# Patient Record
Sex: FEMALE | Race: WHITE | NOT HISPANIC OR LATINO | URBAN - METROPOLITAN AREA
[De-identification: names, ages, dates, MRNs, and addresses within clinical notes are randomized per-mention and may not be internally consistent; named-entity substitution may affect disease eponyms.]

---

## 2017-02-13 ENCOUNTER — ALLSCRIPTS OFFICE VISIT (OUTPATIENT)
Dept: OTHER | Facility: OTHER | Age: 67
End: 2017-02-13

## 2017-04-04 ENCOUNTER — ALLSCRIPTS OFFICE VISIT (OUTPATIENT)
Dept: OTHER | Facility: OTHER | Age: 67
End: 2017-04-04

## 2017-04-04 DIAGNOSIS — Z12.31 ENCOUNTER FOR SCREENING MAMMOGRAM FOR MALIGNANT NEOPLASM OF BREAST: ICD-10-CM

## 2017-04-04 PROCEDURE — G0145 SCR C/V CYTO,THINLAYER,RESCR: HCPCS | Performed by: NURSE PRACTITIONER

## 2017-04-05 ENCOUNTER — LAB REQUISITION (OUTPATIENT)
Dept: LAB | Facility: HOSPITAL | Age: 67
End: 2017-04-05
Payer: COMMERCIAL

## 2017-04-05 DIAGNOSIS — Z01.419 ENCOUNTER FOR GYNECOLOGICAL EXAMINATION WITHOUT ABNORMAL FINDING: ICD-10-CM

## 2017-04-11 LAB
LAB AP GYN PRIMARY INTERPRETATION: NORMAL
Lab: NORMAL

## 2017-04-12 ENCOUNTER — GENERIC CONVERSION - ENCOUNTER (OUTPATIENT)
Dept: OTHER | Facility: OTHER | Age: 67
End: 2017-04-12

## 2017-10-17 ENCOUNTER — HOSPITAL ENCOUNTER (OUTPATIENT)
Dept: MAMMOGRAPHY | Facility: CLINIC | Age: 67
Discharge: HOME | End: 2017-10-17
Payer: COMMERCIAL

## 2017-10-17 DIAGNOSIS — Z12.31 ENCOUNTER FOR SCREENING MAMMOGRAM FOR MALIGNANT NEOPLASM OF BREAST: ICD-10-CM

## 2017-10-17 PROCEDURE — G0202 SCR MAMMO BI INCL CAD: HCPCS

## 2017-10-17 PROCEDURE — 77063 BREAST TOMOSYNTHESIS BI: CPT

## 2018-01-12 VITALS
BODY MASS INDEX: 21.97 KG/M2 | DIASTOLIC BLOOD PRESSURE: 60 MMHG | WEIGHT: 124 LBS | HEIGHT: 63 IN | SYSTOLIC BLOOD PRESSURE: 103 MMHG

## 2018-01-13 VITALS
SYSTOLIC BLOOD PRESSURE: 102 MMHG | BODY MASS INDEX: 21.44 KG/M2 | WEIGHT: 121 LBS | DIASTOLIC BLOOD PRESSURE: 64 MMHG | HEIGHT: 63 IN

## 2018-01-14 NOTE — RESULT NOTES
Message  Previous [facility] pt: Last Annual 3/30/2016, discussed ASCCP guidelines and she will "think about them"   Colonoscopy due SUMMER 2016/2017, DXA 9/22/16 showed + osteopenia, FRAX 16% and 0 9%, Activella stopped 3/2015, Mammo review from 2008 till 2016 all nml with last mammo 9/21/2016 BIRADS 1, FIBROGLANDULAR      Signatures   Electronically signed by : JAY Angeles; Sep 23 2016  3:13PM EST                       (Author)

## 2018-01-16 NOTE — RESULT NOTES
Message  PAPS NIL 3/30/2016 THRU 2009  HPV NEG 2015      Signatures   Electronically signed by : Sean Piedra; Sep 26 2016  3:01PM EST                       (Author)

## 2018-01-17 NOTE — RESULT NOTES
Verified Results  (1) THIN PREP PAP WITH IMAGING [id number] 12:00AM Garo Wallis     Test Name Result Flag Reference   LAB AP CASE REPORT (Report)     Gynecologic Cytology Report            Case: [id number]                  Authorizing Provider: JAY Marinelli    Collected:      04/04/2017           First Screen:     FAHAD Qiu    Received:      04/06/2017 1226        Specimen:  LIQUID-BASED PAP, SCREENING, Cervix   LAB AP GYN PRIMARY INTERPRETATION      Negative for intraepithelial lesion or malignancy  Electronically signed by FAHAD Qiu on 4/11/2017 at 10:15 AM   LAB AP GYN SPECIMEN ADEQUACY      Satisfactory for evaluation  (See note)   LAB AP GYN NOTE      No endocervical cells identified  It is difficult to differentiate between   squamous metaplastic cells and parabasal cells in atrophic specimens due   to numerous causes including menopause, postpartum changes and   progestational agents  LAB AP GYN ADDITIONAL INFORMATION (Report)     [employer]'s FDA approved ,  and ThinPrep Imaging System are   utilized with strict adherence to the 's instruction manual to   prepare gynecologic and non-gynecologic cytology specimens for the   production of ThinPrep slides as well as for gynecologic ThinPrep imaging  These processes have been validated by our laboratory and/or by the     The Pap test is not a diagnostic procedure and should not be used as the   sole means to detect cervical cancer  It is only a screening procedure to   aid in the detection of cervical cancer and its precursors  Both   false-negative and false-positive results have been experienced  Your   patient's test result should be interpreted in this context together with   the history and clinical findings     LAB AP LMP

## 2018-04-09 ENCOUNTER — ANNUAL EXAM (OUTPATIENT)
Dept: OBGYN CLINIC | Age: 68
End: 2018-04-09
Payer: COMMERCIAL

## 2018-04-09 VITALS
DIASTOLIC BLOOD PRESSURE: 80 MMHG | SYSTOLIC BLOOD PRESSURE: 118 MMHG | BODY MASS INDEX: 22.15 KG/M2 | HEIGHT: 63 IN | WEIGHT: 125 LBS

## 2018-04-09 DIAGNOSIS — N81.4 UTERINE PROLAPSE: ICD-10-CM

## 2018-04-09 DIAGNOSIS — Z01.419 ENCOUNTER FOR GYNECOLOGICAL EXAMINATION WITHOUT ABNORMAL FINDING: Primary | ICD-10-CM

## 2018-04-09 DIAGNOSIS — N81.6 RECTOCELE: ICD-10-CM

## 2018-04-09 DIAGNOSIS — Z78.0 POSTMENOPAUSAL: ICD-10-CM

## 2018-04-09 DIAGNOSIS — Z12.31 ENCOUNTER FOR SCREENING MAMMOGRAM FOR MALIGNANT NEOPLASM OF BREAST: ICD-10-CM

## 2018-04-09 DIAGNOSIS — N81.11 MIDLINE CYSTOCELE: ICD-10-CM

## 2018-04-09 PROCEDURE — S0612 ANNUAL GYNECOLOGICAL EXAMINA: HCPCS | Performed by: NURSE PRACTITIONER

## 2018-04-09 NOTE — PATIENT INSTRUCTIONS
Calcium/Vitamin D Supplement (By mouth)   Calcium (IRINEO-see-um), Vitamin D (VYE-ta-min D)  Supplies your body with calcium if you need more than you get in your diet  Calcium helps prevent osteoporosis (weak or brittle bones)  Vitamin D helps your body use the calcium  Calcium and vitamin D are minerals that your body needs to work properly  Brand Name(s): Siddharth-Citrate, Siddharth-Citrate Plus Vitamin D, Calcet Petites, Calcium 600MG+D, Calcium Citrate +D3 Maximum, Caltrate 600 + D, Citracal + D, Citracal Calcium Citrate Petites with Vitamin D, Citracal Petites, Citracal Ultradense Calcium Citrate, Citracal Ultradense Calcium Citrate Petite w/Vit D, Citrus Calcium with Vitamin D, D-1000, D-2000, D3-400IU   There may be other brand names for this medicine  When This Medicine Should Not Be Used: You should not use this medicine if you have had an allergic reaction to calcium or vitamin D (ergocalciferol)  How to Use This Medicine:   Tablet, Long Acting Tablet, Fizzy Tablet, Liquid Filled Capsule, Chewable Tablet  · Your doctor will tell you how much medicine to use  Do not use more than directed  · Follow the instructions on the medicine label if you are using this medicine without a prescription  Ask your pharmacist or health caregiver if you are not sure how much calcium you should take in one day  · Most calcium supplements should be taken with food, but some kinds of calcium (such as calcium citrate) can be taken with or without food  Ask your health care provider or read the label on the bottle to see if you need to take your specific kind of calcium with food  Drink a full glass of water (8 ounces) with each dose  · If you are using the effervescent (fizzy) tablet, dissolve the tablet in about 6 to 8 ounces of water (3/4 cup to 1 cup)  After the tablet is completely dissolved, drink this mixture right away  Do not save any mixture to take later    · Carefully follow your doctor's instructions about any special diet   · If you need to take more than one dose each a day, take each dose at evenly spaced times, unless your doctor has told you otherwise  If a dose is missed:   · Take a dose as soon as you remember  If it is almost time for your next dose, wait until then and take a regular dose  Do not take extra medicine to make up for a missed dose  How to Store and Dispose of This Medicine:   · Store the medicine in a closed container at room temperature, away from heat, moisture, and direct light  If the effervescent (fizzy) tablet comes packaged in foil, do not open the foil until you are ready to use each tablet  · Ask your pharmacist, doctor, or health caregiver about the best way to dispose of any outdated medicine or medicine no longer needed  · Keep all medicine out of the reach of children  Never share your medicine with anyone  Drugs and Foods to Avoid:   Ask your doctor or pharmacist before using any other medicine, including over-the-counter medicines, vitamins, and herbal products  · Make sure your doctor knows if you are also using other supplements or medicines that contain calcium  Tell your doctor if you are also using gallium nitrate (Ganite®), cellulose sodium phosphate (Calcibind®), or etidronate (Didronel®)  · Calcium can change the way other medicines work if you take them at the same time  If you need to use other medicines, take them at least 2 hours before or 2 hours after you take your calcium supplement  This is particularly important if you are also using phenytoin (Dilantin®) or a tetracycline antibiotic to treat an infection (such as doxycycline, minocycline, Vibramycin®)  · Do not take your calcium supplement with a high-fiber meal (such as bran, whole-grain cereal or bread, fresh fruits)  Do not smoke cigarettes or cigars  Do not drink large amounts of alcohol or caffeine (for example, more than about 8 cups of coffee)    Warnings While Using This Medicine:   · Make sure your doctor knows if you are pregnant or breast feeding, or if you have kidney disease or have ever had kidney stones  Tell your doctor if you have had problems with too much calcium (hypercalcemia) or too little calcium in your blood (hypocalcemia)  Some health problems that can cause hypercalcemia are sarcoidosis, or problems with your parathyroid gland  · You should not use certain brands of this medicine if you have kidney disease or are on dialysis, because they may harm your kidneys  Ask your caregiver what brands are best for you  · Some health problems can affect how much calcium you should take  Tell your doctor if you have stomach or digestion problems, such as on-going diarrhea, not absorbing nutrients properly, or not having enough acid in your stomach  · This medicine might contain phenylalanine (aspartame)  This is only a concern if you have a disorder called phenylketonuria (a problem with amino acids)  If you have this condition, talk to your doctor before using this medicine  · If you are using a large amount of calcium or using it for a long time, your doctor might need to check your blood on a regular basis  Be sure to keep all appointments  Possible Side Effects While Using This Medicine:   Call your doctor right away if you notice any of these side effects:  · Headache that will not go away, dry mouth, loss of appetite, severe constipation  If you notice other side effects that you think are caused by this medicine, tell your doctor  Call your doctor for medical advice about side effects  You may report side effects to FDA at 7-804-FDA-5704  © 2017 [employer] Information is for End User's use only and may not be sold, redistributed or otherwise used for commercial purposes  The above information is an  only  It is not intended as medical advice for individual conditions or treatments   Talk to your doctor, nurse or pharmacist before following any medical regimen to see if it is safe and effective for you

## 2018-04-09 NOTE — PROGRESS NOTES
Assessment/Plan:    No problem-specific Assessment & Plan notes found for this encounter  Diagnoses and all orders for this visit:    Encounter for gynecological examination without abnormal finding    Encounter for screening mammogram for malignant neoplasm of breast  -     Mammo screening bilateral w 3d & cad; Future    Postmenopausal  -     DXA bone density spine hip and pelvis; Future    Rectocele    Uterine prolapse    Midline cystocele    Other orders  -     Calcium Carbonate-Vitamin D3 (CALCIUM 600/VITAMIN D) 600-400 MG-UNIT TABS; Take 1 tablet by mouth daily          Subjective:      Patient ID: Andressa Odell is a 78 y o  female  Pleasant 78 y o  postmenopausal female here for annual exam  She denies postmenopausal bleeding  Denies history of abnormal pap smears, agrees to no pap today  Denies vaginal issues other than prolapse but does not request intervention at this point    Denies pelvic pain  Denies menopausal/postmenopausal issues  The following portions of the patient's history were reviewed and updated as appropriate:   She  has a past medical history of Benign neoplasm of skin  She   Patient Active Problem List    Diagnosis Date Noted    Postmenopausal 04/09/2018    Encounter for gynecological examination without abnormal finding 04/09/2018    Midline cystocele 12/30/2016    Rectocele 12/30/2016    Uterine prolapse 12/30/2016    Osteopenia 12/16/2016    Blood in urine 01/17/2014     She  has a past surgical history that includes Breast lumpectomy and Tubal ligation  Her family history includes Depression in her daughter and son; Migraines in her daughter and mother; Osteoporosis in her mother; Ovarian cancer in her paternal grandfather; Rheum arthritis in her daughter; Throat cancer in her father; Urinary tract infection in her daughter  She  reports that she has never smoked  She has never used smokeless tobacco  She reports that she drinks alcohol   She reports that she does not use drugs  Current Outpatient Prescriptions   Medication Sig Dispense Refill    Calcium Carbonate-Vitamin D3 (CALCIUM 600/VITAMIN D) 600-400 MG-UNIT TABS Take 1 tablet by mouth daily       No current facility-administered medications for this visit  No current outpatient prescriptions on file prior to visit  No current facility-administered medications on file prior to visit  She has No Known Allergies  OB History    Para Term  AB Living   4 4 4     4   SAB TAB Ectopic Multiple Live Births           4      # Outcome Date GA Lbr Landry/2nd Weight Sex Delivery Anes PTL Lv   4 Term            3 Term            2 Term            1 Term                 2 daughter, 2 sons  Review of Systems   Constitutional: Negative for chills, fatigue, fever and unexpected weight change  Respiratory: Negative for shortness of breath  Gastrointestinal: Negative for anal bleeding, blood in stool, constipation and diarrhea  Genitourinary: Negative for difficulty urinating, dysuria and hematuria  Objective:      /80 (BP Location: Right arm, Patient Position: Sitting)   Ht 5' 3" (1 6 m)   Wt 56 7 kg (125 lb)   Breastfeeding? No   BMI 22 14 kg/m²          Physical Exam   Constitutional: She appears well-developed and well-nourished  No distress  HENT:   Head: Normocephalic  Neck: Normal range of motion  Neck supple  Pulmonary/Chest: Effort normal  Right breast exhibits no inverted nipple, no mass, no nipple discharge, no skin change and no tenderness  Left breast exhibits no inverted nipple, no mass, no nipple discharge, no skin change and no tenderness  Breasts are symmetrical    Abdominal: Soft  Genitourinary: No breast swelling, tenderness, discharge or bleeding  Pelvic exam was performed with patient supine  No labial fusion  There is no rash, tenderness, lesion or injury on the right labia  There is no rash, tenderness, lesion or injury on the left labia   Uterus is not deviated, not enlarged, not fixed and not tender  Cervix exhibits no motion tenderness, no discharge and no friability  Right adnexum displays no mass, no tenderness and no fullness  Left adnexum displays no mass, no tenderness and no fullness  No erythema or tenderness in the vagina  No foreign body in the vagina  No signs of injury around the vagina  No vaginal discharge found  Genitourinary Comments: +uterine/bladder and rectal prolapse stopped using pessary   Lymphadenopathy:        Right: No inguinal adenopathy present  Left: No inguinal adenopathy present

## 2018-11-21 ENCOUNTER — HOSPITAL ENCOUNTER (OUTPATIENT)
Dept: MAMMOGRAPHY | Facility: CLINIC | Age: 68
Discharge: HOME | End: 2018-11-21
Payer: COMMERCIAL

## 2018-11-21 VITALS — WEIGHT: 125 LBS | BODY MASS INDEX: 22.15 KG/M2 | HEIGHT: 63 IN

## 2018-11-21 DIAGNOSIS — Z78.0 POSTMENOPAUSAL: ICD-10-CM

## 2018-11-21 DIAGNOSIS — Z12.31 ENCOUNTER FOR SCREENING MAMMOGRAM FOR MALIGNANT NEOPLASM OF BREAST: ICD-10-CM

## 2018-11-21 PROCEDURE — 77067 SCR MAMMO BI INCL CAD: CPT

## 2018-11-21 PROCEDURE — 77063 BREAST TOMOSYNTHESIS BI: CPT

## 2018-11-21 PROCEDURE — 77080 DXA BONE DENSITY AXIAL: CPT

## 2018-11-26 ENCOUNTER — TELEPHONE (OUTPATIENT)
Dept: OBGYN CLINIC | Facility: CLINIC | Age: 68
End: 2018-11-26

## 2018-11-26 NOTE — TELEPHONE ENCOUNTER
Spoke with pt - she is aware of her DEXA results  Explained we can start her on medication like Fosamax or Boniva - stated she does not want to take medication  She will continue taking her calcium and Vit D with her weight bearing exercises

## 2018-11-26 NOTE — TELEPHONE ENCOUNTER
----- Message from Gabriela Orr, [address]  sent at 11/26/2018  8:18 AM EST -----  Please notify patient her Dexa scan results showed osteoporosis  We can start medication for this if she agrees  She should also continue to work on calcium/vitamin D in her diet and weight bearing exercises  Thanks

## 2019-02-21 ENCOUNTER — OFFICE VISIT (OUTPATIENT)
Dept: INTERNAL MEDICINE CLINIC | Facility: CLINIC | Age: 69
End: 2019-02-21
Payer: COMMERCIAL

## 2019-02-21 VITALS
SYSTOLIC BLOOD PRESSURE: 122 MMHG | BODY MASS INDEX: 22.43 KG/M2 | HEIGHT: 63 IN | OXYGEN SATURATION: 96 % | WEIGHT: 126.6 LBS | DIASTOLIC BLOOD PRESSURE: 70 MMHG | HEART RATE: 70 BPM

## 2019-02-21 DIAGNOSIS — M54.50 ACUTE MIDLINE LOW BACK PAIN WITHOUT SCIATICA: ICD-10-CM

## 2019-02-21 DIAGNOSIS — K59.09 OTHER CONSTIPATION: Primary | ICD-10-CM

## 2019-02-21 PROCEDURE — 1160F RVW MEDS BY RX/DR IN RCRD: CPT | Performed by: INTERNAL MEDICINE

## 2019-02-21 PROCEDURE — 3008F BODY MASS INDEX DOCD: CPT | Performed by: INTERNAL MEDICINE

## 2019-02-21 PROCEDURE — 99214 OFFICE O/P EST MOD 30 MIN: CPT | Performed by: INTERNAL MEDICINE

## 2019-02-21 PROCEDURE — 1036F TOBACCO NON-USER: CPT | Performed by: INTERNAL MEDICINE

## 2019-02-21 NOTE — PROGRESS NOTES
Assessment/Plan:  Regarding the constipation she did have a good bowel movement today  It may be secondary to her back trauma  She will have an x-ray of her low back  She can use Tylenol and/or ibuprofen for that and if it does not get better she will let me know  Regarding the constipation this is a new problem and she has not had a colonoscopy in 12 years  She will use MiraLax for the next day or 2 and then a stool softener  She will make an appointment as soon as possible with Gastroenterology for a colonoscopy  She will get labs including thyroid functions today and if she is not totally resolved of her symptoms she will come back in for re-evaluation  No results found for this or any previous visit (from the past 1008 hour(s))  1  Other constipation  CBC and differential    Comprehensive metabolic panel    TSH, 3rd generation    T4, free    Ambulatory referral to Gastroenterology   2  Acute midline low back pain without sciatica  XR spine lumbar 2 or 3 views injury       Orders Placed This Encounter   Procedures    XR spine lumbar 2 or 3 views injury    CBC and differential    Comprehensive metabolic panel    TSH, 3rd generation    T4, free    Ambulatory referral to Gastroenterology         Subjective:  Constipation and back pain     Patient ID: Lois Mane is a 80 y o  female  HPI she fell about a week ago  She has not had a bowel movement in 4 days until this morning  She was bloated yesterday  No severe pain  No fever  She still has some back pain but it is better  Otherwise has been feeling fairly well but has not had a colonoscopy in 12 years  No melena or hematochezia weight loss or appetite changes  She does have a history of bladder prolapse    The following portions of the patient's history were reviewed and updated as appropriate:   She has a past medical history of Benign neoplasm of skin  ,  does not have any pertinent problems on file  ,   has a past surgical history that includes Tubal ligation and Breast lumpectomy (Left)  ,  family history includes Depression in her daughter and son; Migraines in her daughter and mother; Osteoporosis in her mother; Rheum arthritis in her daughter; Throat cancer in her father; Urinary tract infection in her daughter  ,   reports that she has never smoked  She has never used smokeless tobacco  She reports that she drinks alcohol  She reports that she does not use drugs  ,  has No Known Allergies       Current Outpatient Medications:     Calcium Carbonate-Vitamin D3 (CALCIUM 600/VITAMIN D) 600-400 MG-UNIT TABS, Take 1 tablet by mouth daily, Disp: , Rfl:     Review of Systems   Constitutional: Negative for activity change, appetite change, chills, diaphoresis, fatigue, fever and unexpected weight change  HENT: Negative for congestion, ear pain, hearing loss, mouth sores, nosebleeds, postnasal drip, sinus pressure, sinus pain, sore throat and trouble swallowing  Eyes: Negative for pain, discharge and visual disturbance  Respiratory: Negative for apnea, cough, chest tightness, shortness of breath and wheezing  Cardiovascular: Negative for chest pain, palpitations and leg swelling  Gastrointestinal: Positive for constipation  Negative for abdominal pain, anal bleeding, blood in stool, diarrhea, nausea and vomiting  Endocrine: Negative for polydipsia and polyphagia  Genitourinary: Negative for decreased urine volume, dysuria, flank pain, frequency, hematuria and urgency  She has some bladder dysfunction  Musculoskeletal: Positive for back pain  Negative for arthralgias, gait problem, joint swelling and myalgias  Skin: Negative for rash and wound  Allergic/Immunologic: Negative for environmental allergies and food allergies  Neurological: Negative for dizziness, tremors, seizures, syncope, speech difficulty, light-headedness, numbness and headaches  Hematological: Negative for adenopathy   Does not bruise/bleed easily  Psychiatric/Behavioral: Negative for agitation, confusion, hallucinations, sleep disturbance and suicidal ideas  The patient is not nervous/anxious  Objective:  /70   Pulse 70   Ht 5' 3" (1 6 m)   Wt 57 4 kg (126 lb 9 6 oz)   SpO2 96%   BMI 22 43 kg/m²      Physical Exam   Constitutional: She appears well-developed and well-nourished  No distress  HENT:   Head: Normocephalic  Right Ear: External ear normal    Left Ear: External ear normal    Nose: Nose normal    Mouth/Throat: Oropharynx is clear and moist  No oropharyngeal exudate  Eyes: Pupils are equal, round, and reactive to light  Conjunctivae and EOM are normal  Right eye exhibits no discharge  Left eye exhibits no discharge  Neck: Normal range of motion  Neck supple  No thyromegaly present  Cardiovascular: Normal rate, regular rhythm, normal heart sounds and intact distal pulses  Exam reveals no gallop and no friction rub  No murmur heard  Pulmonary/Chest: Effort normal and breath sounds normal  No respiratory distress  She has no wheezes  She has no rales  Abdominal: Soft  Bowel sounds are normal  She exhibits no distension and no mass  There is no tenderness  There is no rebound and no guarding  Bowel sounds are normoactive  No masses  No tenderness  Musculoskeletal: Normal range of motion  She exhibits no edema, tenderness or deformity  Lymphadenopathy:     She has no cervical adenopathy  Neurological: She is alert  She has normal reflexes  She displays normal reflexes  No cranial nerve deficit  Coordination normal    Skin: Skin is warm and dry  No rash noted  No erythema  Psychiatric: She has a normal mood and affect  Her behavior is normal  Judgment and thought content normal    Nursing note and vitals reviewed

## 2019-02-22 ENCOUNTER — APPOINTMENT (OUTPATIENT)
Dept: RADIOLOGY | Facility: CLINIC | Age: 69
End: 2019-02-22
Payer: COMMERCIAL

## 2019-02-22 ENCOUNTER — APPOINTMENT (OUTPATIENT)
Dept: LAB | Facility: CLINIC | Age: 69
End: 2019-02-22
Payer: COMMERCIAL

## 2019-02-22 DIAGNOSIS — K59.09 OTHER CONSTIPATION: ICD-10-CM

## 2019-02-22 DIAGNOSIS — M54.50 ACUTE MIDLINE LOW BACK PAIN WITHOUT SCIATICA: ICD-10-CM

## 2019-02-22 LAB
ALBUMIN SERPL BCP-MCNC: 3.7 G/DL (ref 3.5–5)
ALP SERPL-CCNC: 81 U/L (ref 46–116)
ALT SERPL W P-5'-P-CCNC: 21 U/L (ref 12–78)
ANION GAP SERPL CALCULATED.3IONS-SCNC: 7 MMOL/L (ref 4–13)
AST SERPL W P-5'-P-CCNC: 23 U/L (ref 5–45)
BASOPHILS # BLD AUTO: 0.07 THOUSANDS/ΜL (ref 0–0.1)
BASOPHILS NFR BLD AUTO: 1 % (ref 0–1)
BILIRUB SERPL-MCNC: 0.67 MG/DL (ref 0.2–1)
BUN SERPL-MCNC: 17 MG/DL (ref 5–25)
CALCIUM SERPL-MCNC: 8.8 MG/DL (ref 8.3–10.1)
CHLORIDE SERPL-SCNC: 106 MMOL/L (ref 100–108)
CO2 SERPL-SCNC: 26 MMOL/L (ref 21–32)
CREAT SERPL-MCNC: 0.91 MG/DL (ref 0.6–1.3)
EOSINOPHIL # BLD AUTO: 0.31 THOUSAND/ΜL (ref 0–0.61)
EOSINOPHIL NFR BLD AUTO: 5 % (ref 0–6)
ERYTHROCYTE [DISTWIDTH] IN BLOOD BY AUTOMATED COUNT: 11.9 % (ref 11.6–15.1)
GFR SERPL CREATININE-BSD FRML MDRD: 65 ML/MIN/1.73SQ M
GLUCOSE P FAST SERPL-MCNC: 77 MG/DL (ref 65–99)
HCT VFR BLD AUTO: 41.4 % (ref 34.8–46.1)
HGB BLD-MCNC: 13.6 G/DL (ref 11.5–15.4)
IMM GRANULOCYTES # BLD AUTO: 0.01 THOUSAND/UL (ref 0–0.2)
IMM GRANULOCYTES NFR BLD AUTO: 0 % (ref 0–2)
LYMPHOCYTES # BLD AUTO: 1.67 THOUSANDS/ΜL (ref 0.6–4.47)
LYMPHOCYTES NFR BLD AUTO: 26 % (ref 14–44)
MCH RBC QN AUTO: 30.4 PG (ref 26.8–34.3)
MCHC RBC AUTO-ENTMCNC: 32.9 G/DL (ref 31.4–37.4)
MCV RBC AUTO: 93 FL (ref 82–98)
MONOCYTES # BLD AUTO: 0.5 THOUSAND/ΜL (ref 0.17–1.22)
MONOCYTES NFR BLD AUTO: 8 % (ref 4–12)
NEUTROPHILS # BLD AUTO: 3.88 THOUSANDS/ΜL (ref 1.85–7.62)
NEUTS SEG NFR BLD AUTO: 60 % (ref 43–75)
NRBC BLD AUTO-RTO: 0 /100 WBCS
PLATELET # BLD AUTO: 289 THOUSANDS/UL (ref 149–390)
PMV BLD AUTO: 10.2 FL (ref 8.9–12.7)
POTASSIUM SERPL-SCNC: 4.1 MMOL/L (ref 3.5–5.3)
PROT SERPL-MCNC: 7.5 G/DL (ref 6.4–8.2)
RBC # BLD AUTO: 4.47 MILLION/UL (ref 3.81–5.12)
SODIUM SERPL-SCNC: 139 MMOL/L (ref 136–145)
T4 FREE SERPL-MCNC: 0.92 NG/DL (ref 0.76–1.46)
TSH SERPL DL<=0.05 MIU/L-ACNC: 1.83 UIU/ML (ref 0.36–3.74)
WBC # BLD AUTO: 6.44 THOUSAND/UL (ref 4.31–10.16)

## 2019-02-22 PROCEDURE — 84443 ASSAY THYROID STIM HORMONE: CPT

## 2019-02-22 PROCEDURE — 85025 COMPLETE CBC W/AUTO DIFF WBC: CPT

## 2019-02-22 PROCEDURE — 36415 COLL VENOUS BLD VENIPUNCTURE: CPT

## 2019-02-22 PROCEDURE — 80053 COMPREHEN METABOLIC PANEL: CPT

## 2019-02-22 PROCEDURE — 84439 ASSAY OF FREE THYROXINE: CPT

## 2019-02-22 PROCEDURE — 72100 X-RAY EXAM L-S SPINE 2/3 VWS: CPT

## 2019-03-05 ENCOUNTER — TELEPHONE (OUTPATIENT)
Dept: INTERNAL MEDICINE CLINIC | Facility: CLINIC | Age: 69
End: 2019-03-05

## 2019-03-05 NOTE — TELEPHONE ENCOUNTER
Patient returning dr gama's phone call  The best time to reach her is before 3 pm daily  Please have him call her at [phone] before 3 pm  Duran López

## 2019-04-12 ENCOUNTER — ANNUAL EXAM (OUTPATIENT)
Dept: OBGYN CLINIC | Age: 69
End: 2019-04-12
Payer: COMMERCIAL

## 2019-04-12 VITALS
WEIGHT: 123 LBS | BODY MASS INDEX: 21.79 KG/M2 | DIASTOLIC BLOOD PRESSURE: 70 MMHG | HEIGHT: 63 IN | SYSTOLIC BLOOD PRESSURE: 112 MMHG

## 2019-04-12 DIAGNOSIS — N81.11 MIDLINE CYSTOCELE: ICD-10-CM

## 2019-04-12 DIAGNOSIS — Z12.31 ENCOUNTER FOR SCREENING MAMMOGRAM FOR MALIGNANT NEOPLASM OF BREAST: Primary | ICD-10-CM

## 2019-04-12 DIAGNOSIS — N81.4 UTERINE PROLAPSE: ICD-10-CM

## 2019-04-12 DIAGNOSIS — M81.8 OTHER OSTEOPOROSIS WITHOUT CURRENT PATHOLOGICAL FRACTURE: ICD-10-CM

## 2019-04-12 DIAGNOSIS — Z78.0 ASYMPTOMATIC POSTMENOPAUSAL STATUS: ICD-10-CM

## 2019-04-12 DIAGNOSIS — Z01.411 ENCOUNTER FOR GYNECOLOGICAL EXAMINATION WITH ABNORMAL FINDING: ICD-10-CM

## 2019-04-12 DIAGNOSIS — N81.6 RECTOCELE: ICD-10-CM

## 2019-04-12 PROCEDURE — S0612 ANNUAL GYNECOLOGICAL EXAMINA: HCPCS | Performed by: NURSE PRACTITIONER

## 2020-01-28 ENCOUNTER — HOSPITAL ENCOUNTER (OUTPATIENT)
Dept: MAMMOGRAPHY | Facility: CLINIC | Age: 70
Discharge: HOME | End: 2020-01-28
Payer: COMMERCIAL

## 2020-01-28 VITALS — WEIGHT: 123 LBS | BODY MASS INDEX: 21.79 KG/M2 | HEIGHT: 63 IN

## 2020-01-28 DIAGNOSIS — Z12.31 ENCOUNTER FOR SCREENING MAMMOGRAM FOR MALIGNANT NEOPLASM OF BREAST: ICD-10-CM

## 2020-01-28 PROCEDURE — 77063 BREAST TOMOSYNTHESIS BI: CPT

## 2020-01-28 PROCEDURE — 77067 SCR MAMMO BI INCL CAD: CPT

## 2020-07-17 LAB
EXTERNAL HIV CONFIRMATION: NORMAL
EXTERNAL HIV SCREEN: NORMAL
HCV AB SER-ACNC: NEGATIVE

## 2020-08-18 ENCOUNTER — TELEPHONE (OUTPATIENT)
Dept: INTERNAL MEDICINE CLINIC | Facility: CLINIC | Age: 70
End: 2020-08-18

## 2020-09-08 ENCOUNTER — OFFICE VISIT (OUTPATIENT)
Dept: INTERNAL MEDICINE CLINIC | Facility: CLINIC | Age: 70
End: 2020-09-08
Payer: MEDICARE

## 2020-09-08 VITALS
SYSTOLIC BLOOD PRESSURE: 120 MMHG | HEART RATE: 68 BPM | RESPIRATION RATE: 12 BRPM | BODY MASS INDEX: 21.79 KG/M2 | TEMPERATURE: 97.1 F | HEIGHT: 63 IN | DIASTOLIC BLOOD PRESSURE: 72 MMHG | WEIGHT: 123 LBS

## 2020-09-08 DIAGNOSIS — Z00.00 WELL ADULT EXAM: ICD-10-CM

## 2020-09-08 DIAGNOSIS — M81.8 OTHER OSTEOPOROSIS WITHOUT CURRENT PATHOLOGICAL FRACTURE: ICD-10-CM

## 2020-09-08 DIAGNOSIS — Z12.11 COLON CANCER SCREENING: Primary | ICD-10-CM

## 2020-09-08 DIAGNOSIS — Z23 NEED FOR PNEUMOCOCCAL VACCINATION: ICD-10-CM

## 2020-09-08 DIAGNOSIS — M41.25 OTHER IDIOPATHIC SCOLIOSIS, THORACOLUMBAR REGION: ICD-10-CM

## 2020-09-08 PROCEDURE — 99214 OFFICE O/P EST MOD 30 MIN: CPT | Performed by: INTERNAL MEDICINE

## 2020-09-08 RX ORDER — CYCLOSPORINE 0.5 MG/ML
1 EMULSION OPHTHALMIC EVERY 12 HOURS
COMMUNITY
Start: 2020-09-04

## 2020-09-08 NOTE — PROGRESS NOTES
Assessment/Plan:    Diagnoses and all orders for this visit:    Colon cancer screening  -     Cologuard; Future    Other osteoporosis without current pathological fracture    Other idiopathic scoliosis, thoracolumbar region    Need for pneumococcal vaccination    Other orders  -     Restasis 0 05 % ophthalmic emulsion         Patient Instructions   New patient visit to establish primary care and annual Medicare well visit  Health maintenance-due for pneumococcal vaccine, she just got Shingrix 9 days ago therefore hold off until next month for pneumococcal vaccine as well as flu vaccine  Cologuard ordered, health maintenance otherwise up-to-date    Osteoporosis-repeat DEXA in November    Left flank pain is likely related to thoracolumbar scoliosis  Consider physical therapy if symptoms warrant, otherwise no specific treatment is indicated  Check CBC, insurance physical lab work reviewed and otherwise unremarkable  Follow-up yearly and as needed      Subjective:      Patient ID: Armida Atwood is a 77 y o  female    New patien to [facility] primary care  Sciatica  Left flank pain        Current Outpatient Medications:     Calcium Carbonate-Vitamin D3 (CALCIUM 600/VITAMIN D) 600-400 MG-UNIT TABS, Take 1 tablet by mouth daily, Disp: , Rfl:     Restasis 0 05 % ophthalmic emulsion, , Disp: , Rfl:     No results found for this or any previous visit (from the past 1008 hour(s))  The following portions of the patient's history were reviewed and updated as appropriate: allergies, current medications, past family history, past medical history, past social history, past surgical history and problem list      Review of Systems   Musculoskeletal: Positive for arthralgias and myalgias  Objective:      Vitals:    09/08/20 1005   BP: 120/72   Pulse: 68   Resp: 12   Temp: (!) 97 1 °F (36 2 °C)          Physical Exam  Constitutional:       Appearance: She is well-developed     HENT:      Head: Normocephalic and atraumatic  Nose: Nose normal    Eyes:      General: No scleral icterus  Conjunctiva/sclera: Conjunctivae normal       Pupils: Pupils are equal, round, and reactive to light  Neck:      Musculoskeletal: Normal range of motion and neck supple  Thyroid: No thyromegaly  Vascular: No JVD  Trachea: No tracheal deviation  Cardiovascular:      Rate and Rhythm: Normal rate and regular rhythm  Heart sounds: No murmur  No friction rub  No gallop  Pulmonary:      Effort: Pulmonary effort is normal  No respiratory distress  Breath sounds: Normal breath sounds  No wheezing or rales  Abdominal:      General: Bowel sounds are normal  There is no distension  Palpations: Abdomen is soft  There is no mass  Tenderness: There is no abdominal tenderness  There is no guarding or rebound  Musculoskeletal:         General: No tenderness  Comments: Dextro thoracic and levo lumbar scoliosis   Lymphadenopathy:      Cervical: No cervical adenopathy  Skin:     General: Skin is warm and dry  Findings: No erythema or rash  Neurological:      Mental Status: She is alert and oriented to person, place, and time  Cranial Nerves: No cranial nerve deficit  Psychiatric:         Behavior: Behavior normal          Thought Content:  Thought content normal          Judgment: Judgment normal

## 2020-09-08 NOTE — PATIENT INSTRUCTIONS
New patient visit to establish primary care and annual Medicare well visit  Health maintenance-due for pneumococcal vaccine, she just got Shingrix 9 days ago therefore hold off until next month for pneumococcal vaccine as well as flu vaccine  Cologuard ordered, health maintenance otherwise up-to-date    Osteoporosis-repeat DEXA in November    Left flank pain is likely related to thoracolumbar scoliosis  Consider physical therapy if symptoms warrant, otherwise no specific treatment is indicated  Check CBC, insurance physical lab work reviewed and otherwise unremarkable      Follow-up yearly and as needed

## 2020-09-08 NOTE — PROGRESS NOTES
Assessment and Plan:     Problem List Items Addressed This Visit     None      Visit Diagnoses     Colon cancer screening    -  Primary    Relevant Orders    Cologuard          Falls Plan of Care: balance, strength, and gait training instructions were provided  Preventive health issues were discussed with patient, and age appropriate screening tests were ordered as noted in patient's After Visit Summary  Personalized health advice and appropriate referrals for health education or preventive services given if needed, as noted in patient's After Visit Summary       History of Present Illness:     Patient presents for Medicare Annual Wellness visit    Patient Care Team:  Aman Yost MD as PCP - General (Internal Medicine)     Problem List:     Patient Active Problem List   Diagnosis    Blood in urine    Midline cystocele    Osteopenia    Rectocele    Uterine prolapse    Postmenopausal    Encounter for gynecological examination without abnormal finding    Other constipation    Other osteoporosis without current pathological fracture    Asymptomatic postmenopausal status      Past Medical and Surgical History:     Past Medical History:   Diagnosis Date    Benign neoplasm of skin      Past Surgical History:   Procedure Laterality Date    BREAST LUMPECTOMY Left     LAST ASSESSED: [id number]    TUBAL LIGATION      LAST ASSESSED: [id number]      Family History:     Family History   Problem Relation Age of Onset    Migraines Mother     Osteoporosis Mother     Throat cancer Father     Depression Daughter         WITH ANXIETY     Migraines Daughter     Rheum arthritis Daughter     Urinary tract infection Daughter     Depression Son         WITH ANXIETY     No Known Problems Daughter     Breast cancer Neg Hx     Colon cancer Neg Hx     Uterine cancer Neg Hx     Cervical cancer Neg Hx     Ovarian cancer Neg Hx       Social History:     E-Cigarette/Vaping    E-Cigarette Use Never User E-Cigarette/Vaping Substances    Nicotine No     THC No     CBD No     Flavoring No     Other No     Unknown No      Social History     Socioeconomic History    Marital status: /Civil Union     Spouse name: None    Number of children: None    Years of education: None    Highest education level: None   Occupational History    None   Social Needs    Financial resource strain: None    Food insecurity     Worry: None     Inability: None    Transportation needs     Medical: None     Non-medical: None   Tobacco Use    Smoking status: Never Smoker    Smokeless tobacco: Never Used   Substance and Sexual Activity    Alcohol use: Yes     Frequency: Monthly or less     Drinks per session: 1 or 2    Drug use: No    Sexual activity: Yes     Birth control/protection: Surgical   Lifestyle    Physical activity     Days per week: 7 days     Minutes per session: 30 min    Stress: To some extent   Relationships    Social connections     Talks on phone: None     Gets together: None     Attends Restoration service: None     Active member of club or organization: None     Attends meetings of clubs or organizations: None     Relationship status: None    Intimate partner violence     Fear of current or ex partner: None     Emotionally abused: None     Physically abused: None     Forced sexual activity: None   Other Topics Concern    None   Social History Narrative    None      Medications and Allergies:     Current Outpatient Medications   Medication Sig Dispense Refill    Calcium Carbonate-Vitamin D3 (CALCIUM 600/VITAMIN D) 600-400 MG-UNIT TABS Take 1 tablet by mouth daily      Restasis 0 05 % ophthalmic emulsion        No current facility-administered medications for this visit  No Known Allergies   Immunizations: There is no immunization history on file for this patient     Health Maintenance:         Topic Date Due    Hepatitis C Screening  1950    MAMMOGRAM  01/28/2021 Topic Date Due    DTaP,Tdap,and Td Vaccines (1 - Tdap) 10/11/1972    Pneumococcal Vaccine: 65+ Years (1 of 1 - PPSV23) 10/11/2016    Influenza Vaccine  07/01/2020      Medicare Health Risk Assessment:     /72 (BP Location: Left arm, Patient Position: Sitting)   Pulse 68   Temp (!) 97 1 °F (36 2 °C) (Temporal)   Resp 12   Ht 5' 3" (1 6 m)   Wt 55 8 kg (123 lb)   BMI 21 79 kg/m²      Alma Delia Plaza is here for her Subsequent Wellness visit  Last Medicare Wellness visit information reviewed, patient interviewed and updates made to the record today  Health Risk Assessment:   Patient rates overall health as excellent  Patient feels that their physical health rating is same  Eyesight was rated as same  Hearing was rated as same  Patient feels that their emotional and mental health rating is same  Pain experienced in the last 7 days has been some  Patient's pain rating has been 3/10  Patient states that she has experienced no weight loss or gain in last 6 months  Depression Screening:   PHQ-2 Score: 0      Fall Risk Screening: In the past year, patient has experienced: history of falling in past year    Number of falls: 2 or more  Injured during fall?: No    Feels unsteady when standing or walking?: No    Worried about falling?: No      Urinary Incontinence Screening:   Patient has not leaked urine accidently in the last six months  Home Safety:  Patient does not have trouble with stairs inside or outside of their home  Patient has working smoke alarms and has no working carbon monoxide detector  Home safety hazards include: none  Nutrition:   Current diet is Regular, Low Carb and Limited junk food  Medications:   Patient is not currently taking any over-the-counter supplements  Patient is able to manage medications       Activities of Daily Living (ADLs)/Instrumental Activities of Daily Living (IADLs):   Walk and transfer into and out of bed and chair?: Yes  Dress and groom yourself?: Yes Bathe or shower yourself?: Yes    Feed yourself?  Yes  Do your laundry/housekeeping?: Yes  Manage your money, pay your bills and track your expenses?: Yes  Make your own meals?: Yes    Do your own shopping?: Yes    Previous Hospitalizations:   Any hospitalizations or ED visits within the last 12 months?: No      Advance Care Planning:   Living will: Yes    Advanced directive: Yes      Comments: Sheila Cameron [address] [address]  [phone]    Cognitive Screening:   Provider or family/friend/caregiver concerned regarding cognition?: No    PREVENTIVE SCREENINGS        Breast Cancer Screening:     General: Screening Current      Cervical Cancer Screening:    General: Screening Not Indicated      Osteoporosis Screening:    General: Screening Not Indicated and History Osteoporosis      Abdominal Aortic Aneurysm (AAA) Screening:        General: Screening Not Indicated      Lung Cancer Screening:     General: Screening Not Indicated      Hepatitis C Screening:    General: Risks and Benefits Discussed      Berry Negron MD

## 2020-10-07 ENCOUNTER — TELEPHONE (OUTPATIENT)
Dept: INTERNAL MEDICINE CLINIC | Facility: CLINIC | Age: 70
End: 2020-10-07

## 2020-10-30 ENCOUNTER — TELEPHONE (OUTPATIENT)
Dept: INTERNAL MEDICINE CLINIC | Facility: CLINIC | Age: 70
End: 2020-10-30

## 2020-10-30 DIAGNOSIS — Z23 NEED FOR INFLUENZA VACCINATION: Primary | ICD-10-CM

## 2020-10-30 DIAGNOSIS — Z23 ENCOUNTER FOR IMMUNIZATION: Primary | ICD-10-CM

## 2020-11-02 ENCOUNTER — TELEPHONE (OUTPATIENT)
Dept: INTERNAL MEDICINE CLINIC | Facility: CLINIC | Age: 70
End: 2020-11-02

## 2020-11-02 ENCOUNTER — APPOINTMENT (OUTPATIENT)
Dept: LAB | Facility: CLINIC | Age: 70
End: 2020-11-02
Payer: MEDICARE

## 2020-11-02 ENCOUNTER — CLINICAL SUPPORT (OUTPATIENT)
Dept: INTERNAL MEDICINE CLINIC | Facility: CLINIC | Age: 70
End: 2020-11-02
Payer: MEDICARE

## 2020-11-02 DIAGNOSIS — Z23 ENCOUNTER FOR IMMUNIZATION: Primary | ICD-10-CM

## 2020-11-02 DIAGNOSIS — Z00.00 WELL ADULT EXAM: ICD-10-CM

## 2020-11-02 LAB
BASOPHILS # BLD AUTO: 0.07 THOUSANDS/ΜL (ref 0–0.1)
BASOPHILS NFR BLD AUTO: 1 % (ref 0–1)
EOSINOPHIL # BLD AUTO: 0.24 THOUSAND/ΜL (ref 0–0.61)
EOSINOPHIL NFR BLD AUTO: 3 % (ref 0–6)
ERYTHROCYTE [DISTWIDTH] IN BLOOD BY AUTOMATED COUNT: 12.3 % (ref 11.6–15.1)
HCT VFR BLD AUTO: 43.2 % (ref 34.8–46.1)
HGB BLD-MCNC: 13.7 G/DL (ref 11.5–15.4)
IMM GRANULOCYTES # BLD AUTO: 0.03 THOUSAND/UL (ref 0–0.2)
IMM GRANULOCYTES NFR BLD AUTO: 0 % (ref 0–2)
LYMPHOCYTES # BLD AUTO: 2.23 THOUSANDS/ΜL (ref 0.6–4.47)
LYMPHOCYTES NFR BLD AUTO: 29 % (ref 14–44)
MCH RBC QN AUTO: 29.8 PG (ref 26.8–34.3)
MCHC RBC AUTO-ENTMCNC: 31.7 G/DL (ref 31.4–37.4)
MCV RBC AUTO: 94 FL (ref 82–98)
MONOCYTES # BLD AUTO: 0.67 THOUSAND/ΜL (ref 0.17–1.22)
MONOCYTES NFR BLD AUTO: 9 % (ref 4–12)
NEUTROPHILS # BLD AUTO: 4.54 THOUSANDS/ΜL (ref 1.85–7.62)
NEUTS SEG NFR BLD AUTO: 58 % (ref 43–75)
NRBC BLD AUTO-RTO: 0 /100 WBCS
PLATELET # BLD AUTO: 303 THOUSANDS/UL (ref 149–390)
PMV BLD AUTO: 10.4 FL (ref 8.9–12.7)
RBC # BLD AUTO: 4.59 MILLION/UL (ref 3.81–5.12)
WBC # BLD AUTO: 7.78 THOUSAND/UL (ref 4.31–10.16)

## 2020-11-02 PROCEDURE — 90670 PCV13 VACCINE IM: CPT

## 2020-11-02 PROCEDURE — 36415 COLL VENOUS BLD VENIPUNCTURE: CPT

## 2020-11-02 PROCEDURE — G0008 ADMIN INFLUENZA VIRUS VAC: HCPCS

## 2020-11-02 PROCEDURE — G0009 ADMIN PNEUMOCOCCAL VACCINE: HCPCS

## 2020-11-02 PROCEDURE — 85025 COMPLETE CBC W/AUTO DIFF WBC: CPT

## 2020-11-02 PROCEDURE — 90662 IIV NO PRSV INCREASED AG IM: CPT

## 2020-11-19 ENCOUNTER — TELEMEDICINE (OUTPATIENT)
Dept: INTERNAL MEDICINE CLINIC | Facility: CLINIC | Age: 70
End: 2020-11-19
Payer: MEDICARE

## 2020-11-19 DIAGNOSIS — Z11.9 ENCOUNTER FOR SCREENING FOR INFECTIOUS AND PARASITIC DISEASES, UNSPECIFIED: Primary | ICD-10-CM

## 2020-11-19 PROCEDURE — 99213 OFFICE O/P EST LOW 20 MIN: CPT | Performed by: INTERNAL MEDICINE

## 2020-12-04 ENCOUNTER — HOSPITAL ENCOUNTER (OUTPATIENT)
Dept: MAMMOGRAPHY | Facility: CLINIC | Age: 70
Discharge: HOME | End: 2020-12-04

## 2020-12-04 DIAGNOSIS — M81.8 OTHER OSTEOPOROSIS WITHOUT CURRENT PATHOLOGICAL FRACTURE: ICD-10-CM

## 2020-12-08 ENCOUNTER — TELEPHONE (OUTPATIENT)
Dept: OBGYN CLINIC | Facility: CLINIC | Age: 70
End: 2020-12-08

## 2020-12-08 DIAGNOSIS — Z12.31 VISIT FOR SCREENING MAMMOGRAM: Primary | ICD-10-CM

## 2021-01-13 ENCOUNTER — TELEPHONE (OUTPATIENT)
Dept: INTERNAL MEDICINE CLINIC | Facility: CLINIC | Age: 71
End: 2021-01-13

## 2021-01-13 ENCOUNTER — HOSPITAL ENCOUNTER (OUTPATIENT)
Dept: MAMMOGRAPHY | Facility: CLINIC | Age: 71
Discharge: HOME | End: 2021-01-13
Payer: MEDICARE

## 2021-01-13 PROCEDURE — 77080 DXA BONE DENSITY AXIAL: CPT

## 2021-01-13 NOTE — TELEPHONE ENCOUNTER
----- Message from Tina Delong MD sent at 1/13/2021  6:21 PM EST -----  Notify -bone density is stable compared to 2 years ago

## 2021-01-14 ENCOUNTER — TELEPHONE (OUTPATIENT)
Dept: INTERNAL MEDICINE CLINIC | Facility: CLINIC | Age: 71
End: 2021-01-14

## 2021-01-15 ENCOUNTER — APPOINTMENT (OUTPATIENT)
Dept: LAB | Facility: CLINIC | Age: 71
End: 2021-01-15
Payer: MEDICARE

## 2021-01-15 ENCOUNTER — OFFICE VISIT (OUTPATIENT)
Dept: INTERNAL MEDICINE CLINIC | Facility: CLINIC | Age: 71
End: 2021-01-15
Payer: MEDICARE

## 2021-01-15 ENCOUNTER — TELEPHONE (OUTPATIENT)
Dept: INTERNAL MEDICINE CLINIC | Facility: CLINIC | Age: 71
End: 2021-01-15

## 2021-01-15 VITALS
SYSTOLIC BLOOD PRESSURE: 122 MMHG | RESPIRATION RATE: 16 BRPM | DIASTOLIC BLOOD PRESSURE: 78 MMHG | BODY MASS INDEX: 21.58 KG/M2 | HEIGHT: 63 IN | HEART RATE: 72 BPM | WEIGHT: 121.8 LBS | TEMPERATURE: 98.8 F

## 2021-01-15 DIAGNOSIS — R30.0 DYSURIA: Primary | ICD-10-CM

## 2021-01-15 LAB
BACTERIA UR QL AUTO: ABNORMAL /HPF
BILIRUB UR QL STRIP: NEGATIVE
CLARITY UR: ABNORMAL
COLOR UR: ABNORMAL
GLUCOSE UR STRIP-MCNC: NEGATIVE MG/DL
HGB UR QL STRIP.AUTO: ABNORMAL
HYALINE CASTS #/AREA URNS LPF: ABNORMAL /LPF
KETONES UR STRIP-MCNC: NEGATIVE MG/DL
LEUKOCYTE ESTERASE UR QL STRIP: ABNORMAL
NITRITE UR QL STRIP: POSITIVE
NON-SQ EPI CELLS URNS QL MICRO: ABNORMAL /HPF
PH UR STRIP.AUTO: 6.5 [PH]
PROT UR STRIP-MCNC: NEGATIVE MG/DL
RBC #/AREA URNS AUTO: ABNORMAL /HPF
SP GR UR STRIP.AUTO: 1.01 (ref 1–1.03)
UROBILINOGEN UR QL STRIP.AUTO: 1 E.U./DL
WBC #/AREA URNS AUTO: ABNORMAL /HPF

## 2021-01-15 PROCEDURE — 87077 CULTURE AEROBIC IDENTIFY: CPT | Performed by: NURSE PRACTITIONER

## 2021-01-15 PROCEDURE — 87086 URINE CULTURE/COLONY COUNT: CPT | Performed by: NURSE PRACTITIONER

## 2021-01-15 PROCEDURE — 87186 SC STD MICRODIL/AGAR DIL: CPT | Performed by: NURSE PRACTITIONER

## 2021-01-15 PROCEDURE — 99213 OFFICE O/P EST LOW 20 MIN: CPT | Performed by: NURSE PRACTITIONER

## 2021-01-15 PROCEDURE — 81001 URINALYSIS AUTO W/SCOPE: CPT | Performed by: NURSE PRACTITIONER

## 2021-01-15 NOTE — PROGRESS NOTES
Assessment/Plan:    There are no Patient Instructions on file for this visit  Diagnoses and all orders for this visit:    Dysuria  -     UA w Reflex to Microscopic w Reflex to Culture         Subjective:      Patient ID: Mayda Victor is a 72 y o  female    Pt has had several days of urinary freq, pelvic pressure and generally not feeling well  Hx of UTI's  No blood, fevers, flank pain n/v  Taking azo        Current Outpatient Medications:     Calcium Carbonate-Vitamin D3 (CALCIUM 600/VITAMIN D) 600-400 MG-UNIT TABS, Take 1 tablet by mouth daily, Disp: , Rfl:     Restasis 0 05 % ophthalmic emulsion, , Disp: , Rfl:     No results found for this or any previous visit (from the past 1008 hour(s))  The following portions of the patient's history were reviewed and updated as appropriate: allergies, current medications, past family history, past medical history, past social history, past surgical history and problem list      Review of Systems   Constitutional: Negative for activity change, appetite change, chills and fatigue  HENT: Negative  Eyes: Negative  Respiratory: Negative  Cardiovascular: Negative  Gastrointestinal: Negative for abdominal distention, abdominal pain and nausea  Genitourinary: Positive for frequency and urgency  Negative for dysuria and flank pain  Musculoskeletal: Negative  Objective:      /78 (BP Location: Left arm, Patient Position: Sitting, Cuff Size: Standard)   Pulse 72   Temp 98 8 °F (37 1 °C) (Temporal) Comment: with nsaids  Resp 16   Ht 5' 3" (1 6 m)   Wt 55 2 kg (121 lb 12 8 oz)   BMI 21 58 kg/m²        Physical Exam  Constitutional:       General: She is not in acute distress  Appearance: She is well-developed  She is not diaphoretic  HENT:      Head: Normocephalic and atraumatic  Nose: Nose normal    Eyes:      Conjunctiva/sclera: Conjunctivae normal       Pupils: Pupils are equal, round, and reactive to light     Neck: Musculoskeletal: Normal range of motion and neck supple  Thyroid: No thyromegaly  Vascular: No JVD  Trachea: No tracheal deviation  Cardiovascular:      Rate and Rhythm: Normal rate and regular rhythm  Heart sounds: Normal heart sounds  No murmur  No friction rub  No gallop  Pulmonary:      Effort: Pulmonary effort is normal  No respiratory distress  Breath sounds: Normal breath sounds  No wheezing or rales  Abdominal:      General: Bowel sounds are normal  There is no distension  Palpations: Abdomen is soft  Tenderness: There is no abdominal tenderness  Musculoskeletal: Normal range of motion  Lymphadenopathy:      Cervical: No cervical adenopathy  Skin:     General: Skin is warm and dry  Findings: No rash  Neurological:      Mental Status: She is alert and oriented to person, place, and time  Cranial Nerves: No cranial nerve deficit  Psychiatric:         Behavior: Behavior normal          Thought Content:  Thought content normal          Judgment: Judgment normal

## 2021-01-18 ENCOUNTER — DOCUMENTATION (OUTPATIENT)
Dept: INTERNAL MEDICINE CLINIC | Facility: CLINIC | Age: 71
End: 2021-01-18

## 2021-01-18 ENCOUNTER — TELEPHONE (OUTPATIENT)
Dept: INTERNAL MEDICINE CLINIC | Facility: CLINIC | Age: 71
End: 2021-01-18

## 2021-01-18 DIAGNOSIS — N30.00 ACUTE CYSTITIS WITHOUT HEMATURIA: Primary | ICD-10-CM

## 2021-01-18 LAB — BACTERIA UR CULT: ABNORMAL

## 2021-01-18 RX ORDER — CEPHALEXIN 500 MG/1
500 CAPSULE ORAL EVERY 12 HOURS SCHEDULED
Qty: 10 CAPSULE | Refills: 0 | Status: SHIPPED | OUTPATIENT
Start: 2021-01-18 | End: 2021-01-23

## 2021-01-18 NOTE — TELEPHONE ENCOUNTER
----- Message from Heaven Khoury, [address] sent at 1/18/2021  9:27 AM EST -----  Notify she does have a uti- not sure why it took so long to get back- sorry for the wait   I sent abx in

## 2021-01-26 ENCOUNTER — TELEMEDICINE (OUTPATIENT)
Dept: INTERNAL MEDICINE CLINIC | Facility: CLINIC | Age: 71
End: 2021-01-26
Payer: MEDICARE

## 2021-01-26 DIAGNOSIS — N39.0 URINARY TRACT INFECTION WITHOUT HEMATURIA, SITE UNSPECIFIED: Primary | ICD-10-CM

## 2021-01-26 DIAGNOSIS — N81.11 MIDLINE CYSTOCELE: ICD-10-CM

## 2021-01-26 DIAGNOSIS — N81.4 UTERINE PROLAPSE: ICD-10-CM

## 2021-01-26 PROCEDURE — 99213 OFFICE O/P EST LOW 20 MIN: CPT | Performed by: NURSE PRACTITIONER

## 2021-01-26 RX ORDER — CIPROFLOXACIN 500 MG/1
500 TABLET, FILM COATED ORAL EVERY 12 HOURS SCHEDULED
Qty: 6 TABLET | Refills: 0 | Status: SHIPPED | OUTPATIENT
Start: 2021-01-26 | End: 2021-01-29
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
## 2021-02-09 ENCOUNTER — HOSPITAL ENCOUNTER (OUTPATIENT)
Dept: MAMMOGRAPHY | Facility: CLINIC | Age: 71
Discharge: HOME | End: 2021-02-09
Payer: MEDICARE

## 2021-02-09 VITALS — BODY MASS INDEX: 21.44 KG/M2 | WEIGHT: 121 LBS | HEIGHT: 63 IN

## 2021-02-09 DIAGNOSIS — Z12.31 VISIT FOR SCREENING MAMMOGRAM: ICD-10-CM

## 2021-02-09 PROCEDURE — 77063 BREAST TOMOSYNTHESIS BI: CPT

## 2021-02-09 PROCEDURE — 77067 SCR MAMMO BI INCL CAD: CPT

## 2021-03-10 DIAGNOSIS — Z23 ENCOUNTER FOR IMMUNIZATION: ICD-10-CM

## 2021-03-18 ENCOUNTER — IMMUNIZATIONS (OUTPATIENT)
Dept: FAMILY MEDICINE CLINIC | Facility: HOSPITAL | Age: 71
End: 2021-03-18

## 2021-03-18 DIAGNOSIS — Z23 ENCOUNTER FOR IMMUNIZATION: Primary | ICD-10-CM

## 2021-03-18 PROCEDURE — 91300 SARS-COV-2 / COVID-19 MRNA VACCINE (PFIZER-BIONTECH) 30 MCG: CPT

## 2021-03-18 PROCEDURE — 0001A SARS-COV-2 / COVID-19 MRNA VACCINE (PFIZER-BIONTECH) 30 MCG: CPT

## 2021-04-05 ENCOUNTER — OFFICE VISIT (OUTPATIENT)
Dept: INTERNAL MEDICINE CLINIC | Facility: CLINIC | Age: 71
End: 2021-04-05
Payer: MEDICARE

## 2021-04-05 ENCOUNTER — APPOINTMENT (OUTPATIENT)
Dept: LAB | Facility: CLINIC | Age: 71
End: 2021-04-05
Payer: MEDICARE

## 2021-04-05 VITALS
OXYGEN SATURATION: 97 % | BODY MASS INDEX: 21.4 KG/M2 | SYSTOLIC BLOOD PRESSURE: 120 MMHG | TEMPERATURE: 98.2 F | WEIGHT: 120.8 LBS | DIASTOLIC BLOOD PRESSURE: 78 MMHG | HEART RATE: 68 BPM

## 2021-04-05 DIAGNOSIS — Z13.6 SCREENING FOR CARDIOVASCULAR CONDITION: ICD-10-CM

## 2021-04-05 DIAGNOSIS — R41.3 MEMORY CHANGE: Primary | ICD-10-CM

## 2021-04-05 DIAGNOSIS — E53.8 VITAMIN B 12 DEFICIENCY: ICD-10-CM

## 2021-04-05 DIAGNOSIS — R73.01 IFG (IMPAIRED FASTING GLUCOSE): ICD-10-CM

## 2021-04-05 DIAGNOSIS — R53.83 OTHER FATIGUE: ICD-10-CM

## 2021-04-05 DIAGNOSIS — R41.3 MEMORY CHANGE: ICD-10-CM

## 2021-04-05 LAB
ALBUMIN SERPL BCP-MCNC: 3.6 G/DL (ref 3.5–5)
ALP SERPL-CCNC: 72 U/L (ref 46–116)
ALT SERPL W P-5'-P-CCNC: 23 U/L (ref 12–78)
ANION GAP SERPL CALCULATED.3IONS-SCNC: 2 MMOL/L (ref 4–13)
AST SERPL W P-5'-P-CCNC: 20 U/L (ref 5–45)
BASOPHILS # BLD AUTO: 0.08 THOUSANDS/ΜL (ref 0–0.1)
BASOPHILS NFR BLD AUTO: 1 % (ref 0–1)
BILIRUB SERPL-MCNC: 0.48 MG/DL (ref 0.2–1)
BUN SERPL-MCNC: 15 MG/DL (ref 5–25)
CALCIUM SERPL-MCNC: 9.3 MG/DL (ref 8.3–10.1)
CHLORIDE SERPL-SCNC: 110 MMOL/L (ref 100–108)
CHOLEST SERPL-MCNC: 191 MG/DL (ref 50–200)
CO2 SERPL-SCNC: 28 MMOL/L (ref 21–32)
CREAT SERPL-MCNC: 0.92 MG/DL (ref 0.6–1.3)
EOSINOPHIL # BLD AUTO: 0.23 THOUSAND/ΜL (ref 0–0.61)
EOSINOPHIL NFR BLD AUTO: 4 % (ref 0–6)
ERYTHROCYTE [DISTWIDTH] IN BLOOD BY AUTOMATED COUNT: 12.1 % (ref 11.6–15.1)
ERYTHROCYTE [SEDIMENTATION RATE] IN BLOOD: 18 MM/HOUR (ref 0–29)
EST. AVERAGE GLUCOSE BLD GHB EST-MCNC: 108 MG/DL
FOLATE SERPL-MCNC: 18.9 NG/ML (ref 3.1–17.5)
GFR SERPL CREATININE-BSD FRML MDRD: 64 ML/MIN/1.73SQ M
GLUCOSE SERPL-MCNC: 88 MG/DL (ref 65–140)
HBA1C MFR BLD: 5.4 %
HCT VFR BLD AUTO: 41.1 % (ref 34.8–46.1)
HDLC SERPL-MCNC: 79 MG/DL
HGB BLD-MCNC: 13.4 G/DL (ref 11.5–15.4)
IMM GRANULOCYTES # BLD AUTO: 0.01 THOUSAND/UL (ref 0–0.2)
IMM GRANULOCYTES NFR BLD AUTO: 0 % (ref 0–2)
LDLC SERPL CALC-MCNC: 97 MG/DL (ref 0–100)
LYMPHOCYTES # BLD AUTO: 1.68 THOUSANDS/ΜL (ref 0.6–4.47)
LYMPHOCYTES NFR BLD AUTO: 29 % (ref 14–44)
MCH RBC QN AUTO: 30.1 PG (ref 26.8–34.3)
MCHC RBC AUTO-ENTMCNC: 32.6 G/DL (ref 31.4–37.4)
MCV RBC AUTO: 92 FL (ref 82–98)
MONOCYTES # BLD AUTO: 0.53 THOUSAND/ΜL (ref 0.17–1.22)
MONOCYTES NFR BLD AUTO: 9 % (ref 4–12)
NEUTROPHILS # BLD AUTO: 3.27 THOUSANDS/ΜL (ref 1.85–7.62)
NEUTS SEG NFR BLD AUTO: 57 % (ref 43–75)
NONHDLC SERPL-MCNC: 112 MG/DL
NRBC BLD AUTO-RTO: 0 /100 WBCS
PLATELET # BLD AUTO: 258 THOUSANDS/UL (ref 149–390)
PMV BLD AUTO: 10.2 FL (ref 8.9–12.7)
POTASSIUM SERPL-SCNC: 4.1 MMOL/L (ref 3.5–5.3)
PROT SERPL-MCNC: 7.3 G/DL (ref 6.4–8.2)
RBC # BLD AUTO: 4.45 MILLION/UL (ref 3.81–5.12)
SODIUM SERPL-SCNC: 140 MMOL/L (ref 136–145)
TRIGL SERPL-MCNC: 76 MG/DL
TSH SERPL DL<=0.05 MIU/L-ACNC: 1.62 UIU/ML (ref 0.36–3.74)
VIT B12 SERPL-MCNC: 258 PG/ML (ref 100–900)
WBC # BLD AUTO: 5.8 THOUSAND/UL (ref 4.31–10.16)

## 2021-04-05 PROCEDURE — 83918 ORGANIC ACIDS TOTAL QUANT: CPT

## 2021-04-05 PROCEDURE — 82746 ASSAY OF FOLIC ACID SERUM: CPT

## 2021-04-05 PROCEDURE — 80061 LIPID PANEL: CPT

## 2021-04-05 PROCEDURE — 36415 COLL VENOUS BLD VENIPUNCTURE: CPT

## 2021-04-05 PROCEDURE — 99214 OFFICE O/P EST MOD 30 MIN: CPT | Performed by: NURSE PRACTITIONER

## 2021-04-05 PROCEDURE — 84443 ASSAY THYROID STIM HORMONE: CPT

## 2021-04-05 PROCEDURE — 83036 HEMOGLOBIN GLYCOSYLATED A1C: CPT

## 2021-04-05 PROCEDURE — 85652 RBC SED RATE AUTOMATED: CPT

## 2021-04-05 PROCEDURE — 85025 COMPLETE CBC W/AUTO DIFF WBC: CPT

## 2021-04-05 PROCEDURE — 80053 COMPREHEN METABOLIC PANEL: CPT

## 2021-04-05 PROCEDURE — 82607 VITAMIN B-12: CPT

## 2021-04-05 NOTE — PATIENT INSTRUCTIONS
Global amnesia attack versus TIA versus other, did stress to patient that would like to check labs an MRI, she is currently declining MRI secondary to claustrophobia, I did recommend we start a baby aspirin, after discussing further with DD, we can try CT a of the head and neck in addition to a full workup including echocardiogram Holter monitor and EEG    Consider statin depending on outcome of testing and labs

## 2021-04-05 NOTE — PROGRESS NOTES
Assessment/Plan:    Patient Instructions    Global amnesia attack versus TIA versus other, did stress to patient that would like to check labs an MRI, she is currently declining MRI secondary to claustrophobia, I did recommend we start a baby aspirin, after discussing further with DD, we can try CT a of the head and neck in addition to a full workup including echocardiogram Holter monitor and EEG  Consider statin depending on outcome of testing and labs         Diagnoses and all orders for this visit:    Memory change  -     TSH, 3rd generation with Free T4 reflex; Future    Vitamin B 12 deficiency  -     Folate; Future  -     Methylmalonic acid, serum; Future  -     Sedimentation rate, automated; Future  -     Vitamin B12; Future    Screening for cardiovascular condition  -     Lipid panel; Future    IFG (impaired fasting glucose)  -     Comprehensive metabolic panel; Future  -     Hemoglobin A1C; Future    Other fatigue  -     CBC and differential; Future         Subjective:      Patient ID: Briseida Stephen is a 72 y o  female    Last Thursday patient was driving home from [place] while she was talking to her mother-in-law on the cellphone she noticed that she was hard time had a hard time recalling specific word, the word was  but she just could not remember she states it took several seconds before the word came to mind, she hung up on that phone and was just trying to focus her his it was an odd issue, then she recalls wanting to call her  and she could not remember how to use series to make phone call, it happened again several minutes later when she wanted to call her brother and again she could not remember how to make a call on the cellphone  At that point she was becoming more anxious she was just talking to herself and she felt like when she was saying words out loud they were not mimicking the words she was thinking internally  She had no headache no visual change    It takes about an hour and a half to get from [place] to home and by the time she got home she felt fine just somewhat fatigued  Next day same thing just felt somewhat disconnected but no other issues  Of note she was in [place] she was visiting her son there is been significant issues with him  He was living in an assisted living that was meant for brain injury patients  But she received a phone call several days prior indicating that he had fallen they had to transfer him to the hospital and they were hoping for him to go into rehab afterward  What ended up happening was they sent him to the hospital and then discharged him from their facility which then she needed to find new placement after rehab  She admits that this was all quite stressful  When she was on the phone with her mother-in-law she was discussing all of these issues  Current Outpatient Medications:     Calcium Carbonate-Vitamin D3 (CALCIUM 600/VITAMIN D) 600-400 MG-UNIT TABS, Take 1 tablet by mouth daily, Disp: , Rfl:     Restasis 0 05 % ophthalmic emulsion, Administer 1 drop to both eyes every 12 (twelve) hours , Disp: , Rfl:     No results found for this or any previous visit (from the past 1008 hour(s))  The following portions of the patient's history were reviewed and updated as appropriate: allergies, current medications, past family history, past medical history, past social history, past surgical history and problem list      Review of Systems   Constitutional: Negative for appetite change, chills, diaphoresis, fatigue, fever and unexpected weight change  HENT: Negative for postnasal drip and sneezing  Eyes: Negative for visual disturbance  Respiratory: Negative for chest tightness and shortness of breath  Cardiovascular: Negative for chest pain, palpitations and leg swelling  Gastrointestinal: Negative for abdominal pain and blood in stool     Endocrine: Negative for cold intolerance, heat intolerance, polydipsia, polyphagia and polyuria  Genitourinary: Negative for difficulty urinating, dysuria, frequency and urgency  Musculoskeletal: Negative for arthralgias and myalgias  Skin: Negative for rash and wound  Neurological: Negative for dizziness, weakness, light-headedness and headaches  Hematological: Negative for adenopathy  Psychiatric/Behavioral: Positive for decreased concentration  Negative for confusion, dysphoric mood and sleep disturbance  The patient is not nervous/anxious  Objective:      /78 (BP Location: Left arm, Patient Position: Sitting, Cuff Size: Standard)   Pulse 68   Temp 98 2 °F (36 8 °C) (Temporal) Comment: NO NSAIDS  Wt 54 8 kg (120 lb 12 8 oz) Comment: w/ shoes denied off  SpO2 97%   BMI 21 40 kg/m²        Physical Exam  Constitutional:       General: She is not in acute distress  Appearance: She is well-developed  She is not diaphoretic  HENT:      Head: Normocephalic and atraumatic  Nose: Nose normal    Eyes:      Conjunctiva/sclera: Conjunctivae normal       Pupils: Pupils are equal, round, and reactive to light  Neck:      Musculoskeletal: Normal range of motion and neck supple  Thyroid: No thyromegaly  Vascular: No JVD  Trachea: No tracheal deviation  Cardiovascular:      Rate and Rhythm: Normal rate and regular rhythm  Heart sounds: Normal heart sounds  No murmur  No friction rub  No gallop  Pulmonary:      Effort: Pulmonary effort is normal  No respiratory distress  Breath sounds: Normal breath sounds  No wheezing or rales  Abdominal:      General: Bowel sounds are normal  There is no distension  Palpations: Abdomen is soft  Tenderness: There is no abdominal tenderness  Musculoskeletal: Normal range of motion  Lymphadenopathy:      Cervical: No cervical adenopathy  Skin:     General: Skin is warm and dry  Findings: No rash     Neurological:      Mental Status: She is alert and oriented to person, place, and time  Cranial Nerves: No cranial nerve deficit  Psychiatric:         Behavior: Behavior normal          Thought Content:  Thought content normal          Judgment: Judgment normal

## 2021-04-08 ENCOUNTER — TELEPHONE (OUTPATIENT)
Dept: INTERNAL MEDICINE CLINIC | Facility: CLINIC | Age: 71
End: 2021-04-08

## 2021-04-08 ENCOUNTER — IMMUNIZATIONS (OUTPATIENT)
Dept: FAMILY MEDICINE CLINIC | Facility: HOSPITAL | Age: 71
End: 2021-04-08

## 2021-04-08 DIAGNOSIS — E53.8 B12 DEFICIENCY: Primary | ICD-10-CM

## 2021-04-08 DIAGNOSIS — Z23 ENCOUNTER FOR IMMUNIZATION: Primary | ICD-10-CM

## 2021-04-08 DIAGNOSIS — R41.3 MEMORY LOSS: ICD-10-CM

## 2021-04-08 PROCEDURE — 91300 SARS-COV-2 / COVID-19 MRNA VACCINE (PFIZER-BIONTECH) 30 MCG: CPT

## 2021-04-08 PROCEDURE — 0002A SARS-COV-2 / COVID-19 MRNA VACCINE (PFIZER-BIONTECH) 30 MCG: CPT

## 2021-04-08 NOTE — TELEPHONE ENCOUNTER
----- Message from Theodora Grijalva sent at 4/8/2021 12:23 PM EDT -----  Please mail lab slip for 4 months

## 2021-04-14 ENCOUNTER — ANNUAL EXAM (OUTPATIENT)
Dept: OBGYN CLINIC | Facility: CLINIC | Age: 71
End: 2021-04-14
Payer: MEDICARE

## 2021-04-14 VITALS
DIASTOLIC BLOOD PRESSURE: 74 MMHG | SYSTOLIC BLOOD PRESSURE: 112 MMHG | BODY MASS INDEX: 21.26 KG/M2 | WEIGHT: 120 LBS | HEIGHT: 63 IN

## 2021-04-14 DIAGNOSIS — Z78.0 ASYMPTOMATIC POSTMENOPAUSAL STATUS: ICD-10-CM

## 2021-04-14 DIAGNOSIS — N81.11 MIDLINE CYSTOCELE: ICD-10-CM

## 2021-04-14 DIAGNOSIS — N81.6 RECTOCELE: ICD-10-CM

## 2021-04-14 DIAGNOSIS — Z01.419 ENCOUNTER FOR GYNECOLOGICAL EXAMINATION WITHOUT ABNORMAL FINDING: Primary | ICD-10-CM

## 2021-04-14 LAB
METHYLMALONATE SERPL-SCNC: 186 NMOL/L (ref 0–378)
SL AMB DISCLAIMER: NORMAL

## 2021-04-14 PROCEDURE — G0101 CA SCREEN;PELVIC/BREAST EXAM: HCPCS | Performed by: NURSE PRACTITIONER

## 2021-04-14 PROCEDURE — G0145 SCR C/V CYTO,THINLAYER,RESCR: HCPCS | Performed by: NURSE PRACTITIONER

## 2021-04-14 NOTE — PATIENT INSTRUCTIONS
Calcium and Osteoporosis   WHAT YOU NEED TO KNOW:   Why is calcium important for osteoporosis? Calcium is important for osteoporosis because calcium helps build bone mass  Osteoporosis is a long-term medical condition that causes your body to break down more bone than it makes  Your bones become weak, brittle, and more likely to fracture  How much calcium do I need? · Women:      ? 19 to 50 years: 1,000 mg    ? Over 50: 1,200 mg    ? Pregnant or breastfeeding, 19 to 50 years: 1,000 mg    · Men:      ? 19 to 70: 1,000 mg    ? Over 70: 1,200 mg    Which foods are high in calcium? The following list shows the number of calcium milligrams (mg) per serving  Your healthcare provider or dietitian can help you create a balanced meal plan for your calcium needs  · Dairy:      ? 1 cup of low-fat plain yogurt (415 mg) or low-fat fruit yogurt (245 to 384 mg)    ? 1½ ounces of shredded cheddar cheese (306 mg) or part skim mozzarella cheese (275 mg)    ? 1 cup of skim, 2%, or whole milk (300 mg)    ? 1 cup of cottage cheese made with 2% milk fat (138 mg)    ? ½ cup of frozen yogurt (103 mg)    · Other foods:      ? 1 cup of calcium-fortified orange juice (300 mg)    ? ½ cup of cooked inge greens (220 mg)    ? 4 canned sardines, with bones (242 mg)    ? ½ cup of tofu (with added calcium) (204 mg)       How can I get extra calcium? · Add powdered milk to puddings, cocoa, custard, or hot cereal     · Sift powdered milk into flour when you make cakes, cookies, or breads  · Use low-fat or fat-free milk instead of water in pancake mix, mashed potatoes, pudding, or hot breakfast cereal     · Add low-fat or fat-free cheese to salad, soup, or pasta  · Add tofu (with added calcium) to vegetable stir-rivera  · Take calcium supplements if you cannot get enough calcium from the foods you eat  Your body can absorb the most calcium from supplements when you take 500 mg or less at one time   Do not take more than 2,500 mg of calcium supplements each day  CARE AGREEMENT:   You have the right to help plan your care  Discuss treatment options with your healthcare provider to decide what care you want to receive  You always have the right to refuse treatment  The above information is an  only  It is not intended as medical advice for individual conditions or treatments  Talk to your doctor, nurse or pharmacist before following any medical regimen to see if it is safe and effective for you  © Copyright [address] Information is for End User's use only and may not be sold, redistributed or otherwise used for commercial purposes   All illustrations and images included in CareNotes® are the copyrighted property of [employer]  or [address] [place] [address]

## 2021-04-14 NOTE — PROGRESS NOTES
Diagnoses and all orders for this visit:    Encounter for gynecological examination without abnormal finding  -     Liquid-based pap, screening    Asymptomatic postmenopausal status    Midline cystocele    Rectocele          -     Encouraged kegels    Call as needed, encouraged calcium/vit D in her diet, call with any PMB, all questions answered      Pleasant 72 y o  postmenopausal female here for annual exam  She denies postmenopausal bleeding  Denies history of abnormal pap smears, last Pap NIL 2017, last pap today if neg  Denies vaginal issues  Denies pelvic pain  Denies postmenopausal issues  Sexually active without issue  Colonoguard done and due again 2024  DXA followed by PCP, due 2023 +history of osteoporosis but declines meds, improved on last check      Past Medical History:   Diagnosis Date    Benign neoplasm of skin      Past Surgical History:   Procedure Laterality Date    BREAST LUMPECTOMY Left     LAST ASSESSED: [id number]    TUBAL LIGATION      LAST ASSESSED: [id number]     Family History   Problem Relation Age of Onset    Migraines Mother     Osteoporosis Mother     Throat cancer Father     Depression Daughter         WITH ANXIETY     Migraines Daughter     Rheum arthritis Daughter     Urinary tract infection Daughter     Depression Son         WITH ANXIETY     No Known Problems Daughter     Breast cancer Neg Hx     Colon cancer Neg Hx     Uterine cancer Neg Hx     Cervical cancer Neg Hx     Ovarian cancer Neg Hx      Social History     Tobacco Use    Smoking status: Never Smoker    Smokeless tobacco: Never Used   Substance Use Topics    Alcohol use: Yes     Frequency: Monthly or less     Drinks per session: 1 or 2     Binge frequency: Never    Drug use: No       Current Outpatient Medications:     Calcium Carbonate-Vitamin D3 (CALCIUM 600/VITAMIN D) 600-400 MG-UNIT TABS, Take 1 tablet by mouth daily, Disp: , Rfl:     Restasis 0 05 % ophthalmic emulsion, Administer 1 drop to both eyes every 12 (twelve) hours , Disp: , Rfl:   Patient Active Problem List    Diagnosis Date Noted    Other idiopathic scoliosis, thoracolumbar region 2020    Other osteoporosis without current pathological fracture 2019    Asymptomatic postmenopausal status 2019    Other constipation 2019    Postmenopausal 2018    Encounter for gynecological examination without abnormal finding 2018    Midline cystocele 2016    Rectocele 2016    Uterine prolapse 2016    Osteopenia 2016    Blood in urine 2014       Allergies   Allergen Reactions    Other Other (See Comments) and Sneezing     CATS-itchy eyes, heaviness in chest       OB History    Para Term  AB Living   4 4 4     4   SAB TAB Ectopic Multiple Live Births           4      # Outcome Date GA Lbr Landry/2nd Weight Sex Delivery Anes PTL Lv   4 Term            3 Term            2 Term            1 Term              Nithya moving to [place] with her boyfriend, 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  Domitila Dias is in a group home, hopefully being transferred to a closer one  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 and is doing great  Younger son works for a [employer]    Vitals:    21 1046   BP: 112/74   BP Location: Right arm   Patient Position: Sitting   Cuff Size: Standard   Weight: 54 4 kg (120 lb)   Height: 5' 3" (1 6 m)     Body mass index is 21 26 kg/m²  Review of Systems   Constitutional: Negative for chills, fatigue, fever and unexpected weight change  Respiratory: Negative for shortness of breath  Gastrointestinal: Negative for anal bleeding, blood in stool, constipation and diarrhea  Genitourinary: Negative for difficulty urinating, dysuria and hematuria  Physical Exam   Constitutional: She appears well-developed and well-nourished  No distress  HENT: atraumatic, EOMI  Head: Normocephalic  Neck: Normal range of motion  Neck supple     Pulmonary: Effort normal   Breasts: bilateral without masses, skin changes or nipple discharge  Bilaterally soft and warm to touch  No areas of erythema or pain  Abdominal: Soft  Pelvic exam was performed with patient supine  No labial fusion  There is no rash, tenderness, lesion or injury on the right labia  There is no rash, tenderness, lesion or injury on the left labia  Urethral meatus does not show any tenderness, inflammation or discharge  Palpation of midline bladder without pain or discomfort  Uterus is not deviated, not enlarged, not fixed and not tender  Cervix exhibits no motion tenderness, no discharge and no friability  Right adnexum displays no mass, no tenderness and no fullness  Left adnexum displays no mass, no tenderness and no fullness  No erythema or tenderness in the vagina  No foreign body in the vagina  No signs of injury around the vagina or anus  Perineum without lesions, signs of injury, erythema or swelling  No vaginal discharge found  +uterine/bladder and rectal prolapse STABLE, she stopped using pessary (didn't like it)  Lymphadenopathy:        Right: No inguinal adenopathy present  Left: No inguinal adenopathy present

## 2021-04-21 LAB
LAB AP GYN PRIMARY INTERPRETATION: NORMAL
Lab: NORMAL

## 2021-05-05 ENCOUNTER — HOSPITAL ENCOUNTER (OUTPATIENT)
Dept: NON INVASIVE DIAGNOSTICS | Facility: CLINIC | Age: 71
Discharge: HOME | End: 2021-05-05
Payer: MEDICARE

## 2021-05-05 DIAGNOSIS — G45.9 TIA (TRANSIENT ISCHEMIC ATTACK): ICD-10-CM

## 2021-05-05 PROCEDURE — 93306 TTE W/DOPPLER COMPLETE: CPT

## 2021-05-05 PROCEDURE — 93226 XTRNL ECG REC<48 HR SCAN A/R: CPT

## 2021-05-05 PROCEDURE — 93306 TTE W/DOPPLER COMPLETE: CPT | Performed by: INTERNAL MEDICINE

## 2021-05-05 PROCEDURE — 93225 XTRNL ECG REC<48 HRS REC: CPT

## 2021-05-06 ENCOUNTER — TELEPHONE (OUTPATIENT)
Dept: INTERNAL MEDICINE CLINIC | Facility: CLINIC | Age: 71
End: 2021-05-06

## 2021-05-06 ENCOUNTER — HOSPITAL ENCOUNTER (OUTPATIENT)
Dept: NEUROLOGY | Facility: HOSPITAL | Age: 71
Discharge: HOME | End: 2021-05-06
Payer: MEDICARE

## 2021-05-06 DIAGNOSIS — R41.3 MEMORY LOSS: ICD-10-CM

## 2021-05-06 DIAGNOSIS — R47.01 EXPRESSIVE APHASIA SYNDROME: ICD-10-CM

## 2021-05-06 PROCEDURE — 95816 EEG AWAKE AND DROWSY: CPT | Performed by: PSYCHIATRY & NEUROLOGY

## 2021-05-06 PROCEDURE — 95816 EEG AWAKE AND DROWSY: CPT

## 2021-05-06 PROCEDURE — 93227 XTRNL ECG REC<48 HR R&I: CPT | Performed by: INTERNAL MEDICINE

## 2021-05-06 NOTE — TELEPHONE ENCOUNTER
Notify-echo looks fine, mild leakage across mitral and aortic valves, will repeat in a yr to assure stability   Per dr Laura Marquez

## 2021-05-06 NOTE — TELEPHONE ENCOUNTER
----- Message from Vinny Mueller MD sent at 5/6/2021 12:56 PM EDT -----  Notify-eeg is normal  ----- Message -----  From: Chris Weaver MD  Sent: 5/6/2021  10:55 AM EDT  To: JAY Manzo

## 2021-05-07 ENCOUNTER — TELEPHONE (OUTPATIENT)
Dept: INTERNAL MEDICINE CLINIC | Facility: CLINIC | Age: 71
End: 2021-05-07

## 2021-05-07 NOTE — TELEPHONE ENCOUNTER
----- Message from Cate Sparrow MD sent at 5/6/2021  8:21 PM EDT -----  Notify-holter fine  ----- Message -----  From: Gena Cortes MD  Sent: 5/6/2021   5:57 PM EDT  To: JAY Grant

## 2021-05-26 ENCOUNTER — HOSPITAL ENCOUNTER (OUTPATIENT)
Dept: CT IMAGING | Facility: CLINIC | Age: 71
Discharge: HOME | End: 2021-05-26
Payer: MEDICARE

## 2021-05-26 DIAGNOSIS — R47.01 EXPRESSIVE APHASIA SYNDROME: ICD-10-CM

## 2021-05-26 DIAGNOSIS — R41.3 MEMORY LOSS: ICD-10-CM

## 2021-05-26 PROCEDURE — 70496 CT ANGIOGRAPHY HEAD: CPT

## 2021-05-26 PROCEDURE — G1004 CDSM NDSC: HCPCS

## 2021-05-26 PROCEDURE — 70498 CT ANGIOGRAPHY NECK: CPT

## 2021-05-26 RX ADMIN — IOHEXOL 85 ML: 350 INJECTION, SOLUTION INTRAVENOUS at 11:13

## 2021-06-02 ENCOUNTER — TELEPHONE (OUTPATIENT)
Dept: INTERNAL MEDICINE CLINIC | Facility: CLINIC | Age: 71
End: 2021-06-02

## 2021-06-02 NOTE — TELEPHONE ENCOUNTER
----- Message from Michael Zamora, [address] Marilou Gipson sent at 6/2/2021  3:46 PM EDT -----  Her CT was normal

## 2021-09-01 ENCOUNTER — TELEPHONE (OUTPATIENT)
Dept: INTERNAL MEDICINE CLINIC | Facility: CLINIC | Age: 71
End: 2021-09-01

## 2021-09-01 DIAGNOSIS — Z20.822 EXPOSURE TO COVID-19 VIRUS: Primary | ICD-10-CM

## 2021-09-01 PROCEDURE — U0003 INFECTIOUS AGENT DETECTION BY NUCLEIC ACID (DNA OR RNA); SEVERE ACUTE RESPIRATORY SYNDROME CORONAVIRUS 2 (SARS-COV-2) (CORONAVIRUS DISEASE [COVID-19]), AMPLIFIED PROBE TECHNIQUE, MAKING USE OF HIGH THROUGHPUT TECHNOLOGIES AS DESCRIBED BY CMS-2020-01-R: HCPCS | Performed by: INTERNAL MEDICINE

## 2021-09-01 PROCEDURE — U0005 INFEC AGEN DETEC AMPLI PROBE: HCPCS | Performed by: INTERNAL MEDICINE

## 2021-09-01 NOTE — TELEPHONE ENCOUNTER
Hawa Perez (Self)  /  [phone]     Pt said she was exposed to someone Sunday that tested positive with covid    she is thinking she should get tested    she has a covid test order in her chart dated 11/20/21 and is asking can she just use that order & come here to get the test done    or does she still need a virtual appt ? ?

## 2021-10-13 ENCOUNTER — TELEPHONE (OUTPATIENT)
Dept: INTERNAL MEDICINE CLINIC | Facility: CLINIC | Age: 71
End: 2021-10-13

## 2021-10-13 ENCOUNTER — OFFICE VISIT (OUTPATIENT)
Dept: INTERNAL MEDICINE CLINIC | Facility: CLINIC | Age: 71
End: 2021-10-13
Payer: MEDICARE

## 2021-10-13 ENCOUNTER — APPOINTMENT (OUTPATIENT)
Dept: LAB | Facility: CLINIC | Age: 71
End: 2021-10-13
Payer: MEDICARE

## 2021-10-13 VITALS
BODY MASS INDEX: 21.79 KG/M2 | TEMPERATURE: 98 F | DIASTOLIC BLOOD PRESSURE: 60 MMHG | HEIGHT: 63 IN | RESPIRATION RATE: 12 BRPM | HEART RATE: 63 BPM | WEIGHT: 123 LBS | SYSTOLIC BLOOD PRESSURE: 112 MMHG | OXYGEN SATURATION: 98 %

## 2021-10-13 DIAGNOSIS — R01.1 MURMUR: Primary | ICD-10-CM

## 2021-10-13 DIAGNOSIS — R41.3 MEMORY LOSS: ICD-10-CM

## 2021-10-13 DIAGNOSIS — E53.8 B12 DEFICIENCY: ICD-10-CM

## 2021-10-13 LAB
ALBUMIN SERPL BCP-MCNC: 3.5 G/DL (ref 3.5–5)
ALP SERPL-CCNC: 75 U/L (ref 46–116)
ALT SERPL W P-5'-P-CCNC: 24 U/L (ref 12–78)
ANION GAP SERPL CALCULATED.3IONS-SCNC: 3 MMOL/L (ref 4–13)
AST SERPL W P-5'-P-CCNC: 22 U/L (ref 5–45)
BASOPHILS # BLD AUTO: 0.08 THOUSANDS/ΜL (ref 0–0.1)
BASOPHILS NFR BLD AUTO: 1 % (ref 0–1)
BILIRUB SERPL-MCNC: 0.54 MG/DL (ref 0.2–1)
BUN SERPL-MCNC: 17 MG/DL (ref 5–25)
CALCIUM SERPL-MCNC: 9.2 MG/DL (ref 8.3–10.1)
CHLORIDE SERPL-SCNC: 109 MMOL/L (ref 100–108)
CO2 SERPL-SCNC: 26 MMOL/L (ref 21–32)
CREAT SERPL-MCNC: 0.88 MG/DL (ref 0.6–1.3)
EOSINOPHIL # BLD AUTO: 0.51 THOUSAND/ΜL (ref 0–0.61)
EOSINOPHIL NFR BLD AUTO: 8 % (ref 0–6)
ERYTHROCYTE [DISTWIDTH] IN BLOOD BY AUTOMATED COUNT: 12.2 % (ref 11.6–15.1)
GFR SERPL CREATININE-BSD FRML MDRD: 67 ML/MIN/1.73SQ M
GLUCOSE SERPL-MCNC: 79 MG/DL (ref 65–140)
HCT VFR BLD AUTO: 41 % (ref 34.8–46.1)
HGB BLD-MCNC: 13.6 G/DL (ref 11.5–15.4)
IMM GRANULOCYTES # BLD AUTO: 0.02 THOUSAND/UL (ref 0–0.2)
IMM GRANULOCYTES NFR BLD AUTO: 0 % (ref 0–2)
LYMPHOCYTES # BLD AUTO: 1.57 THOUSANDS/ΜL (ref 0.6–4.47)
LYMPHOCYTES NFR BLD AUTO: 24 % (ref 14–44)
MCH RBC QN AUTO: 31.1 PG (ref 26.8–34.3)
MCHC RBC AUTO-ENTMCNC: 33.2 G/DL (ref 31.4–37.4)
MCV RBC AUTO: 94 FL (ref 82–98)
MONOCYTES # BLD AUTO: 0.57 THOUSAND/ΜL (ref 0.17–1.22)
MONOCYTES NFR BLD AUTO: 9 % (ref 4–12)
NEUTROPHILS # BLD AUTO: 3.87 THOUSANDS/ΜL (ref 1.85–7.62)
NEUTS SEG NFR BLD AUTO: 58 % (ref 43–75)
NRBC BLD AUTO-RTO: 0 /100 WBCS
PLATELET # BLD AUTO: 269 THOUSANDS/UL (ref 149–390)
PMV BLD AUTO: 10.3 FL (ref 8.9–12.7)
POTASSIUM SERPL-SCNC: 4.2 MMOL/L (ref 3.5–5.3)
PROT SERPL-MCNC: 7.4 G/DL (ref 6.4–8.2)
RBC # BLD AUTO: 4.37 MILLION/UL (ref 3.81–5.12)
SODIUM SERPL-SCNC: 138 MMOL/L (ref 136–145)
VIT B12 SERPL-MCNC: 1328 PG/ML (ref 100–900)
WBC # BLD AUTO: 6.62 THOUSAND/UL (ref 4.31–10.16)

## 2021-10-13 PROCEDURE — 85025 COMPLETE CBC W/AUTO DIFF WBC: CPT

## 2021-10-13 PROCEDURE — 1123F ACP DISCUSS/DSCN MKR DOCD: CPT | Performed by: NURSE PRACTITIONER

## 2021-10-13 PROCEDURE — 36415 COLL VENOUS BLD VENIPUNCTURE: CPT

## 2021-10-13 PROCEDURE — G0438 PPPS, INITIAL VISIT: HCPCS | Performed by: NURSE PRACTITIONER

## 2021-10-13 PROCEDURE — 80053 COMPREHEN METABOLIC PANEL: CPT

## 2021-10-13 PROCEDURE — 99214 OFFICE O/P EST MOD 30 MIN: CPT | Performed by: NURSE PRACTITIONER

## 2021-10-13 PROCEDURE — 82607 VITAMIN B-12: CPT

## 2021-11-28 ENCOUNTER — HOSPITAL ENCOUNTER (INPATIENT)
Facility: HOSPITAL | Age: 71
LOS: 1 days | Discharge: HOME | DRG: 379 | End: 2021-11-29
Attending: EMERGENCY MEDICINE | Admitting: INTERNAL MEDICINE
Payer: MEDICARE

## 2021-11-28 ENCOUNTER — APPOINTMENT (EMERGENCY)
Dept: RADIOLOGY | Facility: HOSPITAL | Age: 71
DRG: 379 | End: 2021-11-28
Payer: MEDICARE

## 2021-11-28 DIAGNOSIS — R55 SYNCOPE AND COLLAPSE: ICD-10-CM

## 2021-11-28 DIAGNOSIS — K92.1 GASTROINTESTINAL HEMORRHAGE WITH MELENA: ICD-10-CM

## 2021-11-28 DIAGNOSIS — K92.2 UPPER GI BLEED: Primary | ICD-10-CM

## 2021-11-28 LAB
ALBUMIN SERPL BCP-MCNC: 3.4 G/DL (ref 3.5–5)
ALP SERPL-CCNC: 64 U/L (ref 46–116)
ALT SERPL W P-5'-P-CCNC: 23 U/L (ref 12–78)
ANION GAP SERPL CALCULATED.3IONS-SCNC: 11 MMOL/L (ref 4–13)
AST SERPL W P-5'-P-CCNC: 18 U/L (ref 5–45)
ATRIAL RATE: 73 BPM
BASOPHILS # BLD AUTO: 0.05 THOUSANDS/ΜL (ref 0–0.1)
BASOPHILS NFR BLD AUTO: 0 % (ref 0–1)
BILIRUB SERPL-MCNC: 0.57 MG/DL (ref 0.2–1)
BUN SERPL-MCNC: 36 MG/DL (ref 5–25)
CALCIUM ALBUM COR SERPL-MCNC: 8.7 MG/DL (ref 8.3–10.1)
CALCIUM SERPL-MCNC: 8.2 MG/DL (ref 8.3–10.1)
CARDIAC TROPONIN I PNL SERPL HS: 2 NG/L
CHLORIDE SERPL-SCNC: 107 MMOL/L (ref 100–108)
CO2 SERPL-SCNC: 26 MMOL/L (ref 21–32)
CREAT SERPL-MCNC: 0.89 MG/DL (ref 0.6–1.3)
EOSINOPHIL # BLD AUTO: 0.02 THOUSAND/ΜL (ref 0–0.61)
EOSINOPHIL NFR BLD AUTO: 0 % (ref 0–6)
ERYTHROCYTE [DISTWIDTH] IN BLOOD BY AUTOMATED COUNT: 12.2 % (ref 11.6–15.1)
GFR SERPL CREATININE-BSD FRML MDRD: 66 ML/MIN/1.73SQ M
GLUCOSE SERPL-MCNC: 103 MG/DL (ref 65–140)
HCT VFR BLD AUTO: 37.5 % (ref 34.8–46.1)
HGB BLD-MCNC: 12.1 G/DL (ref 11.5–15.4)
IMM GRANULOCYTES # BLD AUTO: 0.05 THOUSAND/UL (ref 0–0.2)
IMM GRANULOCYTES NFR BLD AUTO: 0 % (ref 0–2)
LYMPHOCYTES # BLD AUTO: 2.01 THOUSANDS/ΜL (ref 0.6–4.47)
LYMPHOCYTES NFR BLD AUTO: 18 % (ref 14–44)
MCH RBC QN AUTO: 31 PG (ref 26.8–34.3)
MCHC RBC AUTO-ENTMCNC: 32.3 G/DL (ref 31.4–37.4)
MCV RBC AUTO: 96 FL (ref 82–98)
MONOCYTES # BLD AUTO: 0.73 THOUSAND/ΜL (ref 0.17–1.22)
MONOCYTES NFR BLD AUTO: 7 % (ref 4–12)
NEUTROPHILS # BLD AUTO: 8.4 THOUSANDS/ΜL (ref 1.85–7.62)
NEUTS SEG NFR BLD AUTO: 75 % (ref 43–75)
NRBC BLD AUTO-RTO: 0 /100 WBCS
P AXIS: 137 DEGREES
PLATELET # BLD AUTO: 263 THOUSANDS/UL (ref 149–390)
PMV BLD AUTO: 9.8 FL (ref 8.9–12.7)
POTASSIUM SERPL-SCNC: 3.8 MMOL/L (ref 3.5–5.3)
PR INTERVAL: 154 MS
PROT SERPL-MCNC: 7 G/DL (ref 6.4–8.2)
QRS AXIS: 122 DEGREES
QRSD INTERVAL: 80 MS
QT INTERVAL: 398 MS
QTC INTERVAL: 438 MS
RBC # BLD AUTO: 3.9 MILLION/UL (ref 3.81–5.12)
SODIUM SERPL-SCNC: 144 MMOL/L (ref 136–145)
T WAVE AXIS: 112 DEGREES
VENTRICULAR RATE: 73 BPM
WBC # BLD AUTO: 11.26 THOUSAND/UL (ref 4.31–10.16)

## 2021-11-28 PROCEDURE — 80053 COMPREHEN METABOLIC PANEL: CPT | Performed by: EMERGENCY MEDICINE

## 2021-11-28 PROCEDURE — 99285 EMERGENCY DEPT VISIT HI MDM: CPT

## 2021-11-28 PROCEDURE — 93010 ELECTROCARDIOGRAM REPORT: CPT | Performed by: INTERNAL MEDICINE

## 2021-11-28 PROCEDURE — 85025 COMPLETE CBC W/AUTO DIFF WBC: CPT | Performed by: EMERGENCY MEDICINE

## 2021-11-28 PROCEDURE — 71045 X-RAY EXAM CHEST 1 VIEW: CPT

## 2021-11-28 PROCEDURE — 84484 ASSAY OF TROPONIN QUANT: CPT | Performed by: EMERGENCY MEDICINE

## 2021-11-28 PROCEDURE — 36415 COLL VENOUS BLD VENIPUNCTURE: CPT

## 2021-11-28 PROCEDURE — 93005 ELECTROCARDIOGRAM TRACING: CPT

## 2021-11-29 ENCOUNTER — ANESTHESIA (INPATIENT)
Dept: GASTROENTEROLOGY | Facility: HOSPITAL | Age: 71
DRG: 379 | End: 2021-11-29
Payer: MEDICARE

## 2021-11-29 ENCOUNTER — APPOINTMENT (INPATIENT)
Dept: GASTROENTEROLOGY | Facility: HOSPITAL | Age: 71
DRG: 379 | End: 2021-11-29
Payer: MEDICARE

## 2021-11-29 ENCOUNTER — APPOINTMENT (EMERGENCY)
Dept: CT IMAGING | Facility: HOSPITAL | Age: 71
DRG: 379 | End: 2021-11-29
Payer: MEDICARE

## 2021-11-29 ENCOUNTER — TELEPHONE (OUTPATIENT)
Dept: GASTROENTEROLOGY | Facility: HOSPITAL | Age: 71
End: 2021-11-29

## 2021-11-29 ENCOUNTER — ANESTHESIA EVENT (INPATIENT)
Dept: GASTROENTEROLOGY | Facility: HOSPITAL | Age: 71
DRG: 379 | End: 2021-11-29
Payer: MEDICARE

## 2021-11-29 VITALS
OXYGEN SATURATION: 98 % | BODY MASS INDEX: 21.71 KG/M2 | RESPIRATION RATE: 17 BRPM | SYSTOLIC BLOOD PRESSURE: 108 MMHG | TEMPERATURE: 98.3 F | HEART RATE: 66 BPM | WEIGHT: 118 LBS | HEIGHT: 62 IN | DIASTOLIC BLOOD PRESSURE: 53 MMHG

## 2021-11-29 PROBLEM — K26.4 GASTROINTESTINAL HEMORRHAGE ASSOCIATED WITH DUODENAL ULCER: Status: ACTIVE | Noted: 2021-11-29

## 2021-11-29 PROBLEM — R55 SYNCOPE: Status: ACTIVE | Noted: 2021-11-29

## 2021-11-29 PROBLEM — K92.2 GI BLEEDING: Status: ACTIVE | Noted: 2021-11-29

## 2021-11-29 PROBLEM — R13.14 PHARYNGOESOPHAGEAL DYSPHAGIA: Status: ACTIVE | Noted: 2021-11-29

## 2021-11-29 LAB
2HR DELTA HS TROPONIN: 2 NG/L
4HR DELTA HS TROPONIN: 3 NG/L
ABO GROUP BLD: NORMAL
ABO GROUP BLD: NORMAL
BLD GP AB SCN SERPL QL: NEGATIVE
CARDIAC TROPONIN I PNL SERPL HS: 4 NG/L
CARDIAC TROPONIN I PNL SERPL HS: 5 NG/L
FLUAV RNA RESP QL NAA+PROBE: NEGATIVE
FLUBV RNA RESP QL NAA+PROBE: NEGATIVE
HGB BLD-MCNC: 10.2 G/DL (ref 11.5–15.4)
HGB BLD-MCNC: 10.4 G/DL (ref 11.5–15.4)
RH BLD: POSITIVE
RH BLD: POSITIVE
RSV RNA RESP QL NAA+PROBE: NEGATIVE
SARS-COV-2 RNA RESP QL NAA+PROBE: NEGATIVE
SPECIMEN EXPIRATION DATE: NORMAL

## 2021-11-29 PROCEDURE — 0DB98ZX EXCISION OF DUODENUM, VIA NATURAL OR ARTIFICIAL OPENING ENDOSCOPIC, DIAGNOSTIC: ICD-10-PCS | Performed by: INTERNAL MEDICINE

## 2021-11-29 PROCEDURE — 88342 IMHCHEM/IMCYTCHM 1ST ANTB: CPT | Performed by: PATHOLOGY

## 2021-11-29 PROCEDURE — 86850 RBC ANTIBODY SCREEN: CPT

## 2021-11-29 PROCEDURE — 85018 HEMOGLOBIN: CPT

## 2021-11-29 PROCEDURE — 86900 BLOOD TYPING SEROLOGIC ABO: CPT

## 2021-11-29 PROCEDURE — 70450 CT HEAD/BRAIN W/O DYE: CPT

## 2021-11-29 PROCEDURE — 99239 HOSP IP/OBS DSCHRG MGMT >30: CPT | Performed by: INTERNAL MEDICINE

## 2021-11-29 PROCEDURE — 0D748ZZ DILATION OF ESOPHAGOGASTRIC JUNCTION, VIA NATURAL OR ARTIFICIAL OPENING ENDOSCOPIC: ICD-10-PCS | Performed by: INTERNAL MEDICINE

## 2021-11-29 PROCEDURE — NC001 PR NO CHARGE: Performed by: PHYSICIAN ASSISTANT

## 2021-11-29 PROCEDURE — 99222 1ST HOSP IP/OBS MODERATE 55: CPT | Performed by: INTERNAL MEDICINE

## 2021-11-29 PROCEDURE — 88305 TISSUE EXAM BY PATHOLOGIST: CPT | Performed by: PATHOLOGY

## 2021-11-29 PROCEDURE — C9113 INJ PANTOPRAZOLE SODIUM, VIA: HCPCS

## 2021-11-29 PROCEDURE — 0DB68ZX EXCISION OF STOMACH, VIA NATURAL OR ARTIFICIAL OPENING ENDOSCOPIC, DIAGNOSTIC: ICD-10-PCS | Performed by: INTERNAL MEDICINE

## 2021-11-29 PROCEDURE — 43239 EGD BIOPSY SINGLE/MULTIPLE: CPT | Performed by: INTERNAL MEDICINE

## 2021-11-29 PROCEDURE — 99285 EMERGENCY DEPT VISIT HI MDM: CPT | Performed by: PHYSICIAN ASSISTANT

## 2021-11-29 PROCEDURE — 84484 ASSAY OF TROPONIN QUANT: CPT | Performed by: EMERGENCY MEDICINE

## 2021-11-29 PROCEDURE — C9113 INJ PANTOPRAZOLE SODIUM, VIA: HCPCS | Performed by: PHYSICIAN ASSISTANT

## 2021-11-29 PROCEDURE — 0241U HB NFCT DS VIR RESP RNA 4 TRGT: CPT | Performed by: INTERNAL MEDICINE

## 2021-11-29 PROCEDURE — 36415 COLL VENOUS BLD VENIPUNCTURE: CPT | Performed by: EMERGENCY MEDICINE

## 2021-11-29 PROCEDURE — 86901 BLOOD TYPING SEROLOGIC RH(D): CPT

## 2021-11-29 PROCEDURE — 43248 EGD GUIDE WIRE INSERTION: CPT | Performed by: INTERNAL MEDICINE

## 2021-11-29 PROCEDURE — 99223 1ST HOSP IP/OBS HIGH 75: CPT

## 2021-11-29 RX ORDER — SODIUM CHLORIDE, SODIUM LACTATE, POTASSIUM CHLORIDE, CALCIUM CHLORIDE 600; 310; 30; 20 MG/100ML; MG/100ML; MG/100ML; MG/100ML
INJECTION, SOLUTION INTRAVENOUS CONTINUOUS PRN
Status: DISCONTINUED | OUTPATIENT
Start: 2021-11-29 | End: 2021-11-29

## 2021-11-29 RX ORDER — PANTOPRAZOLE SODIUM 40 MG/1
40 INJECTION, POWDER, FOR SOLUTION INTRAVENOUS ONCE
Status: COMPLETED | OUTPATIENT
Start: 2021-11-29 | End: 2021-11-29

## 2021-11-29 RX ORDER — PROPOFOL 10 MG/ML
INJECTION, EMULSION INTRAVENOUS AS NEEDED
Status: DISCONTINUED | OUTPATIENT
Start: 2021-11-29 | End: 2021-11-29

## 2021-11-29 RX ORDER — PANTOPRAZOLE SODIUM 40 MG/1
40 TABLET, DELAYED RELEASE ORAL
Qty: 90 TABLET | Refills: 0 | Status: SHIPPED | OUTPATIENT
Start: 2021-11-30 | End: 2022-03-01 | Stop reason: CLARIF

## 2021-11-29 RX ORDER — PANTOPRAZOLE SODIUM 40 MG/1
40 TABLET, DELAYED RELEASE ORAL
Status: DISCONTINUED | OUTPATIENT
Start: 2021-11-30 | End: 2021-11-30 | Stop reason: HOSPADM

## 2021-11-29 RX ORDER — PANTOPRAZOLE SODIUM 40 MG/1
40 INJECTION, POWDER, FOR SOLUTION INTRAVENOUS EVERY 12 HOURS SCHEDULED
Status: DISCONTINUED | OUTPATIENT
Start: 2021-11-29 | End: 2021-11-29

## 2021-11-29 RX ORDER — LIDOCAINE HYDROCHLORIDE 10 MG/ML
INJECTION, SOLUTION EPIDURAL; INFILTRATION; INTRACAUDAL; PERINEURAL AS NEEDED
Status: DISCONTINUED | OUTPATIENT
Start: 2021-11-29 | End: 2021-11-29

## 2021-11-29 RX ADMIN — PROPOFOL 50 MG: 10 INJECTION, EMULSION INTRAVENOUS at 16:50

## 2021-11-29 RX ADMIN — PROPOFOL 30 MG: 10 INJECTION, EMULSION INTRAVENOUS at 16:53

## 2021-11-29 RX ADMIN — PROPOFOL 50 MG: 10 INJECTION, EMULSION INTRAVENOUS at 16:51

## 2021-11-29 RX ADMIN — PANTOPRAZOLE SODIUM 40 MG: 40 INJECTION, POWDER, FOR SOLUTION INTRAVENOUS at 02:24

## 2021-11-29 RX ADMIN — SODIUM CHLORIDE 1000 ML: 0.9 INJECTION, SOLUTION INTRAVENOUS at 02:26

## 2021-11-29 RX ADMIN — PANTOPRAZOLE SODIUM 40 MG: 40 INJECTION, POWDER, FOR SOLUTION INTRAVENOUS at 14:13

## 2021-11-29 RX ADMIN — LIDOCAINE HYDROCHLORIDE 80 MG: 10 INJECTION, SOLUTION EPIDURAL; INFILTRATION; INTRACAUDAL; PERINEURAL at 16:50

## 2021-11-29 RX ADMIN — SODIUM CHLORIDE, SODIUM LACTATE, POTASSIUM CHLORIDE, AND CALCIUM CHLORIDE: .6; .31; .03; .02 INJECTION, SOLUTION INTRAVENOUS at 16:44

## 2021-11-30 ENCOUNTER — TRANSITIONAL CARE MANAGEMENT (OUTPATIENT)
Dept: INTERNAL MEDICINE CLINIC | Facility: CLINIC | Age: 71
End: 2021-11-30

## 2021-12-01 ENCOUNTER — CLINICAL SUPPORT (OUTPATIENT)
Dept: INTERNAL MEDICINE CLINIC | Facility: CLINIC | Age: 71
End: 2021-12-01
Payer: MEDICARE

## 2021-12-01 DIAGNOSIS — Z23 ENCOUNTER FOR IMMUNIZATION: Primary | ICD-10-CM

## 2021-12-01 PROCEDURE — 90662 IIV NO PRSV INCREASED AG IM: CPT

## 2021-12-01 PROCEDURE — G0008 ADMIN INFLUENZA VIRUS VAC: HCPCS

## 2021-12-07 ENCOUNTER — OFFICE VISIT (OUTPATIENT)
Dept: INTERNAL MEDICINE CLINIC | Facility: CLINIC | Age: 71
End: 2021-12-07
Payer: MEDICARE

## 2021-12-07 VITALS
TEMPERATURE: 97.4 F | HEIGHT: 62 IN | OXYGEN SATURATION: 98 % | WEIGHT: 119.6 LBS | SYSTOLIC BLOOD PRESSURE: 120 MMHG | RESPIRATION RATE: 16 BRPM | HEART RATE: 79 BPM | BODY MASS INDEX: 22.01 KG/M2 | DIASTOLIC BLOOD PRESSURE: 60 MMHG

## 2021-12-07 DIAGNOSIS — K22.2 ESOPHAGEAL RING: ICD-10-CM

## 2021-12-07 DIAGNOSIS — K27.9 PUD (PEPTIC ULCER DISEASE): Primary | ICD-10-CM

## 2021-12-07 DIAGNOSIS — R55 VASOVAGAL SYNCOPE: ICD-10-CM

## 2021-12-07 DIAGNOSIS — K26.4 GASTROINTESTINAL HEMORRHAGE ASSOCIATED WITH DUODENAL ULCER: ICD-10-CM

## 2021-12-07 PROCEDURE — 99214 OFFICE O/P EST MOD 30 MIN: CPT | Performed by: INTERNAL MEDICINE

## 2021-12-07 RX ORDER — FERROUS SULFATE TAB EC 324 MG (65 MG FE EQUIVALENT) 324 (65 FE) MG
324 TABLET DELAYED RESPONSE ORAL
Qty: 60 TABLET | Refills: 0 | Status: SHIPPED | OUTPATIENT
Start: 2021-12-07 | End: 2021-12-27

## 2021-12-08 ENCOUNTER — TELEPHONE (OUTPATIENT)
Dept: GASTROENTEROLOGY | Facility: CLINIC | Age: 71
End: 2021-12-08

## 2021-12-26 DIAGNOSIS — K26.4 GASTROINTESTINAL HEMORRHAGE ASSOCIATED WITH DUODENAL ULCER: ICD-10-CM

## 2021-12-27 RX ORDER — FERROUS SULFATE TAB EC 324 MG (65 MG FE EQUIVALENT) 324 (65 FE) MG
324 TABLET DELAYED RESPONSE ORAL
Qty: 90 TABLET | Refills: 1 | Status: SHIPPED | OUTPATIENT
Start: 2021-12-27

## 2022-01-19 ENCOUNTER — APPOINTMENT (OUTPATIENT)
Dept: LAB | Facility: CLINIC | Age: 72
End: 2022-01-19
Payer: MEDICARE

## 2022-01-19 DIAGNOSIS — K27.9 PUD (PEPTIC ULCER DISEASE): ICD-10-CM

## 2022-01-19 DIAGNOSIS — K26.4 GASTROINTESTINAL HEMORRHAGE ASSOCIATED WITH DUODENAL ULCER: ICD-10-CM

## 2022-01-19 DIAGNOSIS — R55 VASOVAGAL SYNCOPE: ICD-10-CM

## 2022-01-19 LAB
ANION GAP SERPL CALCULATED.3IONS-SCNC: 5 MMOL/L (ref 4–13)
BASOPHILS # BLD AUTO: 0.07 THOUSANDS/ΜL (ref 0–0.1)
BASOPHILS NFR BLD AUTO: 2 % (ref 0–1)
BUN SERPL-MCNC: 17 MG/DL (ref 5–25)
CALCIUM SERPL-MCNC: 9 MG/DL (ref 8.3–10.1)
CHLORIDE SERPL-SCNC: 107 MMOL/L (ref 100–108)
CO2 SERPL-SCNC: 27 MMOL/L (ref 21–32)
CREAT SERPL-MCNC: 1.01 MG/DL (ref 0.6–1.3)
EOSINOPHIL # BLD AUTO: 0.21 THOUSAND/ΜL (ref 0–0.61)
EOSINOPHIL NFR BLD AUTO: 4 % (ref 0–6)
ERYTHROCYTE [DISTWIDTH] IN BLOOD BY AUTOMATED COUNT: 11.9 % (ref 11.6–15.1)
GFR SERPL CREATININE-BSD FRML MDRD: 56 ML/MIN/1.73SQ M
GLUCOSE P FAST SERPL-MCNC: 89 MG/DL (ref 65–99)
HCT VFR BLD AUTO: 39.4 % (ref 34.8–46.1)
HGB BLD-MCNC: 12.9 G/DL (ref 11.5–15.4)
IMM GRANULOCYTES # BLD AUTO: 0.01 THOUSAND/UL (ref 0–0.2)
IMM GRANULOCYTES NFR BLD AUTO: 0 % (ref 0–2)
LYMPHOCYTES # BLD AUTO: 1.5 THOUSANDS/ΜL (ref 0.6–4.47)
LYMPHOCYTES NFR BLD AUTO: 31 % (ref 14–44)
MAGNESIUM SERPL-MCNC: 2.2 MG/DL (ref 1.6–2.6)
MCH RBC QN AUTO: 30.6 PG (ref 26.8–34.3)
MCHC RBC AUTO-ENTMCNC: 32.7 G/DL (ref 31.4–37.4)
MCV RBC AUTO: 94 FL (ref 82–98)
MONOCYTES # BLD AUTO: 0.48 THOUSAND/ΜL (ref 0.17–1.22)
MONOCYTES NFR BLD AUTO: 10 % (ref 4–12)
NEUTROPHILS # BLD AUTO: 2.52 THOUSANDS/ΜL (ref 1.85–7.62)
NEUTS SEG NFR BLD AUTO: 53 % (ref 43–75)
NRBC BLD AUTO-RTO: 0 /100 WBCS
PLATELET # BLD AUTO: 254 THOUSANDS/UL (ref 149–390)
PMV BLD AUTO: 10.1 FL (ref 8.9–12.7)
POTASSIUM SERPL-SCNC: 4.2 MMOL/L (ref 3.5–5.3)
RBC # BLD AUTO: 4.21 MILLION/UL (ref 3.81–5.12)
SODIUM SERPL-SCNC: 139 MMOL/L (ref 136–145)
WBC # BLD AUTO: 4.79 THOUSAND/UL (ref 4.31–10.16)

## 2022-01-19 PROCEDURE — 83735 ASSAY OF MAGNESIUM: CPT

## 2022-01-19 PROCEDURE — 80048 BASIC METABOLIC PNL TOTAL CA: CPT

## 2022-01-19 PROCEDURE — 85025 COMPLETE CBC W/AUTO DIFF WBC: CPT

## 2022-01-19 PROCEDURE — 36415 COLL VENOUS BLD VENIPUNCTURE: CPT

## 2022-01-27 ENCOUNTER — TELEPHONE (OUTPATIENT)
Dept: OBGYN CLINIC | Facility: CLINIC | Age: 72
End: 2022-01-27

## 2022-01-27 DIAGNOSIS — Z12.31 VISIT FOR SCREENING MAMMOGRAM: Primary | ICD-10-CM

## 2022-02-03 ENCOUNTER — OFFICE VISIT (OUTPATIENT)
Dept: INTERNAL MEDICINE CLINIC | Facility: CLINIC | Age: 72
End: 2022-02-03
Payer: MEDICARE

## 2022-02-03 VITALS
TEMPERATURE: 98.3 F | OXYGEN SATURATION: 97 % | SYSTOLIC BLOOD PRESSURE: 120 MMHG | DIASTOLIC BLOOD PRESSURE: 62 MMHG | WEIGHT: 121.2 LBS | RESPIRATION RATE: 16 BRPM | HEART RATE: 66 BPM | BODY MASS INDEX: 22.31 KG/M2 | HEIGHT: 62 IN

## 2022-02-03 DIAGNOSIS — K26.4 GASTROINTESTINAL HEMORRHAGE ASSOCIATED WITH DUODENAL ULCER: Primary | ICD-10-CM

## 2022-02-03 DIAGNOSIS — Z12.31 ENCOUNTER FOR SCREENING MAMMOGRAM FOR BREAST CANCER: ICD-10-CM

## 2022-02-03 DIAGNOSIS — Z00.00 WELL ADULT EXAM: ICD-10-CM

## 2022-02-03 DIAGNOSIS — K27.9 PUD (PEPTIC ULCER DISEASE): ICD-10-CM

## 2022-02-03 DIAGNOSIS — Z23 ENCOUNTER FOR IMMUNIZATION: ICD-10-CM

## 2022-02-03 PROCEDURE — 99213 OFFICE O/P EST LOW 20 MIN: CPT | Performed by: INTERNAL MEDICINE

## 2022-02-03 RX ORDER — UBIDECARENONE 75 MG
CAPSULE ORAL DAILY
COMMUNITY

## 2022-02-03 NOTE — PROGRESS NOTES
Assessment/Plan:    Diagnoses and all orders for this visit:    Gastrointestinal hemorrhage associated with duodenal ulcer    Encounter for immunization    Encounter for screening mammogram for breast cancer  -     Mammo screening bilateral w 3d & cad; Future    PUD (peptic ulcer disease)  -     CBC and differential; Future  -     Comprehensive metabolic panel; Future  -     Ferritin; Future  -     Iron Panel (Includes Ferritin, Iron Sat%, Iron, and TIBC); Future    Well adult exam  -     Lipid panel; Future  -     TSH, 3rd generation with Free T4 reflex; Future    Other orders  -     cyanocobalamin (VITAMIN B-12) 100 mcg tablet; Take by mouth daily              Patient Instructions   Lab data reviewed in detail and compared prior    Peptic ulcer disease as well as Schatzki ring status post dilation-hemoglobin has normalized  Okay to discontinue oral iron  Continue pantoprazole but taper off as discussed taking every other day for 2 weeks and then every 3rd day for a few more doses before you run out  Contact me with any recurrent symptoms  Will follow-up after labs in May, sooner as needed    This patient was interviewed and examined by myself  This wrap up was written by myself  HPI and physical exam were documented by Wild Askew RN, NP Student  Subjective:      Patient ID: Afia Proctor is a 80 y o  female    Patient presenting to the office today for a follow-up of acute GI bleed and review of blood work  Patient reports feeling "good" and offers no complaints  She denies abdominal pain, nausea, vomiting, or blood in stools  She reports taking iron every other day as prescribed and is taking the Protonix daily  Her most recent blood work was normal with no signs of anemia at this time  She denies recent lightheaded, dizziness, or syncopal episodes           Current Outpatient Medications:     Calcium Carbonate-Vitamin D3 (CALCIUM 600/VITAMIN D) 600-400 MG-UNIT TABS, Take 1 tablet by mouth daily, Disp: , Rfl:     cyanocobalamin (VITAMIN B-12) 100 mcg tablet, Take by mouth daily, Disp: , Rfl:     ferrous sulfate 324 (65 Fe) mg, TAKE 1 TABLET (324 MG TOTAL) BY MOUTH DAILY BEFORE BREAKFAST, Disp: 90 tablet, Rfl: 1    pantoprazole (PROTONIX) 40 mg tablet, Take 1 tablet (40 mg total) by mouth daily in the early morning, Disp: 90 tablet, Rfl: 0    Restasis 0 05 % ophthalmic emulsion, Administer 1 drop to both eyes every 12 (twelve) hours , Disp: , Rfl:     Recent Results (from the past 1008 hour(s))   CBC and differential    Collection Time: 01/19/22 10:00 AM   Result Value Ref Range    WBC 4 79 4 31 - 10 16 Thousand/uL    RBC 4 21 3 81 - 5 12 Million/uL    Hemoglobin 12 9 11 5 - 15 4 g/dL    Hematocrit 39 4 34 8 - 46 1 %    MCV 94 82 - 98 fL    MCH 30 6 26 8 - 34 3 pg    MCHC 32 7 31 4 - 37 4 g/dL    RDW 11 9 11 6 - 15 1 %    MPV 10 1 8 9 - 12 7 fL    Platelets 951 093 - 913 Thousands/uL    nRBC 0 /100 WBCs    Neutrophils Relative 53 43 - 75 %    Immat GRANS % 0 0 - 2 %    Lymphocytes Relative 31 14 - 44 %    Monocytes Relative 10 4 - 12 %    Eosinophils Relative 4 0 - 6 %    Basophils Relative 2 (H) 0 - 1 %    Neutrophils Absolute 2 52 1 85 - 7 62 Thousands/µL    Immature Grans Absolute 0 01 0 00 - 0 20 Thousand/uL    Lymphocytes Absolute 1 50 0 60 - 4 47 Thousands/µL    Monocytes Absolute 0 48 0 17 - 1 22 Thousand/µL    Eosinophils Absolute 0 21 0 00 - 0 61 Thousand/µL    Basophils Absolute 0 07 0 00 - 0 10 Thousands/µL   Basic metabolic panel    Collection Time: 01/19/22 10:00 AM   Result Value Ref Range    Sodium 139 136 - 145 mmol/L    Potassium 4 2 3 5 - 5 3 mmol/L    Chloride 107 100 - 108 mmol/L    CO2 27 21 - 32 mmol/L    ANION GAP 5 4 - 13 mmol/L    BUN 17 5 - 25 mg/dL    Creatinine 1 01 0 60 - 1 30 mg/dL    Glucose, Fasting 89 65 - 99 mg/dL    Calcium 9 0 8 3 - 10 1 mg/dL    eGFR 56 ml/min/1 73sq m   Magnesium    Collection Time: 01/19/22 10:00 AM   Result Value Ref Range    Magnesium 2 2 1 6 - 2 6 mg/dL       The following portions of the patient's history were reviewed and updated as appropriate: allergies, current medications, past family history, past medical history, past social history, past surgical history and problem list      Review of Systems   Constitutional: Negative for appetite change, chills, diaphoresis, fatigue, fever and unexpected weight change  HENT: Negative for congestion, hearing loss and rhinorrhea  Eyes: Negative for visual disturbance  Respiratory: Negative for cough, chest tightness, shortness of breath and wheezing  Cardiovascular: Negative for chest pain, palpitations and leg swelling  Gastrointestinal: Negative for abdominal distention, abdominal pain, blood in stool, nausea and vomiting  Endocrine: Negative for cold intolerance, heat intolerance, polydipsia and polyuria  Genitourinary: Negative for difficulty urinating, dysuria, frequency and urgency  Musculoskeletal: Negative for arthralgias and myalgias  Skin: Negative for rash  Neurological: Negative for dizziness, syncope, weakness, light-headedness and headaches  Hematological: Does not bruise/bleed easily  Psychiatric/Behavioral: Negative for dysphoric mood and sleep disturbance  Objective:      Vitals:    02/03/22 0944   BP: 120/62   Pulse: 66   Resp: 16   Temp: 98 3 °F (36 8 °C)   SpO2: 97%          Physical Exam  Vitals and nursing note reviewed  Constitutional:       General: She is not in acute distress  Appearance: She is well-developed  She is not ill-appearing  HENT:      Head: Normocephalic and atraumatic  Right Ear: Tympanic membrane normal       Left Ear: Tympanic membrane normal       Nose: Nose normal       Mouth/Throat:      Mouth: Mucous membranes are moist       Pharynx: Oropharynx is clear  Eyes:      General: No scleral icterus  Conjunctiva/sclera: Conjunctivae normal       Pupils: Pupils are equal, round, and reactive to light     Neck: Thyroid: No thyromegaly  Vascular: No JVD  Trachea: No tracheal deviation  Cardiovascular:      Rate and Rhythm: Normal rate and regular rhythm  Heart sounds: No murmur heard  No friction rub  No gallop  Pulmonary:      Effort: Pulmonary effort is normal  No respiratory distress  Breath sounds: Normal breath sounds  No wheezing or rales  Abdominal:      General: Abdomen is flat  Bowel sounds are normal  There is no distension  Palpations: Abdomen is soft  There is no mass  Tenderness: There is no abdominal tenderness  There is no guarding or rebound  Musculoskeletal:         General: No tenderness  Normal range of motion  Cervical back: Normal range of motion and neck supple  Lymphadenopathy:      Cervical: No cervical adenopathy  Skin:     General: Skin is warm and dry  Coloration: Skin is not pale  Findings: No bruising, erythema or rash  Neurological:      Mental Status: She is alert and oriented to person, place, and time  Cranial Nerves: No cranial nerve deficit  Motor: No weakness  Psychiatric:         Behavior: Behavior normal          Thought Content:  Thought content normal          Judgment: Judgment normal

## 2022-02-03 NOTE — PATIENT INSTRUCTIONS
Lab data reviewed in detail and compared prior    Peptic ulcer disease as well as Schatzki ring status post dilation-hemoglobin has normalized  Okay to discontinue oral iron  Continue pantoprazole but taper off as discussed taking every other day for 2 weeks and then every 3rd day for a few more doses before you run out  Contact me with any recurrent symptoms  Will follow-up after labs in May, sooner as needed    This patient was interviewed and examined by myself  This wrap up was written by myself  HPI and physical exam were documented by Osvaldo Luis RN, NP Student

## 2022-02-17 ENCOUNTER — APPOINTMENT (EMERGENCY)
Dept: RADIOLOGY | Facility: HOSPITAL | Age: 72
End: 2022-02-17
Payer: MEDICARE

## 2022-02-17 ENCOUNTER — HOSPITAL ENCOUNTER (OUTPATIENT)
Facility: HOSPITAL | Age: 72
Setting detail: OBSERVATION
Discharge: HOME | End: 2022-02-18
Attending: EMERGENCY MEDICINE | Admitting: INTERNAL MEDICINE
Payer: MEDICARE

## 2022-02-17 ENCOUNTER — APPOINTMENT (EMERGENCY)
Dept: CT IMAGING | Facility: HOSPITAL | Age: 72
End: 2022-02-17
Payer: MEDICARE

## 2022-02-17 DIAGNOSIS — R55 SYNCOPE: Primary | ICD-10-CM

## 2022-02-17 DIAGNOSIS — S09.90XA INJURY OF HEAD, INITIAL ENCOUNTER: ICD-10-CM

## 2022-02-17 LAB
BASOPHILS # BLD AUTO: 0.06 THOUSANDS/ΜL (ref 0–0.1)
BASOPHILS NFR BLD AUTO: 1 % (ref 0–1)
EOSINOPHIL # BLD AUTO: 0.19 THOUSAND/ΜL (ref 0–0.61)
EOSINOPHIL NFR BLD AUTO: 3 % (ref 0–6)
ERYTHROCYTE [DISTWIDTH] IN BLOOD BY AUTOMATED COUNT: 11.9 % (ref 11.6–15.1)
HCT VFR BLD AUTO: 42.7 % (ref 34.8–46.1)
HGB BLD-MCNC: 13.8 G/DL (ref 11.5–15.4)
IMM GRANULOCYTES # BLD AUTO: 0 THOUSAND/UL (ref 0–0.2)
IMM GRANULOCYTES NFR BLD AUTO: 0 % (ref 0–2)
LYMPHOCYTES # BLD AUTO: 1.91 THOUSANDS/ΜL (ref 0.6–4.47)
LYMPHOCYTES NFR BLD AUTO: 34 % (ref 14–44)
MCH RBC QN AUTO: 29.4 PG (ref 26.8–34.3)
MCHC RBC AUTO-ENTMCNC: 32.3 G/DL (ref 31.4–37.4)
MCV RBC AUTO: 91 FL (ref 82–98)
MONOCYTES # BLD AUTO: 0.51 THOUSAND/ΜL (ref 0.17–1.22)
MONOCYTES NFR BLD AUTO: 9 % (ref 4–12)
NEUTROPHILS # BLD AUTO: 3.03 THOUSANDS/ΜL (ref 1.85–7.62)
NEUTS SEG NFR BLD AUTO: 53 % (ref 43–75)
NRBC BLD AUTO-RTO: 0 /100 WBCS
PLATELET # BLD AUTO: 266 THOUSANDS/UL (ref 149–390)
PMV BLD AUTO: 9.5 FL (ref 8.9–12.7)
RBC # BLD AUTO: 4.7 MILLION/UL (ref 3.81–5.12)
WBC # BLD AUTO: 5.7 THOUSAND/UL (ref 4.31–10.16)

## 2022-02-17 PROCEDURE — 99285 EMERGENCY DEPT VISIT HI MDM: CPT

## 2022-02-17 PROCEDURE — 99285 EMERGENCY DEPT VISIT HI MDM: CPT | Performed by: EMERGENCY MEDICINE

## 2022-02-17 PROCEDURE — 70450 CT HEAD/BRAIN W/O DYE: CPT

## 2022-02-17 PROCEDURE — 84484 ASSAY OF TROPONIN QUANT: CPT | Performed by: EMERGENCY MEDICINE

## 2022-02-17 PROCEDURE — 85025 COMPLETE CBC W/AUTO DIFF WBC: CPT | Performed by: EMERGENCY MEDICINE

## 2022-02-17 PROCEDURE — 80053 COMPREHEN METABOLIC PANEL: CPT | Performed by: EMERGENCY MEDICINE

## 2022-02-17 PROCEDURE — 36415 COLL VENOUS BLD VENIPUNCTURE: CPT | Performed by: EMERGENCY MEDICINE

## 2022-02-17 PROCEDURE — 72125 CT NECK SPINE W/O DYE: CPT

## 2022-02-17 PROCEDURE — 71046 X-RAY EXAM CHEST 2 VIEWS: CPT

## 2022-02-18 ENCOUNTER — TELEPHONE (OUTPATIENT)
Dept: CARDIOLOGY CLINIC | Facility: CLINIC | Age: 72
End: 2022-02-18

## 2022-02-18 ENCOUNTER — APPOINTMENT (OUTPATIENT)
Dept: NON INVASIVE DIAGNOSTICS | Facility: HOSPITAL | Age: 72
End: 2022-02-18
Payer: MEDICARE

## 2022-02-18 VITALS
BODY MASS INDEX: 22.26 KG/M2 | TEMPERATURE: 98.2 F | OXYGEN SATURATION: 94 % | HEART RATE: 59 BPM | HEIGHT: 62 IN | SYSTOLIC BLOOD PRESSURE: 112 MMHG | RESPIRATION RATE: 15 BRPM | WEIGHT: 121 LBS | DIASTOLIC BLOOD PRESSURE: 59 MMHG

## 2022-02-18 PROBLEM — K26.9 DUODENAL ULCER: Status: ACTIVE | Noted: 2021-12-07

## 2022-02-18 LAB
2HR DELTA HS TROPONIN: 1 NG/L
4HR DELTA HS TROPONIN: 2 NG/L
ALBUMIN SERPL BCP-MCNC: 3.7 G/DL (ref 3.5–5)
ALP SERPL-CCNC: 75 U/L (ref 46–116)
ALT SERPL W P-5'-P-CCNC: 30 U/L (ref 12–78)
ANION GAP SERPL CALCULATED.3IONS-SCNC: 7 MMOL/L (ref 4–13)
AORTIC ROOT: 2.8 CM
APICAL FOUR CHAMBER EJECTION FRACTION: 64 %
ASCENDING AORTA: 2.8 CM (ref 1.79–2.67)
AST SERPL W P-5'-P-CCNC: 25 U/L (ref 5–45)
ATRIAL RATE: 67 BPM
ATRIAL RATE: 70 BPM
ATRIAL RATE: 70 BPM
BILIRUB SERPL-MCNC: 0.25 MG/DL (ref 0.2–1)
BUN SERPL-MCNC: 11 MG/DL (ref 5–25)
CALCIUM SERPL-MCNC: 9.3 MG/DL (ref 8.3–10.1)
CARDIAC TROPONIN I PNL SERPL HS: 3 NG/L
CARDIAC TROPONIN I PNL SERPL HS: 4 NG/L
CARDIAC TROPONIN I PNL SERPL HS: 5 NG/L
CHLORIDE SERPL-SCNC: 102 MMOL/L (ref 100–108)
CO2 SERPL-SCNC: 30 MMOL/L (ref 21–32)
CREAT SERPL-MCNC: 0.9 MG/DL (ref 0.6–1.3)
E WAVE DECELERATION TIME: 296 MS
FRACTIONAL SHORTENING: 34 % (ref 28–44)
GFR SERPL CREATININE-BSD FRML MDRD: 64 ML/MIN/1.73SQ M
GLUCOSE SERPL-MCNC: 103 MG/DL (ref 65–140)
INTERVENTRICULAR SEPTUM IN DIASTOLE (PARASTERNAL SHORT AXIS VIEW): 0.8 CM (ref 0.48–0.9)
INTERVENTRICULAR SEPTUM: 0.8 CM (ref 0.6–1.1)
LA/AORTA RATIO 2D: 1.04
LAAS-AP4: 11.2 CM2
LEFT ATRIUM SIZE: 2.9 CM
LEFT INTERNAL DIMENSION IN SYSTOLE: 2.3 CM (ref 2.26–3.43)
LEFT VENTRICULAR INTERNAL DIMENSION IN DIASTOLE: 3.5 CM (ref 3.68–5.48)
LEFT VENTRICULAR POSTERIOR WALL IN END DIASTOLE: 0.9 CM (ref 0.47–0.89)
LEFT VENTRICULAR STROKE VOLUME: 35 ML
LVSV (TEICH): 35 ML
MV E'TISSUE VEL-SEP: 8 CM/S
MV PEAK A VEL: 0.65 M/S
MV PEAK E VEL: 69 CM/S
MV STENOSIS PRESSURE HALF TIME: 87 MS
MV VALVE AREA P 1/2 METHOD: 2.53 CM2
P AXIS: 62 DEGREES
P AXIS: 65 DEGREES
P AXIS: 65 DEGREES
POTASSIUM SERPL-SCNC: 3.8 MMOL/L (ref 3.5–5.3)
PR INTERVAL: 166 MS
PR INTERVAL: 170 MS
PR INTERVAL: 172 MS
PROT SERPL-MCNC: 7.6 G/DL (ref 6.4–8.2)
QRS AXIS: -1 DEGREES
QRS AXIS: -2 DEGREES
QRS AXIS: 34 DEGREES
QRSD INTERVAL: 86 MS
QRSD INTERVAL: 86 MS
QRSD INTERVAL: 88 MS
QT INTERVAL: 404 MS
QT INTERVAL: 408 MS
QT INTERVAL: 408 MS
QTC INTERVAL: 431 MS
QTC INTERVAL: 436 MS
QTC INTERVAL: 440 MS
SL CV PED ECHO LEFT VENTRICLE DIASTOLIC VOLUME (MOD BIPLANE) 2D: 52 ML
SL CV PED ECHO LEFT VENTRICLE SYSTOLIC VOLUME (MOD BIPLANE) 2D: 18 ML
SODIUM SERPL-SCNC: 139 MMOL/L (ref 136–145)
T WAVE AXIS: 38 DEGREES
T WAVE AXIS: 48 DEGREES
T WAVE AXIS: 57 DEGREES
TR MAX PG: 15 MMHG
TR PEAK VELOCITY: 1.9 M/S
TRICUSPID ANNULAR PLANE SYSTOLIC EXCURSION: 1.8 CM
TRICUSPID VALVE PEAK REGURGITATION VELOCITY: 1.91 M/S
VENTRICULAR RATE: 67 BPM
VENTRICULAR RATE: 70 BPM
VENTRICULAR RATE: 70 BPM
Z-SCORE OF ASCENDING AORTA: 2.55
Z-SCORE OF INTERVENTRICULAR SEPTUM IN END DIASTOLE: 1.02
Z-SCORE OF LEFT VENTRICULAR DIMENSION IN END DIASTOLE: -2.48
Z-SCORE OF LEFT VENTRICULAR DIMENSION IN END SYSTOLE: -1.51
Z-SCORE OF LEFT VENTRICULAR POSTERIOR WALL IN END DIASTOLE: 2.06

## 2022-02-18 PROCEDURE — 93306 TTE W/DOPPLER COMPLETE: CPT

## 2022-02-18 PROCEDURE — 84484 ASSAY OF TROPONIN QUANT: CPT | Performed by: PHYSICIAN ASSISTANT

## 2022-02-18 PROCEDURE — 93005 ELECTROCARDIOGRAM TRACING: CPT

## 2022-02-18 PROCEDURE — 99204 OFFICE O/P NEW MOD 45 MIN: CPT | Performed by: INTERNAL MEDICINE

## 2022-02-18 PROCEDURE — 36415 COLL VENOUS BLD VENIPUNCTURE: CPT | Performed by: PHYSICIAN ASSISTANT

## 2022-02-18 PROCEDURE — 99236 HOSP IP/OBS SAME DATE HI 85: CPT | Performed by: INTERNAL MEDICINE

## 2022-02-18 PROCEDURE — 93306 TTE W/DOPPLER COMPLETE: CPT | Performed by: INTERNAL MEDICINE

## 2022-02-18 PROCEDURE — 93010 ELECTROCARDIOGRAM REPORT: CPT | Performed by: INTERNAL MEDICINE

## 2022-02-18 RX ORDER — FERROUS SULFATE 325(65) MG
325 TABLET ORAL
Status: DISCONTINUED | OUTPATIENT
Start: 2022-02-18 | End: 2022-02-18

## 2022-02-18 RX ORDER — PANTOPRAZOLE SODIUM 40 MG/1
40 TABLET, DELAYED RELEASE ORAL
Status: DISCONTINUED | OUTPATIENT
Start: 2022-02-18 | End: 2022-02-18

## 2022-02-18 RX ORDER — ACETAMINOPHEN 325 MG/1
650 TABLET ORAL EVERY 6 HOURS PRN
Status: DISCONTINUED | OUTPATIENT
Start: 2022-02-18 | End: 2022-02-18 | Stop reason: HOSPADM

## 2022-02-18 RX ORDER — PANTOPRAZOLE SODIUM 40 MG/1
40 TABLET, DELAYED RELEASE ORAL EVERY OTHER DAY
Status: DISCONTINUED | OUTPATIENT
Start: 2022-02-18 | End: 2022-02-18 | Stop reason: HOSPADM

## 2022-02-18 RX ADMIN — PANTOPRAZOLE SODIUM 40 MG: 40 TABLET, DELAYED RELEASE ORAL at 09:36

## 2022-02-18 RX ADMIN — SODIUM CHLORIDE 1000 ML: 0.9 INJECTION, SOLUTION INTRAVENOUS at 01:12

## 2022-02-18 NOTE — ASSESSMENT & PLAN NOTE
Patient presents to ED with syncope  She felt dizzy while brushing her teeth and woke up on the ground, notably striking her head on the way down  · She has had similar episodes in the past  · CT head/spine negative  · Negative troponin  · No ischemic changes on EKG  · Status post echocardiogram, preserved EF, no major valvular dysfunction  · Orthostatics VS negative  · Appreciate input per Cardiology  Okay to discharge    Plan on 2 weeks event monitor and ischemic workup as outpatient  · Okay per Cardiology for discharge

## 2022-02-18 NOTE — H&P
[address] [place] [address]  H&P- Tonio Chou 1950, 80 y o  female MRN: [id number]  Unit/Bed#: FT 02 Encounter: [id number]  Primary Care Provider: Jaimee Proctor MD   Date and time admitted to hospital: 2/17/2022 11:17 PM    * Vasovagal syncope  Assessment & Plan  Patient presents to ED with syncope  She felt dizzy while brushing her teeth and woke up on the ground, notably striking her head on the way down  · She has similar episodes in the past  · Last echo done in May 2021 was unremarkable with an EF of 60%  · CT head/spine negative  · Initial troponin 3  · EKG NSR HR 70, unchanged from priors   · Check orthostatics   · Consider repeatTTE   · Tele   · May benefit from outpt holter monitor due to recurrent episodes      Duodenal ulcer  Assessment & Plan  · History of duodenal ulcer with gastrointestinal hemorrhage  · Hemoglobin stable  · Continue Protonix 40mg QOD    VTE Pharmacologic Prophylaxis: VTE Score: 2 Low Risk (Score 0-2) - Encourage Ambulation  Code Status: Level 1 - Full Code     Anticipated Length of Stay: Patient will be admitted on an observation basis with an anticipated length of stay of less than 2 midnights secondary to syncope  Total Time for Visit, including Counseling / Coordination of Care: 45 minutes Greater than 50% of this total time spent on direct patient counseling and coordination of care  Chief Complaint:   Chief Complaint   Patient presents with    Fall     pt states she felt dizzy brushing her teeth and then woke up on the tile floor with head pain         History of Present Illness:  Tonio Chou is a 80 y o  female with a PMH of PUD who presents withPatient presents to ED with syncope  She felt dizzy while brushing her teeth and woke up on the ground, notably striking her head on the way down  Patient has had 2 similar episodes in the past when she had GI bleeding    She also reports she had an episode a few days ago where she woke up from sleep and felt dizzy as if she was going to pass out, however does not did not have syncopal event at that time  She denies chest pain, palpitations, visual changes leading up to this event  She did wake up with pain in the back of her head in back after fall in believes she hit her head on the way down  When she became alert after syncopal episode she had floaters and blurry vision  The symptoms are now resolved  She took Tylenol at home with improvement in pain  CT head performed in the ED was unremarkable  She denies dizziness, lightheadedness, palpitations, visual disturbances chest pain outside of this event  No fever or chills  No known exposure to sick contact  She has not had any dark stools, hematochezia, nausea vomiting, hematemesis  Labs reveal stable hemoglobin  No hx of MI or CVA  Patient reports she drinks 2-3 cups of green tea per day with limited water intake  Review of Systems:  Review of Systems   Constitutional: Negative for chills and fever  HENT: Negative for ear pain and sore throat  Eyes: Negative for pain and visual disturbance  Respiratory: Negative for cough and shortness of breath  Cardiovascular: Negative for chest pain and palpitations  Gastrointestinal: Negative for abdominal pain, nausea and vomiting  Genitourinary: Negative for difficulty urinating and dysuria  Skin: Negative for color change and rash  Neurological: Positive for dizziness and syncope  All other systems reviewed and are negative  Past Medical and Surgical History:   Past Medical History:   Diagnosis Date    Benign neoplasm of skin        Past Surgical History:   Procedure Laterality Date    BREAST LUMPECTOMY Left     LAST ASSESSED: [id number]    TUBAL LIGATION      LAST ASSESSED: [id number]       Meds/Allergies:  Prior to Admission medications    Medication Sig Start Date End Date Taking?  Authorizing Provider   Calcium Carbonate-Vitamin D3 (CALCIUM 600/VITAMIN D) 600-400 MG-UNIT TABS Take 1 tablet by mouth daily 6/18/15   Historical Provider, MD   cyanocobalamin (VITAMIN B-12) 100 mcg tablet Take by mouth daily    Historical Provider, MD   ferrous sulfate 324 (65 Fe) mg TAKE 1 TABLET (324 MG TOTAL) BY MOUTH DAILY BEFORE BREAKFAST 12/27/21   Andres Shannon MD   pantoprazole (PROTONIX) 40 mg tablet Take 1 tablet (40 mg total) by mouth daily in the early morning 11/30/21   Andressa Floyd MD   Restasis 0 05 % ophthalmic emulsion Administer 1 drop to both eyes every 12 (twelve) hours  9/4/20   Historical Provider, MD     I have reviewed home medications with patient personally  Allergies:    Allergies   Allergen Reactions    Other Other (See Comments) and Sneezing     CATS-itchy eyes, heaviness in chest       Social History:  Marital Status: /Civil Union   Occupation:   Patient Pre-hospital Living Situation: Home  Patient Pre-hospital Level of Mobility: walks  Patient Pre-hospital Diet Restrictions:   Substance Use History:   Social History     Substance and Sexual Activity   Alcohol Use Yes    Alcohol/week: 2 0 standard drinks    Types: 2 Glasses of wine per week     Social History     Tobacco Use   Smoking Status Never Smoker   Smokeless Tobacco Never Used     Social History     Substance and Sexual Activity   Drug Use No       Family History:  Family History   Problem Relation Age of Onset    Migraines Mother     Osteoporosis Mother     Throat cancer Father     Depression Daughter         WITH ANXIETY     Migraines Daughter     Rheum arthritis Daughter     Urinary tract infection Daughter     Depression Son         WITH ANXIETY     No Known Problems Daughter     Breast cancer Neg Hx     Colon cancer Neg Hx     Uterine cancer Neg Hx     Cervical cancer Neg Hx     Ovarian cancer Neg Hx        Physical Exam:     Vitals:   Blood Pressure: 151/72 (02/17/22 2313)  Pulse: 81 (02/17/22 2313)  Temperature: 98 2 °F (36 8 °C) (02/17/22 2313)  Temp Source: Oral (02/17/22 2313)  Respirations: 20 (02/17/22 2313)  SpO2: 96 % (02/17/22 2313)    Physical Exam  Vitals and nursing note reviewed  Constitutional:       General: She is not in acute distress  Appearance: She is well-developed  HENT:      Head: Normocephalic and atraumatic  Eyes:      Conjunctiva/sclera: Conjunctivae normal    Cardiovascular:      Rate and Rhythm: Normal rate and regular rhythm  Heart sounds: No murmur heard  Pulmonary:      Effort: Pulmonary effort is normal  No respiratory distress  Breath sounds: Normal breath sounds  Abdominal:      Palpations: Abdomen is soft  Tenderness: There is no abdominal tenderness  Musculoskeletal:      Cervical back: Neck supple  Skin:     General: Skin is warm and dry  Neurological:      Mental Status: She is alert  Additional Data:     Lab Results:  Results from last 7 days   Lab Units 02/17/22  2352   WBC Thousand/uL 5 70   HEMOGLOBIN g/dL 13 8   HEMATOCRIT % 42 7   PLATELETS Thousands/uL 266   NEUTROS PCT % 53   LYMPHS PCT % 34   MONOS PCT % 9   EOS PCT % 3     Results from last 7 days   Lab Units 02/17/22  2352   SODIUM mmol/L 139   POTASSIUM mmol/L 3 8   CHLORIDE mmol/L 102   CO2 mmol/L 30   BUN mg/dL 11   CREATININE mg/dL 0 90   ANION GAP mmol/L 7   CALCIUM mg/dL 9 3   ALBUMIN g/dL 3 7   TOTAL BILIRUBIN mg/dL 0 25   ALK PHOS U/L 75   ALT U/L 30   AST U/L 25   GLUCOSE RANDOM mg/dL 103                       Imaging: Reviewed radiology reports from this admission including: CT head  CT head without contrast   Final Result by Nida Holcomb DO (02/18 0003)      No acute intracranial abnormality  Workstation performed: [id number]         CT spine cervical without contrast   Final Result by Nida Holcomb DO (02/18 0007)      No cervical spine fracture or traumatic malalignment                     Workstation performed: [id number]         XR chest pa & lateral    (Results Pending)       EKG and Other Studies Reviewed on Admission:   · EKG: NSR  HR 70     ** Please Note: This note has been constructed using a voice recognition system   **

## 2022-02-18 NOTE — PLAN OF CARE
Problem: PAIN - ADULT  Goal: Verbalizes/displays adequate comfort level or baseline comfort level  Description: Interventions:  - Encourage patient to monitor pain and request assistance  - Assess pain using appropriate pain scale  - Administer analgesics based on type and severity of pain and evaluate response  - Implement non-pharmacological measures as appropriate and evaluate response  - Consider cultural and social influences on pain and pain management  - Notify physician/advanced practitioner if interventions unsuccessful or patient reports new pain  Outcome: Progressing     Problem: SAFETY ADULT  Goal: Patient will remain free of falls  Description: INTERVENTIONS:  - Educate patient/family on patient safety including physical limitations  - Instruct patient to call for assistance with activity   - Consult OT/PT to assist with strengthening/mobility   - Keep Call bell within reach  - Keep bed low and locked with side rails adjusted as appropriate  - Keep care items and personal belongings within reach  - Initiate and maintain comfort rounds  - Make Fall Risk Sign visible to staff  - Offer Toileting every 2 Hours, in advance of need  - Apply yellow socks and bracelet for high fall risk patients  - Consider moving patient to room near nurses station  Outcome: Progressing

## 2022-02-18 NOTE — ASSESSMENT & PLAN NOTE
Patient presents to ED with syncope  She felt dizzy while brushing her teeth and woke up on the ground, notably striking her head on the way down    · She has similar episodes in the past  · Last echo done in May 2021 was unremarkable with an EF of 60%  · CT head/spine negative  · Initial troponin 3  · EKG NSR HR 70, unchanged from priors   · Check orthostatics   · Consider repeatTTE   · Tele   · May benefit from outpt holter monitor due to recurrent episodes

## 2022-02-18 NOTE — ASSESSMENT & PLAN NOTE
· History of duodenal ulcer with gastrointestinal hemorrhage  · Hemoglobin stable  · Continue Protonix 40mg QOD

## 2022-02-18 NOTE — ED NOTES
Patient presents for syncopal episode and hit her head   Denies chest pain at present     Jessica Haynes RN  02/18/22 0120

## 2022-02-18 NOTE — DISCHARGE SUMMARY
[facility]  Discharge- Fuentes Hagan 1950, 80 y o  female MRN: [id number]  Unit/Bed#: ED 17 Encounter: [id number]  Primary Care Provider: Sergo Rushing MD   Date and time admitted to hospital: 2/17/2022 11:17 PM    * Vasovagal syncope  Assessment & Plan  Patient presents to ED with syncope  She felt dizzy while brushing her teeth and woke up on the ground, notably striking her head on the way down  · She has had similar episodes in the past  · CT head/spine negative  · Negative troponin  · No ischemic changes on EKG  · Status post echocardiogram, preserved EF, no major valvular dysfunction  · Orthostatics VS negative  · Appreciate input per Cardiology  Okay to discharge  Plan on 2 weeks event monitor and ischemic workup as outpatient  · Okay per Cardiology for discharge    Duodenal ulcer  Assessment & Plan  · History of duodenal ulcer with gastrointestinal hemorrhage  · Hemoglobin stable  · Continue Protonix 40mg QOD    Discharging Physician / Practitioner: Jenelle Malagon DO  PCP: Sergo Rushing MD  Admission Date:   Admission Orders (From admission, onward)     Ordered        02/18/22 0035  Place in Observation  Once                      Discharge Date: 02/18/22    Medical Problems             Resolved Problems  Date Reviewed: 2/18/2022    None                Consultations During Hospital Stay:  · Cardiology    Procedures Performed:   · Echo  · CT head/cervical spine    Significant Findings / Test Results:   · See above    Incidental Findings:   ·     Test Results Pending at Discharge (will require follow up):   ·      Outpatient Tests Requested:  ·     Complications:  none    Reason for Admission: syncope    Hospital Course:     Fuentes Hagan is a 80 y o  female with a PMH of PUD who presents withPatient presents to ED with syncope   She felt dizzy while brushing her teeth and woke up on the ground, notably striking her head on the way down    Patient has had 2 similar episodes in the past when she had GI bleeding  She also reports she had an episode a few days ago where she woke up from sleep and felt dizzy as if she was going to pass out, however does not did not have syncopal event at that time  She denies chest pain, palpitations, visual changes leading up to this event  She did wake up with pain in the back of her head in back after fall in believes she hit her head on the way down  When she became alert after syncopal episode she had floaters and blurry vision  The symptoms are now resolved  She took Tylenol at home with improvement in pain  CT head performed in the ED was unremarkable  She denies dizziness, lightheadedness, palpitations, visual disturbances chest pain outside of this event  No fever or chills  No known exposure to sick contact  She has not had any dark stools, hematochezia, nausea vomiting, hematemesis  Labs reveal stable hemoglobin  No hx of MI or CVA  Patient reports she drinks 2-3 cups of green tea per day with limited water intake         Please see above list of diagnoses and related plan for additional information  Condition at Discharge: stable     Discharge Day Visit / Exam:     Subjective:  No recurring syncope  No acute events  Pt denies any cardiac symptoms  Vitals: Blood Pressure: 112/59 (02/18/22 1420)  Pulse: 59 (02/18/22 1500)  Temperature: 98 2 °F (36 8 °C) (02/17/22 2313)  Temp Source: Oral (02/17/22 2313)  Respirations: 15 (02/18/22 1500)  Height: 5' 2" (157 5 cm) (02/18/22 1300)  Weight - Scale: 54 9 kg (121 lb) (02/18/22 1300)  SpO2: 94 % (02/18/22 1420)  Exam:   Physical Exam  Cardiovascular:      Rate and Rhythm: Normal rate and regular rhythm  Pulses: Normal pulses  Heart sounds: Normal heart sounds  No murmur heard  Pulmonary:      Effort: Pulmonary effort is normal  No respiratory distress  Breath sounds: Normal breath sounds  No wheezing or rales  Abdominal:      General: Abdomen is flat  Bowel sounds are normal  There is no distension  Palpations: Abdomen is soft  Tenderness: There is no abdominal tenderness  There is no guarding  Musculoskeletal:         General: Normal range of motion  Cervical back: Normal range of motion and neck supple  Right lower leg: No edema  Left lower leg: No edema  Skin:     General: Skin is warm and dry  Neurological:      General: No focal deficit present  Mental Status: She is alert and oriented to person, place, and time  Mental status is at baseline  Cranial Nerves: No cranial nerve deficit  Motor: No weakness  Discussion with Family: Plan of care d/w patient at length  Offered to call her , she declined stating that he has been updated    Discharge instructions/Information to patient and family:   See after visit summary for information provided to patient and family  Provisions for Follow-Up Care:  See after visit summary for information related to follow-up care and any pertinent home health orders  Disposition:     Home    For Discharges to [facility] SNF:   · Not Applicable to this Patient - Not Applicable to this Patient    Planned Readmission: No     Discharge Statement:  I spent 30 minutes discharging the patient  This time was spent on the day of discharge  I had direct contact with the patient on the day of discharge  Greater than 50% of the total time was spent examining patient, answering all patient questions, arranging and discussing plan of care with patient as well as directly providing post-discharge instructions  Additional time then spent on discharge activities  Discharge Medications:  See after visit summary for reconciled discharge medications provided to patient and family        ** Please Note: This note has been constructed using a voice recognition system **

## 2022-02-18 NOTE — PLAN OF CARE
Problem: PAIN - ADULT  Goal: Verbalizes/displays adequate comfort level or baseline comfort level  Description: Interventions:  - Encourage patient to monitor pain and request assistance  - Assess pain using appropriate pain scale  - Administer analgesics based on type and severity of pain and evaluate response  - Implement non-pharmacological measures as appropriate and evaluate response  - Consider cultural and social influences on pain and pain management  - Notify physician/advanced practitioner if interventions unsuccessful or patient reports new pain  2/18/2022 1748 by Shaniqua Beltrán RN  Outcome: Adequate for Discharge  2/18/2022 1300 by Shaniqua Beltrán RN  Outcome: Progressing     Problem: INFECTION - ADULT  Goal: Absence or prevention of progression during hospitalization  Description: INTERVENTIONS:  - Assess and monitor for signs and symptoms of infection  - Monitor lab/diagnostic results  - Monitor all insertion sites, i e  indwelling lines, tubes, and drains  - Monitor endotracheal if appropriate and nasal secretions for changes in amount and color  - [place] appropriate cooling/warming therapies per order  - Administer medications as ordered  - Instruct and encourage patient and family to use good hand hygiene technique  - Identify and instruct in appropriate isolation precautions for identified infection/condition  Outcome: Adequate for Discharge  Goal: Absence of fever/infection during neutropenic period  Description: INTERVENTIONS:  - Monitor WBC    Outcome: Adequate for Discharge     Problem: SAFETY ADULT  Goal: Patient will remain free of falls  Description: INTERVENTIONS:  - Educate patient/family on patient safety including physical limitations  - Instruct patient to call for assistance with activity   - Consult OT/PT to assist with strengthening/mobility   - Keep Call bell within reach  - Keep bed low and locked with side rails adjusted as appropriate  - Keep care items and personal belongings within reach  - Initiate and maintain comfort rounds  - Make Fall Risk Sign visible to staff  - Offer Toileting every 2 Hours, in advance of need  - Apply yellow socks and bracelet for high fall risk patients  - Consider moving patient to room near nurses station  2/18/2022 1748 by Hilario Arrieta RN  Outcome: Adequate for Discharge  2/18/2022 1300 by Hilario Arrieta RN  Outcome: Progressing  Goal: Maintain or return to baseline ADL function  Description: INTERVENTIONS:  -  Assess patient's ability to carry out ADLs; assess patient's baseline for ADL function and identify physical deficits which impact ability to perform ADLs (bathing, care of mouth/teeth, toileting, grooming, dressing, etc )  - Assess/evaluate cause of self-care deficits   - Assess range of motion  - Assess patient's mobility; develop plan if impaired  - Assess patient's need for assistive devices and provide as appropriate  - Encourage maximum independence but intervene and supervise when necessary  - Involve family in performance of ADLs  - Assess for home care needs following discharge   - Consider OT consult to assist with ADL evaluation and planning for discharge  - Provide patient education as appropriate  Outcome: Adequate for Discharge  Goal: Maintains/Returns to pre admission functional level  Description: INTERVENTIONS:  - Perform BMAT or MOVE assessment daily    - Set and communicate daily mobility goal to care team and patient/family/caregiver  - Collaborate with rehabilitation services on mobility goals if consulted  - Perform Range of Motion 4 times a day  - Reposition patient every 2 hours    - Dangle patient 4 times a day  - Stand patient 4 times a day  - Ambulate patient 4 times a day  - Out of bed to chair 3 times a day   - Out of bed for meals 3 times a day  - Out of bed for toileting  - Record patient progress and toleration of activity level   Outcome: Adequate for Discharge     Problem: DISCHARGE PLANNING  Goal: Discharge to home or other facility with appropriate resources  Description: INTERVENTIONS:  - Identify barriers to discharge w/patient and caregiver  - Arrange for needed discharge resources and transportation as appropriate  - Identify discharge learning needs (meds, wound care, etc )  - Arrange for interpretive services to assist at discharge as needed  - Refer to Case Management Department for coordinating discharge planning if the patient needs post-hospital services based on physician/advanced practitioner order or complex needs related to functional status, cognitive ability, or social support system  Outcome: Adequate for Discharge     Problem: Knowledge Deficit  Goal: Patient/family/caregiver demonstrates understanding of disease process, treatment plan, medications, and discharge instructions  Description: Complete learning assessment and assess knowledge base    Interventions:  - Provide teaching at level of understanding  - Provide teaching via preferred learning methods  Outcome: Adequate for Discharge

## 2022-02-18 NOTE — ED PROVIDER NOTES
History  Chief Complaint   Patient presents with   [facility] Esther Mare Fall     pt states she felt dizzy brushing her teeth and then woke up on the tile floor with head pain     HPI  52-year-old female presents with syncope  She states that she felt dizzy when she was brushing teeth about 1 hour prior to arrival and then woke up on the floor  She states that she hit the back of her head  She denies any chest pain or palpitations  She states that she does feel somewhat tired  She states she was admitted for syncope and GI bleed in the past, does not have any vomiting, dark stools  Prior to Admission Medications   Prescriptions Last Dose Informant Patient Reported? Taking?    Calcium Carbonate-Vitamin D3 (CALCIUM 600/VITAMIN D) 600-400 MG-UNIT TABS  Self Yes No   Sig: Take 1 tablet by mouth daily   Restasis 0 05 % ophthalmic emulsion  Self Yes No   Sig: Administer 1 drop to both eyes every 12 (twelve) hours    cyanocobalamin (VITAMIN B-12) 100 mcg tablet   Yes No   Sig: Take by mouth daily   ferrous sulfate 324 (65 Fe) mg   No No   Sig: TAKE 1 TABLET (324 MG TOTAL) BY MOUTH DAILY BEFORE BREAKFAST   pantoprazole (PROTONIX) 40 mg tablet   No No   Sig: Take 1 tablet (40 mg total) by mouth daily in the early morning      Facility-Administered Medications: None       Past Medical History:   Diagnosis Date    Benign neoplasm of skin        Past Surgical History:   Procedure Laterality Date    BREAST LUMPECTOMY Left     LAST ASSESSED: [id number]    TUBAL LIGATION      LAST ASSESSED: [id number]       Family History   Problem Relation Age of Onset    Migraines Mother     Osteoporosis Mother     Throat cancer Father     Depression Daughter         WITH ANXIETY     Migraines Daughter     Rheum arthritis Daughter     Urinary tract infection Daughter     Depression Son         WITH ANXIETY     No Known Problems Daughter     Breast cancer Neg Hx     Colon cancer Neg Hx     Uterine cancer Neg Hx     Cervical cancer Neg Hx     Ovarian cancer Neg Hx      I have reviewed and agree with the history as documented  E-Cigarette/Vaping    E-Cigarette Use Never User      E-Cigarette/Vaping Substances    Nicotine No     THC No     CBD No     Flavoring No     Other No     Unknown No      Social History     Tobacco Use    Smoking status: Never Smoker    Smokeless tobacco: Never Used   Vaping Use    Vaping Use: Never used   Substance Use Topics    Alcohol use: Yes     Alcohol/week: 2 0 standard drinks     Types: 2 Glasses of wine per week    Drug use: No       Review of Systems   Constitutional: Negative for chills and fever  HENT: Negative for dental problem and ear pain  +head injury   Eyes: Negative for pain and redness  Respiratory: Negative for cough and shortness of breath  Cardiovascular: Negative for chest pain and palpitations  Gastrointestinal: Negative for abdominal pain and nausea  Endocrine: Negative for polydipsia and polyphagia  Genitourinary: Negative for dysuria and frequency  Musculoskeletal: Negative for arthralgias and joint swelling  Skin: Negative for color change and rash  Neurological: Positive for syncope  Negative for dizziness and headaches  Psychiatric/Behavioral: Negative for behavioral problems and confusion  All other systems reviewed and are negative  Physical Exam  Physical Exam  Vitals and nursing note reviewed  Constitutional:       General: She is not in acute distress  Appearance: She is well-developed  She is not diaphoretic  HENT:      Head:      Comments: Occipital hematoma     Right Ear: External ear normal       Left Ear: External ear normal       Nose: Nose normal    Eyes:      Conjunctiva/sclera: Conjunctivae normal       Pupils: Pupils are equal, round, and reactive to light  Neck:      Vascular: No JVD  Cardiovascular:      Rate and Rhythm: Normal rate and regular rhythm  Heart sounds: Normal heart sounds  No murmur heard        Pulmonary: Effort: Pulmonary effort is normal  No respiratory distress  Breath sounds: Normal breath sounds  No wheezing  Abdominal:      General: Bowel sounds are normal  There is no distension  Palpations: Abdomen is soft  Tenderness: There is no abdominal tenderness  Musculoskeletal:         General: Normal range of motion  Cervical back: Normal range of motion and neck supple  Skin:     General: Skin is warm and dry  Capillary Refill: Capillary refill takes less than 2 seconds  Neurological:      Mental Status: She is alert and oriented to person, place, and time  Cranial Nerves: No cranial nerve deficit     Psychiatric:         Behavior: Behavior normal          Vital Signs  ED Triage Vitals [02/17/22 2313]   Temperature Pulse Respirations Blood Pressure SpO2   98 2 °F (36 8 °C) 81 20 151/72 96 %      Temp Source Heart Rate Source Patient Position - Orthostatic VS BP Location FiO2 (%)   Oral Monitor Sitting Left arm --      Pain Score       --           Vitals:    02/17/22 2313   BP: 151/72   Pulse: 81   Patient Position - Orthostatic VS: Sitting         Visual Acuity      ED Medications  Medications - No data to display    Diagnostic Studies  Results Reviewed     Procedure Component Value Units Date/Time    HS Troponin 0hr (reflex protocol) [[id number]]  (Normal) Collected: 02/17/22 2352    Lab Status: Final result Specimen: Blood from Arm, Right Updated: 02/18/22 0020     hs TnI 0hr 3 ng/L     HS Troponin I 2hr [[id number]]     Lab Status: No result Specimen: Blood     HS Troponin I 4hr [[id number]]     Lab Status: No result Specimen: Blood     Comprehensive metabolic panel [[id number]] Collected: 02/17/22 2352    Lab Status: Final result Specimen: Blood from Arm, Right Updated: 02/18/22 0012     Sodium 139 mmol/L      Potassium 3 8 mmol/L      Chloride 102 mmol/L      CO2 30 mmol/L      ANION GAP 7 mmol/L      BUN 11 mg/dL      Creatinine 0 90 mg/dL      Glucose 103 mg/dL Calcium 9 3 mg/dL      AST 25 U/L      ALT 30 U/L      Alkaline Phosphatase 75 U/L      Total Protein 7 6 g/dL      Albumin 3 7 g/dL      Total Bilirubin 0 25 mg/dL      eGFR 64 ml/min/1 73sq m     Narrative:      [place] guidelines for Chronic Kidney Disease (CKD):     Stage 1 with normal or high GFR (GFR > 90 mL/min/1 73 square meters)    Stage 2 Mild CKD (GFR = 60-89 mL/min/1 73 square meters)    Stage 3A Moderate CKD (GFR = 45-59 mL/min/1 73 square meters)    Stage 3B Moderate CKD (GFR = 30-44 mL/min/1 73 square meters)    Stage 4 Severe CKD (GFR = 15-29 mL/min/1 73 square meters)    Stage 5 End Stage CKD (GFR <15 mL/min/1 73 square meters)  Note: GFR calculation is accurate only with a steady state creatinine    CBC and differential [[id number]] Collected: 02/17/22 2352    Lab Status: Final result Specimen: Blood from Arm, Right Updated: 02/17/22 2359     WBC 5 70 Thousand/uL      RBC 4 70 Million/uL      Hemoglobin 13 8 g/dL      Hematocrit 42 7 %      MCV 91 fL      MCH 29 4 pg      MCHC 32 3 g/dL      RDW 11 9 %      MPV 9 5 fL      Platelets 366 Thousands/uL      nRBC 0 /100 WBCs      Neutrophils Relative 53 %      Immat GRANS % 0 %      Lymphocytes Relative 34 %      Monocytes Relative 9 %      Eosinophils Relative 3 %      Basophils Relative 1 %      Neutrophils Absolute 3 03 Thousands/µL      Immature Grans Absolute 0 00 Thousand/uL      Lymphocytes Absolute 1 91 Thousands/µL      Monocytes Absolute 0 51 Thousand/µL      Eosinophils Absolute 0 19 Thousand/µL      Basophils Absolute 0 06 Thousands/µL                  CT head without contrast   Final Result by Shanice Harper DO (02/18 0003)      No acute intracranial abnormality  Workstation performed: [id number]         CT spine cervical without contrast   Final Result by Shanice Harper DO (02/18 0007)      No cervical spine fracture or traumatic malalignment                     Workstation performed: [id number] chest pa & lateral    (Results Pending)              Procedures  Procedures         ED Course                                             MDM  Patient presents with syncope and head injury  CT scans negative  Labs are unremarkable for acute abnormality  Given her  syncope with very limited prodrome, advanced age,cannot rule out cardiac etiology and will admit patient for telemetry and further monitoring  Disposition  Final diagnoses:   Syncope   Injury of head, initial encounter     Time reflects when diagnosis was documented in both MDM as applicable and the Disposition within this note     Time User Action Codes Description Comment    2/18/2022 12:34 AM Isma Reynolds [[id number]] Syncope     2/18/2022 12:34 AM Isma Reynolds [[id number]] Injury of head, initial encounter       ED Disposition     ED Disposition Condition Date/Time Comment    Admit Stable Fri Feb 18, 2022 12:34 AM Case was discussed with [facility] and the patient's admission status was agreed to be Admission Status: observation status to the service of Dr Millicent Gaming   Follow-up Information    None         Patient's Medications   Discharge Prescriptions    No medications on file       No discharge procedures on file      PDMP Review     None          ED Provider  Electronically Signed by           Nat Hoff MD  02/18/22 1327

## 2022-02-18 NOTE — DISCHARGE INSTRUCTIONS
Syncope   WHAT YOU NEED TO KNOW:   Syncope is also called fainting or passing out  Syncope is a sudden, temporary loss of consciousness, followed by a fall from a standing or sitting position  Syncope ranges from not serious to a sign of a more serious condition that needs to be treated  You can control some health conditions that cause syncope  Your healthcare providers can help you create a plan to manage syncope and prevent episodes  DISCHARGE INSTRUCTIONS:   Seek care immediately if:   · You are bleeding because you hit your head when you fainted  · You suddenly have double vision, difficulty speaking, numbness, and cannot move your arms or legs  · You have chest pain and trouble breathing  · You vomit blood or material that looks like coffee grounds  · You see blood in your bowel movement  Contact your healthcare provider if:   · You have new or worsening symptoms  · You have another syncope episode  · You have a headache, fast heartbeat, or feel too dizzy to stand up  · You have questions or concerns about your condition or care  Medicines:   · Medicines  may be needed to help your heart pump strongly and regularly  Your healthcare provider may also make changes to any medicines that are causing syncope  · Take your medicine as directed  Contact your healthcare provider if you think your medicine is not helping or if you have side effects  Tell him or her if you are allergic to any medicine  Keep a list of the medicines, vitamins, and herbs you take  Include the amounts, and when and why you take them  Bring the list or the pill bottles to follow-up visits  Carry your medicine list with you in case of an emergency  Follow up with your doctor as directed:  Write down your questions so you remember to ask them during your visits  Manage syncope:   · Keep a record of your syncope episodes  Include your symptoms and your activity before and after the episode   The record can help your healthcare provider find the cause of your syncope and help you manage episodes  · Sit or lie down when needed  This includes when you feel dizzy, your throat is getting tight, and your vision changes  Raise your legs above the level of your heart  · Take slow, deep breaths if you start to breathe faster with anxiety or fear  This can help decrease dizziness and the feeling that you might faint  · Check your blood pressure often  This is important if you take medicine to lower your blood pressure  Check your blood pressure when you are lying down and when you are standing  Ask how often to check during the day  Keep a record of your blood pressure numbers  Your healthcare provider may use the record to help plan your treatment  Prevent a syncope episode:   · Move slowly and let yourself get used to one position before you move to another position  This is very important when you change from a lying or sitting position to a standing position  Take some deep breaths before you stand up from a lying position  Stand up slowly  Sudden movements may cause a fainting spell  Sit on the side of the bed or couch for a few minutes before you stand up  If you are on bedrest, try to be upright for about 2 hours each day, or as directed  Do not lock your legs if you are standing for a long period of time  Move your legs and bend your knees to keep blood flowing  · Follow your healthcare provider's recommendations  Your provider may  recommend that you drink more liquids to prevent dehydration  You may also need to have more salt to keep your blood pressure from dropping too low and causing syncope  Your provider will tell you how much liquid and sodium to have each day  He or she will also tell you how much physical activity is safe for you  This will depend on what is causing your syncope  · Watch for signs of low blood sugar    These include hunger, nervousness, sweating, and fast or fluttery heartbeats  Talk with your healthcare provider about ways to keep your blood sugar level steady  · Do not strain if you are constipated  You may faint if you strain to have a bowel movement  Walking is the best way to get your bowels moving  Eat foods high in fiber to make it easier to have a bowel movement  Good examples are high-fiber cereals, beans, vegetables, and whole-grain breads  Prune juice may help make bowel movements softer  · Be careful in hot weather  Heat can cause a syncope episode  Limit activity done outside on hot days  Physical activity in hot weather can lead to dehydration  This can cause an episode  © Copyright [employer] 2021 Information is for End User's use only and may not be sold, redistributed or otherwise used for commercial purposes  All illustrations and images included in CareNotes® are the copyrighted property of A D A M , Inc  or Nam Yoder   The above information is an  only  It is not intended as medical advice for individual conditions or treatments  Talk to your doctor, nurse or pharmacist before following any medical regimen to see if it is safe and effective for you  Holter Monitoring   WHAT YOU NEED TO KNOW:   A Holter monitor is also called a portable electrocardiography (EKG) monitor  It shows your heart's electrical activity while you do your usual activities  The monitor is a small battery-operated device that you wear  It will show how fast your heart beats and if it beats in a regular pattern  DISCHARGE INSTRUCTIONS:   How to wear a Holter monitor:   · Sticky pads are placed on certain areas of your chest  Three to 8 sticky pads may be used  Healthcare providers may tape the electrodes to your skin to keep them in place  Wear loose-fitting clothes with your monitor so you can move freely  · The electrodes will be plugged into the monitor   The monitor will be turned on and will record electrical signals constantly for 24 to 48 hours  You may need to use your monitor for up to 7 days  The monitor will be put in a pouch for you to carry  Keep a log:  List any symptoms you have while you wear the monitor  Write down the time and what you were doing when the symptoms started  List when you take any medicines or drink any alcohol  Take this log with you when you see your healthcare provider  The log may help him learn what is causing your abnormal heart activity  The following are some examples of symptoms to write down in the log:  · Chest pain or discomfort    · Dizziness or fainting    · Irregular heartbeats, such as a fluttery feeling in your chest    · Shortness of breath or trouble breathing    · Strong, pounding heartbeats  Do not get your chest wet: Your sticky pads may fall off if they get wet  The monitor cannot record your heart rhythm without the sticky pads and electrodes in place  Do not take a shower while you wear the Holter monitor  Take sponge baths instead  Follow up with your healthcare provider or cardiologist in 24 to 48 hours: You will need to return to have the sticky pads and monitor removed  Bring your log with you  Write down your questions so you remember to ask them during your visits  Contact your healthcare provider if:   · The sticky pads or electrodes come off your chest     · Your monitor stops working  · You have a headache, dizziness, or feel like you are going to faint  · You have a rash on your skin under the sticky pads  · You have questions or concerns about the Holter monitor, your condition, or care  Seek care immediately or call 911 if:   · You are pale and have cold, sweaty skin  · You have a heavy or squeezing feeling in your chest that lasts longer than a few minutes  · You have pain in your chest that spreads to your shoulders, neck, or arms  · You have trouble breathing    © 2017 [employer] Information is for End User's use only and may not be sold, redistributed or otherwise used for commercial purposes  All illustrations and images included in CareNotes® are the copyrighted property of A D A M , Inc  or Dickson Urbina  The above information is an  only  It is not intended as medical advice for individual conditions or treatments  Talk to your doctor, nurse or pharmacist before following any medical regimen to see if it is safe and effective for you

## 2022-02-18 NOTE — CONSULTS
Consultation - Cardiology   Afia Proctor 80 y o  female MRN: [id number]  Unit/Bed#: ED 17 Encounter: [id number]  02/18/22  10:52 AM    Assessment/ Plan:  1  Syncope and collapse  · Syncopal episode while brushing teeth  · Previous workup May 2021 including echo, Holter monitor for 24 hours, and CTA head and neck were negative for significant findings  · No significant family history of sudden cardiac death  · She endorses significant shortness of breath preceding syncopal episode  · Repeat echo ordered  · Two week ambulatory monitor ordered to evaluate for arrhythmia  · Patient would benefit from outpatient stress test to evaluate for exercise induced arrhythmia       History of Present Illness   Physician Requesting Consult: Samuel Cooper    Reason for Consult / Principal Problem: Syncope    HPI: Afia Proctor is a 80y o  year old female with PMH of PUD who presents with after syncopal episode while brushing her teeth  She states that she was sleeping in the evening  Her  called her saying that she was on his way home, so she when to unlock the front door and went to brush her teeth  She said she suddenly felt dizzy and significantly short of breath  She passed out and woke up on the floor  She hit her head during event  She went to the bed to lie down and noted that she had double vision and black floating particles  That has since resolved  Patient underwent workup in May 2021 for possible TIA  Echo showed EF 60%, no wall motion abnormalities, Mild MR, trace AR, trace TR  Holter monitor (24hrs) showed normal sinus rhythm with minimal ectopic activity  No symptoms were noted during this study  CTA of head and neck was negative for stenosis, occlusion, or thrombosis of the cervical or intracranial vasculature  EEG was negative for seizure disorder  Patient states that she overall lives an active and healthy lifestyle   She used to do rachel and currently walks frequently without any exertional symptoms  She denies any familial history of heart disease or sudden cardiac death  She currently feels good and denies any chest pain, palpitations, shortness of breath, lightheadedness, dizziness, or visual changes  Inpatient consult to Cardiology  Consult performed by: JAY Osborne  Consult ordered by: Edwige Mejia DO          EKG: Normal sinus rhythm    Echo 5/5/2021  LEFT VENTRICLE: Size was normal  Ejection fraction was estimated to be 60 %  There were no regional wall motion abnormalities  DOPPLER: Left ventricular diastolic function parameters were normal      RIGHT VENTRICLE: The size was normal  Systolic function was normal  Wall thickness was normal      LEFT ATRIUM: Size was normal      RIGHT ATRIUM: Size was normal      MITRAL VALVE: There was annular calcification  DOPPLER: There was mild regurgitation      AORTIC VALVE: The valve was not well visualized  DOPPLER: There was no evidence for stenosis  There was trace regurgitation      TRICUSPID VALVE: The valve structure was normal  There was normal leaflet separation  DOPPLER: The transtricuspid velocity was within the normal range  There was no evidence for stenosis  There was trace regurgitation      PULMONIC VALVE: Leaflets exhibited normal thickness, no calcification, and normal cuspal separation  DOPPLER: The transpulmonic velocity was within the normal range  There was no regurgitation      PERICARDIUM: There was no pericardial effusion  The pericardium was normal in appearance      AORTA: The root exhibited normal size  Review of Systems   Constitutional: Negative for chills, diaphoresis and fever  Eyes: Positive for visual disturbance  Respiratory: Positive for shortness of breath  Negative for cough and chest tightness  Cardiovascular: Negative for chest pain, palpitations and leg swelling  Gastrointestinal: Negative for abdominal distention, blood in stool, nausea and vomiting     Genitourinary: Negative for difficulty urinating  Musculoskeletal: Negative for arthralgias and back pain  Neurological: Positive for dizziness, syncope and light-headedness  Negative for headaches  Psychiatric/Behavioral: Negative for agitation and confusion  The patient is not nervous/anxious  Historical Information   Past Medical History:   Diagnosis Date    Benign neoplasm of skin      Past Surgical History:   Procedure Laterality Date    BREAST LUMPECTOMY Left     LAST ASSESSED: [id number]    TUBAL LIGATION      LAST ASSESSED: [id number]     Social History     Substance and Sexual Activity   Alcohol Use Yes    Alcohol/week: 2 0 standard drinks    Types: 2 Glasses of wine per week     Social History     Substance and Sexual Activity   Drug Use No     Social History     Tobacco Use   Smoking Status Never Smoker   Smokeless Tobacco Never Used       Family History:   Family History   Problem Relation Age of Onset    Migraines Mother     Osteoporosis Mother     Throat cancer Father     Depression Daughter         WITH ANXIETY     Migraines Daughter     Rheum arthritis Daughter     Urinary tract infection Daughter     Depression Son         WITH ANXIETY     No Known Problems Daughter     Breast cancer Neg Hx     Colon cancer Neg Hx     Uterine cancer Neg Hx     Cervical cancer Neg Hx     Ovarian cancer Neg Hx        Meds/Allergies   all current active meds have been reviewed and current meds:   Current Facility-Administered Medications   Medication Dose Route Frequency    acetaminophen (TYLENOL) tablet 650 mg  650 mg Oral Q6H PRN    pantoprazole (PROTONIX) EC tablet 40 mg  40 mg Oral Every Other Day     Allergies   Allergen Reactions    Other Other (See Comments) and Sneezing     CATS-itchy eyes, heaviness in chest       Objective   Vitals: Blood pressure 141/76, pulse 61, temperature 98 2 °F (36 8 °C), temperature source Oral, resp  rate 20, SpO2 97 %, not currently breastfeeding  , There is no height or weight on file to calculate BMI ,   Orthostatic Blood Pressures      Most Recent Value   Blood Pressure 141/76 filed at 02/18/2022 1008   Patient Position - Orthostatic VS Standing for 3 minutes - Orthostatic VS filed at 02/18/2022 2314          Systolic (34RZX), XCZ:315 , Min:114 , SXS:860     Diastolic (50URW), FSL:93, Min:58, Max:76        Intake/Output Summary (Last 24 hours) at 2/18/2022 1052  Last data filed at 2/18/2022 0431  Gross per 24 hour   Intake 1000 ml   Output --   Net 1000 ml       Invasive Devices  Report    Peripheral Intravenous Line            Peripheral IV 02/17/22 Right Antecubital <1 day                    Physical Exam:  Physical Exam  Vitals and nursing note reviewed  Constitutional:       General: She is not in acute distress  Appearance: Normal appearance  She is well-developed  She is not ill-appearing  Comments: Appears younger than stated age   HENT:      Head: Normocephalic and atraumatic  Eyes:      Conjunctiva/sclera: Conjunctivae normal    Neck:      Vascular: No carotid bruit  Cardiovascular:      Rate and Rhythm: Normal rate and regular rhythm  Heart sounds: Normal heart sounds  No murmur heard  Pulmonary:      Effort: Pulmonary effort is normal  No respiratory distress  Breath sounds: Normal breath sounds  Abdominal:      Palpations: Abdomen is soft  Tenderness: There is no abdominal tenderness  Musculoskeletal:      Cervical back: Neck supple  Right lower leg: No edema  Left lower leg: No edema  Skin:     General: Skin is warm and dry  Neurological:      Mental Status: She is alert and oriented to person, place, and time     Psychiatric:         Mood and Affect: Mood normal          Behavior: Behavior normal           Lab Results:     Cardiac Profile:   Results from last 7 days   Lab Units 02/18/22  0413 02/18/22  0158 02/17/22  2352   HS TNI 0HR ng/L  --   --  3   HS TNI 2HR ng/L  --  4  --    HSTNI D2 ng/L  --  1 --    HS TNI 4HR ng/L 5  --   --    HSTNI D4 ng/L 2  --   --        CBC with diff:   Results from last 7 days   Lab Units 02/17/22  2352   WBC Thousand/uL 5 70   HEMOGLOBIN g/dL 13 8   HEMATOCRIT % 42 7   MCV fL 91   PLATELETS Thousands/uL 266   MCH pg 29 4   MCHC g/dL 32 3   RDW % 11 9   MPV fL 9 5   NRBC AUTO /100 WBCs 0         CMP:   Results from last 7 days   Lab Units 02/17/22  2352   POTASSIUM mmol/L 3 8   CHLORIDE mmol/L 102   CO2 mmol/L 30   BUN mg/dL 11   CREATININE mg/dL 0 90   CALCIUM mg/dL 9 3   AST U/L 25   ALT U/L 30   ALK PHOS U/L 75   EGFR ml/min/1 73sq m 64

## 2022-02-18 NOTE — TELEPHONE ENCOUNTER
----- Message from JAY Frazier sent at 2/18/2022  3:03 PM EST -----  Regarding: Hospital Follow-up  Cardiology Follow-up:    Patient clinical visit in 4 week at the Cardio location: [place] office. .    Schedule visit with [facility] Providers: Janet Levy.    Type of Visit: VISIT TYPE: in-person office visit.    Test ordered: Cardiac tests: 2 week biotel, mail to home.    Additional Details:

## 2022-02-21 ENCOUNTER — TELEPHONE (OUTPATIENT)
Dept: INTERNAL MEDICINE CLINIC | Facility: CLINIC | Age: 72
End: 2022-02-21

## 2022-02-21 ENCOUNTER — TRANSITIONAL CARE MANAGEMENT (OUTPATIENT)
Dept: INTERNAL MEDICINE CLINIC | Facility: CLINIC | Age: 72
End: 2022-02-21

## 2022-02-21 NOTE — TELEPHONE ENCOUNTER
Patient is scheduled to see Dr. Levy March 22nd. Explained to patient if she does not receive the biotel before then to give us a call.

## 2022-03-01 ENCOUNTER — OFFICE VISIT (OUTPATIENT)
Dept: INTERNAL MEDICINE CLINIC | Facility: CLINIC | Age: 72
End: 2022-03-01
Payer: MEDICARE

## 2022-03-01 VITALS
WEIGHT: 122.2 LBS | OXYGEN SATURATION: 99 % | DIASTOLIC BLOOD PRESSURE: 60 MMHG | SYSTOLIC BLOOD PRESSURE: 102 MMHG | TEMPERATURE: 97.4 F | BODY MASS INDEX: 22.49 KG/M2 | HEART RATE: 74 BPM | HEIGHT: 62 IN

## 2022-03-01 DIAGNOSIS — R55 SYNCOPE, UNSPECIFIED SYNCOPE TYPE: Primary | ICD-10-CM

## 2022-03-01 PROCEDURE — 99495 TRANSJ CARE MGMT MOD F2F 14D: CPT | Performed by: NURSE PRACTITIONER

## 2022-03-01 NOTE — PATIENT INSTRUCTIONS
Syncope of unclear etiology question orthostatic hypotension  She has zio patch on now  Following w/ cardiology in 2 weeks  orthstatic signs stable in office today

## 2022-03-01 NOTE — PROGRESS NOTES
Assessment/Plan:   Syncope of unclear etiology question orthostatic hypotension  She has zio patch on now  Following w/ cardiology in 2 weeks  orthstatic signs stable in office today  No problem-specific Assessment & Plan notes found for this encounter  There are no diagnoses linked to this encounter  Subjective:     Patient ID: Carmen Chowdary is a 80 y o  female  Pt is here to follow up recent hospitalization  She states on the night of the hospitalization she was asleep on the recliner, her huband called her and woke her up, she rushed downstairs to meet him at the door but he wasn't there yet so she went into the bathroom to brush her teeth, she was leaning on the counter when she started to feel dizzy and the next thing she knows she was on the floor  She heard her  walking up the stairs so she got up off the floor and went to the bed to lay down  She relized she had hit her head  So she went to the ER  States she has happened before and it was a similar situations      Review of Systems   Constitutional: Negative for appetite change, chills, diaphoresis, fatigue, fever and unexpected weight change  HENT: Negative for postnasal drip and sneezing  Eyes: Negative for visual disturbance  Respiratory: Negative for chest tightness and shortness of breath  Cardiovascular: Negative for chest pain, palpitations and leg swelling  Gastrointestinal: Negative for abdominal pain and blood in stool  Endocrine: Negative for cold intolerance, heat intolerance, polydipsia, polyphagia and polyuria  Genitourinary: Negative for difficulty urinating, dysuria, frequency and urgency  Musculoskeletal: Negative for arthralgias and myalgias  Skin: Negative for rash and wound  Neurological: Negative for dizziness, weakness, light-headedness and headaches  Hematological: Negative for adenopathy  Psychiatric/Behavioral: Negative for confusion, dysphoric mood and sleep disturbance   The patient is not nervous/anxious  Objective:     Physical Exam  Constitutional:       General: She is not in acute distress  Appearance: She is well-developed  She is not diaphoretic  HENT:      Head: Normocephalic and atraumatic  Nose: Nose normal    Eyes:      Conjunctiva/sclera: Conjunctivae normal       Pupils: Pupils are equal, round, and reactive to light  Neck:      Thyroid: No thyromegaly  Vascular: No JVD  Trachea: No tracheal deviation  Cardiovascular:      Rate and Rhythm: Normal rate and regular rhythm  Heart sounds: Normal heart sounds  No murmur heard  No friction rub  No gallop  Pulmonary:      Effort: Pulmonary effort is normal  No respiratory distress  Breath sounds: Normal breath sounds  No wheezing or rales  Abdominal:      General: Bowel sounds are normal  There is no distension  Palpations: Abdomen is soft  Tenderness: There is no abdominal tenderness  Musculoskeletal:         General: Normal range of motion  Cervical back: Normal range of motion and neck supple  Lymphadenopathy:      Cervical: No cervical adenopathy  Skin:     General: Skin is warm and dry  Findings: No rash  Neurological:      Mental Status: She is alert and oriented to person, place, and time  Cranial Nerves: No cranial nerve deficit  Psychiatric:         Behavior: Behavior normal          Thought Content: Thought content normal          Judgment: Judgment normal            Vitals:    03/01/22 0817   BP: 102/60   Pulse: 74   Temp: (!) 97 4 °F (36 3 °C)   SpO2: 99%   Weight: 55 4 kg (122 lb 3 2 oz)   Height: 5' 2" (1 575 m)       Transitional Care Management Review:  Kimmie Sparks is a 80 y o  female here for TCM follow up       During the TCM phone call patient stated:    TCM Call (since 1/29/2022)     Date and time call was made  2/22/2022  9:10 AM    Hospital care reviewed  Records reviewed        Patient was hospitialized at  [facility] Date of Admission  02/17/22    Date of discharge  02/18/22    Diagnosis  head injury,syncope    Disposition  Home    Current Symptoms  --  Head injury region slightly sore  No changes in mental status, vision, no headache       TCM Call (since 1/29/2022)     Scheduled for follow up?   Yes    Do you need help managing your prescriptions or medications  No    Is transportation to your appointment needed  No    I have advised the patient to call PCP with any new or worsening symptoms  Shira Galindo LPN    Living Arrangements  Spouse or Significiant other    Are you recieving any outpatient services  Yes    What type of services  Holter Monitor, Stress Test    Are you recieving home care services  No    Interperter language line needed  No    Counseling  Patient    Counseling topics  Importance of RX compliance          JAY Wayne

## 2022-03-03 ENCOUNTER — HOSPITAL ENCOUNTER (OUTPATIENT)
Dept: MAMMOGRAPHY | Facility: CLINIC | Age: 72
Discharge: HOME | End: 2022-03-03
Payer: MEDICARE

## 2022-03-03 VITALS — BODY MASS INDEX: 22.45 KG/M2 | WEIGHT: 122 LBS | HEIGHT: 62 IN

## 2022-03-03 DIAGNOSIS — Z12.31 VISIT FOR SCREENING MAMMOGRAM: ICD-10-CM

## 2022-03-03 PROCEDURE — 77067 SCR MAMMO BI INCL CAD: CPT

## 2022-03-03 PROCEDURE — 77063 BREAST TOMOSYNTHESIS BI: CPT

## 2022-03-10 ENCOUNTER — CLINICAL SUPPORT (OUTPATIENT)
Dept: CARDIOLOGY CLINIC | Facility: CLINIC | Age: 72
End: 2022-03-10
Payer: MEDICARE

## 2022-03-10 DIAGNOSIS — R55 SYNCOPE AND COLLAPSE: ICD-10-CM

## 2022-03-10 PROCEDURE — 93228 REMOTE 30 DAY ECG REV/REPORT: CPT | Performed by: INTERNAL MEDICINE

## 2022-03-22 ENCOUNTER — OFFICE VISIT (OUTPATIENT)
Dept: CARDIOLOGY CLINIC | Facility: CLINIC | Age: 72
End: 2022-03-22
Payer: MEDICARE

## 2022-03-22 VITALS
OXYGEN SATURATION: 98 % | SYSTOLIC BLOOD PRESSURE: 98 MMHG | WEIGHT: 121 LBS | HEART RATE: 62 BPM | RESPIRATION RATE: 16 BRPM | DIASTOLIC BLOOD PRESSURE: 70 MMHG | HEIGHT: 62 IN | BODY MASS INDEX: 22.26 KG/M2

## 2022-03-22 DIAGNOSIS — R55 SYNCOPE AND COLLAPSE: Primary | ICD-10-CM

## 2022-03-22 PROCEDURE — 99214 OFFICE O/P EST MOD 30 MIN: CPT | Performed by: INTERNAL MEDICINE

## 2022-03-22 NOTE — PROGRESS NOTES
[facility] [place]  [id number] [facility] [place] [address]  Cardiology Follow Up    Aldo Ye  1950  [id number]      1  Syncope and collapse  Echo stress test, exercise       Chief Complaint   Patient presents with    Follow-up       Interval History:   73-year-old female with history of peptic ulcer disease presented for cardiology follow-up after recent hospital discharge    Patient was admitted at [facility] in mid February 2022 for syncope  Etiology was unclear  Acute MI was ruled  EKG showed sinus rhythm with normal QTC  There is a possibility it could be a vasovagal syncope  Echo showed preserved LVEF without regional wall motion abnormality, mild MR, mild TR  Patient was discharged  She had 2 weeks of event monitoring as an outpatient which showed sinus rhythm without any significant arrhythmia  Patient is here for follow-up    Since hospital discharge patient is doing well  She denies chest pain, shortness of breath, palpitation, dizziness, orthopnea, leg edema or loss of conscious  She is doing exercise at home without any chest pain or shortness of breath    Patient had syncope last year which was secondary to her hematemesis was noted to have gastric ulcer    Current medications reviewed  Recent labs during hospitalization reviewed  Recent head CT during hospitalization did not show any evidence of intracranial abnormality    Discussed with patient echo and event monitoring finding today    Review of Systems:   All review of system negative except as mentioned above    Patient Active Problem List   Diagnosis    Blood in urine    Midline cystocele    Osteopenia    Rectocele    Uterine prolapse    Postmenopausal    Encounter for gynecological examination without abnormal finding    Other constipation    Other osteoporosis without current pathological fracture    Asymptomatic postmenopausal status    Other idiopathic scoliosis, thoracolumbar region    Memory loss    Gastrointestinal hemorrhage associated with duodenal ulcer    Vasovagal syncope    Pharyngoesophageal dysphagia    Duodenal ulcer    Esophageal ring     Past Medical History:   Diagnosis Date    Benign neoplasm of skin      Social History     Socioeconomic History    Marital status: /Civil Union     Spouse name: Not on file    Number of children: Not on file    Years of education: Not on file    Highest education level: Not on file   Occupational History    Not on file   Tobacco Use    Smoking status: Never Smoker    Smokeless tobacco: Never Used   Vaping Use    Vaping Use: Never used   Substance and Sexual Activity    Alcohol use: Yes     Alcohol/week: 2 0 standard drinks     Types: 2 Glasses of wine per week    Drug use: No    Sexual activity: Yes     Partners: Male     Birth control/protection: Surgical   Other Topics Concern    Not on file   Social History Narrative    Not on file     Social Determinants of Health     Financial Resource Strain: Not on file   Food Insecurity: No Food Insecurity    Worried About Running Out of Food in the Last Year: Never true    Jalen of Food in the Last Year: Never true   Transportation Needs: No Transportation Needs    Lack of Transportation (Medical): No    Lack of Transportation (Non-Medical): No   Physical Activity: Sufficiently Active    Days of Exercise per Week: 7 days    Minutes of Exercise per Session: 30 min   Stress: Stress Concern Present    Feeling of Stress :  To some extent   Social Connections: Not on file   Intimate Partner Violence: Not on file   Housing Stability: Unknown    Unable to Pay for Housing in the Last Year: Not on file    Number of Places Lived in the Last Year: 1    Unstable Housing in the Last Year: No      Family History   Problem Relation Age of Onset   Amadeo Cobalt Migraines Mother     Osteoporosis Mother     Throat cancer Father     Depression Daughter         WITH ANXIETY     Migraines Daughter     Rheum arthritis Daughter     Urinary tract infection Daughter     Depression Son         WITH ANXIETY     No Known Problems Daughter     Breast cancer Neg Hx     Colon cancer Neg Hx     Uterine cancer Neg Hx     Cervical cancer Neg Hx     Ovarian cancer Neg Hx      Past Surgical History:   Procedure Laterality Date    BREAST EXCISIONAL BIOPSY Left     1992    TUBAL LIGATION      LAST ASSESSED: [id number]       Current Outpatient Medications:     Calcium Carbonate-Vitamin D3 (CALCIUM 600/VITAMIN D) 600-400 MG-UNIT TABS, Take 1 tablet by mouth daily, Disp: , Rfl:     cyanocobalamin (VITAMIN B-12) 100 mcg tablet, Take by mouth daily, Disp: , Rfl:     Restasis 0 05 % ophthalmic emulsion, Administer 1 drop to both eyes every 12 (twelve) hours , Disp: , Rfl:     ferrous sulfate 324 (65 Fe) mg, TAKE 1 TABLET (324 MG TOTAL) BY MOUTH DAILY BEFORE BREAKFAST (Patient not taking: Reported on 3/1/2022 ), Disp: 90 tablet, Rfl: 1  Allergies   Allergen Reactions    Other Other (See Comments) and Sneezing     CATS-itchy eyes, heaviness in chest       Labs:  Admission on 02/17/2022, Discharged on 02/18/2022   Component Date Value    WBC 02/17/2022 5 70     RBC 02/17/2022 4 70     Hemoglobin 02/17/2022 13 8     Hematocrit 02/17/2022 42 7     MCV 02/17/2022 91     MCH 02/17/2022 29 4     MCHC 02/17/2022 32 3     RDW 02/17/2022 11 9     MPV 02/17/2022 9 5     Platelets 84/13/3894 266     nRBC 02/17/2022 0     Neutrophils Relative 02/17/2022 53     Immat GRANS % 02/17/2022 0     Lymphocytes Relative 02/17/2022 34     Monocytes Relative 02/17/2022 9     Eosinophils Relative 02/17/2022 3     Basophils Relative 02/17/2022 1     Neutrophils Absolute 02/17/2022 3 03     Immature Grans Absolute 02/17/2022 0 00     Lymphocytes Absolute 02/17/2022 1 91     Monocytes Absolute 02/17/2022 0 51     Eosinophils Absolute 02/17/2022 0 19     Basophils Absolute 02/17/2022 0 06     Sodium 02/17/2022 139  Potassium 02/17/2022 3 8     Chloride 02/17/2022 102     CO2 02/17/2022 30     ANION GAP 02/17/2022 7     BUN 02/17/2022 11     Creatinine 02/17/2022 0 90     Glucose 02/17/2022 103     Calcium 02/17/2022 9 3     AST 02/17/2022 25     ALT 02/17/2022 30     Alkaline Phosphatase 02/17/2022 75     Total Protein 02/17/2022 7 6     Albumin 02/17/2022 3 7     Total Bilirubin 02/17/2022 0 25     eGFR 02/17/2022 64     hs TnI 0hr 02/17/2022 3     hs TnI 2hr 02/18/2022 4     Delta 2hr hsTnI 02/18/2022 1     hs TnI 4hr 02/18/2022 5     Delta 4hr hsTnI 02/18/2022 2     LA/Ao Ratio 2D 02/18/2022 1 04     LA size 02/18/2022 2 9     Triscuspid Valve Regurgi* 02/18/2022 15 0     Tricuspid valve peak reg* 02/18/2022 1 91     LVPWd 02/18/2022 0 90     Left Atrium Area-systoli* 02/18/2022 11 2     MV E' Tissue Velocity Se* 02/18/2022 8     Tricuspid annular plane * 02/18/2022 1 80     TR Peak Bobby 02/18/2022 1 9     IVSd 02/18/2022 0 51     LV DIASTOLIC VOLUME (MOD* 21/80/2576 52     LEFT VENTRICLE SYSTOLIC * 97/18/2041 18     Left ventricular stroke * 02/18/2022 35 00     A4C EF 02/18/2022 64     LVIDd 02/18/2022 3 50     IVS 02/18/2022 0 8     LVIDS 02/18/2022 2 30     FS 02/18/2022 34     Asc Ao 02/18/2022 2 8     Ao root 02/18/2022 2 80     MV valve area p 1/2 meth* 02/18/2022 2 53     E wave deceleration time 02/18/2022 296     MV Peak E Bobby 02/18/2022 69     MV Peak A Bobby 02/18/2022 0 65     MV stenosis pressure 1/2* 02/18/2022 87     LVSV, 2D 02/18/2022 35     Ao asc z-score 02/18/2022 2 55     ZLVPWD 02/18/2022 2 06     ZLVIDS 02/18/2022 -1 51     ZLVIDD 02/18/2022 -2 48     ZIVSD 02/18/2022 1 02     Ventricular Rate 02/18/2022 70     Atrial Rate 02/18/2022 70     DC Interval 02/18/2022 166     QRSD Interval 02/18/2022 86     QT Interval 02/18/2022 408     QTC Interval 02/18/2022 440     P Axis 02/18/2022 65     QRS Axis 02/18/2022 -2     T Wave Axis 02/18/2022 48  Ventricular Rate 02/18/2022 67     Atrial Rate 02/18/2022 67     OH Interval 02/18/2022 170     QRSD Interval 02/18/2022 88     QT Interval 02/18/2022 408     QTC Interval 02/18/2022 431     P Axis 02/18/2022 62     QRS Axis 02/18/2022 -1     T Wave Axis 02/18/2022 38     Ventricular Rate 02/18/2022 70     Atrial Rate 02/18/2022 70     OH Interval 02/18/2022 172     QRSD Interval 02/18/2022 86     QT Interval 02/18/2022 404     QTC Interval 02/18/2022 436     P Axis 02/18/2022 65     QRS Axis 02/18/2022 34     T Wave Gruver 02/18/2022 57    Appointment on 01/19/2022   Component Date Value    WBC 01/19/2022 4 79     RBC 01/19/2022 4 21     Hemoglobin 01/19/2022 12 9     Hematocrit 01/19/2022 39 4     MCV 01/19/2022 94     MCH 01/19/2022 30 6     MCHC 01/19/2022 32 7     RDW 01/19/2022 11 9     MPV 01/19/2022 10 1     Platelets 24/41/8894 254     nRBC 01/19/2022 0     Neutrophils Relative 01/19/2022 53     Immat GRANS % 01/19/2022 0     Lymphocytes Relative 01/19/2022 31     Monocytes Relative 01/19/2022 10     Eosinophils Relative 01/19/2022 4     Basophils Relative 01/19/2022 2*    Neutrophils Absolute 01/19/2022 2 52     Immature Grans Absolute 01/19/2022 0 01     Lymphocytes Absolute 01/19/2022 1 50     Monocytes Absolute 01/19/2022 0 48     Eosinophils Absolute 01/19/2022 0 21     Basophils Absolute 01/19/2022 0 07     Sodium 01/19/2022 139     Potassium 01/19/2022 4 2     Chloride 01/19/2022 107     CO2 01/19/2022 27     ANION GAP 01/19/2022 5     BUN 01/19/2022 17     Creatinine 01/19/2022 1 01     Glucose, Fasting 01/19/2022 89     Calcium 01/19/2022 9 0     eGFR 01/19/2022 56     Magnesium 01/19/2022 2 2    Admission on 11/28/2021, Discharged on 11/29/2021   Component Date Value    WBC 11/28/2021 11 26*    RBC 11/28/2021 3 90     Hemoglobin 11/28/2021 12 1     Hematocrit 11/28/2021 37 5     MCV 11/28/2021 96     [address] 11/28/2021 31 0     MCHC 11/28/2021 32 3     RDW 11/28/2021 12 2     MPV 11/28/2021 9 8     Platelets 88/82/0747 263     nRBC 11/28/2021 0     Neutrophils Relative 11/28/2021 75     Immat GRANS % 11/28/2021 0     Lymphocytes Relative 11/28/2021 18     Monocytes Relative 11/28/2021 7     Eosinophils Relative 11/28/2021 0     Basophils Relative 11/28/2021 0     Neutrophils Absolute 11/28/2021 8 40*    Immature Grans Absolute 11/28/2021 0 05     Lymphocytes Absolute 11/28/2021 2 01     Monocytes Absolute 11/28/2021 0 73     Eosinophils Absolute 11/28/2021 0 02     Basophils Absolute 11/28/2021 0 05     Sodium 11/28/2021 144     Potassium 11/28/2021 3 8     Chloride 11/28/2021 107     CO2 11/28/2021 26     ANION GAP 11/28/2021 11     BUN 11/28/2021 36*    Creatinine 11/28/2021 0 89     Glucose 11/28/2021 103     Calcium 11/28/2021 8 2*    Corrected Calcium 11/28/2021 8 7     AST 11/28/2021 18     ALT 11/28/2021 23     Alkaline Phosphatase 11/28/2021 64     Total Protein 11/28/2021 7 0     Albumin 11/28/2021 3 4*    Total Bilirubin 11/28/2021 0 57     eGFR 11/28/2021 66     hs TnI 0hr 11/28/2021 2     hs TnI 2hr 11/29/2021 4     Delta 2hr hsTnI 11/29/2021 2     Ventricular Rate 11/28/2021 73     Atrial Rate 11/28/2021 73     NV Interval 11/28/2021 154     QRSD Interval 11/28/2021 80     QT Interval 11/28/2021 398     QTC Interval 11/28/2021 438     P Axis 11/28/2021 137     QRS Axis 11/28/2021 122     T Wave Axis 11/28/2021 112     hs TnI 4hr 11/29/2021 5     Delta 4hr hsTnI 11/29/2021 3     Hemoglobin 11/29/2021 10 2*    ABO Grouping 11/29/2021 O     Rh Factor 11/29/2021 Positive     Antibody Screen 11/29/2021 Negative     Specimen Expiration Date 11/29/2021 [id number]     ABO Grouping 11/29/2021 O     Rh Factor 11/29/2021 Positive     Hemoglobin 11/29/2021 10 4*    Case Report 11/29/2021                      Value:Surgical Pathology Report                         Case: [id number] Authorizing Provider:  Roni Denny DO          Collected:           11/29/2021 1657              Ordering Location:     [facility] Received:            11/29/2021 [address]                                      Emergency Department                                                         Pathologist:           Mel Lara MD                                                        Specimens:   A) - Stomach, antritis                                                                              B) - Duodenum, ulcer                                                                       Final Diagnosis 11/29/2021                      Value: This result contains rich text formatting which cannot be displayed here   Additional Information 11/29/2021                      Value: This result contains rich text formatting which cannot be displayed here  Cade Dorsey Gross Description 11/29/2021                      Value: This result contains rich text formatting which cannot be displayed here   Clinical Information 11/29/2021                      Value:Cold bx r/o H   Pylori    SARS-CoV-2 11/29/2021 Negative     INFLUENZA A PCR 11/29/2021 Negative     INFLUENZA B PCR 11/29/2021 Negative     RSV PCR 11/29/2021 Negative    Appointment on 10/13/2021   Component Date Value    WBC 10/13/2021 6 62     RBC 10/13/2021 4 37     Hemoglobin 10/13/2021 13 6     Hematocrit 10/13/2021 41 0     MCV 10/13/2021 94     MCH 10/13/2021 31 1     MCHC 10/13/2021 33 2     RDW 10/13/2021 12 2     MPV 10/13/2021 10 3     Platelets 95/18/8099 269     nRBC 10/13/2021 0     Neutrophils Relative 10/13/2021 58     Immat GRANS % 10/13/2021 0     Lymphocytes Relative 10/13/2021 24     Monocytes Relative 10/13/2021 9     Eosinophils Relative 10/13/2021 8*    Basophils Relative 10/13/2021 1     Neutrophils Absolute 10/13/2021 3 87     Immature Grans Absolute 10/13/2021 0 02     Lymphocytes Absolute 10/13/2021 1 57     Monocytes Absolute 10/13/2021 0 57     Eosinophils Absolute 10/13/2021 0 51     Basophils Absolute 10/13/2021 0 08     Sodium 10/13/2021 138     Potassium 10/13/2021 4 2     Chloride 10/13/2021 109*    CO2 10/13/2021 26     ANION GAP 10/13/2021 3*    BUN 10/13/2021 17     Creatinine 10/13/2021 0 88     Glucose 10/13/2021 79     Calcium 10/13/2021 9 2     AST 10/13/2021 22     ALT 10/13/2021 24     Alkaline Phosphatase 10/13/2021 75     Total Protein 10/13/2021 7 4     Albumin 10/13/2021 3 5     Total Bilirubin 10/13/2021 0 54     eGFR 10/13/2021 67     Vitamin B-12 10/13/2021 1,328*     Imaging: Mammo screening bilateral w 3d & cad    Result Date: 3/7/2022  Narrative: DIAGNOSIS: Visit for screening mammogram TECHNIQUE: Digital screening mammography was performed  Computer Aided Detection (CAD) analyzed all applicable images  COMPARISONS: Prior breast imaging dated: 02/09/2021, 01/28/2020, 11/21/2018, 10/17/2017, 09/21/2016, 09/16/2015, 09/02/2011, 07/19/2010, 07/06/2009, and 06/26/2008 RELEVANT HISTORY: Family Breast Cancer History: History of breast cancer in Neg Hx  Family Medical History: No known relevant family medical history  Personal History: Hormone history includes hormone replacement therapy  Surgical history includes breast biopsy  No known relevant medical history  The patient is scheduled in a reminder system for screening mammography  8-10% of cancers will be missed on mammography  Management of a palpable abnormality must be based on clinical grounds  Patients will be notified of their results via letter from our facility  Accredited by [address] [place] [address] of Radiology and FDA  RISK ASSESSMENT: 5 Year Tyrer-Cuzick: 0 57 % 10 Year Tyrer-Cuzick: 1 21 % Lifetime Tyrer-Cuzick: 1 94 % TISSUE DENSITY: The breasts are almost entirely fatty  INDICATION: Juana Fritz is a 80 y o  female presenting for screening mammography   FINDINGS: Bilateral There are no suspicious masses, grouped microcalcifications or areas of unexplained architectural distortion  The skin and nipple areolar complex are unremarkable  Benign-appearing calcifications are noted in each breast      Impression: No mammographic evidence of malignancy  ASSESSMENT/BI-RADS CATEGORY: Left: 2 - Benign Right: 2 - Benign Overall: 2 - Benign RECOMMENDATION:      - Routine screening mammogram in 1 year for both breasts  Workstation ID: [id number]       Physical Exam:  General:  Average built, awake, alert and oriented x3, not in distress  Neck: supple, no JVD  Eyes: PERRL, conjunctiva normal  Lungs:  Bilateral air entry positive, no wheeze/rhonchi or crackle  Heart:  S1-S2 normal, no murmur  Abdomen:  Soft ,nondistended ,nontender, bowel sounds positive  Extremities:  No leg edema, no deformity, ROM normal  Neuro:  Moving all extremities, speech clear  Skin: warm, no rash  BP 98/70 (BP Location: Left arm, Patient Position: Sitting, Cuff Size: Standard)   Pulse 62   Resp 16   Ht 5' 2" (1 575 m)   Wt 54 9 kg (121 lb)   SpO2 98%   BMI 22 13 kg/m²     Cardiographics :  As mentioned above      Assessment:    1  Syncope  Unclear etiology  Recent acute MI ruled out  Echo showed preserved LVEF without significant structural abnormality  No arrhythmia or any event monitoring  2  History of duodenal ulcer    Recommendations:    I would get exercise stress echo to evaluate for any exercise-induced arrhythmia and coronary artery disease  Patient advised to stay hydrated    If patient continues to have recurrent syncope then she will need a loop recorder    Above all discussed with patient    Patient understands and agrees

## 2022-04-29 ENCOUNTER — HOSPITAL ENCOUNTER (OUTPATIENT)
Dept: NON INVASIVE DIAGNOSTICS | Facility: CLINIC | Age: 72
Discharge: HOME | End: 2022-04-29
Payer: MEDICARE

## 2022-04-29 VITALS
WEIGHT: 121 LBS | BODY MASS INDEX: 23.75 KG/M2 | HEIGHT: 60 IN | DIASTOLIC BLOOD PRESSURE: 66 MMHG | HEART RATE: 60 BPM | SYSTOLIC BLOOD PRESSURE: 126 MMHG | OXYGEN SATURATION: 98 %

## 2022-04-29 DIAGNOSIS — R55 SYNCOPE AND COLLAPSE: ICD-10-CM

## 2022-04-29 LAB
MAX DIASTOLIC BP: 84 MMHG
MAX HEART RATE: 142 BPM
MAX HR PERCENT: 95 %
MAX PREDICTED HEART RATE: 150 BPM
MAX. SYSTOLIC BP: 168 MMHG
PROTOCOL NAME: NORMAL
RATE PRESSURE PRODUCT: NORMAL
REASON FOR TERMINATION: NORMAL
SL CV STRESS RECOVERY BP: NORMAL MMHG
SL CV STRESS RECOVERY HR: 88 BPM
SL CV STRESS STAGE REACHED: 3
STRESS ANGINA INDEX: 0
STRESS BASELINE BP: NORMAL MMHG
STRESS BASELINE HR: 60 BPM
STRESS O2 SAT REST: 98 %
STRESS PEAK HR: 142 BPM
STRESS PERCENT HR: 95 %
STRESS POST ESTIMATED WORKLOAD: 10.1 METS
STRESS POST EXERCISE DUR MIN: 8 MIN
STRESS POST O2 SAT PEAK: 98 %
STRESS POST PEAK BP: 168 MMHG
STRESS TARGET HR: 142 BPM
TARGET HR FORMULA: NORMAL
TEST INDICATION: NORMAL
TIME IN EXERCISE PHASE: NORMAL

## 2022-04-29 PROCEDURE — 93351 STRESS TTE COMPLETE: CPT | Performed by: INTERNAL MEDICINE

## 2022-04-29 PROCEDURE — 93350 STRESS TTE ONLY: CPT

## 2022-05-19 ENCOUNTER — APPOINTMENT (OUTPATIENT)
Dept: LAB | Facility: CLINIC | Age: 72
End: 2022-05-19
Payer: MEDICARE

## 2022-05-19 DIAGNOSIS — Z00.00 WELL ADULT EXAM: ICD-10-CM

## 2022-05-19 DIAGNOSIS — K27.9 PUD (PEPTIC ULCER DISEASE): ICD-10-CM

## 2022-05-19 LAB
ALBUMIN SERPL BCP-MCNC: 3.5 G/DL (ref 3.5–5)
ALP SERPL-CCNC: 77 U/L (ref 46–116)
ALT SERPL W P-5'-P-CCNC: 38 U/L (ref 12–78)
ANION GAP SERPL CALCULATED.3IONS-SCNC: 4 MMOL/L (ref 4–13)
AST SERPL W P-5'-P-CCNC: 31 U/L (ref 5–45)
BASOPHILS # BLD AUTO: 0.07 THOUSANDS/ΜL (ref 0–0.1)
BASOPHILS NFR BLD AUTO: 1 % (ref 0–1)
BILIRUB SERPL-MCNC: 0.81 MG/DL (ref 0.2–1)
BUN SERPL-MCNC: 15 MG/DL (ref 5–25)
CALCIUM SERPL-MCNC: 9.9 MG/DL (ref 8.3–10.1)
CHLORIDE SERPL-SCNC: 108 MMOL/L (ref 100–108)
CHOLEST SERPL-MCNC: 228 MG/DL
CO2 SERPL-SCNC: 29 MMOL/L (ref 21–32)
CREAT SERPL-MCNC: 0.95 MG/DL (ref 0.6–1.3)
EOSINOPHIL # BLD AUTO: 0.14 THOUSAND/ΜL (ref 0–0.61)
EOSINOPHIL NFR BLD AUTO: 2 % (ref 0–6)
ERYTHROCYTE [DISTWIDTH] IN BLOOD BY AUTOMATED COUNT: 13.3 % (ref 11.6–15.1)
FERRITIN SERPL-MCNC: 52 NG/ML (ref 8–388)
GFR SERPL CREATININE-BSD FRML MDRD: 60 ML/MIN/1.73SQ M
GLUCOSE SERPL-MCNC: 84 MG/DL (ref 65–140)
HCT VFR BLD AUTO: 41.2 % (ref 34.8–46.1)
HDLC SERPL-MCNC: 84 MG/DL
HGB BLD-MCNC: 13.7 G/DL (ref 11.5–15.4)
IMM GRANULOCYTES # BLD AUTO: 0.02 THOUSAND/UL (ref 0–0.2)
IMM GRANULOCYTES NFR BLD AUTO: 0 % (ref 0–2)
IRON SATN MFR SERPL: 25 % (ref 15–50)
IRON SERPL-MCNC: 92 UG/DL (ref 50–170)
LDLC SERPL CALC-MCNC: 130 MG/DL (ref 0–100)
LYMPHOCYTES # BLD AUTO: 1.67 THOUSANDS/ΜL (ref 0.6–4.47)
LYMPHOCYTES NFR BLD AUTO: 27 % (ref 14–44)
MCH RBC QN AUTO: 30.7 PG (ref 26.8–34.3)
MCHC RBC AUTO-ENTMCNC: 33.3 G/DL (ref 31.4–37.4)
MCV RBC AUTO: 92 FL (ref 82–98)
MONOCYTES # BLD AUTO: 0.54 THOUSAND/ΜL (ref 0.17–1.22)
MONOCYTES NFR BLD AUTO: 9 % (ref 4–12)
NEUTROPHILS # BLD AUTO: 3.68 THOUSANDS/ΜL (ref 1.85–7.62)
NEUTS SEG NFR BLD AUTO: 61 % (ref 43–75)
NONHDLC SERPL-MCNC: 144 MG/DL
NRBC BLD AUTO-RTO: 0 /100 WBCS
PLATELET # BLD AUTO: 274 THOUSANDS/UL (ref 149–390)
PMV BLD AUTO: 10 FL (ref 8.9–12.7)
POTASSIUM SERPL-SCNC: 4 MMOL/L (ref 3.5–5.3)
PROT SERPL-MCNC: 7.5 G/DL (ref 6.4–8.2)
RBC # BLD AUTO: 4.46 MILLION/UL (ref 3.81–5.12)
SODIUM SERPL-SCNC: 141 MMOL/L (ref 136–145)
TIBC SERPL-MCNC: 370 UG/DL (ref 250–450)
TRIGL SERPL-MCNC: 72 MG/DL
TSH SERPL DL<=0.05 MIU/L-ACNC: 1.61 UIU/ML (ref 0.45–4.5)
WBC # BLD AUTO: 6.12 THOUSAND/UL (ref 4.31–10.16)

## 2022-05-19 PROCEDURE — 82728 ASSAY OF FERRITIN: CPT

## 2022-05-19 PROCEDURE — 84443 ASSAY THYROID STIM HORMONE: CPT

## 2022-05-19 PROCEDURE — 36415 COLL VENOUS BLD VENIPUNCTURE: CPT

## 2022-05-19 PROCEDURE — 85025 COMPLETE CBC W/AUTO DIFF WBC: CPT

## 2022-05-19 PROCEDURE — 83550 IRON BINDING TEST: CPT

## 2022-05-19 PROCEDURE — 83540 ASSAY OF IRON: CPT

## 2022-05-19 PROCEDURE — 80061 LIPID PANEL: CPT

## 2022-05-19 PROCEDURE — 80053 COMPREHEN METABOLIC PANEL: CPT

## 2022-05-26 ENCOUNTER — OFFICE VISIT (OUTPATIENT)
Dept: INTERNAL MEDICINE CLINIC | Facility: CLINIC | Age: 72
End: 2022-05-26
Payer: MEDICARE

## 2022-05-26 VITALS
BODY MASS INDEX: 22.01 KG/M2 | WEIGHT: 119.6 LBS | HEIGHT: 62 IN | DIASTOLIC BLOOD PRESSURE: 70 MMHG | TEMPERATURE: 97.4 F | OXYGEN SATURATION: 98 % | RESPIRATION RATE: 16 BRPM | HEART RATE: 68 BPM | SYSTOLIC BLOOD PRESSURE: 108 MMHG

## 2022-05-26 DIAGNOSIS — R55 VASOVAGAL SYNCOPE: ICD-10-CM

## 2022-05-26 DIAGNOSIS — M85.80 OSTEOPENIA, UNSPECIFIED LOCATION: ICD-10-CM

## 2022-05-26 DIAGNOSIS — K26.4 GASTROINTESTINAL HEMORRHAGE ASSOCIATED WITH DUODENAL ULCER: Primary | ICD-10-CM

## 2022-05-26 DIAGNOSIS — E78.2 MIXED HYPERLIPIDEMIA: ICD-10-CM

## 2022-05-26 PROCEDURE — 99214 OFFICE O/P EST MOD 30 MIN: CPT | Performed by: INTERNAL MEDICINE

## 2022-05-26 NOTE — PATIENT INSTRUCTIONS
Lab data reviewed in detail and compared prior, cardiology workup reviewed    Vasovagal syncope-negative workup, no recurrence of symptoms, no indication for further workup    History of peptic ulcer disease stable after course of PPI, iron stores remain stable  Continue with p r n  Pepcid    Hyperlipidemia -10 year atherosclerotic risk score only 6 6%  Implications discussed  Guidelines discussed  Concentrate on low saturated fat diet  Continue excellent exercise regimen and maintain ideal body weight  Health maintenance is up-to-date, follow-up 6 months, sooner as needed

## 2022-05-26 NOTE — PROGRESS NOTES
Assessment/Plan:    Diagnoses and all orders for this visit:    Gastrointestinal hemorrhage associated with duodenal ulcer    Osteopenia, unspecified location    Vasovagal syncope    Mixed hyperlipidemia  -     CBC and differential; Future  -     Comprehensive metabolic panel; Future  -     Lipid panel; Future              Patient Instructions   Lab data reviewed in detail and compared prior, cardiology workup reviewed    Vasovagal syncope-negative workup, no recurrence of symptoms, no indication for further workup    History of peptic ulcer disease stable after course of PPI, iron stores remain stable  Continue with p r n  Pepcid    Hyperlipidemia -10 year atherosclerotic risk score only 6 6%  Implications discussed  Guidelines discussed  Concentrate on low saturated fat diet  Continue excellent exercise regimen and maintain ideal body weight  Health maintenance is up-to-date, follow-up 6 months, sooner as needed  Subjective:      Patient ID: Mimi Pinon is a 80 y o  female    F/u mmp and review labs  Syncope in Feb after jumping to action from sleep  Neg w/u, no recurrence  PUD-off iron and ppi, using pepcid prn gerd  HPL-had been eating more ice cream and cheese prior to getting labs done  Exercising at [facility] w/ rachel wts, and Amp'd, and yoga, no cp/sob     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        Current Outpatient Medications:     Calcium Carbonate-Vitamin D3 600-400 MG-UNIT TABS, Take 1 tablet by mouth daily, Disp: , Rfl:     cyanocobalamin (VITAMIN B-12) 100 mcg tablet, Take by mouth daily, Disp: , Rfl:     Restasis 0 05 % ophthalmic emulsion, Administer 1 drop to both eyes every 12 (twelve) hours , Disp: , Rfl:     ferrous sulfate 324 (65 Fe) mg, TAKE 1 TABLET (324 MG TOTAL) BY MOUTH DAILY BEFORE BREAKFAST (Patient not taking: Reported on 3/1/2022 ), Disp: 90 tablet, Rfl: 1    Recent Results (from the past 1008 hour(s))   Stress strip    Collection Time: 04/29/22  9:18 AM   Result Value Ref Range Protocol Name DELVIN     Time In Exercise Phase 00:08:00     MAX   SYSTOLIC  mmHg    Max Diastolic Bp 84 mmHg    Max Heart Rate 142 BPM    Max Predicted Heart Rate 150 BPM    Reason for Termination Target Heart Rate Achieved     Test Indication Syncope     Target Hr Formular (220 - Age)*85%     Arrhy During Ex      ECG Interp Before Ex      ECG Interp during Ex      Ex Summary Comment      Overall Hr Response To Exercise      Overall BP Response To Exercise     Echo stress test, exercise    Collection Time: 04/29/22  9:53 AM   Result Value Ref Range    Baseline HR 60 bpm    Baseline /66 mmHg    O2 sat rest 98 %    Stress peak  bpm    Post peak  mmHg    Rate Pressure Product 23,856 0     O2 sat peak 98 %    Recovery HR 88 bpm    Recovery /76 mmHg    Target  bpm    Percent HR 95 %    Exercise duration (min) 8 min    Estimated workload 10 1 METS    Angina Index 0     Stress Stage Reached 3 0     Max HR Percent 95 %   CBC and differential    Collection Time: 05/19/22 11:01 AM   Result Value Ref Range    WBC 6 12 4 31 - 10 16 Thousand/uL    RBC 4 46 3 81 - 5 12 Million/uL    Hemoglobin 13 7 11 5 - 15 4 g/dL    Hematocrit 41 2 34 8 - 46 1 %    MCV 92 82 - 98 fL    MCH 30 7 26 8 - 34 3 pg    MCHC 33 3 31 4 - 37 4 g/dL    RDW 13 3 11 6 - 15 1 %    MPV 10 0 8 9 - 12 7 fL    Platelets 649 782 - 850 Thousands/uL    nRBC 0 /100 WBCs    Neutrophils Relative 61 43 - 75 %    Immat GRANS % 0 0 - 2 %    Lymphocytes Relative 27 14 - 44 %    Monocytes Relative 9 4 - 12 %    Eosinophils Relative 2 0 - 6 %    Basophils Relative 1 0 - 1 %    Neutrophils Absolute 3 68 1 85 - 7 62 Thousands/µL    Immature Grans Absolute 0 02 0 00 - 0 20 Thousand/uL    Lymphocytes Absolute 1 67 0 60 - 4 47 Thousands/µL    Monocytes Absolute 0 54 0 17 - 1 22 Thousand/µL    Eosinophils Absolute 0 14 0 00 - 0 61 Thousand/µL    Basophils Absolute 0 07 0 00 - 0 10 Thousands/µL   Comprehensive metabolic panel    Collection Time: 05/19/22 11:01 AM   Result Value Ref Range    Sodium 141 136 - 145 mmol/L    Potassium 4 0 3 5 - 5 3 mmol/L    Chloride 108 100 - 108 mmol/L    CO2 29 21 - 32 mmol/L    ANION GAP 4 4 - 13 mmol/L    BUN 15 5 - 25 mg/dL    Creatinine 0 95 0 60 - 1 30 mg/dL    Glucose 84 65 - 140 mg/dL    Calcium 9 9 8 3 - 10 1 mg/dL    AST 31 5 - 45 U/L    ALT 38 12 - 78 U/L    Alkaline Phosphatase 77 46 - 116 U/L    Total Protein 7 5 6 4 - 8 2 g/dL    Albumin 3 5 3 5 - 5 0 g/dL    Total Bilirubin 0 81 0 20 - 1 00 mg/dL    eGFR 60 ml/min/1 73sq m   Lipid panel    Collection Time: 05/19/22 11:01 AM   Result Value Ref Range    Cholesterol 228 (H) See Comment mg/dL    Triglycerides 72 See Comment mg/dL    HDL, Direct 84 >=50 mg/dL    LDL Calculated 130 (H) 0 - 100 mg/dL    Non-HDL-Chol (CHOL-HDL) 144 mg/dl   TSH, 3rd generation with Free T4 reflex    Collection Time: 05/19/22 11:01 AM   Result Value Ref Range    TSH 3RD GENERATON 1 610 0 450 - 4 500 uIU/mL   Iron Saturation %    Collection Time: 05/19/22 11:01 AM   Result Value Ref Range    Iron Saturation 25 15 - 50 %    TIBC 370 250 - 450 ug/dL    Iron 92 50 - 170 ug/dL   Ferritin    Collection Time: 05/19/22 11:01 AM   Result Value Ref Range    Ferritin 52 8 - 388 ng/mL       The following portions of the patient's history were reviewed and updated as appropriate: allergies, current medications, past family history, past medical history, past social history, past surgical history and problem list      Review of Systems   Constitutional: Negative for appetite change, chills, diaphoresis, fatigue, fever and unexpected weight change  HENT: Negative for congestion, hearing loss and rhinorrhea  Eyes: Negative for visual disturbance  Respiratory: Negative for cough, chest tightness, shortness of breath and wheezing  Cardiovascular: Negative for chest pain, palpitations and leg swelling  Gastrointestinal: Negative for abdominal pain and blood in stool     Endocrine: Negative for cold intolerance, heat intolerance, polydipsia and polyuria  Genitourinary: Negative for difficulty urinating, dysuria, frequency and urgency  Musculoskeletal: Positive for back pain  Negative for arthralgias and myalgias  Skin: Negative for rash  Neurological: Negative for dizziness, weakness, light-headedness and headaches  Hematological: Does not bruise/bleed easily  Psychiatric/Behavioral: Negative for dysphoric mood and sleep disturbance  Objective:      Vitals:    05/26/22 1015   BP: 108/70   Pulse: 68   Resp: 16   Temp: (!) 97 4 °F (36 3 °C)   SpO2: 98%          Physical Exam  Constitutional:       Appearance: She is well-developed  HENT:      Head: Normocephalic and atraumatic  Nose: Nose normal    Eyes:      General: No scleral icterus  Conjunctiva/sclera: Conjunctivae normal       Pupils: Pupils are equal, round, and reactive to light  Neck:      Thyroid: No thyromegaly  Vascular: No JVD  Trachea: No tracheal deviation  Cardiovascular:      Rate and Rhythm: Normal rate and regular rhythm  Heart sounds: No murmur heard  No friction rub  No gallop  Pulmonary:      Effort: Pulmonary effort is normal  No respiratory distress  Breath sounds: Normal breath sounds  No wheezing or rales  Musculoskeletal:         General: No deformity  Cervical back: Normal range of motion and neck supple  Lymphadenopathy:      Cervical: No cervical adenopathy  Skin:     General: Skin is warm and dry  Coloration: Skin is not pale  Findings: No erythema or rash  Neurological:      Mental Status: She is alert and oriented to person, place, and time  Cranial Nerves: No cranial nerve deficit  Psychiatric:         Behavior: Behavior normal          Thought Content:  Thought content normal          Judgment: Judgment normal

## 2022-11-04 ENCOUNTER — APPOINTMENT (OUTPATIENT)
Dept: LAB | Facility: CLINIC | Age: 72
End: 2022-11-04

## 2022-11-04 DIAGNOSIS — E78.2 MIXED HYPERLIPIDEMIA: ICD-10-CM

## 2022-11-04 LAB
ALBUMIN SERPL BCP-MCNC: 3.8 G/DL (ref 3.5–5)
ALP SERPL-CCNC: 73 U/L (ref 46–116)
ALT SERPL W P-5'-P-CCNC: 23 U/L (ref 12–78)
ANION GAP SERPL CALCULATED.3IONS-SCNC: 3 MMOL/L (ref 4–13)
AST SERPL W P-5'-P-CCNC: 22 U/L (ref 5–45)
BASOPHILS # BLD AUTO: 0.08 THOUSANDS/ÂΜL (ref 0–0.1)
BASOPHILS NFR BLD AUTO: 1 % (ref 0–1)
BILIRUB SERPL-MCNC: 0.75 MG/DL (ref 0.2–1)
BUN SERPL-MCNC: 13 MG/DL (ref 5–25)
CALCIUM SERPL-MCNC: 9.6 MG/DL (ref 8.3–10.1)
CHLORIDE SERPL-SCNC: 105 MMOL/L (ref 96–108)
CHOLEST SERPL-MCNC: 211 MG/DL
CO2 SERPL-SCNC: 28 MMOL/L (ref 21–32)
CREAT SERPL-MCNC: 0.95 MG/DL (ref 0.6–1.3)
EOSINOPHIL # BLD AUTO: 0.18 THOUSAND/ÂΜL (ref 0–0.61)
EOSINOPHIL NFR BLD AUTO: 3 % (ref 0–6)
ERYTHROCYTE [DISTWIDTH] IN BLOOD BY AUTOMATED COUNT: 12 % (ref 11.6–15.1)
GFR SERPL CREATININE-BSD FRML MDRD: 60 ML/MIN/1.73SQ M
GLUCOSE P FAST SERPL-MCNC: 92 MG/DL (ref 65–99)
HCT VFR BLD AUTO: 42.8 % (ref 34.8–46.1)
HDLC SERPL-MCNC: 80 MG/DL
HGB BLD-MCNC: 14.1 G/DL (ref 11.5–15.4)
IMM GRANULOCYTES # BLD AUTO: 0.01 THOUSAND/UL (ref 0–0.2)
IMM GRANULOCYTES NFR BLD AUTO: 0 % (ref 0–2)
LDLC SERPL CALC-MCNC: 116 MG/DL (ref 0–100)
LYMPHOCYTES # BLD AUTO: 1.76 THOUSANDS/ÂΜL (ref 0.6–4.47)
LYMPHOCYTES NFR BLD AUTO: 32 % (ref 14–44)
MCH RBC QN AUTO: 31.1 PG (ref 26.8–34.3)
MCHC RBC AUTO-ENTMCNC: 32.9 G/DL (ref 31.4–37.4)
MCV RBC AUTO: 95 FL (ref 82–98)
MONOCYTES # BLD AUTO: 0.51 THOUSAND/ÂΜL (ref 0.17–1.22)
MONOCYTES NFR BLD AUTO: 9 % (ref 4–12)
NEUTROPHILS # BLD AUTO: 3.03 THOUSANDS/ÂΜL (ref 1.85–7.62)
NEUTS SEG NFR BLD AUTO: 55 % (ref 43–75)
NONHDLC SERPL-MCNC: 131 MG/DL
NRBC BLD AUTO-RTO: 0 /100 WBCS
PLATELET # BLD AUTO: 267 THOUSANDS/UL (ref 149–390)
PMV BLD AUTO: 10 FL (ref 8.9–12.7)
POTASSIUM SERPL-SCNC: 4.3 MMOL/L (ref 3.5–5.3)
PROT SERPL-MCNC: 7.4 G/DL (ref 6.4–8.4)
RBC # BLD AUTO: 4.53 MILLION/UL (ref 3.81–5.12)
SODIUM SERPL-SCNC: 136 MMOL/L (ref 135–147)
TRIGL SERPL-MCNC: 74 MG/DL
WBC # BLD AUTO: 5.57 THOUSAND/UL (ref 4.31–10.16)

## 2022-12-15 ENCOUNTER — OFFICE VISIT (OUTPATIENT)
Dept: INTERNAL MEDICINE CLINIC | Facility: CLINIC | Age: 72
End: 2022-12-15

## 2022-12-15 VITALS
HEART RATE: 68 BPM | RESPIRATION RATE: 16 BRPM | BODY MASS INDEX: 22.26 KG/M2 | WEIGHT: 121 LBS | SYSTOLIC BLOOD PRESSURE: 122 MMHG | HEIGHT: 62 IN | DIASTOLIC BLOOD PRESSURE: 82 MMHG | OXYGEN SATURATION: 99 %

## 2022-12-15 DIAGNOSIS — Z98.890 HISTORY OF BREAST LUMP/MASS EXCISION: ICD-10-CM

## 2022-12-15 DIAGNOSIS — K26.9 DUODENAL ULCER: ICD-10-CM

## 2022-12-15 DIAGNOSIS — K26.4 GASTROINTESTINAL HEMORRHAGE ASSOCIATED WITH DUODENAL ULCER: Primary | ICD-10-CM

## 2022-12-15 DIAGNOSIS — E78.5 DYSLIPIDEMIA: ICD-10-CM

## 2022-12-15 NOTE — PROGRESS NOTES
Assessment/Plan:    Diagnoses and all orders for this visit:    Gastrointestinal hemorrhage associated with duodenal ulcer  -     CBC and differential; Future  -     Comprehensive metabolic panel; Future  -     Lipid Panel with Direct LDL reflex; Future    Duodenal ulcer  -     CBC and differential; Future  -     Comprehensive metabolic panel; Future  -     Lipid Panel with Direct LDL reflex; Future    Dyslipidemia  -     CBC and differential; Future  -     Comprehensive metabolic panel; Future  -     Lipid Panel with Direct LDL reflex; Future    History of breast lump/mass excision              Patient Instructions   Osteopenia: dexa scan reviewed from last year, currently not on any meds, patient trying conservative measures with calcium and weightbearing exercises    Dyslipidemia: not on statin, diet measures, lipid panel showing slight improvement from previous    Duodenal Ulcer: currently denying any blood or melena in stool    History of breast mass: has yearly mammograms, next due in 2023      Subjective:      Patient ID: Elizabeth Henry is a 71 y o  female    71 y f, pmh- duodenal ulcer, osteopenia  Mammogram done this year, on yearly schedule, next one to be done in 2023  DEXA scan reviewed from 2021 consistent with osteopenia  She has no new complaints or concerns  Current Outpatient Medications:   •  Restasis 0 05 % ophthalmic emulsion, Administer 1 drop to both eyes every 12 (twelve) hours , Disp: , Rfl:   •  Calcium Carbonate-Vitamin D3 600-400 MG-UNIT TABS, Take 1 tablet by mouth daily (Patient not taking: Reported on 12/15/2022), Disp: , Rfl:   •  cyanocobalamin (VITAMIN B-12) 100 mcg tablet, Take by mouth daily, Disp: , Rfl:   •  ferrous sulfate 324 (65 Fe) mg, TAKE 1 TABLET (324 MG TOTAL) BY MOUTH DAILY BEFORE BREAKFAST (Patient not taking: Reported on 3/1/2022 ), Disp: 90 tablet, Rfl: 1    No results found for this or any previous visit (from the past 1008 hour(s))      The following portions of the patient's history were reviewed and updated as appropriate: allergies, current medications, past family history, past medical history, past social history, past surgical history and problem list      Review of Systems   Constitutional: Negative for appetite change, fever and unexpected weight change  Respiratory: Negative for cough, chest tightness and shortness of breath  Cardiovascular: Negative for chest pain, palpitations and leg swelling  Gastrointestinal: Negative for abdominal pain, constipation, diarrhea, nausea and vomiting  Genitourinary: Negative for difficulty urinating and dysuria  Musculoskeletal: Negative for back pain, joint swelling and myalgias  Neurological: Negative for dizziness, numbness and headaches  Psychiatric/Behavioral: Negative for agitation, behavioral problems and confusion  Objective:      Vitals:    12/15/22 1226   BP: 122/82   Pulse: 68   Resp: 16   SpO2: 99%          Physical Exam  Constitutional:       Appearance: Normal appearance  HENT:      Head: Normocephalic and atraumatic  Eyes:      General: No scleral icterus  Right eye: No discharge  Left eye: No discharge  Cardiovascular:      Rate and Rhythm: Normal rate and regular rhythm  Pulses: Normal pulses  Heart sounds: Normal heart sounds  Pulmonary:      Effort: Pulmonary effort is normal       Breath sounds: Normal breath sounds  Abdominal:      General: There is no distension  Palpations: Abdomen is soft  Tenderness: There is no abdominal tenderness  Musculoskeletal:         General: Normal range of motion  Cervical back: Normal range of motion  Skin:     General: Skin is warm and dry  Neurological:      General: No focal deficit present  Mental Status: She is alert  Mental status is at baseline  Psychiatric:         Mood and Affect: Mood normal          Thought Content:  Thought content normal

## 2022-12-15 NOTE — PROGRESS NOTES
Assessment and Plan:     Problem List Items Addressed This Visit        Digestive    Gastrointestinal hemorrhage associated with duodenal ulcer - Primary    Duodenal ulcer        Preventive health issues were discussed with patient, and age appropriate screening tests were ordered as noted in patient's After Visit Summary  Personalized health advice and appropriate referrals for health education or preventive services given if needed, as noted in patient's After Visit Summary       History of Present Illness:     Patient presents for a Medicare Wellness Visit    HPI   Patient Care Team:  Slick Arita MD as PCP - General (Internal Medicine)     Review of Systems:     Review of Systems     Problem List:     Patient Active Problem List   Diagnosis   • Midline cystocele   • Osteopenia   • Rectocele   • Uterine prolapse   • Postmenopausal   • Encounter for gynecological examination without abnormal finding   • Other constipation   • Other osteoporosis without current pathological fracture   • Asymptomatic postmenopausal status   • Other idiopathic scoliosis, thoracolumbar region   • Memory loss   • Gastrointestinal hemorrhage associated with duodenal ulcer   • Vasovagal syncope   • Pharyngoesophageal dysphagia   • Duodenal ulcer   • Esophageal ring      Past Medical and Surgical History:     Past Medical History:   Diagnosis Date   • Benign neoplasm of skin      Past Surgical History:   Procedure Laterality Date   • BREAST EXCISIONAL BIOPSY Left     1992   • TUBAL LIGATION      LAST ASSESSED: [id number]      Family History:     Family History   Problem Relation Age of Onset   • Migraines Mother    • Osteoporosis Mother    • Throat cancer Father    • Depression Daughter         WITH ANXIETY    • Migraines Daughter    • Rheum arthritis Daughter    • Urinary tract infection Daughter    • Depression Son         WITH ANXIETY    • No Known Problems Daughter    • Breast cancer Neg Hx    • Colon cancer Neg Hx    • Uterine cancer Neg Hx    • Cervical cancer Neg Hx    • Ovarian cancer Neg Hx       Social History:     Social History     Socioeconomic History   • Marital status: /Civil Union     Spouse name: None   • Number of children: None   • Years of education: None   • Highest education level: None   Occupational History   • None   Tobacco Use   • Smoking status: Never   • Smokeless tobacco: Never   Vaping Use   • Vaping Use: Never used   Substance and Sexual Activity   • Alcohol use: Yes     Alcohol/week: 2 0 standard drinks     Types: 2 Glasses of wine per week   • Drug use: No   • Sexual activity: Yes     Partners: Male     Birth control/protection: Surgical   Other Topics Concern   • None   Social History Narrative   • None     Social Determinants of Health     Financial Resource Strain: Low Risk    • Difficulty of Paying Living Expenses: Not hard at all   Food Insecurity: Not on file   Transportation Needs: No Transportation Needs   • Lack of Transportation (Medical): No   • Lack of Transportation (Non-Medical): No   Physical Activity: Not on file   Stress: Not on file   Social Connections: Not on file   Intimate Partner Violence: Not on file   Housing Stability: Not on file      Medications and Allergies:     Current Outpatient Medications   Medication Sig Dispense Refill   • Restasis 0 05 % ophthalmic emulsion Administer 1 drop to both eyes every 12 (twelve) hours      • Calcium Carbonate-Vitamin D3 600-400 MG-UNIT TABS Take 1 tablet by mouth daily (Patient not taking: Reported on 12/15/2022)     • cyanocobalamin (VITAMIN B-12) 100 mcg tablet Take by mouth daily     • ferrous sulfate 324 (65 Fe) mg TAKE 1 TABLET (324 MG TOTAL) BY MOUTH DAILY BEFORE BREAKFAST (Patient not taking: Reported on 3/1/2022 ) 90 tablet 1     No current facility-administered medications for this visit       Allergies   Allergen Reactions   • Other Other (See Comments) and Sneezing     CATS-itchy eyes, heaviness in chest      Immunizations: Immunization History   Administered Date(s) Administered   • COVID-19 PFIZER VACCINE 0 3 ML IM 03/18/2021, 04/08/2021, 10/13/2021   • Influenza, high dose seasonal 0 7 mL 11/02/2020, 12/01/2021   • Pneumococcal Conjugate 13-Valent 11/02/2020   • Zoster Vaccine Recombinant 06/30/2020, 08/31/2020      Health Maintenance:         Topic Date Due   • Breast Cancer Screening: Mammogram  03/03/2023   • Colorectal Cancer Screening  09/30/2023   • Hepatitis C Screening  Completed         Topic Date Due   • Hepatitis B Vaccine (1 of 3 - 3-dose series) Never done   • Pneumococcal Vaccine: 65+ Years (2 - PPSV23 if available, else PCV20) 11/02/2021   • COVID-19 Vaccine (4 - Booster for Pfizer series) 02/13/2022   • Influenza Vaccine (1) 09/01/2022      Medicare Screening Tests and Risk Assessments:     Dama Collet is here for her Subsequent Wellness visit  Health Risk Assessment:   Patient rates overall health as good  Patient feels that their physical health rating is same  Patient is satisfied with their life  Eyesight was rated as same  Hearing was rated as same  Patient feels that their emotional and mental health rating is same  Patients states they are never, rarely angry  Patient states they are never, rarely unusually tired/fatigued  Pain experienced in the last 7 days has been none  Patient states that she has experienced no weight loss or gain in last 6 months  Fall Risk Screening: In the past year, patient has experienced: no history of falling in past year      Urinary Incontinence Screening:   Patient has not leaked urine accidently in the last six months  Home Safety:  Patient does not have trouble with stairs inside or outside of their home  Patient has working smoke alarms and has working carbon monoxide detector  Home safety hazards include: none  Nutrition:   Current diet is Regular  Medications:   Patient is currently taking over-the-counter supplements   OTC medications include: see medication list  Patient is able to manage medications  Activities of Daily Living (ADLs)/Instrumental Activities of Daily Living (IADLs):   Walk and transfer into and out of bed and chair?: Yes  Dress and groom yourself?: Yes    Bathe or shower yourself?: Yes    Feed yourself? Yes  Do your laundry/housekeeping?: Yes  Manage your money, pay your bills and track your expenses?: Yes  Make your own meals?: Yes    Do your own shopping?: Yes    Previous Hospitalizations:   Any hospitalizations or ED visits within the last 12 months?: No      Advance Care Planning:   Living will: Yes    Advanced directive: Yes      PREVENTIVE SCREENINGS      Cardiovascular Screening:    General: Screening Not Indicated and History Lipid Disorder      Diabetes Screening:     General: Screening Current      Colorectal Cancer Screening:     General: Screening Current      Breast Cancer Screening:     General: Screening Current      Cervical Cancer Screening:    General: Screening Not Indicated      Osteoporosis Screening:    General: Screening Not Indicated and History Osteoporosis      Lung Cancer Screening:     General: Screening Not Indicated      Hepatitis C Screening:    General: Screening Current    Screening, Brief Intervention, and Referral to Treatment (SBIRT)    Screening  Typical number of drinks in a day: 0  Typical number of drinks in a week: 0  Interpretation: Low risk drinking behavior  Single Item Drug Screening:  How often have you used an illegal drug (including marijuana) or a prescription medication for non-medical reasons in the past year? never    Single Item Drug Screen Score: 0  Interpretation: Negative screen for possible drug use disorder    No results found       Physical Exam:     /82 (BP Location: Left arm, Patient Position: Sitting, Cuff Size: Standard)   Pulse 68   Resp 16   Ht 5' 2" (1 575 m)   Wt 54 9 kg (121 lb)   SpO2 99%   BMI 22 13 kg/m²     Physical Exam     Simona Carvalho MD

## 2022-12-15 NOTE — PATIENT INSTRUCTIONS
Osteopenia: dexa scan reviewed from last year, currently not on any meds, patient trying conservative measures with calcium and weightbearing exercises    Dyslipidemia: not on statin, diet measures, lipid panel showing slight improvement from previous    Duodenal Ulcer: currently denying any blood or melena in stool    History of breast mass: has yearly mammograms, next due in 2023

## 2022-12-28 ENCOUNTER — TELEPHONE (OUTPATIENT)
Dept: INTERNAL MEDICINE CLINIC | Facility: CLINIC | Age: 72
End: 2022-12-28

## 2022-12-28 NOTE — TELEPHONE ENCOUNTER
Pt fell [place]; hit head; had bump on head- gone now  Nothing was of concern at the time    But this AM- was she just tired?   Had some memory issues; she couldn't get the right words out  When she looked in the mirror her vision was off

## 2023-01-06 ENCOUNTER — OFFICE VISIT (OUTPATIENT)
Dept: INTERNAL MEDICINE CLINIC | Facility: CLINIC | Age: 73
End: 2023-01-06

## 2023-01-06 ENCOUNTER — HOSPITAL ENCOUNTER (OUTPATIENT)
Dept: CT IMAGING | Facility: HOSPITAL | Age: 73
End: 2023-01-06

## 2023-01-06 ENCOUNTER — TELEPHONE (OUTPATIENT)
Dept: INTERNAL MEDICINE CLINIC | Facility: CLINIC | Age: 73
End: 2023-01-06

## 2023-01-06 VITALS
HEART RATE: 66 BPM | RESPIRATION RATE: 16 BRPM | HEIGHT: 62 IN | BODY MASS INDEX: 22.63 KG/M2 | WEIGHT: 123 LBS | DIASTOLIC BLOOD PRESSURE: 84 MMHG | SYSTOLIC BLOOD PRESSURE: 122 MMHG

## 2023-01-06 DIAGNOSIS — S09.90XS TRAUMATIC INJURY OF HEAD, SEQUELA: ICD-10-CM

## 2023-01-06 DIAGNOSIS — R47.89 WORD FINDING DIFFICULTY: ICD-10-CM

## 2023-01-06 DIAGNOSIS — S09.90XS TRAUMATIC INJURY OF HEAD, SEQUELA: Primary | ICD-10-CM

## 2023-01-06 DIAGNOSIS — S06.0X0S CONCUSSION WITHOUT LOSS OF CONSCIOUSNESS, SEQUELA (HCC): ICD-10-CM

## 2023-01-06 NOTE — PROGRESS NOTES
INTERNAL MEDICINE FOLLOW-UP VISIT  [facility] - [facility] [address] [place] [address]    NAME: Micha Boles  AGE: 71 y o  SEX: female  : 1950     DATE: 2023     Assessment and Plan:   1  Traumatic injury of head, sequela  Its unlikely that she has a subdural bleed but need to rule out  I think her sx are secondary to concussion/sleep deprived/stress    - CT head wo contrast; Future    2  Word finding difficulty  - CT head wo contrast; Future    3  Concussion without loss of consciousness, sequela (HCC)  - CT head wo contrast; Future        No follow-ups on file  Chief Complaint:     Chief Complaint   Patient presents with   • Fall     On  hit head and left side  History of Present Illness: On  she was assisting him  In the wheelchair in the wheelchair [employer]  She slid of the ramp of the [place]  She was able to get up and get in the car and drove  While in the [employer] for about 20 mins was feeling a goose egg the left forehead  Nothing else signficant  About 5 days after the fall she noticed her dog got sick and she didn't sleep  She woke up the next morning and had visual change right eye and then though her speech was different halted and couldn't get words out  It resolved within mins   All head sx have resolved  Still w/ left rib pain, worse w/ laying down taking advil alter w/ tylen  The following portions of the patient's history were reviewed and updated as appropriate: allergies, current medications, past family history, past medical history, past social history, past surgical history and problem list      Review of Systems:     Review of Systems   Constitutional: Negative for appetite change, chills, diaphoresis, fatigue, fever and unexpected weight change  HENT: Negative for postnasal drip and sneezing  Eyes: Negative for visual disturbance  Respiratory: Negative for chest tightness and shortness of breath      Cardiovascular: Negative for chest pain, palpitations and leg swelling  Gastrointestinal: Negative for abdominal pain and blood in stool  Endocrine: Negative for cold intolerance, heat intolerance, polydipsia, polyphagia and polyuria  Genitourinary: Negative for difficulty urinating, dysuria, frequency and urgency  Musculoskeletal: Negative for arthralgias and myalgias  Skin: Negative for rash and wound  Neurological: Negative for dizziness, weakness, light-headedness and headaches  Hematological: Negative for adenopathy  Psychiatric/Behavioral: Negative for confusion, dysphoric mood and sleep disturbance  The patient is not nervous/anxious  Past Medical History:     Past Medical History:   Diagnosis Date   • Benign neoplasm of skin         Current Medications:     Current Outpatient Medications:   •  cyanocobalamin (VITAMIN B-12) 100 mcg tablet, Take by mouth daily, Disp: , Rfl:   •  Restasis 0 05 % ophthalmic emulsion, Administer 1 drop to both eyes every 12 (twelve) hours , Disp: , Rfl:      Allergies: Allergies   Allergen Reactions   • Other Other (See Comments) and Sneezing     CATS-itchy eyes, heaviness in chest        Physical Exam:     /84 (BP Location: Left arm, Patient Position: Sitting, Cuff Size: Standard)   Pulse 66   Resp 16   Ht 5' 2" (1 575 m)   Wt 55 8 kg (123 lb)   BMI 22 50 kg/m²     Physical Exam  Constitutional:       Appearance: She is well-developed  HENT:      Head: Normocephalic and atraumatic  Eyes:      Pupils: Pupils are equal, round, and reactive to light  Neck:      Thyroid: No thyromegaly  Cardiovascular:      Rate and Rhythm: Normal rate and regular rhythm  Heart sounds: No murmur heard  Pulmonary:      Effort: Pulmonary effort is normal       Breath sounds: Normal breath sounds  Abdominal:      General: Bowel sounds are normal       Palpations: Abdomen is soft  Musculoskeletal:         General: Normal range of motion        Cervical back: Normal range of motion and neck supple  Lymphadenopathy:      Cervical: No cervical adenopathy  Skin:     General: Skin is warm and dry  Neurological:      Mental Status: She is alert and oriented to person, place, and time  Data:     Laboratory Results: I have personally reviewed the pertinent laboratory results/reports   Radiology/Other Diagnostic Testing Results: I have personally reviewed pertinent reports        JAY Rojo  [facility] [address] [place] [address]

## 2023-02-10 ENCOUNTER — TELEPHONE (OUTPATIENT)
Dept: INTERNAL MEDICINE CLINIC | Facility: CLINIC | Age: 73
End: 2023-02-10

## 2023-02-10 DIAGNOSIS — Z12.31 ENCOUNTER FOR SCREENING MAMMOGRAM FOR MALIGNANT NEOPLASM OF BREAST: ICD-10-CM

## 2023-02-10 DIAGNOSIS — Z12.39 ENCOUNTER FOR BREAST CANCER SCREENING OTHER THAN MAMMOGRAM: Primary | ICD-10-CM

## 2023-03-09 ENCOUNTER — HOSPITAL ENCOUNTER (OUTPATIENT)
Dept: MAMMOGRAPHY | Facility: CLINIC | Age: 73
Discharge: HOME | End: 2023-03-09

## 2023-03-09 VITALS — BODY MASS INDEX: 22.63 KG/M2 | WEIGHT: 123 LBS | HEIGHT: 62 IN

## 2023-03-09 DIAGNOSIS — Z12.31 ENCOUNTER FOR SCREENING MAMMOGRAM FOR MALIGNANT NEOPLASM OF BREAST: ICD-10-CM

## 2023-03-09 DIAGNOSIS — Z12.39 ENCOUNTER FOR BREAST CANCER SCREENING OTHER THAN MAMMOGRAM: ICD-10-CM

## 2023-06-15 ENCOUNTER — APPOINTMENT (OUTPATIENT)
Age: 73
End: 2023-06-15
Payer: MEDICARE

## 2023-06-15 DIAGNOSIS — K26.4 GASTROINTESTINAL HEMORRHAGE ASSOCIATED WITH DUODENAL ULCER: ICD-10-CM

## 2023-06-15 DIAGNOSIS — K26.9 DUODENAL ULCER: ICD-10-CM

## 2023-06-15 DIAGNOSIS — E78.5 DYSLIPIDEMIA: ICD-10-CM

## 2023-06-15 LAB
ALBUMIN SERPL BCP-MCNC: 3.7 G/DL (ref 3.5–5)
ALP SERPL-CCNC: 60 U/L (ref 46–116)
ALT SERPL W P-5'-P-CCNC: 27 U/L (ref 12–78)
ANION GAP SERPL CALCULATED.3IONS-SCNC: 3 MMOL/L (ref 4–13)
AST SERPL W P-5'-P-CCNC: 26 U/L (ref 5–45)
BASOPHILS # BLD AUTO: 0.08 THOUSANDS/ÂΜL (ref 0–0.1)
BASOPHILS NFR BLD AUTO: 2 % (ref 0–1)
BILIRUB SERPL-MCNC: 0.48 MG/DL (ref 0.2–1)
BUN SERPL-MCNC: 16 MG/DL (ref 5–25)
CALCIUM SERPL-MCNC: 9.2 MG/DL (ref 8.3–10.1)
CHLORIDE SERPL-SCNC: 110 MMOL/L (ref 96–108)
CHOLEST SERPL-MCNC: 216 MG/DL
CO2 SERPL-SCNC: 25 MMOL/L (ref 21–32)
CREAT SERPL-MCNC: 0.97 MG/DL (ref 0.6–1.3)
EOSINOPHIL # BLD AUTO: 0.22 THOUSAND/ÂΜL (ref 0–0.61)
EOSINOPHIL NFR BLD AUTO: 4 % (ref 0–6)
ERYTHROCYTE [DISTWIDTH] IN BLOOD BY AUTOMATED COUNT: 11.9 % (ref 11.6–15.1)
GFR SERPL CREATININE-BSD FRML MDRD: 58 ML/MIN/1.73SQ M
GLUCOSE P FAST SERPL-MCNC: 90 MG/DL (ref 65–99)
HCT VFR BLD AUTO: 42.1 % (ref 34.8–46.1)
HDLC SERPL-MCNC: 92 MG/DL
HGB BLD-MCNC: 13.9 G/DL (ref 11.5–15.4)
IMM GRANULOCYTES # BLD AUTO: 0.01 THOUSAND/UL (ref 0–0.2)
IMM GRANULOCYTES NFR BLD AUTO: 0 % (ref 0–2)
LDLC SERPL CALC-MCNC: 112 MG/DL (ref 0–100)
LYMPHOCYTES # BLD AUTO: 1.57 THOUSANDS/ÂΜL (ref 0.6–4.47)
LYMPHOCYTES NFR BLD AUTO: 31 % (ref 14–44)
MCH RBC QN AUTO: 31 PG (ref 26.8–34.3)
MCHC RBC AUTO-ENTMCNC: 33 G/DL (ref 31.4–37.4)
MCV RBC AUTO: 94 FL (ref 82–98)
MONOCYTES # BLD AUTO: 0.47 THOUSAND/ÂΜL (ref 0.17–1.22)
MONOCYTES NFR BLD AUTO: 9 % (ref 4–12)
NEUTROPHILS # BLD AUTO: 2.78 THOUSANDS/ÂΜL (ref 1.85–7.62)
NEUTS SEG NFR BLD AUTO: 54 % (ref 43–75)
NRBC BLD AUTO-RTO: 0 /100 WBCS
PLATELET # BLD AUTO: 271 THOUSANDS/UL (ref 149–390)
PMV BLD AUTO: 10 FL (ref 8.9–12.7)
POTASSIUM SERPL-SCNC: 4 MMOL/L (ref 3.5–5.3)
PROT SERPL-MCNC: 7.4 G/DL (ref 6.4–8.4)
RBC # BLD AUTO: 4.49 MILLION/UL (ref 3.81–5.12)
SODIUM SERPL-SCNC: 138 MMOL/L (ref 135–147)
TRIGL SERPL-MCNC: 60 MG/DL
WBC # BLD AUTO: 5.13 THOUSAND/UL (ref 4.31–10.16)

## 2023-06-15 PROCEDURE — 80053 COMPREHEN METABOLIC PANEL: CPT

## 2023-06-15 PROCEDURE — 80061 LIPID PANEL: CPT

## 2023-06-15 PROCEDURE — 85025 COMPLETE CBC W/AUTO DIFF WBC: CPT

## 2023-06-15 PROCEDURE — 36415 COLL VENOUS BLD VENIPUNCTURE: CPT

## 2023-06-27 ENCOUNTER — OFFICE VISIT (OUTPATIENT)
Age: 73
End: 2023-06-27
Payer: MEDICARE

## 2023-06-27 VITALS
OXYGEN SATURATION: 97 % | WEIGHT: 118.6 LBS | DIASTOLIC BLOOD PRESSURE: 60 MMHG | SYSTOLIC BLOOD PRESSURE: 120 MMHG | HEIGHT: 62 IN | HEART RATE: 71 BPM | BODY MASS INDEX: 21.83 KG/M2

## 2023-06-27 DIAGNOSIS — S09.90XD CLOSED HEAD INJURY, SUBSEQUENT ENCOUNTER: ICD-10-CM

## 2023-06-27 DIAGNOSIS — R47.89 WORD FINDING DIFFICULTY: ICD-10-CM

## 2023-06-27 DIAGNOSIS — N18.31 STAGE 3A CHRONIC KIDNEY DISEASE (HCC): ICD-10-CM

## 2023-06-27 DIAGNOSIS — K22.2 ESOPHAGEAL RING: Primary | ICD-10-CM

## 2023-06-27 DIAGNOSIS — M81.8 OTHER OSTEOPOROSIS WITHOUT CURRENT PATHOLOGICAL FRACTURE: ICD-10-CM

## 2023-06-27 DIAGNOSIS — E78.5 DYSLIPIDEMIA: ICD-10-CM

## 2023-06-27 PROCEDURE — G0439 PPPS, SUBSEQ VISIT: HCPCS | Performed by: INTERNAL MEDICINE

## 2023-06-27 PROCEDURE — 99214 OFFICE O/P EST MOD 30 MIN: CPT | Performed by: INTERNAL MEDICINE

## 2023-06-27 NOTE — PATIENT INSTRUCTIONS
Lab data, CTA from 2021, CT brain from January all reviewed and compared to prior    Intermittent word finding difficulties are of unclear etiology  Given temporal correlation after close head injury will check MRI and EEG and refer to neurology  We discussed how stress can impact speech and language in certain situations  Patient advised to continue with healthy diet, routine exercise, good sleep and cutting back on alcohol  Hyperlipidemia-LDL continues to improve, atherosclerotic risk score favorable  CTA showing no significant plaques 2 years ago  Hold off on statin unless any evidence for atherosclerotic cerebrovascular disease      Health maintenance up-to-date    Routine follow-up after labs in 6 months, we will follow-up EEG and MRI accordingly

## 2023-06-27 NOTE — PROGRESS NOTES
Assessment and Plan:     Problem List Items Addressed This Visit        Digestive    Esophageal ring - Primary       Musculoskeletal and Integument    Other osteoporosis without current pathological fracture       Genitourinary    Stage 3a chronic kidney disease ([facility] [address] )       Other    Dyslipidemia    Relevant Orders    CBC and differential    Comprehensive metabolic panel    Lipid panel    TSH, 3rd generation with Free T4 reflex   Other Visit Diagnoses     Word finding difficulty        Relevant Orders    EEG Routine and awake    MRI brain wo contrast    Ambulatory Referral to Neurology    Closed head injury, subsequent encounter        Relevant Orders    EEG Routine and awake    MRI brain wo contrast    Ambulatory Referral to Neurology           Preventive health issues were discussed with patient, and age appropriate screening tests were ordered as noted in patient's After Visit Summary  Personalized health advice and appropriate referrals for health education or preventive services given if needed, as noted in patient's After Visit Summary  History of Present Illness:     Patient presents for a Medicare Wellness Visit    F/u mmp and review labs  Feeling generally well, but several concerns  She had a fall w/ [facility] in Dec, neg CT in Jan     Several episodes of word finding difficulty since then  First episode was 2021 after covid vaccine  Admits lots of stress surrounding events  PUD-off iron and ppi, using pepcid prn gerd  HPL-had been eating more ice cream and cheese prior to getting labs done  Exercising at [facility] w/ rachel wts, and Amp[employer], and yoga, no cp/sob  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     Patient Care Team:  Joann Payne MD as PCP - General (Internal Medicine)     Review of Systems:     Review of Systems   Constitutional: Negative for appetite change, chills, diaphoresis, fatigue, fever and unexpected weight change  HENT: Negative for congestion, hearing loss and rhinorrhea      Eyes: Negative for visual disturbance  Respiratory: Negative for cough, chest tightness, shortness of breath and wheezing  Cardiovascular: Negative for chest pain, palpitations and leg swelling  Gastrointestinal: Negative for abdominal pain and blood in stool  Endocrine: Negative for cold intolerance, heat intolerance, polydipsia and polyuria  Genitourinary: Negative for difficulty urinating, dysuria, frequency and urgency  Musculoskeletal: Negative for arthralgias and myalgias  Skin: Negative for rash  Neurological: Negative for dizziness, weakness, light-headedness and headaches  Hematological: Does not bruise/bleed easily  Psychiatric/Behavioral: Negative for dysphoric mood and sleep disturbance          Problem List:     Patient Active Problem List   Diagnosis   • Midline cystocele   • Osteopenia   • Rectocele   • Uterine prolapse   • Postmenopausal   • Encounter for gynecological examination without abnormal finding   • Other constipation   • Other osteoporosis without current pathological fracture   • Asymptomatic postmenopausal status   • Other idiopathic scoliosis, thoracolumbar region   • Memory loss   • Gastrointestinal hemorrhage associated with duodenal ulcer   • Vasovagal syncope   • Pharyngoesophageal dysphagia   • Duodenal ulcer   • Esophageal ring   • Dyslipidemia   • Stage 3a chronic kidney disease ([facility] [address] )      Past Medical and Surgical History:     Past Medical History:   Diagnosis Date   • Benign neoplasm of skin      Past Surgical History:   Procedure Laterality Date   • BREAST EXCISIONAL BIOPSY Left     1992   • TUBAL LIGATION      LAST ASSESSED: [id number]      Family History:     Family History   Problem Relation Age of Onset   • Migraines Mother    • Osteoporosis Mother    • Throat cancer Father    • Depression Daughter         WITH ANXIETY    • Migraines Daughter    • Rheum arthritis Daughter    • Urinary tract infection Daughter    • Depression Son         WITH ANXIETY    • No Known Problems Daughter    • Breast cancer Neg Hx    • Colon cancer Neg Hx    • Uterine cancer Neg Hx    • Cervical cancer Neg Hx    • Ovarian cancer Neg Hx       Social History:     Social History     Socioeconomic History   • Marital status: /Civil Union     Spouse name: None   • Number of children: None   • Years of education: None   • Highest education level: None   Occupational History   • None   Tobacco Use   • Smoking status: Never   • Smokeless tobacco: Never   Vaping Use   • Vaping Use: Never used   Substance and Sexual Activity   • Alcohol use: Yes     Alcohol/week: 2 0 standard drinks of alcohol     Types: 2 Glasses of wine per week   • Drug use: No   • Sexual activity: Yes     Partners: Male     Birth control/protection: Surgical   Other Topics Concern   • None   Social History Narrative   • None     Social Determinants of Health     Financial Resource Strain: Low Risk  (6/27/2023)    Overall Financial Resource Strain (CARDIA)    • Difficulty of Paying Living Expenses: Not very hard   Food Insecurity: No Food Insecurity (11/29/2021)    Hunger Vital Sign    • Worried About Running Out of Food in the Last Year: Never true    • Ran Out of Food in the Last Year: Never true   Transportation Needs: No Transportation Needs (6/27/2023)    PRAPARE - Transportation    • Lack of Transportation (Medical): No    • Lack of Transportation (Non-Medical): No   Physical Activity: Sufficiently Active (4/5/2021)    Exercise Vital Sign    • Days of Exercise per Week: 7 days    • Minutes of Exercise per Session: 30 min   Stress: Stress Concern Present (4/5/2021)    [id number]    • Feeling of Stress :  To some extent   Social Connections: Not on file   Intimate Partner Violence: Not on file   Housing Stability: Unknown (11/29/2021)    Housing Stability Vital Sign    • Unable to Pay for Housing in the Last Year: Not on file    • Number of Places Lived in the Last Year: 1 • Unstable Housing in the Last Year: No      Medications and Allergies:     Current Outpatient Medications   Medication Sig Dispense Refill   • cyanocobalamin (VITAMIN B-12) 100 mcg tablet Take by mouth daily     • Restasis 0 05 % ophthalmic emulsion Administer 1 drop to both eyes every 12 (twelve) hours        No current facility-administered medications for this visit  Allergies   Allergen Reactions   • Other Other (See Comments) and Sneezing     CATS-itchy eyes, heaviness in chest      Immunizations:     Immunization History   Administered Date(s) Administered   • COVID-19 PFIZER VACCINE 0 3 ML IM 03/18/2021, 04/08/2021, 10/13/2021   • Influenza, high dose seasonal 0 7 mL 11/02/2020, 12/01/2021   • Pneumococcal Conjugate 13-Valent 11/02/2020   • Zoster Vaccine Recombinant 06/30/2020, 08/31/2020      Health Maintenance:         Topic Date Due   • Colorectal Cancer Screening  09/30/2023   • Breast Cancer Screening: Mammogram  03/09/2024   • Hepatitis C Screening  Completed         Topic Date Due   • Pneumococcal Vaccine: 65+ Years (2 - PPSV23 if available, else PCV20) 11/02/2021   • COVID-19 Vaccine (4 - Pfizer series) 12/08/2021   • Influenza Vaccine (Season Ended) 09/01/2023      Medicare Screening Tests and Risk Assessments:     Kaylee Dillon is here for her Subsequent Wellness visit  Health Risk Assessment:   Patient rates overall health as good  Patient feels that their physical health rating is same  Patient is satisfied with their life  Eyesight was rated as same  Hearing was rated as same  Patient feels that their emotional and mental health rating is same  Patients states they are never, rarely angry  Patient states they are never, rarely unusually tired/fatigued  Pain experienced in the last 7 days has been none  Patient states that she has experienced no weight loss or gain in last 6 months  Depression Screening:   PHQ-2 Score: 0      Fall Risk Screening:    In the past year, patient has experienced: no history of falling in past year      Urinary Incontinence Screening:   Patient has not leaked urine accidently in the last six months  Home Safety:  Patient does not have trouble with stairs inside or outside of their home  Patient has working smoke alarms and has working carbon monoxide detector  Home safety hazards include: none  Nutrition:   Current diet is Regular  Medications:   Patient is currently taking over-the-counter supplements  OTC medications include: see medication list  Patient is able to manage medications  Activities of Daily Living (ADLs)/Instrumental Activities of Daily Living (IADLs):   Walk and transfer into and out of bed and chair?: Yes  Dress and groom yourself?: Yes    Bathe or shower yourself?: Yes    Feed yourself?  Yes  Do your laundry/housekeeping?: Yes  Manage your money, pay your bills and track your expenses?: Yes  Make your own meals?: Yes    Do your own shopping?: Yes    Previous Hospitalizations:   Any hospitalizations or ED visits within the last 12 months?: No      Advance Care Planning:   Living will: Yes    Advanced directive: Yes      Cognitive Screening:   Provider or family/friend/caregiver concerned regarding cognition?: No    PREVENTIVE SCREENINGS      Cardiovascular Screening:    General: Screening Not Indicated and History Lipid Disorder      Diabetes Screening:     General: Screening Current      Colorectal Cancer Screening:     General: Screening Current      Breast Cancer Screening:     General: Screening Current      Cervical Cancer Screening:    General: Screening Not Indicated      Osteoporosis Screening:    General: Screening Not Indicated and History Osteoporosis      Abdominal Aortic Aneurysm (AAA) Screening:        General: Screening Not Indicated      Lung Cancer Screening:     General: Screening Not Indicated      Hepatitis C Screening:    General: Screening Current    Screening, Brief Intervention, and Referral to Treatment "(SBIRT)    Screening  Typical number of drinks in a day: 1  Typical number of drinks in a week: 0  Interpretation: Low risk drinking behavior  Single Item Drug Screening:  How often have you used an illegal drug (including marijuana) or a prescription medication for non-medical reasons in the past year? never    Single Item Drug Screen Score: 0  Interpretation: Negative screen for possible drug use disorder    No results found  Physical Exam:     /60 (BP Location: Left arm, Patient Position: Sitting)   Pulse 71   Ht 5' 2\" (1 575 m)   Wt 53 8 kg (118 lb 9 6 oz)   SpO2 97%   BMI 21 69 kg/m²     Physical Exam  Vitals and nursing note reviewed  Constitutional:       General: She is not in acute distress  Appearance: She is well-developed  HENT:      Head: Normocephalic and atraumatic  Eyes:      Conjunctiva/sclera: Conjunctivae normal    Cardiovascular:      Rate and Rhythm: Normal rate and regular rhythm  Heart sounds: No murmur heard  Pulmonary:      Effort: Pulmonary effort is normal  No respiratory distress  Breath sounds: Normal breath sounds  Abdominal:      Palpations: Abdomen is soft  Tenderness: There is no abdominal tenderness  Musculoskeletal:         General: No swelling  Cervical back: Neck supple  Skin:     General: Skin is warm and dry  Capillary Refill: Capillary refill takes less than 2 seconds  Neurological:      General: No focal deficit present  Mental Status: She is alert and oriented to person, place, and time  Cranial Nerves: No cranial nerve deficit        Comments: Cerebellar intact rapid alternating movements and finger-to-nose   Psychiatric:         Mood and Affect: Mood normal           Anabella Torres MD  "

## 2023-07-10 ENCOUNTER — HOSPITAL ENCOUNTER (OUTPATIENT)
Dept: NEUROLOGY | Facility: HOSPITAL | Age: 73
Discharge: HOME | End: 2023-07-10
Payer: MEDICARE

## 2023-07-10 DIAGNOSIS — R47.89 WORD FINDING DIFFICULTY: ICD-10-CM

## 2023-07-10 DIAGNOSIS — S09.90XD CLOSED HEAD INJURY, SUBSEQUENT ENCOUNTER: ICD-10-CM

## 2023-07-10 PROCEDURE — 95816 EEG AWAKE AND DROWSY: CPT

## 2023-07-10 PROCEDURE — 95819 EEG AWAKE AND ASLEEP: CPT | Performed by: PSYCHIATRY & NEUROLOGY

## 2023-08-01 ENCOUNTER — HOSPITAL ENCOUNTER (OUTPATIENT)
Dept: MRI IMAGING | Facility: HOSPITAL | Age: 73
Discharge: HOME | End: 2023-08-01
Attending: INTERNAL MEDICINE
Payer: MEDICARE

## 2023-08-01 DIAGNOSIS — S09.90XD CLOSED HEAD INJURY, SUBSEQUENT ENCOUNTER: ICD-10-CM

## 2023-08-01 DIAGNOSIS — R47.89 WORD FINDING DIFFICULTY: ICD-10-CM

## 2023-08-01 PROCEDURE — 70551 MRI BRAIN STEM W/O DYE: CPT

## 2023-08-01 PROCEDURE — G1004 CDSM NDSC: HCPCS

## 2023-12-08 ENCOUNTER — APPOINTMENT (OUTPATIENT)
Dept: LAB | Facility: CLINIC | Age: 73
End: 2023-12-08
Payer: MEDICARE

## 2023-12-08 DIAGNOSIS — E78.5 DYSLIPIDEMIA: ICD-10-CM

## 2023-12-08 LAB
ALBUMIN SERPL BCP-MCNC: 4.2 G/DL (ref 3.5–5)
ALP SERPL-CCNC: 64 U/L (ref 34–104)
ALT SERPL W P-5'-P-CCNC: 16 U/L (ref 7–52)
ANION GAP SERPL CALCULATED.3IONS-SCNC: 7 MMOL/L
AST SERPL W P-5'-P-CCNC: 23 U/L (ref 13–39)
BASOPHILS # BLD AUTO: 0.08 THOUSANDS/ÂΜL (ref 0–0.1)
BASOPHILS NFR BLD AUTO: 2 % (ref 0–1)
BILIRUB SERPL-MCNC: 0.65 MG/DL (ref 0.2–1)
BUN SERPL-MCNC: 14 MG/DL (ref 5–25)
CALCIUM SERPL-MCNC: 9.8 MG/DL (ref 8.4–10.2)
CHLORIDE SERPL-SCNC: 104 MMOL/L (ref 96–108)
CHOLEST SERPL-MCNC: 219 MG/DL
CO2 SERPL-SCNC: 29 MMOL/L (ref 21–32)
CREAT SERPL-MCNC: 0.84 MG/DL (ref 0.6–1.3)
EOSINOPHIL # BLD AUTO: 0.25 THOUSAND/ÂΜL (ref 0–0.61)
EOSINOPHIL NFR BLD AUTO: 5 % (ref 0–6)
ERYTHROCYTE [DISTWIDTH] IN BLOOD BY AUTOMATED COUNT: 12 % (ref 11.6–15.1)
GFR SERPL CREATININE-BSD FRML MDRD: 69 ML/MIN/1.73SQ M
GLUCOSE P FAST SERPL-MCNC: 86 MG/DL (ref 65–99)
HCT VFR BLD AUTO: 41.7 % (ref 34.8–46.1)
HDLC SERPL-MCNC: 78 MG/DL
HGB BLD-MCNC: 14.1 G/DL (ref 11.5–15.4)
IMM GRANULOCYTES # BLD AUTO: 0.01 THOUSAND/UL (ref 0–0.2)
IMM GRANULOCYTES NFR BLD AUTO: 0 % (ref 0–2)
LDLC SERPL CALC-MCNC: 127 MG/DL (ref 0–100)
LYMPHOCYTES # BLD AUTO: 1.79 THOUSANDS/ÂΜL (ref 0.6–4.47)
LYMPHOCYTES NFR BLD AUTO: 36 % (ref 14–44)
MCH RBC QN AUTO: 31.7 PG (ref 26.8–34.3)
MCHC RBC AUTO-ENTMCNC: 33.8 G/DL (ref 31.4–37.4)
MCV RBC AUTO: 94 FL (ref 82–98)
MONOCYTES # BLD AUTO: 0.48 THOUSAND/ÂΜL (ref 0.17–1.22)
MONOCYTES NFR BLD AUTO: 10 % (ref 4–12)
NEUTROPHILS # BLD AUTO: 2.43 THOUSANDS/ÂΜL (ref 1.85–7.62)
NEUTS SEG NFR BLD AUTO: 47 % (ref 43–75)
NONHDLC SERPL-MCNC: 141 MG/DL
NRBC BLD AUTO-RTO: 0 /100 WBCS
PLATELET # BLD AUTO: 269 THOUSANDS/UL (ref 149–390)
PMV BLD AUTO: 9.9 FL (ref 8.9–12.7)
POTASSIUM SERPL-SCNC: 4.2 MMOL/L (ref 3.5–5.3)
PROT SERPL-MCNC: 6.9 G/DL (ref 6.4–8.4)
RBC # BLD AUTO: 4.45 MILLION/UL (ref 3.81–5.12)
SODIUM SERPL-SCNC: 140 MMOL/L (ref 135–147)
TRIGL SERPL-MCNC: 72 MG/DL
TSH SERPL DL<=0.05 MIU/L-ACNC: 2.73 UIU/ML (ref 0.45–4.5)
WBC # BLD AUTO: 5.04 THOUSAND/UL (ref 4.31–10.16)

## 2023-12-08 PROCEDURE — 85025 COMPLETE CBC W/AUTO DIFF WBC: CPT

## 2023-12-08 PROCEDURE — 36415 COLL VENOUS BLD VENIPUNCTURE: CPT

## 2023-12-08 PROCEDURE — 84443 ASSAY THYROID STIM HORMONE: CPT

## 2023-12-08 PROCEDURE — 80061 LIPID PANEL: CPT

## 2023-12-08 PROCEDURE — 80053 COMPREHEN METABOLIC PANEL: CPT

## 2023-12-19 ENCOUNTER — RA CDI HCC (OUTPATIENT)
Dept: OTHER | Facility: HOSPITAL | Age: 73
End: 2023-12-19

## 2023-12-28 ENCOUNTER — OFFICE VISIT (OUTPATIENT)
Age: 73
End: 2023-12-28
Payer: MEDICARE

## 2023-12-28 VITALS
DIASTOLIC BLOOD PRESSURE: 66 MMHG | BODY MASS INDEX: 22.08 KG/M2 | HEART RATE: 75 BPM | OXYGEN SATURATION: 96 % | WEIGHT: 120 LBS | HEIGHT: 62 IN | SYSTOLIC BLOOD PRESSURE: 100 MMHG | TEMPERATURE: 98 F

## 2023-12-28 DIAGNOSIS — Z23 ENCOUNTER FOR IMMUNIZATION: Primary | ICD-10-CM

## 2023-12-28 DIAGNOSIS — Z12.11 SCREENING FOR COLON CANCER: ICD-10-CM

## 2023-12-28 DIAGNOSIS — Z12.31 ENCOUNTER FOR SCREENING MAMMOGRAM FOR BREAST CANCER: ICD-10-CM

## 2023-12-28 DIAGNOSIS — L82.1 SEBORRHEIC KERATOSIS: ICD-10-CM

## 2023-12-28 DIAGNOSIS — E78.5 DYSLIPIDEMIA: ICD-10-CM

## 2023-12-28 DIAGNOSIS — Z78.0 MENOPAUSE: ICD-10-CM

## 2023-12-28 DIAGNOSIS — M85.80 OSTEOPENIA, UNSPECIFIED LOCATION: ICD-10-CM

## 2023-12-28 PROBLEM — N18.31 STAGE 3A CHRONIC KIDNEY DISEASE (HCC): Status: RESOLVED | Noted: 2023-06-27 | Resolved: 2023-12-28

## 2023-12-28 PROCEDURE — 99214 OFFICE O/P EST MOD 30 MIN: CPT | Performed by: INTERNAL MEDICINE

## 2023-12-28 PROCEDURE — G2211 COMPLEX E/M VISIT ADD ON: HCPCS | Performed by: INTERNAL MEDICINE

## 2023-12-28 NOTE — PROGRESS NOTES
Assessment/Plan:    Diagnoses and all orders for this visit:    Encounter for immunization  -     influenza vaccine, high-dose, PF 0.7 mL (FLUZONE HIGH-DOSE)    Encounter for screening mammogram for breast cancer  -     Mammo screening bilateral w 3d & cad; Future    Dyslipidemia  -     CBC and differential; Future  -     Comprehensive metabolic panel; Future  -     Lipid panel; Future  -     TSH, 3rd generation with Free T4 reflex; Future    Osteopenia, unspecified location    Menopause  -     DXA bone density spine hip and pelvis; Future    Screening for colon cancer  -     Ambulatory referral to Gastroenterology; Future    Seborrheic keratosis           Depression Screening and Follow-up Plan: Patient was screened for depression during today's encounter. They screened negative with a PHQ-2 score of 0.        Patient Instructions   Lab data reviewed in detail and compared to prior    CKD-GFR up to 69.  Continue to hydrate well    History of peptic ulcer disease-no recurrence.  Hemoglobin stable    Hyperlipidemia-continue with dietary modification and excellent exercise regimen    Skin lesions consistent with seborrheic keratosis, no further workup needed    Routine follow-up after labs in 6 months, sooner as needed    Health maintenance is up-to-date with the exception of colon cancer screening, referral sent and bone density    Subjective:      Patient ID: Rosario Morales is a 73 y.o. female    F/u mmp and review labs  Feeling generally well   No further episodes of word finding difficulty since 2/23, first episode was 2021 after covid vaccine.    PUD-off iron and ppi, using pepcid prn gerd.   HPL-watching diet  CKD- hydrating better.   Exercising at [employer] w/ rachel wts, and [employer], and yoga, no cp/sob.   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   Notes several raised skin lesions.          Current Outpatient Medications:     cyanocobalamin (VITAMIN B-12) 100 mcg  tablet, Take by mouth daily, Disp: , Rfl:     Restasis 0.05 % ophthalmic emulsion, Administer 1 drop to both eyes every 12 (twelve) hours , Disp: , Rfl:     Recent Results (from the past 1008 hour(s))   CBC and differential    Collection Time: 12/08/23  8:22 AM   Result Value Ref Range    WBC 5.04 4.31 - 10.16 Thousand/uL    RBC 4.45 3.81 - 5.12 Million/uL    Hemoglobin 14.1 11.5 - 15.4 g/dL    Hematocrit 41.7 34.8 - 46.1 %    MCV 94 82 - 98 fL    MCH 31.7 26.8 - 34.3 pg    MCHC 33.8 31.4 - 37.4 g/dL    RDW 12.0 11.6 - 15.1 %    MPV 9.9 8.9 - 12.7 fL    Platelets 269 149 - 390 Thousands/uL    nRBC 0 /100 WBCs    Neutrophils Relative 47 43 - 75 %    Immat GRANS % 0 0 - 2 %    Lymphocytes Relative 36 14 - 44 %    Monocytes Relative 10 4 - 12 %    Eosinophils Relative 5 0 - 6 %    Basophils Relative 2 (H) 0 - 1 %    Neutrophils Absolute 2.43 1.85 - 7.62 Thousands/µL    Immature Grans Absolute 0.01 0.00 - 0.20 Thousand/uL    Lymphocytes Absolute 1.79 0.60 - 4.47 Thousands/µL    Monocytes Absolute 0.48 0.17 - 1.22 Thousand/µL    Eosinophils Absolute 0.25 0.00 - 0.61 Thousand/µL    Basophils Absolute 0.08 0.00 - 0.10 Thousands/µL   Comprehensive metabolic panel    Collection Time: 12/08/23  8:22 AM   Result Value Ref Range    Sodium 140 135 - 147 mmol/L    Potassium 4.2 3.5 - 5.3 mmol/L    Chloride 104 96 - 108 mmol/L    CO2 29 21 - 32 mmol/L    ANION GAP 7 mmol/L    BUN 14 5 - 25 mg/dL    Creatinine 0.84 0.60 - 1.30 mg/dL    Glucose, Fasting 86 65 - 99 mg/dL    Calcium 9.8 8.4 - 10.2 mg/dL    AST 23 13 - 39 U/L    ALT 16 7 - 52 U/L    Alkaline Phosphatase 64 34 - 104 U/L    Total Protein 6.9 6.4 - 8.4 g/dL    Albumin 4.2 3.5 - 5.0 g/dL    Total Bilirubin 0.65 0.20 - 1.00 mg/dL    eGFR 69 ml/min/1.73sq m   Lipid panel    Collection Time: 12/08/23  8:22 AM   Result Value Ref Range    Cholesterol 219 (H) See Comment mg/dL    Triglycerides 72 See Comment mg/dL    HDL, Direct 78 >=50 mg/dL    LDL Calculated 127 (H) 0 - 100  mg/dL    Non-HDL-Chol (CHOL-HDL) 141 mg/dl   TSH, 3rd generation with Free T4 reflex    Collection Time: 12/08/23  8:22 AM   Result Value Ref Range    TSH 3RD GENERATON 2.725 0.450 - 4.500 uIU/mL       The following portions of the patient's history were reviewed and updated as appropriate: allergies, current medications, past family history, past medical history, past social history, past surgical history and problem list.     Review of Systems   Constitutional:  Negative for chills and fever.   HENT:  Negative for ear pain and sore throat.    Eyes:  Negative for pain and visual disturbance.   Respiratory:  Negative for cough and shortness of breath.    Cardiovascular:  Negative for chest pain and palpitations.   Gastrointestinal:  Negative for abdominal pain and vomiting.   Genitourinary:  Negative for dysuria and hematuria.   Musculoskeletal:  Negative for arthralgias and back pain.   Skin:  Negative for color change and rash.   Neurological:  Negative for seizures and syncope.   All other systems reviewed and are negative.        Objective:      Vitals:    12/28/23 1408   BP: 100/66   Pulse: 75   Temp: 98 °F (36.7 °C)   SpO2: 96%          Physical Exam  Constitutional:       Appearance: She is well-developed.   HENT:      Head: Normocephalic and atraumatic.      Nose: Nose normal.   Eyes:      General: No scleral icterus.     Conjunctiva/sclera: Conjunctivae normal.      Pupils: Pupils are equal, round, and reactive to light.   Neck:      Thyroid: No thyromegaly.      Vascular: No JVD.      Trachea: No tracheal deviation.   Cardiovascular:      Rate and Rhythm: Normal rate and regular rhythm.      Heart sounds: No murmur heard.     No friction rub. No gallop.   Pulmonary:      Effort: Pulmonary effort is normal. No respiratory distress.      Breath sounds: Normal breath sounds. No wheezing or rales.   Musculoskeletal:         General: No deformity.      Cervical back: Normal range of motion and neck supple.    Lymphadenopathy:      Cervical: No cervical adenopathy.   Skin:     General: Skin is warm and dry.      Coloration: Skin is not pale.      Findings: No erythema or rash.      Comments: Several sk's on trunk   Neurological:      Mental Status: She is alert and oriented to person, place, and time.      Cranial Nerves: No cranial nerve deficit.   Psychiatric:         Behavior: Behavior normal.         Thought Content: Thought content normal.         Judgment: Judgment normal.

## 2023-12-28 NOTE — PATIENT INSTRUCTIONS
Lab data reviewed in detail and compared to prior    CKD-GFR up to 69.  Continue to hydrate well    History of peptic ulcer disease-no recurrence.  Hemoglobin stable    Hyperlipidemia-continue with dietary modification and excellent exercise regimen    Skin lesions consistent with seborrheic keratosis, no further workup needed    Routine follow-up after labs in 6 months, sooner as needed    Health maintenance is up-to-date with the exception of colon cancer screening, referral sent and bone density

## 2024-01-12 ENCOUNTER — TELEPHONE (OUTPATIENT)
Age: 74
End: 2024-01-12

## 2024-01-12 NOTE — TELEPHONE ENCOUNTER
Pt called in to serge appt/consult for COLON/EGD wanted to speak with provider was setting it up online thru portal

## 2024-01-24 ENCOUNTER — HOSPITAL ENCOUNTER (OUTPATIENT)
Age: 74
Discharge: HOME | End: 2024-01-24
Payer: MEDICARE

## 2024-01-24 VITALS — BODY MASS INDEX: 22.86 KG/M2 | HEIGHT: 61 IN

## 2024-01-24 DIAGNOSIS — Z78.0 MENOPAUSE: ICD-10-CM

## 2024-01-24 PROCEDURE — 77080 DXA BONE DENSITY AXIAL: CPT

## 2024-01-29 ENCOUNTER — OFFICE VISIT (OUTPATIENT)
Dept: GASTROENTEROLOGY | Facility: CLINIC | Age: 74
End: 2024-01-29
Payer: MEDICARE

## 2024-01-29 VITALS
BODY MASS INDEX: 22.12 KG/M2 | DIASTOLIC BLOOD PRESSURE: 76 MMHG | OXYGEN SATURATION: 98 % | SYSTOLIC BLOOD PRESSURE: 118 MMHG | HEIGHT: 62 IN | HEART RATE: 71 BPM | WEIGHT: 120.2 LBS

## 2024-01-29 DIAGNOSIS — Z12.11 SCREENING FOR COLON CANCER: ICD-10-CM

## 2024-01-29 PROCEDURE — 99203 OFFICE O/P NEW LOW 30 MIN: CPT | Performed by: INTERNAL MEDICINE

## 2024-01-29 PROCEDURE — 99213 OFFICE O/P EST LOW 20 MIN: CPT | Performed by: INTERNAL MEDICINE

## 2024-01-29 NOTE — PATIENT INSTRUCTIONS
Scheduled date of colonoscopy (as of today):3/7/24  Physician performing colonoscopy:Jonathon  Location of colonoscopy:[place]  Bowel prep reviewed with patient:Deon/Miralax  Instructions reviewed with patient by:Aren chavez  Clearances:  none

## 2024-01-29 NOTE — PROGRESS NOTES
[facility] - Outpatient Consultation  Rosario Morales 73 y.o. female MRN: [id number]  Encounter: [id number]          ASSESSMENT AND PLAN:      1. Screening for colon cancer  - Ambulatory referral to Gastroenterology  - Colonoscopy; Future    ______________________________________________________________________    HPI: Rosario is a 73-year-old female who comes today to schedule her second colonoscopy no previous colonoscopy 15 years ago or perhaps even longer.  The colonoscopy was unremarkable.  She had an esophagogastroduodenoscopy which I performed in 2000 she had been treated with metformin bowel movements on a daily basis.  She denies a family history of colon cancer or colon polyp.  She states that she eats a lot of vegetables on a daily basis.  She denies heartburn, diarrhea, constipation,, melena, belching, bloating, gaseousness.  She denies any problems with her previous colonoscopy related to the anesthesia, the bowel prep, or the actual procedure.      REVIEW OF SYSTEMS:    CONSTITUTIONAL: Denies any fever, chills, rigors, and weight loss.  HEENT: No earache or tinnitus. Denies hearing loss or visual disturbances.  CARDIOVASCULAR: No chest pain or palpitations.   RESPIRATORY: Denies any cough, hemoptysis, shortness of breath or dyspnea on exertion.  GASTROINTESTINAL: As noted in the History of Present Illness.   GENITOURINARY: No problems with urination. Denies any hematuria or dysuria.  NEUROLOGIC: No dizziness or vertigo, denies headaches.   MUSCULOSKELETAL: Denies any muscle or joint pain.   SKIN: Denies skin rashes or itching.   ENDOCRINE: Denies excessive thirst. Denies intolerance to heat or cold.  PSYCHOSOCIAL: Denies depression or anxiety. Denies any recent memory loss.       Historical Information   Past Medical History:   Diagnosis Date    Benign neoplasm of skin      Past Surgical History:   Procedure Laterality Date    BREAST EXCISIONAL BIOPSY Left     1992    TUBAL  "LIGATION      LAST ASSESSED: [id number]     Social History   Social History     Substance and Sexual Activity   Alcohol Use Yes    Alcohol/week: 2.0 standard drinks of alcohol    Types: 2 Glasses of wine per week     Social History     Substance and Sexual Activity   Drug Use No     Social History     Tobacco Use   Smoking Status Never   Smokeless Tobacco Never     Family History   Problem Relation Age of Onset    Migraines Mother     Osteoporosis Mother     Throat cancer Father     Depression Daughter         WITH ANXIETY     Migraines Daughter     Rheum arthritis Daughter     Urinary tract infection Daughter     Depression Son         WITH ANXIETY     No Known Problems Daughter     Breast cancer Neg Hx     Colon cancer Neg Hx     Uterine cancer Neg Hx     Cervical cancer Neg Hx     Ovarian cancer Neg Hx        Meds/Allergies       Current Outpatient Medications:     cyanocobalamin (VITAMIN B-12) 100 mcg tablet    Restasis 0.05 % ophthalmic emulsion    Allergies   Allergen Reactions    Other Other (See Comments) and Sneezing     CATS-itchy eyes, heaviness in chest           Objective     Blood pressure 118/76, pulse 71, height 5' 2\" (1.575 m), weight 54.5 kg (120 lb 3.2 oz), SpO2 98%, not currently breastfeeding. Body mass index is 21.98 kg/m².        PHYSICAL EXAM:      General Appearance:   Alert, cooperative, no distress   HEENT:   Normocephalic, atraumatic, anicteric.     Neck:  Supple, symmetrical, trachea midline   Lungs:   Clear to auscultation bilaterally; no rales, rhonchi or wheezing; respirations unlabored    Heart::   Regular rate and rhythm; no murmur, rub, or gallop.   Abdomen:   Soft, non-tender, non-distended; normal bowel sounds; no masses, no organomegaly    Genitalia:   Deferred    Rectal:   Deferred    Extremities:  No cyanosis, clubbing or edema    Pulses:  2+ and symmetric    Skin:  No jaundice, rashes, or lesions    Lymph nodes:  No palpable cervical lymphadenopathy        Lab Results:   No " visits with results within 1 Day(s) from this visit.   Latest known visit with results is:   Appointment on 12/08/2023   Component Date Value    WBC 12/08/2023 5.04     RBC 12/08/2023 4.45     Hemoglobin 12/08/2023 14.1     Hematocrit 12/08/2023 41.7     MCV 12/08/2023 94     MCH 12/08/2023 31.7     MCHC 12/08/2023 33.8     RDW 12/08/2023 12.0     MPV 12/08/2023 9.9     Platelets 12/08/2023 269     nRBC 12/08/2023 0     Neutrophils Relative 12/08/2023 47     Immat GRANS % 12/08/2023 0     Lymphocytes Relative 12/08/2023 36     Monocytes Relative 12/08/2023 10     Eosinophils Relative 12/08/2023 5     Basophils Relative 12/08/2023 2 (H)     Neutrophils Absolute 12/08/2023 2.43     Immature Grans Absolute 12/08/2023 0.01     Lymphocytes Absolute 12/08/2023 1.79     Monocytes Absolute 12/08/2023 0.48     Eosinophils Absolute 12/08/2023 0.25     Basophils Absolute 12/08/2023 0.08     Sodium 12/08/2023 140     Potassium 12/08/2023 4.2     Chloride 12/08/2023 104     CO2 12/08/2023 29     ANION GAP 12/08/2023 7     BUN 12/08/2023 14     Creatinine 12/08/2023 0.84     Glucose, Fasting 12/08/2023 86     Calcium 12/08/2023 9.8     AST 12/08/2023 23     ALT 12/08/2023 16     Alkaline Phosphatase 12/08/2023 64     Total Protein 12/08/2023 6.9     Albumin 12/08/2023 4.2     Total Bilirubin 12/08/2023 0.65     eGFR 12/08/2023 69     Cholesterol 12/08/2023 219 (H)     Triglycerides 12/08/2023 72     HDL, Direct 12/08/2023 78     LDL Calculated 12/08/2023 127 (H)     Non-HDL-Chol (CHOL-HDL) 12/08/2023 141     TSH 3RD GENERATON 12/08/2023 2.725          Radiology Results:   DXA bone density spine hip and pelvis    Result Date: 1/25/2024  Narrative: DXA SCAN CLINICAL HISTORY: 73 years postmenopausal female. OTHER RISK FACTORS: Parental history of hip fracture status post minor injury. PHARMACOLOGIC THERAPY FOR OSTEOPOROSIS:  None. TECHNIQUE: Bone densitometry was performed using a [employer] Horizon W bone densitometer.  Regions of  interest appear properly placed. COMPARISON: 1/13/2021. RESULTS: LUMBAR SPINE Level: L1-L4 : BMD: 0.884 gm/cm2 T-score: -1.5 These values may be artifactually elevated due to the presence of scoliosis with spondylosis. LEFT  TOTAL HIP: BMD: 0.736 gm/cm2 T-score: -1.7 LEFT  FEMORAL NECK: BMD: 0.587 gm/cm2 T score: -2.4     Impression: 1. Low bone mass (osteopenia). 2.  Since a DXA study from 1/13/2021, there has been: A  STATISTICALLY SIGNIFICANT INCREASE in bone mineral density of 0.075 g/cm2 (9.3%) in the lumbar spine. This may be artifactual, in part, due to progression of spondylosis since the last DXA study. A  STATISTICALLY SIGNIFICANT DECREASE in bone mineral density of 0.049 g/cm2 (6.3%) in the left total hip. 3.  The 10 year risk of hip fracture is 10% with the 10 year risk of major osteoporotic fracture being 23% as calculated by the [employer] fracture risk assessment tool (FRAX, which is based on data generated by the WHO Collaborating [place] for Metabolic Bone Diseases). 4.  The current NOF guidelines recommend treating patients with a T-score of -2.5 or less in the lumbar spine or hips, or in post-menopausal women and men over the age of 50 with low bone mass (osteopenia) and a FRAX 10 year risk score of >3% for hip fracture and/or >20% for major osteoporotic fracture. 5.  The NOF recommends follow-up DXA in 1-2 years after initiating therapy for osteoporosis and every 2 years thereafter. More frequent evaluation is appropriate for patients with conditions associated with rapid bone loss, such as glucocorticoid therapy. The interval between DXA screenings may be longer for individuals without major risk factors and initial T-score in the normal or upper low bone mass range. The FRAX algorithm has certain limitations: -FRAX has not been validated in patients currently or previously treated with pharmacotherapy for osteoporosis.  In such patients, clinical judgment must be exercised in  interpreting FRAX scores. -Prior hip, vertebral and humeral fragility fractures appear to confer greater risk of subsequent fracture than fractures at other sites (this is especially true for individuals with severe vertebral fractures), but quantification of this incremental risk is not possible with FRAX. -FRAX underestimates fracture risk in patients with history of multiple fragility fractures. -FRAX may underestimate fracture risk in patients with history of frequent falls. -It is not appropriate to use FRAX to monitor treatment response. WHO CLASSIFICATION: Normal (a T-score of -1.0 or higher) Low bone mineral density (a T-score of less than -1.0 but higher than -2.5) Osteoporosis (a T-score of -2.5 or less) Severe osteoporosis (a T-score of -2.5 or less with a fragility fracture) LEAST SIGNIFICANT CHANGE (AT 95% C.I): Lumbar spine: 0.035 g/cm2; 3.4% Total hip: 0.025 g/cm2; 2.9% Forearm: 0.022 g/cm2; 3.4% Workstation performed: [id number]     Answers submitted by the patient for this visit:  Abdominal Pain Questionnaire (Submitted on 1/26/2024)  Chief Complaint: Abdominal pain  Frequency: rarely  Episode duration: 1 Hours  Progression since onset: resolved  Pain - numeric: 0/10

## 2024-01-31 RX ORDER — RISEDRONATE SODIUM 150 MG/1
150 TABLET, FILM COATED ORAL
Qty: 1 TABLET | Refills: 5 | Status: CANCELLED | OUTPATIENT
Start: 2024-01-31

## 2024-02-01 ENCOUNTER — OFFICE VISIT (OUTPATIENT)
Age: 74
End: 2024-02-01
Payer: MEDICARE

## 2024-02-01 VITALS
OXYGEN SATURATION: 97 % | HEART RATE: 66 BPM | SYSTOLIC BLOOD PRESSURE: 126 MMHG | DIASTOLIC BLOOD PRESSURE: 84 MMHG | BODY MASS INDEX: 21.71 KG/M2 | RESPIRATION RATE: 16 BRPM | HEIGHT: 62 IN | WEIGHT: 118 LBS

## 2024-02-01 DIAGNOSIS — Z78.0 ASYMPTOMATIC POSTMENOPAUSAL STATUS: ICD-10-CM

## 2024-02-01 DIAGNOSIS — M85.80 OSTEOPENIA, UNSPECIFIED LOCATION: Primary | ICD-10-CM

## 2024-02-01 PROCEDURE — 99213 OFFICE O/P EST LOW 20 MIN: CPT

## 2024-02-01 NOTE — PROGRESS NOTES
INTERNAL MEDICINE FOLLOW-UP VISIT  [facility] - [facility] INTERNAL MEDICINE [address]    NAME: Rosario Morales  AGE: 73 y.o. SEX: female  : 1950     DATE: 2024     Assessment and Plan:   1. Osteopenia, unspecified location  Recent DXA showed 10 year risk of hip fracture of 10% and 10 year risk of major osteoporotic fracture being 23%. Had a lengthy discussion regarding the benefits and risks of osteoporotic medications, which she would like to defer at this time. Will repeat DXA in 1 year. Discussed continuing the vitamin D and calcium supplements as well as exercising on a daily basis.  - DXA bone density spine hip and pelvis; Future    2. Asymptomatic postmenopausal status  Repeat DXA in 1 year.   - DXA bone density spine hip and pelvis; Future        No follow-ups on file.       Chief Complaint:     Chief Complaint   Patient presents with    Results     Dexa scan      History of Present Illness:     Exercise 5-6 days a a week, rachel, aerobic,     The following portions of the patient's history were reviewed and updated as appropriate: allergies, current medications, past family history, past medical history, past social history, past surgical history and problem list.     Review of Systems:     Review of Systems   Constitutional:  Negative for chills, fatigue and fever.   HENT:  Negative for ear discharge, ear pain, postnasal drip, rhinorrhea, sinus pressure, sinus pain, sore throat, tinnitus and trouble swallowing.    Eyes:  Negative for pain, discharge and itching.   Respiratory:  Negative for cough, shortness of breath and wheezing.    Cardiovascular:  Negative for chest pain, palpitations and leg swelling.   Gastrointestinal:  Negative for abdominal pain, constipation, diarrhea, nausea and vomiting.   Endocrine: Negative for polydipsia, polyphagia and polyuria.   Genitourinary:  Negative for difficulty urinating, frequency, hematuria and urgency.   Musculoskeletal:  Positive for  "arthralgias (Hip and low back). Negative for joint swelling and myalgias.   Skin:  Negative for color change.   Allergic/Immunologic: Negative for environmental allergies.   Neurological:  Negative for dizziness, weakness, light-headedness, numbness and headaches.   Hematological:  Negative for adenopathy.   Psychiatric/Behavioral:  Negative for decreased concentration and sleep disturbance. The patient is not nervous/anxious.         Past Medical History:     Past Medical History:   Diagnosis Date    Benign neoplasm of skin         Current Medications:     Current Outpatient Medications:     cyanocobalamin (VITAMIN B-12) 100 mcg tablet, Take by mouth daily, Disp: , Rfl:     Restasis 0.05 % ophthalmic emulsion, Administer 1 drop to both eyes every 12 (twelve) hours , Disp: , Rfl:      Allergies:     Allergies   Allergen Reactions    Other Other (See Comments) and Sneezing     CATS-itchy eyes, heaviness in chest        Physical Exam:     /84 (BP Location: Left arm, Patient Position: Sitting, Cuff Size: Standard)   Pulse 66   Resp 16   Ht 5' 2\" (1.575 m)   Wt 53.5 kg (118 lb)   SpO2 97%   BMI 21.58 kg/m²     Physical Exam  Vitals and nursing note reviewed.   Constitutional:       General: She is awake. She is not in acute distress.     Appearance: Normal appearance. She is well-developed, well-groomed and normal weight.   HENT:      Head: Normocephalic and atraumatic.      Right Ear: Hearing and external ear normal.      Left Ear: Hearing and external ear normal.      Nose: Nose normal.      Mouth/Throat:      Lips: Pink.      Mouth: Mucous membranes are moist.   Eyes:      General: Lids are normal. Vision grossly intact. Gaze aligned appropriately.      Conjunctiva/sclera: Conjunctivae normal.   Neck:      Vascular: No carotid bruit.      Trachea: Trachea and phonation normal.   Cardiovascular:      Rate and Rhythm: Normal rate and regular rhythm.      Heart sounds: Normal heart sounds, S1 normal and S2 " normal. No murmur heard.     No friction rub. No gallop.   Pulmonary:      Effort: Pulmonary effort is normal. No respiratory distress.      Breath sounds: Normal breath sounds and air entry. No decreased breath sounds, wheezing, rhonchi or rales.   Abdominal:      General: Abdomen is flat.   Musculoskeletal:         General: No swelling.      Cervical back: Neck supple.      Right lower leg: No edema.      Left lower leg: No edema.   Skin:     General: Skin is warm.      Capillary Refill: Capillary refill takes less than 2 seconds.   Neurological:      Mental Status: She is alert.   Psychiatric:         Attention and Perception: Attention and perception normal.         Mood and Affect: Mood and affect normal.         Speech: Speech normal.         Behavior: Behavior normal. Behavior is cooperative.         Thought Content: Thought content normal.         Cognition and Memory: Cognition and memory normal.         Judgment: Judgment normal.           Data:     Laboratory Results: I have personally reviewed the pertinent laboratory results/reports   Radiology/Other Diagnostic Testing Results: I have personally reviewed pertinent reports.      Meagan Mason PA-C  [facility]NAL MEDICINE [address]

## 2024-02-02 ENCOUNTER — CONSULT (OUTPATIENT)
Dept: NEUROLOGY | Facility: CLINIC | Age: 74
End: 2024-02-02
Payer: MEDICARE

## 2024-02-02 VITALS
HEART RATE: 68 BPM | DIASTOLIC BLOOD PRESSURE: 80 MMHG | SYSTOLIC BLOOD PRESSURE: 118 MMHG | BODY MASS INDEX: 22.05 KG/M2 | WEIGHT: 119.8 LBS | HEIGHT: 62 IN

## 2024-02-02 DIAGNOSIS — R47.89 WORD FINDING DIFFICULTY: Primary | ICD-10-CM

## 2024-02-02 DIAGNOSIS — S09.90XD CLOSED HEAD INJURY, SUBSEQUENT ENCOUNTER: ICD-10-CM

## 2024-02-02 PROCEDURE — 99204 OFFICE O/P NEW MOD 45 MIN: CPT | Performed by: STUDENT IN AN ORGANIZED HEALTH CARE EDUCATION/TRAINING PROGRAM

## 2024-02-02 NOTE — PROGRESS NOTES
[facility] Neurology Consult  PATIENT:  Rosario Morales  MRN:  [id number]  :  1950  DATE OF SERVICE:  2024  REFERRED BY: Mike Rosario MD  PMD: Mike Rosario MD    Assessment/Plan:     Rosario Morales is a very pleasant 73 y.o. female with a past medical history that includes duodenal ulcer, HLD who presents for evaluation of word-finding difficulties.     Word-finding difficulty  -neuro exam today negative for any neurologic deficits   -MRI brain performed 23 showed no evidence of any intracranial abnormalities   -suspect that these symptoms may be 2/2 anxiety as she does report that they are generally stress-induced. No clear evidence of a central neurologic cause of her symptoms at this time  -recommend f/u with neurology if she experiences any new/worsening symptoms    CC:   Word-finding difficulties    History of Present Illness:     73-year-old female with history significant for duodenal ulcer hemorrhage and osteopenia presenting to the office with 2-year history of episodes of word finding difficulty. These episodes are very infrequent where her last episode occurred in 2023. She has had a total of 3 episodes starting back in 2021 when she was under a lot of stress and her mind went blank. Patient noted that she was trying to speak but could not “make up any words” and slowly started coming back. The episodes occur either under a lot of emotional stress or in close proximity to a head strike from a fall. However, the workup of the head strikes in the past were negative for any stroke or hemorrhage.      Past Medical History:     Past Medical History:   Diagnosis Date    Benign neoplasm of skin        Patient Active Problem List   Diagnosis    Midline cystocele    Osteopenia    Rectocele    Uterine prolapse    Postmenopausal    Encounter for gynecological examination without abnormal finding    Other constipation    Other osteoporosis without current pathological fracture     Asymptomatic postmenopausal status    Other idiopathic scoliosis, thoracolumbar region    Memory loss    Gastrointestinal hemorrhage associated with duodenal ulcer    Vasovagal syncope    Pharyngoesophageal dysphagia    Duodenal ulcer    Esophageal ring    Dyslipidemia    Word finding difficulty       Medications:      Current Outpatient Medications   Medication Sig Dispense Refill    cyanocobalamin (VITAMIN B-12) 100 mcg tablet Take by mouth daily      Restasis 0.05 % ophthalmic emulsion Administer 1 drop to both eyes every 12 (twelve) hours        No current facility-administered medications for this visit.        Allergies:      Allergies   Allergen Reactions    Other Other (See Comments) and Sneezing     CATS-itchy eyes, heaviness in chest       Family History:     Family History   Problem Relation Age of Onset    Migraines Mother     Osteoporosis Mother     Throat cancer Father     Depression Daughter         WITH ANXIETY     Migraines Daughter     Rheum arthritis Daughter     Urinary tract infection Daughter     Depression Son         WITH ANXIETY     No Known Problems Daughter     Breast cancer Neg Hx     Colon cancer Neg Hx     Uterine cancer Neg Hx     Cervical cancer Neg Hx     Ovarian cancer Neg Hx        Social History:       Social History     Socioeconomic History    Marital status: /Civil Union     Spouse name: Not on file    Number of children: Not on file    Years of education: Not on file    Highest education level: Not on file   Occupational History    Not on file   Tobacco Use    Smoking status: Never    Smokeless tobacco: Never   Vaping Use    Vaping status: Never Used   Substance and Sexual Activity    Alcohol use: Yes     Alcohol/week: 2.0 standard drinks of alcohol     Types: 2 Glasses of wine per week    Drug use: No    Sexual activity: Yes     Partners: Male     Birth control/protection: Surgical   Other Topics Concern    Not on file   Social History Narrative    Not on file  "    Social Determinants of Health     Financial Resource Strain: Low Risk  (6/27/2023)    Overall Financial Resource Strain (CARDIA)     Difficulty of Paying Living Expenses: Not very hard   Food Insecurity: No Food Insecurity (11/29/2021)    Hunger Vital Sign     Worried About Running Out of Food in the Last Year: Never true     Ran Out of Food in the Last Year: Never true   Transportation Needs: No Transportation Needs (6/27/2023)    PRAPARE - Transportation     Lack of Transportation (Medical): No     Lack of Transportation (Non-Medical): No   Physical Activity: Sufficiently Active (4/5/2021)    Exercise Vital Sign     Days of Exercise per Week: 7 days     Minutes of Exercise per Session: 30 min   Stress: Stress Concern Present (4/5/2021)    [employer] [place] [employer] - Occupational Stress Questionnaire     Feeling of Stress : To some extent   Social Connections: Not on file   Intimate Partner Violence: Not on file   Housing Stability: Unknown (11/29/2021)    Housing Stability Vital Sign     Unable to Pay for Housing in the Last Year: Not on file     Number of Places Lived in the Last Year: 1     Unstable Housing in the Last Year: No         Objective:   /80 (BP Location: Left arm, Patient Position: Sitting, Cuff Size: Large)   Pulse 68   Ht 5' 2\" (1.575 m)   Wt 54.3 kg (119 lb 12.8 oz)   BMI 21.91 kg/m²     General: Patient is not in any acute/apparent distress, well nourished, well developed and cooperative.   HEENT: normocephalic, atraumatic, moist membranes  Neck: supple  Extremities: no edema noted   Skin: no lesions or rash  Musculosketal: no bony abnormalities    Neurologic Examination:   Mental status: alert, awake, oriented X 3 and following commands.     Speech/Language: Speech is fluent without any dysarthria, no aphasia noted, can name, repeat, comprehension intact    Cranial Nerves:   CN I: smell not tested  CN II: Visual fields full to confrontation  CN III, IV, VI: " Extraocular movements intact bilaterally. Pupils equal round and reactive to light bilaterally.  CN V: Facial sensation is normal.  CN VII: Full and symmetric facial movement.  CN VIII: Hearing is normal.  CN IX, X: Palate elevates symmetrically.  CN XI: Shoulder shrug strength is normal.  CN XII: Tongue midline without atrophy or fasciculations.    Motor:   Strength 5/5 in all 4 extremities. Bulk/tone - normal.  Fasiculations - none    Sensory:   Sensation intact to soft touch in all 4 extremities.    Cerebellar:   Finger-to-nose intact, normal heel to shin.    Reflexes: 2+ in all 4 extremities  Pathologic reflexes - babinski reflex negative    Gait:   Normal gait, Romberg sign negative     Imaging:   MRI brain 8/1/23  FINDINGS:     BRAIN PARENCHYMA:  There is no discrete mass, mass effect or midline shift. There is no intracranial hemorrhage.  There is no evidence of acute infarction and diffusion imaging is unremarkable. Mild chronic microvascular ischemic change with more focal   gliosis along the left corona radiata.     VENTRICLES:  Normal for the patient's age.     SELLA AND PITUITARY GLAND:  Normal.     ORBITS:  Normal.     PARANASAL SINUSES:  Normal.     VASCULATURE:  Evaluation of the major intracranial vasculature demonstrates appropriate flow voids.     CALVARIUM AND SKULL BASE:  Normal.     EXTRACRANIAL SOFT TISSUES:  Normal.     IMPRESSION:     No mass effect, acute intracranial hemorrhage or evidence of recent infarction.     Mild chronic microvascular ischemic change.     Review of Systems:     ROS:    Review of Systems   Constitutional:  Negative for appetite change, fatigue and fever.   HENT: Negative.  Negative for hearing loss, tinnitus, trouble swallowing and voice change.    Eyes: Negative.  Negative for photophobia, pain and visual disturbance.   Respiratory: Negative.  Negative for shortness of breath.    Cardiovascular: Negative.  Negative for palpitations.   Gastrointestinal: Negative.   Negative for nausea and vomiting.   Endocrine: Negative.  Negative for cold intolerance.   Genitourinary: Negative.  Negative for dysuria, frequency and urgency.   Musculoskeletal:  Positive for back pain (Lower). Negative for gait problem, myalgias, neck pain and neck stiffness.   Skin: Negative.  Negative for rash.   Allergic/Immunologic: Negative.    Neurological: Negative.  Negative for dizziness, tremors, seizures, syncope, facial asymmetry, speech difficulty, weakness, light-headedness, numbness and headaches.        Tingling in her legs sometimes   Hematological: Negative.  Does not bruise/bleed easily.   Psychiatric/Behavioral: Negative.  Negative for confusion, hallucinations and sleep disturbance.      I have spent a total time of 52 minutes on 02/02/24 in caring for this patient including Instructions for management, Patient and family education, Risk factor reductions, Impressions, Documenting in the medical record, Reviewing / ordering tests, medicine, procedures  , and Obtaining or reviewing history  .

## 2024-02-21 PROBLEM — Z01.419 ENCOUNTER FOR GYNECOLOGICAL EXAMINATION WITHOUT ABNORMAL FINDING: Status: RESOLVED | Noted: 2018-04-09 | Resolved: 2024-02-21

## 2024-03-07 ENCOUNTER — ANESTHESIA EVENT (OUTPATIENT)
Dept: GASTROENTEROLOGY | Facility: HOSPITAL | Age: 74
End: 2024-03-07

## 2024-03-07 ENCOUNTER — HOSPITAL ENCOUNTER (OUTPATIENT)
Dept: GASTROENTEROLOGY | Facility: HOSPITAL | Age: 74
Setting detail: OUTPATIENT SURGERY
End: 2024-03-07
Attending: INTERNAL MEDICINE
Payer: MEDICARE

## 2024-03-07 ENCOUNTER — ANESTHESIA (OUTPATIENT)
Dept: GASTROENTEROLOGY | Facility: HOSPITAL | Age: 74
End: 2024-03-07

## 2024-03-07 VITALS
HEART RATE: 57 BPM | HEIGHT: 61 IN | WEIGHT: 114.42 LBS | DIASTOLIC BLOOD PRESSURE: 69 MMHG | OXYGEN SATURATION: 96 % | TEMPERATURE: 97.3 F | SYSTOLIC BLOOD PRESSURE: 142 MMHG | RESPIRATION RATE: 18 BRPM | BODY MASS INDEX: 21.6 KG/M2

## 2024-03-07 DIAGNOSIS — Z12.11 SCREENING FOR COLON CANCER: ICD-10-CM

## 2024-03-07 PROBLEM — K26.4 GASTROINTESTINAL HEMORRHAGE ASSOCIATED WITH DUODENAL ULCER: Status: RESOLVED | Noted: 2021-11-29 | Resolved: 2024-03-07

## 2024-03-07 PROBLEM — R13.14 PHARYNGOESOPHAGEAL DYSPHAGIA: Status: RESOLVED | Noted: 2021-11-29 | Resolved: 2024-03-07

## 2024-03-07 PROCEDURE — G0121 COLON CA SCRN NOT HI RSK IND: HCPCS | Performed by: INTERNAL MEDICINE

## 2024-03-07 RX ORDER — PROPOFOL 10 MG/ML
INJECTION, EMULSION INTRAVENOUS AS NEEDED
Status: DISCONTINUED | OUTPATIENT
Start: 2024-03-07 | End: 2024-03-07

## 2024-03-07 RX ORDER — LIDOCAINE HYDROCHLORIDE 10 MG/ML
INJECTION, SOLUTION EPIDURAL; INFILTRATION; INTRACAUDAL; PERINEURAL AS NEEDED
Status: DISCONTINUED | OUTPATIENT
Start: 2024-03-07 | End: 2024-03-07

## 2024-03-07 RX ORDER — SODIUM CHLORIDE, SODIUM LACTATE, POTASSIUM CHLORIDE, CALCIUM CHLORIDE 600; 310; 30; 20 MG/100ML; MG/100ML; MG/100ML; MG/100ML
INJECTION, SOLUTION INTRAVENOUS CONTINUOUS PRN
Status: DISCONTINUED | OUTPATIENT
Start: 2024-03-07 | End: 2024-03-07

## 2024-03-07 RX ADMIN — PROPOFOL 40 MG: 10 INJECTION, EMULSION INTRAVENOUS at 08:54

## 2024-03-07 RX ADMIN — PROPOFOL 20 MG: 10 INJECTION, EMULSION INTRAVENOUS at 08:49

## 2024-03-07 RX ADMIN — LIDOCAINE HYDROCHLORIDE 50 MG: 10 INJECTION, SOLUTION EPIDURAL; INFILTRATION; INTRACAUDAL; PERINEURAL at 08:45

## 2024-03-07 RX ADMIN — PROPOFOL 25 MG: 10 INJECTION, EMULSION INTRAVENOUS at 08:57

## 2024-03-07 RX ADMIN — PROPOFOL 25 MG: 10 INJECTION, EMULSION INTRAVENOUS at 08:59

## 2024-03-07 RX ADMIN — SODIUM CHLORIDE, SODIUM LACTATE, POTASSIUM CHLORIDE, AND CALCIUM CHLORIDE: .6; .31; .03; .02 INJECTION, SOLUTION INTRAVENOUS at 08:15

## 2024-03-07 RX ADMIN — PROPOFOL 50 MG: 10 INJECTION, EMULSION INTRAVENOUS at 08:51

## 2024-03-07 RX ADMIN — PROPOFOL 10 MG: 10 INJECTION, EMULSION INTRAVENOUS at 09:05

## 2024-03-07 RX ADMIN — PROPOFOL 25 MG: 10 INJECTION, EMULSION INTRAVENOUS at 09:02

## 2024-03-07 RX ADMIN — PROPOFOL 80 MG: 10 INJECTION, EMULSION INTRAVENOUS at 08:46

## 2024-03-07 NOTE — ANESTHESIA POSTPROCEDURE EVALUATION
Post-Op Assessment Note    CV Status:  Stable  Pain Score: 0    Pain management: adequate       Mental Status:  Alert and awake   Hydration Status:  Stable   PONV Controlled:  None   Airway Patency:  Patent and adequate     Post Op Vitals Reviewed: Yes    No anethesia notable event occurred.    Staff: Anesthesiologist, CRNA               /51 (03/07/24 0910)    Temp (!) 97.3 °F (36.3 °C) (03/07/24 0910)    Pulse 74 (03/07/24 0910)   Resp 17 (03/07/24 0910)    SpO2   99%

## 2024-03-07 NOTE — H&P
"History and Physical -  [facility]  Rosario Morales 73 y.o. female MRN: [id number]      HPI: Rosario Morales is a 73 y.o. year old female who presents for screening colonoscopy      REVIEW OF SYSTEMS: Per the HPI, and otherwise unremarkable.    Historical Information   Past Medical History:   Diagnosis Date    Benign neoplasm of skin      Past Surgical History:   Procedure Laterality Date    BREAST EXCISIONAL BIOPSY Left     1992    COLONOSCOPY      EGD      2021    TUBAL LIGATION      LAST ASSESSED: [id number]     Social History   Social History     Substance and Sexual Activity   Alcohol Use Yes    Alcohol/week: 7.0 standard drinks of alcohol    Types: 7 Glasses of wine per week     Social History     Substance and Sexual Activity   Drug Use No     Social History     Tobacco Use   Smoking Status Never   Smokeless Tobacco Never     Family History   Problem Relation Age of Onset    Migraines Mother     Osteoporosis Mother     Throat cancer Father     Depression Daughter         WITH ANXIETY     Migraines Daughter     Rheum arthritis Daughter     Urinary tract infection Daughter     Depression Son         WITH ANXIETY     No Known Problems Daughter     Breast cancer Neg Hx     Colon cancer Neg Hx     Uterine cancer Neg Hx     Cervical cancer Neg Hx     Ovarian cancer Neg Hx        Meds/Allergies     (Not in a hospital admission)      Allergies   Allergen Reactions    Other Other (See Comments) and Sneezing     CATS-itchy eyes, heaviness in chest       Objective     Blood pressure 123/58, pulse 63, temperature (!) 97.4 °F (36.3 °C), temperature source Temporal, resp. rate 18, height 5' 1\" (1.549 m), weight 51.9 kg (114 lb 6.7 oz), SpO2 99%, not currently breastfeeding.      PHYSICAL EXAM    Gen: NAD  CV: RRR  CHEST: Clear  ABD: soft, NT/ND  EXT: no edema      ASSESSMENT/PLAN:  This is a 73 y.o. year old female here for noscapine, and she is stable and optimized for her procedure.          "

## 2024-03-07 NOTE — ANESTHESIA PREPROCEDURE EVALUATION
Procedure:  COLONOSCOPY    Relevant Problems   ANESTHESIA (within normal limits)      CARDIO (within normal limits)      ENDO (within normal limits)      GI/HEPATIC   (+) Duodenal ulcer   (+) Gastrointestinal hemorrhage associated with duodenal ulcer (Resolved)   (+) Pharyngoesophageal dysphagia (Resolved)      /RENAL (within normal limits)      GYN (within normal limits)      HEMATOLOGY (within normal limits)      MUSCULOSKELETAL   (+) Other idiopathic scoliosis, thoracolumbar region      NEURO/PSYCH   (+) Word finding difficulty (Resolved)      PULMONARY (within normal limits)        Physical Exam    Airway    Mallampati score: II  TM Distance: >3 FB  Neck ROM: full     Dental       Cardiovascular  Rhythm: regular, Rate: normal    Pulmonary  Pulmonary exam normal     Other Findings  post-pubertal.      Anesthesia Plan  ASA Score- 1     Anesthesia Type- IV sedation with anesthesia with ASA Monitors.         Additional Monitors:     Airway Plan:            Plan Factors-Exercise tolerance (METS): >4 METS.    Chart reviewed. EKG reviewed. Imaging results reviewed. Existing labs reviewed. Patient summary reviewed.    Patient is not a current smoker.      There is medical exclusion for perioperative obstructive sleep apnea risk education.        Induction- intravenous.    Postoperative Plan-     Informed Consent- Anesthetic plan and risks discussed with patient.  I personally reviewed this patient with the CRNA. Discussed and agreed on the Anesthesia Plan with the CRNA..

## 2024-05-07 ENCOUNTER — HOSPITAL ENCOUNTER (OUTPATIENT)
Dept: MAMMOGRAPHY | Facility: CLINIC | Age: 74
Discharge: HOME | End: 2024-05-07
Payer: MEDICARE

## 2024-05-07 VITALS — HEIGHT: 61 IN | WEIGHT: 119 LBS | BODY MASS INDEX: 22.47 KG/M2

## 2024-05-07 DIAGNOSIS — Z12.31 ENCOUNTER FOR SCREENING MAMMOGRAM FOR BREAST CANCER: ICD-10-CM

## 2024-05-07 PROCEDURE — 77063 BREAST TOMOSYNTHESIS BI: CPT

## 2024-05-07 PROCEDURE — 77067 SCR MAMMO BI INCL CAD: CPT

## 2024-06-11 ENCOUNTER — HOSPITAL ENCOUNTER (EMERGENCY)
Facility: HOSPITAL | Age: 74
Discharge: HOME | End: 2024-06-11
Attending: EMERGENCY MEDICINE | Admitting: EMERGENCY MEDICINE
Payer: MEDICARE

## 2024-06-11 ENCOUNTER — APPOINTMENT (EMERGENCY)
Dept: RADIOLOGY | Facility: HOSPITAL | Age: 74
End: 2024-06-11
Payer: MEDICARE

## 2024-06-11 VITALS
HEART RATE: 59 BPM | SYSTOLIC BLOOD PRESSURE: 123 MMHG | DIASTOLIC BLOOD PRESSURE: 58 MMHG | RESPIRATION RATE: 20 BRPM | OXYGEN SATURATION: 97 % | TEMPERATURE: 97.5 F

## 2024-06-11 DIAGNOSIS — S43.004A CLOSED DISLOCATION OF RIGHT SHOULDER: Primary | ICD-10-CM

## 2024-06-11 PROCEDURE — 99285 EMERGENCY DEPT VISIT HI MDM: CPT | Performed by: EMERGENCY MEDICINE

## 2024-06-11 PROCEDURE — 23650 CLTX SHO DSLC W/MNPJ WO ANES: CPT | Performed by: PHYSICIAN ASSISTANT

## 2024-06-11 PROCEDURE — 96372 THER/PROPH/DIAG INJ SC/IM: CPT

## 2024-06-11 PROCEDURE — 73060 X-RAY EXAM OF HUMERUS: CPT

## 2024-06-11 PROCEDURE — 99284 EMERGENCY DEPT VISIT MOD MDM: CPT

## 2024-06-11 PROCEDURE — 73020 X-RAY EXAM OF SHOULDER: CPT

## 2024-06-11 PROCEDURE — 73070 X-RAY EXAM OF ELBOW: CPT

## 2024-06-11 RX ORDER — FENTANYL CITRATE 50 UG/ML
50 INJECTION, SOLUTION INTRAMUSCULAR; INTRAVENOUS ONCE
Status: DISCONTINUED | OUTPATIENT
Start: 2024-06-11 | End: 2024-06-11 | Stop reason: HOSPADM

## 2024-06-11 RX ORDER — MORPHINE SULFATE 4 MG/ML
4 INJECTION, SOLUTION INTRAMUSCULAR; INTRAVENOUS ONCE
Status: COMPLETED | OUTPATIENT
Start: 2024-06-11 | End: 2024-06-11

## 2024-06-11 RX ORDER — LORAZEPAM 2 MG/ML
2 INJECTION INTRAMUSCULAR ONCE
Status: COMPLETED | OUTPATIENT
Start: 2024-06-11 | End: 2024-06-11

## 2024-06-11 RX ORDER — PROPOFOL 10 MG/ML
100 INJECTION, EMULSION INTRAVENOUS ONCE
Status: COMPLETED | OUTPATIENT
Start: 2024-06-11 | End: 2024-06-11

## 2024-06-11 RX ADMIN — MORPHINE SULFATE 4 MG: 4 INJECTION INTRAVENOUS at 15:11

## 2024-06-11 RX ADMIN — LORAZEPAM 2 MG: 2 INJECTION INTRAMUSCULAR; INTRAVENOUS at 16:46

## 2024-06-11 RX ADMIN — PROPOFOL 50 MG: 10 INJECTION, EMULSION INTRAVENOUS at 17:37

## 2024-06-11 NOTE — ED ATTENDING ATTESTATION
6/11/2024  I, Rc Dyer MD, saw and evaluated the patient. I have discussed the patient with the resident/non-physician practitioner and agree with the resident's/non-physician practitioner's findings, Plan of Care, and MDM as documented in the resident's/non-physician practitioner's note, except where noted. All available labs and Radiology studies were reviewed.  I was present for key portions of any procedure(s) performed by the resident/non-physician practitioner and I was immediately available to provide assistance.       At this point I agree with the current assessment done in the Emergency Department.  I have conducted an independent evaluation of this patient a history and physical is as follows:    73-year-old female, presents with right arm injury after fall after tripping over a curb.  On exam, patient holding her right arm over her head, unable to lower.  Tenderness diffusely in right shoulder.  Normal radial pulse and sensation in right fingers.    ED Course     Patient with dislocation noted on x-ray, conscious sedation performed by myself, reduction performed by PA under my supervision with my assistance with successful reduction.  Patient placed in sling, instructed to follow-up with orthopedics.    Critical Care Time  Procedural Sedation    Date/Time: 6/11/2024 5:35 PM    Performed by: Rc Dyer MD  Authorized by: Rc Dyer MD    Immediate pre-procedure details:     Reassessment: Patient reassessed immediately prior to procedure      Reviewed: vital signs and NPO status      Verified: bag valve mask available, emergency equipment available, intubation equipment available, IV patency confirmed, oxygen available and suction available    Procedure details (see MAR for exact dosages):     Sedation start time:  6/11/2024 5:35 PM    Preoxygenation:  Nasal cannula    Sedation:  Propofol    Analgesia:  Morphine    Intra-procedure monitoring:  Blood pressure monitoring, continuous  capnometry, frequent LOC assessments, frequent vital sign checks, continuous pulse oximetry and cardiac monitor    Intra-procedure events: none      Intra-procedure management:  Airway repositioning    Sedation end time:  6/11/2024 5:46 PM    Total sedation time (minutes):  11  Post-procedure details:     Post-sedation assessment completed:  6/11/2024 5:51 PM    Attendance: Constant attendance by certified staff until patient recovered      Recovery: Patient returned to pre-procedure baseline      Post-sedation assessments completed and reviewed: airway patency, cardiovascular function, mental status, nausea/vomiting, pain level, respiratory function and temperature      Patient is stable for discharge or admission: yes      Patient tolerance:  Tolerated well, no immediate complications

## 2024-06-11 NOTE — SEDATION DOCUMENTATION
Pre procedure time out performed with angelo Paula, blaine jaquez, Kolby Funes, confirming reduction of right shoulder with patient's name,  confirmed

## 2024-06-11 NOTE — ED PROVIDER NOTES
History  Chief Complaint   Patient presents with    Fall     Tripped over concrete curb earlier today and reports right humerus and right shoulder pain. 2+ radial pulses, denies head strike, able to move x5 fingers of right hand. (-) thinners.      74 yo s/p trip and fall over a curb. Fell onto right side. No head injury or LOC. Isolated injury to right upper arm. Severe with ROM and mild when resting. Took 3 motrin without relief. No prior injuries to this extremity.       History provided by:  Patient   used: No    Fall  Associated symptoms: no abdominal pain, no back pain, no chest pain, no seizures and no vomiting    *-    Prior to Admission Medications   Prescriptions Last Dose Informant Patient Reported? Taking?   Restasis 0.05 % ophthalmic emulsion  Self Yes No   Sig: Administer 1 drop to both eyes every 12 (twelve) hours    cyanocobalamin (VITAMIN B-12) 100 mcg tablet  Self Yes No   Sig: Take by mouth daily      Facility-Administered Medications: None       Past Medical History:   Diagnosis Date    Benign neoplasm of skin        Past Surgical History:   Procedure Laterality Date    BREAST EXCISIONAL BIOPSY Left     1992    COLONOSCOPY      EGD      2021    TUBAL LIGATION      LAST ASSESSED: [id number]       Family History   Problem Relation Age of Onset    Migraines Mother     Osteoporosis Mother     Throat cancer Father     Depression Daughter         WITH ANXIETY     Migraines Daughter     Rheum arthritis Daughter     Urinary tract infection Daughter     No Known Problems Daughter     Depression Son         WITH ANXIETY     Breast cancer Neg Hx     Colon cancer Neg Hx     Uterine cancer Neg Hx     Cervical cancer Neg Hx     Ovarian cancer Neg Hx      I have reviewed and agree with the history as documented.    E-Cigarette/Vaping    E-Cigarette Use Never User      E-Cigarette/Vaping Substances    Nicotine No     THC No     CBD No     Flavoring No     Other No     Unknown No      Social  History     Tobacco Use    Smoking status: Never    Smokeless tobacco: Never   Vaping Use    Vaping status: Never Used   Substance Use Topics    Alcohol use: Yes     Alcohol/week: 7.0 standard drinks of alcohol     Types: 7 Glasses of wine per week    Drug use: No       Review of Systems   Constitutional:  Negative for chills and fever.   HENT:  Negative for ear pain and sore throat.    Eyes:  Negative for pain and visual disturbance.   Respiratory:  Negative for cough and shortness of breath.    Cardiovascular:  Negative for chest pain and palpitations.   Gastrointestinal:  Negative for abdominal pain and vomiting.   Genitourinary:  Negative for dysuria and hematuria.   Musculoskeletal:  Negative for arthralgias and back pain.        Right arm injury   Skin:  Negative for color change and rash.   Neurological:  Negative for seizures and syncope.   All other systems reviewed and are negative.      Physical Exam  Physical Exam  Vitals and nursing note reviewed.   Constitutional:       General: She is not in acute distress.     Appearance: She is well-developed.   HENT:      Head: Normocephalic and atraumatic.   Eyes:      Conjunctiva/sclera: Conjunctivae normal.   Cardiovascular:      Rate and Rhythm: Normal rate and regular rhythm.      Pulses:           Radial pulses are 2+ on the right side and 2+ on the left side.      Heart sounds: No murmur heard.  Pulmonary:      Effort: Pulmonary effort is normal. No respiratory distress.      Breath sounds: Normal breath sounds.   Abdominal:      Palpations: Abdomen is soft.      Tenderness: There is no abdominal tenderness.   Musculoskeletal:         General: No swelling.        Arms:       Cervical back: Neck supple.   Skin:     General: Skin is warm and dry.      Capillary Refill: Capillary refill takes less than 2 seconds.   Neurological:      Mental Status: She is alert.      GCS: GCS eye subscore is 4. GCS verbal subscore is 5. GCS motor subscore is 6.      Comments:  GCS 15. AAOx3. Ambulating in department without difficulty. CN II-XII grossly intact. No focal neuro deficits.     Psychiatric:         Mood and Affect: Mood normal.         Vital Signs  ED Triage Vitals   Temperature Pulse Respirations Blood Pressure SpO2   06/11/24 1322 06/11/24 1322 06/11/24 1322 06/11/24 1322 06/11/24 1322   97.5 °F (36.4 °C) 60 16 135/61 98 %      Temp Source Heart Rate Source Patient Position - Orthostatic VS BP Location FiO2 (%)   06/11/24 1322 06/11/24 1642 06/11/24 1322 06/11/24 1322 --   Oral Monitor Sitting Left arm       Pain Score       06/11/24 1511       10 - Worst Possible Pain           Vitals:    06/11/24 1751 06/11/24 1830 06/11/24 1845 06/11/24 1900   BP: 134/61 116/59 112/58 123/58   Pulse: 69 61 60 59   Patient Position - Orthostatic VS: Sitting Sitting Sitting Sitting         Visual Acuity  Visual Acuity      Flowsheet Row Most Recent Value   L Pupil Size (mm) 3   R Pupil Size (mm) 3            ED Medications  Medications   morphine injection 4 mg (4 mg Intramuscular Given 6/11/24 1511)   LORazepam (ATIVAN) injection 2 mg (2 mg Intramuscular Given 6/11/24 1646)   propofol (DIPRIVAN) 200 MG/20ML bolus injection 100 mg (50 mg Intravenous Given by Other 6/11/24 1737)       Diagnostic Studies  Results Reviewed       None                   XR shoulder 1 vw right   ED Interpretation by Jarvis Solomon PA-C (06/11 1821)   Adequate reduction        Final Result by Gee Louise MD (06/12 0618)      Interval reduction of prior glenohumeral dislocation.         Workstation performed: [id number]         XR humerus right   Final Result by Ida Lara MD (06/11 1651)   No acute displaced fracture.      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      Workstation performed: [id number]         XR elbow 2 vw right   Final Result by Ida Lara MD (06/11 1654)   No acute displaced fracture    Study limited      Workstation performed: [id number]                    Procedures  Orthopedic injury treatment    Date/Time: 6/11/2024 6:22 PM    Performed by: Jarvis Solomon PA-C  Authorized by: Jarvis Solomon PA-C    Patient Location:  ED  [place] Protocol:  Procedure performed by: (Dr. Dyer)  Consent: Verbal consent obtained.  Risks and benefits: risks, benefits and alternatives were discussed  Consent given by: patient  Patient understanding: patient states understanding of the procedure being performed  Required items: required blood products, implants, devices, and special equipment available  Patient identity confirmed: verbally with patient and arm band    Injury location:  Shoulder  Location details:  Right shoulder  Injury type:  Dislocation  Dislocation type: inferior    Chronicity:  New  Neurovascular status: Neurovascularly intact    Distal perfusion: normal    Neurological function: normal    Range of motion: reduced    Local anesthesia used?: No    Sedation type:  Moderate (conscious) sedation (See separate Procedural Sedation form)  Manipulation performed?: Yes    Reduction method: traction and counter traction  Immobilization:  Sling  Neurovascular status: Neurovascularly intact    Distal perfusion: normal    Neurological function: normal    Range of motion: improved    Patient tolerance:  Patient tolerated the procedure well with no immediate complications           ED Course                                             Medical Decision Making  Differential diagnosis including but not limited to: sprain, strain, fracture, dislocation, contusion; doubt compartment syndrome.   Plan: XR    MDM: 72 yo with shoulder injury. XR revealed inferior dislocation suspicious for a luxatio erecta injury. Conscious sedation performed. Reduction successful. Tolerated well. Needs ortho f/u. Return parameters provided. Pt understands and agrees with plan.      Amount and/or Complexity of Data  Reviewed  Radiology: ordered and independent interpretation performed.    Risk  Prescription drug management.             Disposition  Final diagnoses:   Closed dislocation of right shoulder     Time reflects when diagnosis was documented in both MDM as applicable and the Disposition within this note       Time User Action Codes Description Comment    6/11/2024  7:08 PM Jarvis Solomon Add [S43.004A] Closed dislocation of right shoulder           ED Disposition       ED Disposition   Discharge    Condition   Stable    Date/Time   Tue Jun 11, 2024  7:07 PM    Comment   Rosario Morales discharge to home/self care.                   Follow-up Information       Follow up With Specialties Details Why Contact Info Additional Information    [facility] [place] [facility] Call in 1 day  [address]  [facility] [address] [phone] [facility] [place], [address] [place] [address] [address], [place], [place], [address]   [phone]            Discharge Medication List as of 6/11/2024  7:08 PM        CONTINUE these medications which have NOT CHANGED    Details   cyanocobalamin (VITAMIN B-12) 100 mcg tablet Take by mouth daily, Historical Med      Restasis 0.05 % ophthalmic emulsion Administer 1 drop to both eyes every 12 (twelve) hours , Starting Fri 9/4/2020, Historical Med                 PDMP Review       None            ED Provider  Electronically Signed by             Jarvis Solomon PA-C  06/12/24 9952

## 2024-06-17 ENCOUNTER — OFFICE VISIT (OUTPATIENT)
Dept: OBGYN CLINIC | Facility: CLINIC | Age: 74
End: 2024-06-17
Payer: MEDICARE

## 2024-06-17 VITALS
HEART RATE: 77 BPM | SYSTOLIC BLOOD PRESSURE: 114 MMHG | WEIGHT: 119.4 LBS | DIASTOLIC BLOOD PRESSURE: 70 MMHG | HEIGHT: 61 IN | BODY MASS INDEX: 22.54 KG/M2

## 2024-06-17 DIAGNOSIS — S43.004A CLOSED DISLOCATION OF RIGHT SHOULDER: ICD-10-CM

## 2024-06-17 DIAGNOSIS — M24.811 INTERNAL DERANGEMENT OF RIGHT SHOULDER: Primary | ICD-10-CM

## 2024-06-17 PROCEDURE — 99203 OFFICE O/P NEW LOW 30 MIN: CPT | Performed by: FAMILY MEDICINE

## 2024-06-17 RX ORDER — MULTIVIT WITH MINERALS/LUTEIN
1000 TABLET ORAL DAILY
COMMUNITY

## 2024-06-17 RX ORDER — CALCIUM CARBONATE/VITAMIN D3 500MG-5MCG
2 TABLET ORAL DAILY
COMMUNITY

## 2024-06-17 NOTE — PROGRESS NOTES
Assessment/Plan:  Assessment & Plan   Diagnoses and all orders for this visit:    Internal derangement of right shoulder  -     MRI shoulder right wo contrast; Future    Closed dislocation of right shoulder  -     Ambulatory Referral to Orthopedic Surgery    Other orders  -     Calcium Carb-Cholecalciferol (Calcium 500 + D) 500-5 MG-MCG TABS; Take 2 tablets by mouth in the morning  -     Ascorbic Acid (vitamin C) 1000 MG tablet; Take 1,000 mg by mouth daily      73-year-old right-hand-dominant female onset of right shoulder and arm pain sustained from injury on 6/11/2024.  Discussed with patient physical exam, radiographs, impression, and plan.  X-rays of the shoulder, humerus, and elbow are unremarkable for acute osseous abnormality, but noted for reduction of shoulder dislocation.  Physical exam right shoulder for tenderness anterior and lateral aspects.  She has range of motion limited actively to forward flexion of 30 degrees and passively to 110 degrees with abduction limited to 60 degrees and internal rotation to the lumbar spine.  She has 4/5 strength abduction and supraspinatus with 4+/5 external rotation.  There is positive drop arm test, positive empty can, and positive Gann.  She has normal sensation, bicep reflex, radial pulse both upper extremities.  Clinical impression is that she is status post shoulders location however there is concern for rotator cuff pathology.  At this time I will refer her for MRI of the right shoulder to evaluate for rotator cuff tear as surgical intervention may be warranted.  She will return after having MRI done.      Subjective:   Patient ID: Rosario Morales is a 73 y.o. female.  Chief Complaint   Patient presents with    Right Shoulder - Pain        73-year-old right-hand-dominant female presents for evaluation of right shoulder and upper arm pain sustained from injury on 6/11/2024.  She tripped onto a curb and landed directly onto her right shoulder.  She had pain  "described as sudden in onset, generalized to the shoulder upper arm, worse with moving the arm, associate with limited range of motion and weakness, and improved with resting.  She passively elevated her arm above her head and then her arm got stuck in that position.  She was unable to lower the arm.  She presented to the emergency room where x-ray evaluation was noted for shoulder dislocation.  She underwent reduction and was advised to follow-up with orthopedic care.  She had been icing and also alternate with ibuprofen and Tylenol to help with symptoms.      Shoulder Pain  This is a new problem. The current episode started in the past 7 days. The problem occurs daily. The problem has been unchanged. Associated symptoms include arthralgias, joint swelling and weakness. Pertinent negatives include no numbness. Exacerbated by: Arm use. She has tried rest, position changes, acetaminophen, NSAIDs and ice for the symptoms. The treatment provided mild relief.           The following portions of the patient's history were reviewed and updated as appropriate: She  has a past medical history of Benign neoplasm of skin.  She is allergic to other..    Review of Systems   Musculoskeletal:  Positive for arthralgias and joint swelling.   Neurological:  Positive for weakness. Negative for numbness.       Objective:  Vitals:    06/17/24 1107   BP: 114/70   Pulse: 77   Weight: 54.2 kg (119 lb 6.4 oz)   Height: 5' 1\" (1.549 m)      Right Hand Exam     Muscle Strength   Wrist extension: 5/5   Wrist flexion: 5/5   : 5/5     Other   Sensation: normal  Pulse: present      Left Hand Exam     Muscle Strength   Wrist extension: 5/5   Wrist flexion: 5/5   :  5/5     Other   Sensation: normal  Pulse: present      Right Elbow Exam     Range of Motion   The patient has normal right elbow ROM.    Muscle Strength   The patient has normal right elbow strength (5/5 flexion and extension).    Other   Sensation: normal      Left Elbow Exam "     Other   Sensation: normal      Right Shoulder Exam     Tenderness   Right shoulder tenderness location: Anterior, lateral.    Range of Motion   Active abduction:  60   Forward flexion:  30 (Passive 110)   Internal rotation 0 degrees:  Lumbar     Muscle Strength   Abduction: 4/5   Internal rotation: 5/5   Supraspinatus: 4/5     Tests   Gann test: positive  Cross arm: positive    Other   Sensation: normal    Comments:  4+/5 external rotation  Positive empty can      Left Shoulder Exam     Other   Sensation: normal           Strength/Myotome Testing     Left Wrist/Hand   Wrist extension: 5  Wrist flexion: 5    Right Wrist/Hand   Wrist extension: 5  Wrist flexion: 5      Physical Exam  Vitals and nursing note reviewed.   Constitutional:       Appearance: Normal appearance. She is well-developed. She is not ill-appearing or diaphoretic.   HENT:      Head: Normocephalic and atraumatic.      Right Ear: External ear normal.      Left Ear: External ear normal.   Eyes:      Conjunctiva/sclera: Conjunctivae normal.   Neck:      Trachea: No tracheal deviation.   Cardiovascular:      Rate and Rhythm: Normal rate.   Pulmonary:      Effort: Pulmonary effort is normal. No respiratory distress.   Abdominal:      General: There is no distension.   Musculoskeletal:         General: Tenderness and signs of injury present.   Skin:     General: Skin is warm and dry.      Coloration: Skin is not jaundiced or pale.   Neurological:      Mental Status: She is alert and oriented to person, place, and time.   Psychiatric:         Mood and Affect: Mood normal.         Behavior: Behavior normal.         Thought Content: Thought content normal.         Judgment: Judgment normal.         I have personally reviewed pertinent films in PACS and my interpretation is  .    X-rays of the shoulder, humerus, and elbow are unremarkable for acute osseous abnormality, but noted for reduction of shoulder dislocation.

## 2024-06-19 ENCOUNTER — TELEPHONE (OUTPATIENT)
Dept: OBGYN CLINIC | Facility: CLINIC | Age: 74
End: 2024-06-19

## 2024-06-19 NOTE — TELEPHONE ENCOUNTER
Caller: Patient    Doctor: Robb    Reason for call: Patient returning a call. Relayed message. Patient understood    Call back#: [phone]

## 2024-06-25 ENCOUNTER — APPOINTMENT (OUTPATIENT)
Dept: LAB | Facility: CLINIC | Age: 74
End: 2024-06-25
Payer: MEDICARE

## 2024-06-25 DIAGNOSIS — E78.5 DYSLIPIDEMIA: ICD-10-CM

## 2024-06-27 ENCOUNTER — APPOINTMENT (OUTPATIENT)
Dept: LAB | Facility: CLINIC | Age: 74
End: 2024-06-27
Payer: MEDICARE

## 2024-06-27 LAB
ALBUMIN SERPL BCG-MCNC: 4.1 G/DL (ref 3.5–5)
ALP SERPL-CCNC: 62 U/L (ref 34–104)
ALT SERPL W P-5'-P-CCNC: 16 U/L (ref 7–52)
ANION GAP SERPL CALCULATED.3IONS-SCNC: 8 MMOL/L (ref 4–13)
AST SERPL W P-5'-P-CCNC: 24 U/L (ref 13–39)
BASOPHILS # BLD AUTO: 0.1 THOUSANDS/ÂΜL (ref 0–0.1)
BASOPHILS NFR BLD AUTO: 2 % (ref 0–1)
BILIRUB SERPL-MCNC: 0.52 MG/DL (ref 0.2–1)
BUN SERPL-MCNC: 13 MG/DL (ref 5–25)
CALCIUM SERPL-MCNC: 9.3 MG/DL (ref 8.4–10.2)
CHLORIDE SERPL-SCNC: 102 MMOL/L (ref 96–108)
CHOLEST SERPL-MCNC: 202 MG/DL
CO2 SERPL-SCNC: 29 MMOL/L (ref 21–32)
CREAT SERPL-MCNC: 0.79 MG/DL (ref 0.6–1.3)
EOSINOPHIL # BLD AUTO: 0.27 THOUSAND/ÂΜL (ref 0–0.61)
EOSINOPHIL NFR BLD AUTO: 5 % (ref 0–6)
ERYTHROCYTE [DISTWIDTH] IN BLOOD BY AUTOMATED COUNT: 12.7 % (ref 11.6–15.1)
GFR SERPL CREATININE-BSD FRML MDRD: 75 ML/MIN/1.73SQ M
GLUCOSE P FAST SERPL-MCNC: 87 MG/DL (ref 65–99)
HCT VFR BLD AUTO: 40.6 % (ref 34.8–46.1)
HDLC SERPL-MCNC: 83 MG/DL
HGB BLD-MCNC: 13.3 G/DL (ref 11.5–15.4)
IMM GRANULOCYTES # BLD AUTO: 0.01 THOUSAND/UL (ref 0–0.2)
IMM GRANULOCYTES NFR BLD AUTO: 0 % (ref 0–2)
LDLC SERPL CALC-MCNC: 99 MG/DL (ref 0–100)
LYMPHOCYTES # BLD AUTO: 1.92 THOUSANDS/ÂΜL (ref 0.6–4.47)
LYMPHOCYTES NFR BLD AUTO: 38 % (ref 14–44)
MCH RBC QN AUTO: 31.3 PG (ref 26.8–34.3)
MCHC RBC AUTO-ENTMCNC: 32.8 G/DL (ref 31.4–37.4)
MCV RBC AUTO: 96 FL (ref 82–98)
MONOCYTES # BLD AUTO: 0.51 THOUSAND/ÂΜL (ref 0.17–1.22)
MONOCYTES NFR BLD AUTO: 10 % (ref 4–12)
NEUTROPHILS # BLD AUTO: 2.31 THOUSANDS/ÂΜL (ref 1.85–7.62)
NEUTS SEG NFR BLD AUTO: 45 % (ref 43–75)
NONHDLC SERPL-MCNC: 119 MG/DL
NRBC BLD AUTO-RTO: 0 /100 WBCS
PLATELET # BLD AUTO: 293 THOUSANDS/UL (ref 149–390)
PMV BLD AUTO: 10.3 FL (ref 8.9–12.7)
POTASSIUM SERPL-SCNC: 4.1 MMOL/L (ref 3.5–5.3)
PROT SERPL-MCNC: 6.9 G/DL (ref 6.4–8.4)
RBC # BLD AUTO: 4.25 MILLION/UL (ref 3.81–5.12)
SODIUM SERPL-SCNC: 139 MMOL/L (ref 135–147)
TRIGL SERPL-MCNC: 100 MG/DL
TSH SERPL DL<=0.05 MIU/L-ACNC: 2.9 UIU/ML (ref 0.45–4.5)
WBC # BLD AUTO: 5.12 THOUSAND/UL (ref 4.31–10.16)

## 2024-06-27 PROCEDURE — 80053 COMPREHEN METABOLIC PANEL: CPT

## 2024-06-27 PROCEDURE — 36415 COLL VENOUS BLD VENIPUNCTURE: CPT

## 2024-06-27 PROCEDURE — 80061 LIPID PANEL: CPT

## 2024-06-27 PROCEDURE — 85025 COMPLETE CBC W/AUTO DIFF WBC: CPT

## 2024-06-27 PROCEDURE — 84443 ASSAY THYROID STIM HORMONE: CPT

## 2024-06-28 ENCOUNTER — HOSPITAL ENCOUNTER (OUTPATIENT)
Dept: MRI IMAGING | Facility: CLINIC | Age: 74
Discharge: HOME | End: 2024-06-28
Payer: MEDICARE

## 2024-06-28 DIAGNOSIS — M24.811 INTERNAL DERANGEMENT OF RIGHT SHOULDER: ICD-10-CM

## 2024-06-28 PROCEDURE — 73221 MRI JOINT UPR EXTREM W/O DYE: CPT

## 2024-07-03 ENCOUNTER — OFFICE VISIT (OUTPATIENT)
Dept: OBGYN CLINIC | Facility: CLINIC | Age: 74
End: 2024-07-03
Payer: MEDICARE

## 2024-07-03 VITALS
WEIGHT: 119 LBS | DIASTOLIC BLOOD PRESSURE: 71 MMHG | HEIGHT: 61 IN | SYSTOLIC BLOOD PRESSURE: 132 MMHG | BODY MASS INDEX: 22.47 KG/M2 | HEART RATE: 68 BPM

## 2024-07-03 DIAGNOSIS — M67.921 TENDINOPATHY OF RIGHT BICEPS TENDON: ICD-10-CM

## 2024-07-03 DIAGNOSIS — S42.254A CLOSED NONDISPLACED FRACTURE OF GREATER TUBEROSITY OF RIGHT HUMERUS, INITIAL ENCOUNTER: ICD-10-CM

## 2024-07-03 DIAGNOSIS — M67.911 TENDINOPATHY OF RIGHT ROTATOR CUFF: ICD-10-CM

## 2024-07-03 DIAGNOSIS — M75.101 TEAR OF RIGHT SUPRASPINATUS TENDON: Primary | ICD-10-CM

## 2024-07-03 PROCEDURE — 99213 OFFICE O/P EST LOW 20 MIN: CPT | Performed by: FAMILY MEDICINE

## 2024-07-03 RX ORDER — CYCLOSPORINE 0.5 MG/ML
1 EMULSION OPHTHALMIC 2 TIMES DAILY
COMMUNITY

## 2024-07-03 NOTE — PROGRESS NOTES
Assessment/Plan:  Assessment & Plan   Diagnoses and all orders for this visit:    Tear of right supraspinatus tendon  -     Ambulatory Referral to Orthopedic Surgery; Future    Closed nondisplaced fracture of greater tuberosity of right humerus, initial encounter  -     Ambulatory Referral to Orthopedic Surgery; Future    Tendinopathy of right rotator cuff    Tendinopathy of right biceps tendon    Other orders  -     cycloSPORINE (RESTASIS) 0.05 % ophthalmic emulsion; Administer 1 drop to both eyes 2 (two) times a day          73-year-old right-hand-dominant female with onset of right shoulder and arm pain sustained from injury on 6/11/2024.  Discussed the patient MRI results, impression, and plan.  MRI right shoulder noted for full-thickness supraspinatus tear with torn tendon and retracted 1.2 cm.  No appreciable muscle atrophy.  There is impacted fracture superior grade tuberosity.  Severe tendinosis and probable partial tear of the long head of the biceps tendon, moderate subacromial/subjective bursitis.  On physical exam her right shoulder has active range of motion limited to forward flexion of 30 degrees with passive forward flexion to 100 degrees, active abduction to 30 degrees.  Clinical impression is that she has symptoms of combination of acute greater tuberosity fracture and supraspinatus tear.  Due to morphology of her injury I recommend she be seen and eval by orthopedic surgeon.  She was advised she may continue passive range of motion exercises to prevent stiffness of the shoulder.  She will follow-up as needed.          Subjective:   Patient ID: Rosario Morales is a 73 y.o. female.  Chief Complaint   Patient presents with    Right Shoulder - Follow-up        73-year-old right-hand-dominant female following for onset of right shoulder and arm pain sustained from injury on 6/11/2024.  She was last seen by me 2 weeks ago at which point she was referred for MRI of the right shoulder due to concern for  "rotator cuff injury.  She reports having less pain in the shoulder, but symptoms are still bothersome.  She reports having pain described generalized to the shoulder, worse with moving the arm, associated with limited range of motion and weakness, and improved resting.  She has difficulty actively abducting and forward flexing the arm.    Shoulder Pain  This is a new problem. The current episode started 1 to 4 weeks ago. The problem occurs 2 to 4 times per day. The problem has been gradually improving. Associated symptoms include arthralgias and weakness. Pertinent negatives include no joint swelling or numbness. Exacerbated by: Arm movement. She has tried rest and position changes for the symptoms. The treatment provided mild relief.               Review of Systems   Musculoskeletal:  Positive for arthralgias. Negative for joint swelling.   Neurological:  Positive for weakness. Negative for numbness.       Objective:  Vitals:    07/03/24 1541   BP: 132/71   Pulse: 68   Weight: 54 kg (119 lb)   Height: 5' 1\" (1.549 m)      Right Shoulder Exam     Range of Motion   Active abduction:  30   Forward flexion:  30 (Passive 100)             Physical Exam  Vitals and nursing note reviewed.   Constitutional:       Appearance: Normal appearance. She is well-developed. She is not ill-appearing or diaphoretic.   HENT:      Head: Normocephalic and atraumatic.      Right Ear: External ear normal.      Left Ear: External ear normal.   Eyes:      Conjunctiva/sclera: Conjunctivae normal.   Neck:      Trachea: No tracheal deviation.   Cardiovascular:      Rate and Rhythm: Normal rate.   Pulmonary:      Effort: Pulmonary effort is normal. No respiratory distress.   Abdominal:      General: There is no distension.   Skin:     General: Skin is warm and dry.      Coloration: Skin is not jaundiced or pale.   Neurological:      Mental Status: She is alert and oriented to person, place, and time.   Psychiatric:         Mood and Affect: Mood " normal.         Behavior: Behavior normal.         Thought Content: Thought content normal.         Judgment: Judgment normal.         I have personally reviewed pertinent films in PACS and my interpretation is  .  Greater tuberosity fracture, supraspinatus tear with retraction, Subacromial bursitis.

## 2024-07-16 ENCOUNTER — OFFICE VISIT (OUTPATIENT)
Age: 74
End: 2024-07-16
Payer: MEDICARE

## 2024-07-16 VITALS
SYSTOLIC BLOOD PRESSURE: 118 MMHG | WEIGHT: 121 LBS | OXYGEN SATURATION: 98 % | DIASTOLIC BLOOD PRESSURE: 74 MMHG | BODY MASS INDEX: 22.84 KG/M2 | HEIGHT: 61 IN | RESPIRATION RATE: 16 BRPM | HEART RATE: 70 BPM

## 2024-07-16 DIAGNOSIS — K26.9 DUODENAL ULCER: Primary | ICD-10-CM

## 2024-07-16 DIAGNOSIS — E78.5 DYSLIPIDEMIA: ICD-10-CM

## 2024-07-16 DIAGNOSIS — S46.011A TRAUMATIC COMPLETE TEAR OF RIGHT ROTATOR CUFF, INITIAL ENCOUNTER: ICD-10-CM

## 2024-07-16 DIAGNOSIS — M81.8 OTHER OSTEOPOROSIS WITHOUT CURRENT PATHOLOGICAL FRACTURE: ICD-10-CM

## 2024-07-16 PROCEDURE — 99214 OFFICE O/P EST MOD 30 MIN: CPT | Performed by: INTERNAL MEDICINE

## 2024-07-16 PROCEDURE — G0439 PPPS, SUBSEQ VISIT: HCPCS | Performed by: INTERNAL MEDICINE

## 2024-07-16 NOTE — PATIENT INSTRUCTIONS
Lab data reviewed in detail and compared to prior    Dyslipidemia-dramatic improvement with dietary modification    Osteopenia-continue with weightbearing exercise, calcium and vitamin D supplements and repeat bone density in January    Shoulder dislocation with rotator cuff tear working with orthopedics    Health maintenance up-to-date

## 2024-07-16 NOTE — PROGRESS NOTES
Ambulatory Visit  Name: Rosario Morales      : 1950      MRN: [id number]  Encounter Provider: Mike Rosario MD  Encounter Date: 2024   Encounter department: [facility] INTERNAL MEDICINE [facility] [address]    Assessment & Plan   1. Duodenal ulcer  2. Other osteoporosis without current pathological fracture  3. Dyslipidemia  -     CBC and differential; Future; Expected date: 2025  -     Comprehensive metabolic panel; Future; Expected date: 2025  -     Lipid panel; Future; Expected date: 2025  4. Traumatic complete tear of right rotator cuff, initial encounter       Preventive health issues were discussed with patient, and age appropriate screening tests were ordered as noted in patient's After Visit Summary. Personalized health advice and appropriate referrals for health education or preventive services given if needed, as noted in patient's After Visit Summary.    History of Present Illness     F/u mmp, awv and review labs  Feeling generally well   Fell last mo and dislocated R shoulder, working w/ Dr. Roach, needs surgery.  RUE movement is limited.    No further episodes of word finding difficulty since , first episode was  after covid vaccine.    Jacob is in a group home in [facility].  Now w/c bound.    PUD-off iron and ppi, using pepcid prn gerd.   HPL-watching diet more closely, gave up cheese and ice cream for the most part.   CKD- hydrating better.   Osteopenia w/ elevated FRAX-declined anti-resorptive rx, repeat dxa   Exercise has been limited d/t injury.   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   Notes several raised skin lesions.       Patient Care Team:  Mike Rosario MD as PCP - General (Internal Medicine)    Review of Systems  Medical History Reviewed by provider this encounter:  Tobacco  Allergies  Meds  Problems  Med Hx  Surg Hx  Fam Hx       Annual Wellness Visit Questionnaire   Rosario is here  for her Subsequent Wellness visit.     Health Risk Assessment:   Patient rates overall health as excellent. Patient feels that their physical health rating is slightly better. Patient is very satisfied with their life. Eyesight was rated as same. Hearing was rated as same. Patient feels that their emotional and mental health rating is same. Patients states they are never, rarely angry. Patient states they are sometimes unusually tired/fatigued. Pain experienced in the last 7 days has been some. Patient's pain rating has been 6/10. Patient states that she has experienced no weight loss or gain in last 6 months. Pain is due to a shoulder injury    Depression Screening:   PHQ-2 Score: 0      Fall Risk Screening:   In the past year, patient has experienced: history of falling in past year      Urinary Incontinence Screening:   Patient has not leaked urine accidently in the last six months.     Home Safety:  Patient does not have trouble with stairs inside or outside of their home. Patient has working smoke alarms and has no working carbon monoxide detector. Home safety hazards include: none.     Nutrition:   Current diet is Regular, Low Cholesterol and Limited junk food. A large amount of fresh garden vegetables    Medications:   Patient is currently taking over-the-counter supplements. OTC medications include: see medication list. Patient is able to manage medications.     Activities of Daily Living (ADLs)/Instrumental Activities of Daily Living (IADLs):   Walk and transfer into and out of bed and chair?: Yes  Dress and groom yourself?: Yes    Bathe or shower yourself?: Yes    Feed yourself? Yes  Do your laundry/housekeeping?: Yes  Manage your money, pay your bills and track your expenses?: Yes  Make your own meals?: Yes    Do your own shopping?: Yes    Previous Hospitalizations:   Any hospitalizations or ED visits within the last 12 months?: Yes    How many hospitalizations have you had in the last year?:  1-2    Hospitalization Comments: Dislocated shoulder on     Advance Care Planning:   Living will: Yes    Durable POA for healthcare: Yes    Advanced directive: Yes      Cognitive Screening:   Provider or family/friend/caregiver concerned regarding cognition?: Yes    PREVENTIVE SCREENINGS      Cardiovascular Screening:    General: Screening Current      Diabetes Screening:     General: Screening Current      Colorectal Cancer Screening:     General: Screening Current      Breast Cancer Screening:     General: Screening Current      Cervical Cancer Screening:    General: Screening Not Indicated      Osteoporosis Screening:    General: Screening Not Indicated and History Osteoporosis      Abdominal Aortic Aneurysm (AAA) Screening:        General: Screening Not Indicated      Lung Cancer Screening:     General: Screening Not Indicated      Hepatitis C Screening:    General: Screening Current    Screening, Brief Intervention, and Referral to Treatment (SBIRT)    Screening  Typical number of drinks in a day: 1  Typical number of drinks in a week: 7  Interpretation: Low risk drinking behavior.    AUDIT-C Screenin) How often did you have a drink containing alcohol in the past year? 4 or more times a week  2) How many drinks did you have on a typical day when you were drinking in the past year? 1 to 2  3) How often did you have 6 or more drinks on one occasion in the past year? never    AUDIT-C Score: 4  Interpretation: Score 3-12 (female): POSITIVE screen for alcohol misuse    AUDIT Screenin) How often during the last year have you found that you were not able to stop drinking once you had started? 0 - never  5) How often during the last year have you failed to do what was normally expected from you because of drinking? 0 - never  6) How often during the last year have you needed a first drink in the morning to get yourself going after a heavy drinking session? 0 - never  7) How often during the last year  "have you had a feeling of guilt or remorse after drinking? 0 - never  8) How often during the last year have you been unable to remember what happened the night before because you had been drinking? 0 - never  9) Have you or someone else been injured as a result of your drinking? 0 - no  10) Has a relative or friend or a doctor or another health worker been concerned about your drinking or suggested you cut down? 0 - no    AUDIT Score: 4  Interpretation: Low risk alcohol consumption    Single Item Drug Screening:  How often have you used an illegal drug (including marijuana) or a prescription medication for non-medical reasons in the past year? never    Single Item Drug Screen Score: 0  Interpretation: Negative screen for possible drug use disorder    Social Determinants of Health     Financial Resource Strain: Low Risk  (6/27/2023)    Overall Financial Resource Strain (CARDIA)     Difficulty of Paying Living Expenses: Not very hard   Food Insecurity: No Food Insecurity (7/16/2024)    Hunger Vital Sign     Worried About Running Out of Food in the Last Year: Never true     Ran Out of Food in the Last Year: Never true   Transportation Needs: No Transportation Needs (7/16/2024)    PRAPARE - Transportation     Lack of Transportation (Medical): No     Lack of Transportation (Non-Medical): No   Housing Stability: Unknown (7/16/2024)    Housing Stability Vital Sign     Unable to Pay for Housing in the Last Year: No     Homeless in the Last Year: No   Utilities: Not At Risk (7/16/2024)    [facility] Utilities     Threatened with loss of utilities: No     No results found.    Objective     /74 (BP Location: Left arm, Patient Position: Sitting, Cuff Size: Standard)   Pulse 70   Resp 16   Ht 5' 1\" (1.549 m)   Wt 54.9 kg (121 lb)   SpO2 98%   BMI 22.86 kg/m²     Physical Exam  Constitutional:       Appearance: She is well-developed.   HENT:      Head: Normocephalic and atraumatic.      Nose: Nose normal.   Eyes:      " General: No scleral icterus.     Conjunctiva/sclera: Conjunctivae normal.      Pupils: Pupils are equal, round, and reactive to light.   Neck:      Thyroid: No thyromegaly.      Vascular: No JVD.      Trachea: No tracheal deviation.   Cardiovascular:      Rate and Rhythm: Normal rate and regular rhythm.      Heart sounds: No murmur heard.     No friction rub. No gallop.   Pulmonary:      Effort: Pulmonary effort is normal. No respiratory distress.      Breath sounds: Normal breath sounds. No wheezing or rales.   Abdominal:      General: Bowel sounds are normal. There is no distension.      Palpations: Abdomen is soft. There is no mass.      Tenderness: There is no abdominal tenderness. There is no guarding or rebound.   Musculoskeletal:         General: No tenderness.      Cervical back: Normal range of motion and neck supple.   Lymphadenopathy:      Cervical: No cervical adenopathy.   Skin:     General: Skin is warm and dry.      Findings: No erythema or rash.   Neurological:      Mental Status: She is alert and oriented to person, place, and time.      Cranial Nerves: No cranial nerve deficit.   Psychiatric:         Behavior: Behavior normal.         Thought Content: Thought content normal.         Judgment: Judgment normal.

## 2024-07-30 ENCOUNTER — APPOINTMENT (OUTPATIENT)
Dept: RADIOLOGY | Facility: CLINIC | Age: 74
End: 2024-07-30
Payer: MEDICARE

## 2024-07-30 ENCOUNTER — OFFICE VISIT (OUTPATIENT)
Dept: OBGYN CLINIC | Facility: CLINIC | Age: 74
End: 2024-07-30
Payer: MEDICARE

## 2024-07-30 VITALS
SYSTOLIC BLOOD PRESSURE: 122 MMHG | HEART RATE: 70 BPM | BODY MASS INDEX: 22.84 KG/M2 | HEIGHT: 61 IN | WEIGHT: 121 LBS | DIASTOLIC BLOOD PRESSURE: 75 MMHG

## 2024-07-30 DIAGNOSIS — S46.001A INJURY OF RIGHT ROTATOR CUFF, INITIAL ENCOUNTER: ICD-10-CM

## 2024-07-30 DIAGNOSIS — M25.511 RIGHT SHOULDER PAIN, UNSPECIFIED CHRONICITY: ICD-10-CM

## 2024-07-30 DIAGNOSIS — M75.101 TEAR OF RIGHT SUPRASPINATUS TENDON: ICD-10-CM

## 2024-07-30 DIAGNOSIS — S42.254A CLOSED NONDISPLACED FRACTURE OF GREATER TUBEROSITY OF RIGHT HUMERUS, INITIAL ENCOUNTER: ICD-10-CM

## 2024-07-30 DIAGNOSIS — M25.511 RIGHT SHOULDER PAIN, UNSPECIFIED CHRONICITY: Primary | ICD-10-CM

## 2024-07-30 PROCEDURE — 99214 OFFICE O/P EST MOD 30 MIN: CPT | Performed by: ORTHOPAEDIC SURGERY

## 2024-07-30 PROCEDURE — 73030 X-RAY EXAM OF SHOULDER: CPT

## 2024-07-30 RX ORDER — CHLORHEXIDINE GLUCONATE 40 MG/ML
SOLUTION TOPICAL DAILY PRN
OUTPATIENT
Start: 2024-07-30

## 2024-07-30 RX ORDER — CHLORHEXIDINE GLUCONATE ORAL RINSE 1.2 MG/ML
15 SOLUTION DENTAL ONCE
OUTPATIENT
Start: 2024-07-30 | End: 2024-07-30

## 2024-07-30 NOTE — H&P (VIEW-ONLY)
Patient Name:  Rosario Morales  MRN:  [id number]    Assessment & Plan     1. Right shoulder pain, unspecified chronicity  -     XR shoulder 2+ vw right; Future; Expected date: 07/30/2024  2. Tear of right supraspinatus tendon  -     Ambulatory Referral to Orthopedic Surgery  -     Ambulatory Referral to Physical Therapy; Future  -     Case request operating room: REPAIR ROTATOR CUFF ARTHROSCOPIC- Right shoulder arthroscopic rotator cuff repair, possible open subpectoral biceps tenodesis vs tenotomy; Standing  -     Ambulatory referral to [facility]; Future  -     CBC and Platelet; Future  -     Comprehensive metabolic panel; Future  -     EKG 12 lead; Future  -     XR chest pa & lateral; Future; Expected date: 07/30/2024  -     Case request operating room: REPAIR ROTATOR CUFF ARTHROSCOPIC- Right shoulder arthroscopic rotator cuff repair, possible open subpectoral biceps tenodesis vs tenotomy  3. Closed nondisplaced fracture of greater tuberosity of right humerus, initial encounter  -     Ambulatory Referral to Orthopedic Surgery  4. Injury of right rotator cuff, initial encounter  -     CBC and Platelet; Future      Right shoulder rotator cuff tear s/p dislocation sustained 06/11/2024  X-rays and MRI reviewed in office today with patient  Discussed nonoperative vs surgical management of Right shoulder rotator cuff tear  Nonoperative management including OTC topical and oral analgesics, CSI, outpatient PT to work on range of motion and strengthening. Risks of nonoperative management including but not limited to persistent pain, weakness, progression of tear, muscle atrophy.  Surgical management including Right shoulder arthroscopic rotator cuff repair and possible open subpectoral biceps tenodesis vs tenotomy.  Educated patient about iuntraoperative findings, procedure, post operative immobilization, outpatient PT and following post op protocol, return to driving and return to unrestricted activity.  Educated  patient about shoulder stiffness pre operatively and risk of worsening stiffness post operatively. Advised patient her passive range of motion is reasonable to proceed with surgical intervention 08/21/2024. Placed PT script to work on passive and active range of motion in the meantime.   Risks of surgical intervention including but not limited to infection, injury to surrounding structures, DVT, PE, loss of limb or life, persistent pain, worsening stiffness, progression of osteoarthritis, failure of repair, need for subsequent procedures, wound healing complications, anesthesia complications, cosmetic deformity and cramping with biceps procedure, bleeding complications.  Patient will require PCP, labs, EKG, CXR pre operatively   Tentatively planned for 08/21/2024  Follow up post operatively      Chief Complaint     Right shoulder pain    History of the Present Illness     Rosario Morales is a RHD 73 y.o. female with Right shoulder pain after sustaining a mechanical fall 06/11/2024. She admits she fell with outstretched arm and was straight up in the air for many hours. Patient reported to the ED after the fall and x-rays were performed demonstrating dislocation. Patient's shoulder was reduced in the ED and was placed in a sling. Patient had follow up with Dr Zamudio whom ordered MRI of the shoulder. Today, patient reports she has a difficult time with active motion of the shoulder. She has pain at night. 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 Patient has history of peptic ulcer and does not take NSAIDs. She does take Tylenol for pain relief. Patient has upcoming singing events.     Review of Systems     Review of Systems   Constitutional:  Negative for chills and fever.   HENT:  Negative for congestion.    Respiratory:  Negative for cough, chest tightness and shortness of breath.    Cardiovascular:  Negative for chest pain and palpitations.   Gastrointestinal:  Negative for abdominal  "pain.   Endocrine: Negative for cold intolerance and heat intolerance.   Neurological:  Negative for syncope.   Psychiatric/Behavioral:  Negative for confusion.        Physical Exam     /75   Pulse 70   Ht 5' 1\" (1.549 m)   Wt 54.9 kg (121 lb)   BMI 22.86 kg/m²     Right Shoulder:   Active range of motion   50 degrees forward flexion  50 degrees abduction  70 degrees external rotation   Lower thoracic internal rotation    Passive range of motion   150 degrees of forward flexion  There is no tenderness present over the shoulder.   Forward flexion testing 3/5  External rotation testing 4-/5  Internal rotation testing 5/5  Gann test is negative   The patient is neurovascularly intact distally in the extremity.      Eyes:  Anicteric sclerae.  Neck:  Supple.  Lungs:  Normal respiratory effort.  Cardiovascular:  Capillary refill is less than 2 seconds.  Skin:  Intact without erythema.  Neurologic:  Sensation grossly intact to light touch.  Psychiatric:  Mood and affect are appropriate.    Data Review     I have personally reviewed pertinent films in PACS, and my interpretation follows:    X-rays taken 07/30/2024 of Right shoulder independently reviewed and demonstrate no obvious fracture, but flattening of greater tuberosity. No acute dislocation.    MRI performed 06/28/2024 of Right shoulder demonstrates impacted fracture of greater tuberosity s/p recent dislocation with associated full thickness tear of supraspinatus tendon. No obvious atrophy noted.     Past Medical History:   Diagnosis Date    Benign neoplasm of skin        Past Surgical History:   Procedure Laterality Date    BREAST EXCISIONAL BIOPSY Left     1992    COLONOSCOPY      EGD      2021    TUBAL LIGATION      LAST ASSESSED: [id number]       Allergies   Allergen Reactions    Other Other (See Comments) and Sneezing     CATS-itchy eyes, heaviness in chest       Current Outpatient Medications on File Prior to Visit   Medication Sig Dispense Refill "    Ascorbic Acid (vitamin C) 1000 MG tablet Take 1,000 mg by mouth daily      Calcium Carb-Cholecalciferol (Calcium 500 + D) 500-5 MG-MCG TABS Take 2 tablets by mouth in the morning      cyanocobalamin (VITAMIN B-12) 100 mcg tablet Take by mouth daily      cycloSPORINE (RESTASIS) 0.05 % ophthalmic emulsion Administer 1 drop to both eyes 2 (two) times a day       No current facility-administered medications on file prior to visit.       Social History     Tobacco Use    Smoking status: Never    Smokeless tobacco: Never   Vaping Use    Vaping status: Never Used   Substance Use Topics    Alcohol use: Yes     Alcohol/week: 7.0 standard drinks of alcohol     Types: 7 Glasses of wine per week    Drug use: No       Family History   Problem Relation Age of Onset    Migraines Mother     Osteoporosis Mother     Scoliosis Mother     Throat cancer Father     Depression Daughter         WITH ANXIETY     Migraines Daughter     Rheum arthritis Daughter     Urinary tract infection Daughter     No Known Problems Daughter     Depression Son         WITH ANXIETY     Breast cancer Neg Hx     Colon cancer Neg Hx     Uterine cancer Neg Hx     Cervical cancer Neg Hx     Ovarian cancer Neg Hx              Procedures Performed     Procedures  None       Meagan Shukla PA-C

## 2024-07-30 NOTE — PROGRESS NOTES
Patient Name:  Rosario Morales  MRN:  [id number]    Assessment & Plan     1. Right shoulder pain, unspecified chronicity  -     XR shoulder 2+ vw right; Future; Expected date: 07/30/2024  2. Tear of right supraspinatus tendon  -     Ambulatory Referral to Orthopedic Surgery  -     Ambulatory Referral to Physical Therapy; Future  -     Case request operating room: REPAIR ROTATOR CUFF ARTHROSCOPIC- Right shoulder arthroscopic rotator cuff repair, possible open subpectoral biceps tenodesis vs tenotomy; Standing  -     Ambulatory referral to [facility]; Future  -     CBC and Platelet; Future  -     Comprehensive metabolic panel; Future  -     EKG 12 lead; Future  -     XR chest pa & lateral; Future; Expected date: 07/30/2024  -     Case request operating room: REPAIR ROTATOR CUFF ARTHROSCOPIC- Right shoulder arthroscopic rotator cuff repair, possible open subpectoral biceps tenodesis vs tenotomy  3. Closed nondisplaced fracture of greater tuberosity of right humerus, initial encounter  -     Ambulatory Referral to Orthopedic Surgery  4. Injury of right rotator cuff, initial encounter  -     CBC and Platelet; Future      Right shoulder rotator cuff tear s/p dislocation sustained 06/11/2024  X-rays and MRI reviewed in office today with patient  Discussed nonoperative vs surgical management of Right shoulder rotator cuff tear  Nonoperative management including OTC topical and oral analgesics, CSI, outpatient PT to work on range of motion and strengthening. Risks of nonoperative management including but not limited to persistent pain, weakness, progression of tear, muscle atrophy.  Surgical management including Right shoulder arthroscopic rotator cuff repair and possible open subpectoral biceps tenodesis vs tenotomy.  Educated patient about iuntraoperative findings, procedure, post operative immobilization, outpatient PT and following post op protocol, return to driving and return to unrestricted activity.  Educated  patient about shoulder stiffness pre operatively and risk of worsening stiffness post operatively. Advised patient her passive range of motion is reasonable to proceed with surgical intervention 08/21/2024. Placed PT script to work on passive and active range of motion in the meantime.   Risks of surgical intervention including but not limited to infection, injury to surrounding structures, DVT, PE, loss of limb or life, persistent pain, worsening stiffness, progression of osteoarthritis, failure of repair, need for subsequent procedures, wound healing complications, anesthesia complications, cosmetic deformity and cramping with biceps procedure, bleeding complications.  Patient will require PCP, labs, EKG, CXR pre operatively   Tentatively planned for 08/21/2024  Follow up post operatively      Chief Complaint     Right shoulder pain    History of the Present Illness     Rosario Morales is a RHD 73 y.o. female with Right shoulder pain after sustaining a mechanical fall 06/11/2024. She admits she fell with outstretched arm and was straight up in the air for many hours. Patient reported to the ED after the fall and x-rays were performed demonstrating dislocation. Patient's shoulder was reduced in the ED and was placed in a sling. Patient had follow up with Dr Zamudio whom ordered MRI of the shoulder. Today, patient reports she has a difficult time with active motion of the shoulder. She has pain at night. 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 Patient has history of peptic ulcer and does not take NSAIDs. She does take Tylenol for pain relief. Patient has upcoming singing events.     Review of Systems     Review of Systems   Constitutional:  Negative for chills and fever.   HENT:  Negative for congestion.    Respiratory:  Negative for cough, chest tightness and shortness of breath.    Cardiovascular:  Negative for chest pain and palpitations.   Gastrointestinal:  Negative for abdominal  "pain.   Endocrine: Negative for cold intolerance and heat intolerance.   Neurological:  Negative for syncope.   Psychiatric/Behavioral:  Negative for confusion.        Physical Exam     /75   Pulse 70   Ht 5' 1\" (1.549 m)   Wt 54.9 kg (121 lb)   BMI 22.86 kg/m²     Right Shoulder:   Active range of motion   50 degrees forward flexion  50 degrees abduction  70 degrees external rotation   Lower thoracic internal rotation    Passive range of motion   150 degrees of forward flexion  There is no tenderness present over the shoulder.   Forward flexion testing 3/5  External rotation testing 4-/5  Internal rotation testing 5/5  Gann test is negative   The patient is neurovascularly intact distally in the extremity.      Eyes:  Anicteric sclerae.  Neck:  Supple.  Lungs:  Normal respiratory effort.  Cardiovascular:  Capillary refill is less than 2 seconds.  Skin:  Intact without erythema.  Neurologic:  Sensation grossly intact to light touch.  Psychiatric:  Mood and affect are appropriate.    Data Review     I have personally reviewed pertinent films in PACS, and my interpretation follows:    X-rays taken 07/30/2024 of Right shoulder independently reviewed and demonstrate no obvious fracture, but flattening of greater tuberosity. No acute dislocation.    MRI performed 06/28/2024 of Right shoulder demonstrates impacted fracture of greater tuberosity s/p recent dislocation with associated full thickness tear of supraspinatus tendon. No obvious atrophy noted.     Past Medical History:   Diagnosis Date    Benign neoplasm of skin        Past Surgical History:   Procedure Laterality Date    BREAST EXCISIONAL BIOPSY Left     1992    COLONOSCOPY      EGD      2021    TUBAL LIGATION      LAST ASSESSED: [id number]       Allergies   Allergen Reactions    Other Other (See Comments) and Sneezing     CATS-itchy eyes, heaviness in chest       Current Outpatient Medications on File Prior to Visit   Medication Sig Dispense Refill "    Ascorbic Acid (vitamin C) 1000 MG tablet Take 1,000 mg by mouth daily      Calcium Carb-Cholecalciferol (Calcium 500 + D) 500-5 MG-MCG TABS Take 2 tablets by mouth in the morning      cyanocobalamin (VITAMIN B-12) 100 mcg tablet Take by mouth daily      cycloSPORINE (RESTASIS) 0.05 % ophthalmic emulsion Administer 1 drop to both eyes 2 (two) times a day       No current facility-administered medications on file prior to visit.       Social History     Tobacco Use    Smoking status: Never    Smokeless tobacco: Never   Vaping Use    Vaping status: Never Used   Substance Use Topics    Alcohol use: Yes     Alcohol/week: 7.0 standard drinks of alcohol     Types: 7 Glasses of wine per week    Drug use: No       Family History   Problem Relation Age of Onset    Migraines Mother     Osteoporosis Mother     Scoliosis Mother     Throat cancer Father     Depression Daughter         WITH ANXIETY     Migraines Daughter     Rheum arthritis Daughter     Urinary tract infection Daughter     No Known Problems Daughter     Depression Son         WITH ANXIETY     Breast cancer Neg Hx     Colon cancer Neg Hx     Uterine cancer Neg Hx     Cervical cancer Neg Hx     Ovarian cancer Neg Hx              Procedures Performed     Procedures  None       Meagan Shukla PA-C

## 2024-07-31 ENCOUNTER — TELEPHONE (OUTPATIENT)
Dept: OBGYN CLINIC | Facility: CLINIC | Age: 74
End: 2024-07-31

## 2024-07-31 NOTE — TELEPHONE ENCOUNTER
Called patient to advise that their surgery is on August 21st 2024. The surgery will take place at [facility] with Dr. Roach.    Patient is aware that they will need to complete Labs, CXR and EKG prior to seeing their PCP . They were informed that these tests are walk-in services and no appointment will be necessary.   All testing must be done at a [facility] facility.     My direct phone number was given and patient was informed to call me if they have a any questions related to their surgery.     Patient is aware that the hospital will call the afternoon prior, after two pm, with their surgery check in time.     The patient should not eat or drink anything after midnight the night before surgery.    Patient will need a ride home once discharged from the hospital.     Soap instructions were reviewed with the patient.       Post Op : 8-27-24 @ 10 am    MC : 8-16-24@ 10:20 am

## 2024-08-06 ENCOUNTER — OFFICE VISIT (OUTPATIENT)
Dept: LAB | Facility: HOSPITAL | Age: 74
End: 2024-08-06
Payer: MEDICARE

## 2024-08-06 ENCOUNTER — HOSPITAL ENCOUNTER (OUTPATIENT)
Dept: RADIOLOGY | Facility: HOSPITAL | Age: 74
Discharge: HOME | End: 2024-08-06
Payer: MEDICARE

## 2024-08-06 ENCOUNTER — PREP FOR PROCEDURE (OUTPATIENT)
Dept: OBGYN CLINIC | Facility: CLINIC | Age: 74
End: 2024-08-06

## 2024-08-06 DIAGNOSIS — M75.101 TEAR OF RIGHT SUPRASPINATUS TENDON: ICD-10-CM

## 2024-08-06 LAB
ATRIAL RATE: 62 BPM
P AXIS: 65 DEGREES
PR INTERVAL: 164 MS
QRS AXIS: 14 DEGREES
QRSD INTERVAL: 84 MS
QT INTERVAL: 418 MS
QTC INTERVAL: 424 MS
T WAVE AXIS: 47 DEGREES
VENTRICULAR RATE: 62 BPM

## 2024-08-06 PROCEDURE — 71046 X-RAY EXAM CHEST 2 VIEWS: CPT

## 2024-08-06 PROCEDURE — 93010 ELECTROCARDIOGRAM REPORT: CPT | Performed by: INTERNAL MEDICINE

## 2024-08-06 PROCEDURE — 93005 ELECTROCARDIOGRAM TRACING: CPT

## 2024-08-09 ENCOUNTER — EVALUATION (OUTPATIENT)
Dept: PHYSICAL THERAPY | Facility: CLINIC | Age: 74
End: 2024-08-09
Payer: MEDICARE

## 2024-08-09 DIAGNOSIS — M75.101 TEAR OF RIGHT SUPRASPINATUS TENDON: Primary | ICD-10-CM

## 2024-08-09 PROCEDURE — 97110 THERAPEUTIC EXERCISES: CPT

## 2024-08-09 PROCEDURE — 97161 PT EVAL LOW COMPLEX 20 MIN: CPT

## 2024-08-09 NOTE — PROGRESS NOTES
"PT Evaluation     Today's date: 2024  Patient name: Rosario Morales  : 1950  MRN: [id number]  Referring provider: Meagan Shukla PA-C  Dx:   Encounter Diagnosis     ICD-10-CM    1. Tear of right supraspinatus tendon  M75.101 Ambulatory Referral to Physical Therapy                     Assessment  Impairments: abnormal or restricted ROM, activity intolerance, impaired physical strength, lacks appropriate home exercise program, pain with function and poor posture   Symptom irritability: moderate    Assessment details: Rosario Morales is a 73 y.o. female presenting to physical therapy for an initial evaluation with a right shoulder rotator cuff tear s/p dislocation 24. The patient demonstrates decreased and painful right shoulder active and passive ROM and decreased shoulder strength. These deficits have limited the patient's ability to complete ADLs, cooking, avocational responsibilities, and recreational activities. This patient would benefit from OP PT services to address their impairments and functional limitations to maximize functional mobility. The patient was educated on post operative expectations following a RTC repair, her post operative protocol, and was provided a HEP focusing on gentle ROM exercises. She demonstrated/verbalized understanding all education.  Understanding of Dx/Px/POC: excellent     Prognosis: good    Goals  STG to be achieved in 4 weeks:   The patient will report a decrease in right shoulder \"at worst\" subjective pain rating score to at least 6/10 to allow for improved tolerance for functional activities.   The patient will increase right shoulder PROM to at least 90 degrees to allow for improved functional mobility.   The patient will increase right shoulder flexion MMT score to at least 3-/5 to allow for improved functional mobility.     LTG to be achieved by DC:   The patient will be independent in comprehensive HEP.   The patient will report no pain with usual activities. " "  The patient will improve all right shoulder AROM to WFL to allow for improved functional mobility.   The patient will increase all right shoulder MMT score to WFL to allow for improved functional mobility.   The patient will be able to complete all household chores and cooking without pain or difficulties.       Plan  Patient would benefit from: skilled physical therapy  Planned modality interventions: thermotherapy: hydrocollator packs, TENS and cryotherapy    Planned therapy interventions: manual therapy, joint mobilization, neuromuscular re-education, patient education, flexibility, strengthening, stretching, therapeutic activities, therapeutic exercise, home exercise program and postural training    Frequency: 2x week  Duration in weeks: 4  Plan of Care beginning date: 8/9/2024  Plan of Care expiration date: 9/6/2024  Treatment plan discussed with: patient        Subjective Evaluation    History of Present Illness  Mechanism of injury: Rosario presents with chief complaints of right shoulder pain following a fall on 6/11/24. She tripped from a parking curb and landed directly onto her right shoulder. She states that her shoulder dislocated in the fall to where she was unable to lower her arm below her shoulder height. She presented to the [facility] where Xrays revealed that her shoulder had dislocated.  She underwent reduction and was advised to follow-up with Orthopedics. At her first follow up with Orthopedics on 6/17/24 she was referred for an MRI as there was concern for RTC pathology. 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 Complete insertional tear of the supraspinatus, torn tendon edge retracted proximally by 1.2 cm. No muscle atrophy.\" She had a consultation with Orthopedic Surgery at which time it was recommended that she underwent a surgical intervention for RTC repair. Tentatively this is scheduled for " 24. She was also advised on attending PT pre-operatively to improve passive and active ROM.               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   Tenderness of the biceps, supraspinatus and upper trapezius.     Tenderness     Right Shoulder  Tenderness in the biceps tendon (proximal) and supraspinatus tendon. No tenderness in the AC joint, clavicle, coracoid process, infraspinatus tendon, lateral scapula, medial scapula and scapular spine.     Active Range of Motion   Left Shoulder   Flexion: 165 degrees   Abduction: 165 degrees   External rotation BTH: T1   Internal rotation BTB: T8     Right Shoulder   Flexion: 60 degrees with pain  Abduction: 25 degrees with pain  External rotation BTH: Active external rotation behind the head: R ear. with pain  Internal rotation BTB: sacrum with pain    Additional Active Range of Motion Details  UT Compensation with L Shoulder AROM    Passive Range of Motion     Right Shoulder   Flexion: 70 degrees with pain  Abduction: 70 degrees with pain  External rotation 45°: 50 degrees with pain  Internal  "rotation 45°: 50 degrees with pain    Strength/Myotome Testing     Left Shoulder   Normal muscle strength    Right Shoulder     Planes of Motion   Flexion: 2-   Abduction: 2-   External rotation at 0°: 2-   Internal rotation at 0°: 2-              Precautions: Right shoulder rotator cuff tear s/p dislocation 06/11/2024  Per Ortho Orders: \"improvement of passive range of motion, active motion. Scapulothoracic mechanics. Modalities as needed. Surgery scheduled 8/21/24\"  Access Code: [email or web address]    POC expires Unit limit Auth  expiration date PT/OT + Visit Limit?   9/6/24 N/A N/A BOMN                 Visit/Unit Tracking  AUTH Status:  Date 8/9              BOMN Used 1               Remaining                     Manuals 8/9            Gentle R Shoulder PROM                                                    Neuro Re-Ed             Scapular retractions             Bwd shoulder rolls                                                                              Ther Ex             UBE fwd and retro for shoulder ROM             Pulleys              Finger ladder              Table slides flex, scapt HEP            Pendulums- fwd/bwd, left/right, cw/ccw HEP            Elbow AROM             Wrist AROM             Gripping             Supine shoulder flexion AAROM with cane or opp UE             Supine cane shoulder ER AAROM                          Pt education PT POC, HEP, post op expectations            Ther Activity                                       Gait Training                                       Modalities                                            "

## 2024-08-13 ENCOUNTER — OFFICE VISIT (OUTPATIENT)
Dept: PHYSICAL THERAPY | Facility: CLINIC | Age: 74
End: 2024-08-13
Payer: MEDICARE

## 2024-08-13 DIAGNOSIS — S43.004S CLOSED DISLOCATION OF RIGHT SHOULDER, SEQUELA: Primary | ICD-10-CM

## 2024-08-13 DIAGNOSIS — M75.101 TEAR OF RIGHT SUPRASPINATUS TENDON: Primary | ICD-10-CM

## 2024-08-13 DIAGNOSIS — S42.254A CLOSED NONDISPLACED FRACTURE OF GREATER TUBEROSITY OF RIGHT HUMERUS, INITIAL ENCOUNTER: ICD-10-CM

## 2024-08-13 DIAGNOSIS — M25.511 RIGHT SHOULDER PAIN, UNSPECIFIED CHRONICITY: ICD-10-CM

## 2024-08-13 PROCEDURE — 97110 THERAPEUTIC EXERCISES: CPT

## 2024-08-13 PROCEDURE — 97140 MANUAL THERAPY 1/> REGIONS: CPT

## 2024-08-13 NOTE — PROGRESS NOTES
"Daily Note     Today's date: 2024  Patient name: Rosario Morales  : 1950  MRN: [id number]  Referring provider: Meagan Shukla PA-C  Dx:   Encounter Diagnosis     ICD-10-CM    1. Tear of right supraspinatus tendon  M75.101                      Subjective: Patient reports no changes to her shoulder. She was able to do her HEP, but did have questions about the length of time vs reps of her pendulum exercises.      Objective: See treatment diary below      Assessment: Tolerated treatment well. Reviewed reps vs hold times for pendulum and table slide exercises with good understanding demonstrated. Good ROM demonstrated during table slide exercise. Patient demonstrated fatigue post treatment, exhibited good technique with therapeutic exercises, and would benefit from continued PT      Plan: Continue per plan of care.      Precautions: Right shoulder rotator cuff tear s/p dislocation 2024  Per Ortho Orders: \"improvement of passive range of motion, active motion. Scapulothoracic mechanics. Modalities as needed. Surgery scheduled 24\"  Access Code: [email or web address]    POC expires Unit limit Auth  expiration date PT/OT + Visit Limit?   24 N/A N/A BOMN                 Visit/Unit Tracking  AUTH Status:  Date              BOMN Used 1 2              Remaining                     Manuals            Gentle R Shoulder PROM  BE                                                  Neuro Re-Ed             Scapular retractions  5\"x20           Bwd shoulder rolls                                                                              Ther Ex             UBE fwd and retro for shoulder ROM             Pulleys   3 min           Finger ladder   5\"x10            Table slides flex, scapt HEP 5\"x20 ea           Pendulums- fwd/bwd, left/right, cw/ccw HEP            Elbow AROM  30x ea           Wrist AROM  30x ea            Gripping  Green 5\"x30           Supine shoulder flexion AAROM with cane or opp UE         "     Supine cane shoulder ER CELINA                          Pt education PT POC, HEP, post op expectations            Ther Activity                                       Gait Training                                       Modalities

## 2024-08-15 ENCOUNTER — OFFICE VISIT (OUTPATIENT)
Dept: PHYSICAL THERAPY | Facility: CLINIC | Age: 74
End: 2024-08-15
Payer: MEDICARE

## 2024-08-15 DIAGNOSIS — M75.101 TEAR OF RIGHT SUPRASPINATUS TENDON: Primary | ICD-10-CM

## 2024-08-15 PROCEDURE — 97110 THERAPEUTIC EXERCISES: CPT

## 2024-08-15 PROCEDURE — 97140 MANUAL THERAPY 1/> REGIONS: CPT

## 2024-08-15 NOTE — PROGRESS NOTES
"Daily Note     Today's date: 8/15/2024  Patient name: Rosario Morales  : 1950  MRN: [id number]  Referring provider: Meagan Shukla PA-C  Dx:   Encounter Diagnosis     ICD-10-CM    1. Tear of right supraspinatus tendon  M75.101           Start Time: 0800  Stop Time: 0842  Total time in clinic (min): 42 minutes    Subjective: Pt noted that she has a hard time sleeping at nights and has a constant ache in her R arm.       Objective: See treatment diary below      Assessment:  Continued with treatment session, R shoulder s/p fall 24 w/ tear of supraspinatus after dislocation. Surgery scheduled for 24.   Overall able to complete all ROM exercises with no changes in pain status reported.  Tolerated treatment well. Patient exhibited good technique with therapeutic exercises and would benefit from continued PT  DOMS may be noted sp treatment session.   May use ice to decrease any soreness and or p!.       Plan: Continue per plan of care.   Progress as able.      Precautions: Right shoulder rotator cuff tear s/p dislocation 2024  Per Ortho Orders: \"improvement of passive range of motion, active motion. Scapulothoracic mechanics. Modalities as needed. Surgery scheduled 24\"  Access Code: [email or web address]    POC expires Unit limit Auth  expiration date PT/OT + Visit Limit?   24 N/A N/A BOMN                 Visit/Unit Tracking  AUTH Status:  Date 8/9 8/13 8/15            BOMN Used 1 2 3             Remaining                     Manuals 8/9 8/13 8/15          Gentle R Shoulder PROM  BE SC                                                 Neuro Re-Ed             Scapular retractions  5\"x20           Bwd shoulder rolls                                                                              Ther Ex             UBE fwd and retro for shoulder ROM             Pulleys   3 min 5min           Finger ladder   5\"x10  5\" 10x          Table slides flex, scapt HEP 5\"x20 ea 5\" 2x 10 ea.           Pendulums- fwd/bwd, " "left/right, cw/ccw HEP            Elbow AROM  30x ea           Wrist AROM  30x ea            Gripping  Green 5\"x30           Supine shoulder flexion AAROM with cane or opp UE   2x 10 5\"           Supine cane shoulder ER AAROM   10 x 10\"                        Pt education PT POC, HEP, post op expectations            Ther Activity                                       Gait Training                                       Modalities                                              "

## 2024-08-15 NOTE — PRE-PROCEDURE INSTRUCTIONS
Pre-Surgery Instructions:   Medication Instructions    Ascorbic Acid (vitamin C) 1000 MG tablet Hold day of surgery.    Calcium Carb-Cholecalciferol (Calcium 500 + D) 500-5 MG-MCG TABS Hold day of surgery.    cyanocobalamin (VITAMIN B-12) 100 mcg tablet Hold day of surgery.    cycloSPORINE (RESTASIS) 0.05 % ophthalmic emulsion Take day of surgery.   Medication instructions for day surgery reviewed. Please use only a sip of water to take your instructed medications. Avoid all over the counter vitamins, supplements and NSAIDS for one week prior to surgery per anesthesia guidelines. Tylenol is ok to take as needed.     You will receive a call one business day prior to surgery with an arrival time and hospital directions. If your surgery is scheduled on a Monday, the hospital will be calling you on the Friday prior to your surgery. If you have not heard from anyone by 8pm, please call the hospital supervisor through the hospital  at [phone]. ([place] [phone] or [place] [phone]).    Do not eat or drink anything after midnight the night before your surgery, including candy, mints, lifesavers, or chewing gum. Do not drink alcohol 24hrs before your surgery. Try not to smoke at least 24hrs before your surgery.       Follow the pre surgery showering instructions as listed in the “My Surgical Experience Booklet” or otherwise provided by your surgeon's office. Do not use a blade to shave the surgical area 1 week before surgery. It is okay to use a clean electric clippers up to 24 hours before surgery. Do not apply any lotions, creams, including makeup, cologne, deodorant, or perfumes after showering on the day of your surgery. Do not use dry shampoo, hair spray, hair gel, or any type of hair products.     No contact lenses, eye make-up, or artificial eyelashes. Remove nail polish, including gel polish, and any artificial, gel, or acrylic nails if possible. Remove all jewelry including rings and body  piercing jewelry.     Wear causal clothing that is easy to take on and off. Consider your type of surgery.    Keep any valuables, jewelry, piercings at home. Please bring any specially ordered equipment (sling, braces) if indicated.    Arrange for a responsible person to drive you to and from the hospital on the day of your surgery. Please confirm the visitor policy for the day of your procedure when you receive your phone call with an arrival time.     Call the surgeon's office with any new illnesses, exposures, or additional questions prior to surgery.    Please reference your “My Surgical Experience Booklet” for additional information to prepare for your upcoming surgery.    Pt. Verbalized an understanding of all instructions reviewed and offers no concerns at this time.Pt. Verbalized an understanding of all instructions reviewed and offers no concerns at this time.

## 2024-08-16 ENCOUNTER — OFFICE VISIT (OUTPATIENT)
Age: 74
End: 2024-08-16
Payer: MEDICARE

## 2024-08-16 VITALS
HEART RATE: 70 BPM | BODY MASS INDEX: 22.66 KG/M2 | WEIGHT: 120 LBS | SYSTOLIC BLOOD PRESSURE: 118 MMHG | HEIGHT: 61 IN | RESPIRATION RATE: 16 BRPM | OXYGEN SATURATION: 98 % | DIASTOLIC BLOOD PRESSURE: 72 MMHG

## 2024-08-16 DIAGNOSIS — Z01.818 PRE-OP EXAMINATION: Primary | ICD-10-CM

## 2024-08-16 DIAGNOSIS — M75.101 TEAR OF RIGHT SUPRASPINATUS TENDON: ICD-10-CM

## 2024-08-16 PROCEDURE — 99214 OFFICE O/P EST MOD 30 MIN: CPT

## 2024-08-16 NOTE — PROGRESS NOTES
INTERNAL MEDICINE PRE-OPERATIVE EVALUATION  [facility]YSICIAN [facility] - [facility] INTERNAL MEDICINE [address]    NAME: Rosario Morales  AGE: 73 y.o. SEX: female  : 1950     DATE: 2024     Internal Medicine Pre-Operative Evaluation:     Chief Complaint: Pre-operative Evaluation     Surgery: Right shoulder arthroscopic rotator cuff repair  Anticipated Date of Surgery: 2024  Referring Provider: Meagan Shukla PA-C        History of Present Illness:     Rosario Morales is a 73 y.o. female who presents to the office today for a preoperative consultation at the request of surgeon, Dr. Roach, who plans on performing right shoulder arthroscopic rotator cuff repair on 2024. Planned anesthesia is general and interscalene block . Patient has a bleeding risk of: no recent abnormal bleeding. Patient does not have objections to receiving blood products if needed. Current anti-platelet/anti-coagulation medications that the patient is prescribed includes:  None .     Alcohol use: Yes, 1.  Tobacco use (if yes, packs per day, # of years smoked, date you quit smoking): None  Illicit drug use: None  Recent URI: none  Allergy to latex products? none  Do you have a heart condition, chest pain, HTN? No recent CP or history of heart disease.  Do you experience SOB, or have asthma, bronchitis, or breathing problems? No SOB or history of pulmonary disease.   Do you take NSAIDs like ASA, ibuprofen, or naproxen? no  Do you take any herbal supplements, alternative medicines, or vitamins? B12, calcium-carb, and vitamin C.  Do you have bleeding problems? none  Do you have loose, chipped, false teeth, or oral piercings? none  Do you wear contacts? Just glasses.   Have you ever received, or needed a blood transfusion?  No    Assessment of Chronic Conditions:   - Dyslipidemia stable.     Assessment of Cardiac Risk:  Denies unstable or severe angina or MI in the last 6 weeks or history of stent placement in the last year    Denies decompensated heart failure (e.g. New onset heart failure, NYHA functional class IV heart failure, or worsening existing heart failure)  Denies significant arrhythmias such as high grade AV block, symptomatic ventricular arrhythmia, newly recognized ventricular tachycardia, supraventricular tachycardia with resting heart rate >100, or symptomatic bradycardia  Denies severe heart valve disease including aortic stenosis or symptomatic mitral stenosis     Exercise Capacity:  Able to walk 4 blocks without symptoms?: Yes  Able to walk 2 flights without symptoms?: Yes    Prior Anesthesia Reactions: No     Personal history of venous thromboembolic disease? No    History of steroid use for >2 weeks within last year? No      Review of Systems:     Review of Systems   Constitutional:  Negative for chills, fatigue and fever.   HENT:  Negative for ear discharge, ear pain, postnasal drip, rhinorrhea, sinus pressure, sinus pain, sore throat, tinnitus and trouble swallowing.    Eyes:  Negative for pain, discharge and itching.   Respiratory:  Negative for cough, shortness of breath and wheezing.    Cardiovascular:  Negative for chest pain, palpitations and leg swelling.   Gastrointestinal:  Negative for abdominal pain, constipation, diarrhea, nausea and vomiting.   Endocrine: Negative for polydipsia, polyphagia and polyuria.   Genitourinary:  Negative for difficulty urinating, frequency, hematuria and urgency.   Musculoskeletal:  Negative for arthralgias, joint swelling and myalgias.   Skin:  Negative for color change.   Allergic/Immunologic: Negative for environmental allergies.   Neurological:  Negative for dizziness, weakness, light-headedness, numbness and headaches.   Hematological:  Negative for adenopathy.   Psychiatric/Behavioral:  Negative for decreased concentration and sleep disturbance. The patient is not nervous/anxious.         Problem List:     Patient Active Problem List   Diagnosis    Midline cystocele     Osteopenia    Rectocele    Uterine prolapse    Postmenopausal    Other constipation    Other osteoporosis without current pathological fracture    Asymptomatic postmenopausal status    Other idiopathic scoliosis, thoracolumbar region    Memory loss    Vasovagal syncope    Duodenal ulcer    Esophageal ring    Dyslipidemia    Right shoulder pain, unspecified chronicity    Closed dislocation of right shoulder, sequela    Closed nondisplaced fracture of greater tuberosity of right humerus, initial encounter        Allergies:     Allergies   Allergen Reactions    Other Other (See Comments) and Sneezing     CATS-itchy eyes, heaviness in chest        Current Medications:       Current Outpatient Medications:     Ascorbic Acid (vitamin C) 1000 MG tablet, Take 1,000 mg by mouth daily, Disp: , Rfl:     Calcium Carb-Cholecalciferol (Calcium 500 + D) 500-5 MG-MCG TABS, Take 2 tablets by mouth in the morning, Disp: , Rfl:     cyanocobalamin (VITAMIN B-12) 100 mcg tablet, Take by mouth daily, Disp: , Rfl:     cycloSPORINE (RESTASIS) 0.05 % ophthalmic emulsion, Administer 1 drop to both eyes 2 (two) times a day, Disp: , Rfl:      Past History:     Past Medical History:   Diagnosis Date    Acute bronchitis 03/30/2015    Acute upper respiratory infection 04/16/2014    Allergic blepharitis 06/18/2015    Benign neoplasm of skin     Plantar fasciitis 06/18/2015    Thumb pain 06/18/2015        Past Surgical History:   Procedure Laterality Date    BREAST EXCISIONAL BIOPSY Left     1992    COLONOSCOPY      EGD      2021    TUBAL LIGATION      LAST ASSESSED: [id number]        Family History   Problem Relation Age of Onset    Migraines Mother     Osteoporosis Mother     Scoliosis Mother     Throat cancer Father     Depression Daughter         WITH ANXIETY     Migraines Daughter     Rheum arthritis Daughter     Urinary tract infection Daughter     No Known Problems Daughter     Depression Son         WITH ANXIETY     Breast cancer Neg Hx      Colon cancer Neg Hx     Uterine cancer Neg Hx     Cervical cancer Neg Hx     Ovarian cancer Neg Hx         Social History     Socioeconomic History    Marital status: /Civil Union     Spouse name: Not on file    Number of children: Not on file    Years of education: Not on file    Highest education level: Not on file   Occupational History    Not on file   Tobacco Use    Smoking status: Never    Smokeless tobacco: Never   Vaping Use    Vaping status: Never Used   Substance and Sexual Activity    Alcohol use: Yes     Alcohol/week: 7.0 standard drinks of alcohol     Types: 7 Glasses of wine per week     Comment: daily    Drug use: No    Sexual activity: Yes     Partners: Male     Birth control/protection: Surgical   Other Topics Concern    Not on file   Social History Narrative    Not on file     Social Determinants of Health     Financial Resource Strain: Low Risk  (6/27/2023)    Overall Financial Resource Strain (CARDIA)     Difficulty of Paying Living Expenses: Not very hard   Food Insecurity: No Food Insecurity (7/16/2024)    Hunger Vital Sign     Worried About Running Out of Food in the Last Year: Never true     Ran Out of Food in the Last Year: Never true   Transportation Needs: No Transportation Needs (7/16/2024)    PRAPARE - Transportation     Lack of Transportation (Medical): No     Lack of Transportation (Non-Medical): No   Physical Activity: Sufficiently Active (4/5/2021)    Exercise Vital Sign     Days of Exercise per Week: 7 days     Minutes of Exercise per Session: 30 min   Stress: Stress Concern Present (4/5/2021)    Citizen of [place] of Occupational Health - Occupational Stress Questionnaire     Feeling of Stress : To some extent   Social Connections: Not on file   Intimate Partner Violence: Not on file   Housing Stability: Unknown (7/16/2024)    Housing Stability Vital Sign     Unable to Pay for Housing in the Last Year: No     Number of Times Moved in the Last Year: Not on file     Homeless in  "the Last Year: No        Physical Exam:      /72 (BP Location: Left arm, Patient Position: Sitting, Cuff Size: Standard)   Pulse 70   Resp 16   Ht 5' 1\" (1.549 m)   Wt 54.4 kg (120 lb)   SpO2 98%   BMI 22.67 kg/m²     Physical Exam  Vitals and nursing note reviewed.   Constitutional:       General: She is awake.      Appearance: Normal appearance. She is well-developed, well-groomed and normal weight.   HENT:      Head: Normocephalic and atraumatic.      Right Ear: Hearing and external ear normal.      Left Ear: Hearing and external ear normal.      Nose: Nose normal.      Mouth/Throat:      Lips: Pink.      Mouth: Mucous membranes are moist.   Eyes:      General: Lids are normal. Vision grossly intact. Gaze aligned appropriately.      Conjunctiva/sclera: Conjunctivae normal.   Neck:      Vascular: No carotid bruit.      Trachea: Trachea and phonation normal.   Cardiovascular:      Rate and Rhythm: Normal rate and regular rhythm.      Heart sounds: Normal heart sounds, S1 normal and S2 normal. No murmur heard.     No friction rub. No gallop.   Pulmonary:      Effort: Pulmonary effort is normal.      Breath sounds: Normal breath sounds and air entry. No wheezing, rhonchi or rales.   Abdominal:      General: Abdomen is flat.   Musculoskeletal:      Right lower leg: No edema.      Left lower leg: No edema.   Lymphadenopathy:      Cervical: No cervical adenopathy.   Skin:     General: Skin is warm.      Capillary Refill: Capillary refill takes less than 2 seconds.   Neurological:      Mental Status: She is alert.   Psychiatric:         Attention and Perception: Attention and perception normal.         Mood and Affect: Mood and affect normal.         Speech: Speech normal.         Behavior: Behavior normal. Behavior is cooperative.         Thought Content: Thought content normal.         Cognition and Memory: Cognition and memory normal.         Judgment: Judgment normal.           Data:     Pre-operative " work-up    Laboratory Results:  Labs are stable.    EKG:  EKG normal sinus rhythm.    Chest x-ray:  Chest x-ray stable.    Previous cardiopulmonary studies within the past year:  Echocardiogram: None  Cardiac Catheterization: None  Stress Test: None  Pulmonary Function Testing: None       Assessment:     1. Pre-op examination      Patient is cleared for surgery.      2. Tear of right supraspinatus tendon  Ambulatory referral to [facility]           Plan:     73 y.o. female with planned surgery: Right shoulder arthroscopic rotator cuff repair.      Cardiac Risk Estimation: per the Revised Cardiac Risk Index (Circ. 100:1043, 1999), the patient's risk factors for cardiac complications include  none , putting her in: RCI RISK CLASS I (0 risk factors, risk of major cardiac compl. appr. 0.5%).    1. Further preoperative workup as follows:   - None; no further preoperative work-up is required    2. Medication Management/Recommendations:   - Patient has been instructed to avoid herbs or non-directed vitamins the week prior to surgery to ensure no drug interactions with perioperative surgical and anesthetic medications.  - Patient has been instructed to avoid aspirin containing medications or non-steroidal anti-inflammatory drugs for the week preceding surgery.    3. Prophylaxis for cardiac events with perioperative beta-blockers: not indicated.    4. Patient requires further consultation with: None    Clearance  Patient is CLEARED for surgery without any additional cardiac testing.     Meagan Mason PA-C  [facility] INTERNAL MEDICINE [address]  [address] [address]  [facility] [place] [address]  Phone#  [phone]  Fax#  [phone]

## 2024-08-19 ENCOUNTER — OFFICE VISIT (OUTPATIENT)
Dept: PHYSICAL THERAPY | Facility: CLINIC | Age: 74
End: 2024-08-19
Payer: MEDICARE

## 2024-08-19 DIAGNOSIS — M75.101 TEAR OF RIGHT SUPRASPINATUS TENDON: Primary | ICD-10-CM

## 2024-08-19 PROCEDURE — 97110 THERAPEUTIC EXERCISES: CPT

## 2024-08-19 PROCEDURE — 97140 MANUAL THERAPY 1/> REGIONS: CPT

## 2024-08-19 NOTE — PROGRESS NOTES
"Daily Note     Today's date: 2024  Patient name: Rosario Morales  : 1950  MRN: [id number]  Referring provider: Meagan Shukla PA-C  Dx:   Encounter Diagnosis     ICD-10-CM    1. Tear of right supraspinatus tendon  M75.101           Start Time: 08  Stop Time: 933  Total time in clinic (min): 40 minutes    Subjective: Pt noted that she is improving mobility with getting ready for surgery; cleaning and gardening.       Objective: See treatment diary below      Assessment:  Continued with treatment session. Surgery scheduled for 24.   Overall able to complete all ROM exercises with no changes in pain status reported. Pt will return to tx after scheduled surgery. Tolerated treatment well. Patient exhibited good technique with therapeutic exercises and would benefit from continued PT          Plan: Continue per plan of care.   Progress as able.      Precautions: Right shoulder rotator cuff tear s/p dislocation 2024  Per Ortho Orders: \"improvement of passive range of motion, active motion. Scapulothoracic mechanics. Modalities as needed. Surgery scheduled 24\"  Access Code: [email or web address]    POC expires Unit limit Auth  expiration date PT/OT + Visit Limit?   24 N/A N/A BOMN                 Visit/Unit Tracking  AUTH Status:  Date 8/9 8/13 8/15 8/19           BOMN Used 1 2 3 4            Remaining                     Manuals 8/9 8/13 8/15 8/19         Gentle R Shoulder PROM  BE SC SA                                                Neuro Re-Ed             Scapular retractions  5\"x20  5\"x20         Bwd shoulder rolls                                                                              Ther Ex             UBE fwd and retro for shoulder ROM             Pulleys   3 min 5min  5min          Finger ladder   5\"x10  5\" 10x 5\" 10x         Table slides flex, scapt HEP 5\"x20 ea 5\" 2x 10 ea.  Nv          Pendulums- fwd/bwd, left/right, cw/ccw HEP            Elbow AROM  30x ea  30x ea         Wrist AROM  " "30x ea   30x ea          Gripping  Green 5\"x30  Green 5\"x30         Supine shoulder flexion AAROM with cane or opp UE   2x 10 5\"  2x 10 5\"         Supine cane shoulder ER AAROM   10 x 10\"  10 x 10\"                      Pt education PT POC, HEP, post op expectations            Ther Activity                                       Gait Training                                       Modalities                                              "

## 2024-08-21 ENCOUNTER — HOSPITAL ENCOUNTER (OUTPATIENT)
Facility: HOSPITAL | Age: 74
Setting detail: OUTPATIENT SURGERY
Discharge: HOME | End: 2024-08-21
Attending: ORTHOPAEDIC SURGERY | Admitting: ORTHOPAEDIC SURGERY
Payer: MEDICARE

## 2024-08-21 ENCOUNTER — ANESTHESIA (OUTPATIENT)
Dept: PERIOP | Facility: HOSPITAL | Age: 74
End: 2024-08-21
Payer: MEDICARE

## 2024-08-21 ENCOUNTER — ANESTHESIA EVENT (OUTPATIENT)
Dept: PERIOP | Facility: HOSPITAL | Age: 74
End: 2024-08-21
Payer: MEDICARE

## 2024-08-21 VITALS
WEIGHT: 119.71 LBS | HEART RATE: 69 BPM | BODY MASS INDEX: 22.6 KG/M2 | DIASTOLIC BLOOD PRESSURE: 61 MMHG | RESPIRATION RATE: 16 BRPM | SYSTOLIC BLOOD PRESSURE: 120 MMHG | HEIGHT: 61 IN | TEMPERATURE: 96.5 F | OXYGEN SATURATION: 91 %

## 2024-08-21 DIAGNOSIS — S46.011D TRAUMATIC COMPLETE TEAR OF RIGHT ROTATOR CUFF, SUBSEQUENT ENCOUNTER: Primary | ICD-10-CM

## 2024-08-21 PROCEDURE — 23430 REPAIR BICEPS TENDON: CPT | Performed by: PHYSICIAN ASSISTANT

## 2024-08-21 PROCEDURE — C1713 ANCHOR/SCREW BN/BN,TIS/BN: HCPCS | Performed by: ORTHOPAEDIC SURGERY

## 2024-08-21 PROCEDURE — 29826 SHO ARTHRS SRG DECOMPRESSION: CPT | Performed by: ORTHOPAEDIC SURGERY

## 2024-08-21 PROCEDURE — 29826 SHO ARTHRS SRG DECOMPRESSION: CPT | Performed by: PHYSICIAN ASSISTANT

## 2024-08-21 PROCEDURE — 29827 SHO ARTHRS SRG RT8TR CUF RPR: CPT | Performed by: ORTHOPAEDIC SURGERY

## 2024-08-21 PROCEDURE — 29827 SHO ARTHRS SRG RT8TR CUF RPR: CPT | Performed by: PHYSICIAN ASSISTANT

## 2024-08-21 PROCEDURE — 23430 REPAIR BICEPS TENDON: CPT | Performed by: ORTHOPAEDIC SURGERY

## 2024-08-21 PROCEDURE — C9290 INJ, BUPIVACAINE LIPOSOME: HCPCS | Performed by: ANESTHESIOLOGY

## 2024-08-21 DEVICE — 4.75MM BC KNOTLESS SWIVELOCK
Type: IMPLANTABLE DEVICE | Site: SHOULDER | Status: FUNCTIONAL
Brand: ARTHREX®

## 2024-08-21 DEVICE — SELF-PUNCHING TRIPLE LOADED FIBERTAK
Type: IMPLANTABLE DEVICE | Site: SHOULDER | Status: FUNCTIONAL
Brand: ARTHREX®

## 2024-08-21 DEVICE — PROXIMAL TENODESIS IMPLANT SYSTEM REV: 0
Type: IMPLANTABLE DEVICE | Site: SHOULDER | Status: FUNCTIONAL
Brand: ARTHREX®

## 2024-08-21 RX ORDER — CEFAZOLIN SODIUM 1 G/50ML
1000 SOLUTION INTRAVENOUS ONCE
Status: DISCONTINUED | OUTPATIENT
Start: 2024-08-21 | End: 2024-08-21 | Stop reason: HOSPADM

## 2024-08-21 RX ORDER — PROPOFOL 10 MG/ML
INJECTION, EMULSION INTRAVENOUS AS NEEDED
Status: DISCONTINUED | OUTPATIENT
Start: 2024-08-21 | End: 2024-08-21

## 2024-08-21 RX ORDER — ONDANSETRON 2 MG/ML
INJECTION INTRAMUSCULAR; INTRAVENOUS AS NEEDED
Status: DISCONTINUED | OUTPATIENT
Start: 2024-08-21 | End: 2024-08-21

## 2024-08-21 RX ORDER — PROPOFOL 10 MG/ML
INJECTION, EMULSION INTRAVENOUS CONTINUOUS PRN
Status: DISCONTINUED | OUTPATIENT
Start: 2024-08-21 | End: 2024-08-21

## 2024-08-21 RX ORDER — LIDOCAINE HYDROCHLORIDE 10 MG/ML
0.5 INJECTION, SOLUTION EPIDURAL; INFILTRATION; INTRACAUDAL; PERINEURAL ONCE AS NEEDED
Status: DISCONTINUED | OUTPATIENT
Start: 2024-08-21 | End: 2024-08-21 | Stop reason: HOSPADM

## 2024-08-21 RX ORDER — LIDOCAINE HYDROCHLORIDE 10 MG/ML
INJECTION, SOLUTION EPIDURAL; INFILTRATION; INTRACAUDAL; PERINEURAL AS NEEDED
Status: DISCONTINUED | OUTPATIENT
Start: 2024-08-21 | End: 2024-08-21

## 2024-08-21 RX ORDER — ROCURONIUM BROMIDE 10 MG/ML
INJECTION, SOLUTION INTRAVENOUS AS NEEDED
Status: DISCONTINUED | OUTPATIENT
Start: 2024-08-21 | End: 2024-08-21

## 2024-08-21 RX ORDER — SODIUM CHLORIDE, SODIUM LACTATE, POTASSIUM CHLORIDE, AND CALCIUM CHLORIDE .6; .31; .03; .02 G/100ML; G/100ML; G/100ML; G/100ML
IRRIGANT IRRIGATION AS NEEDED
Status: DISCONTINUED | OUTPATIENT
Start: 2024-08-21 | End: 2024-08-21 | Stop reason: HOSPADM

## 2024-08-21 RX ORDER — HYDROMORPHONE HCL/PF 1 MG/ML
0.5 SYRINGE (ML) INJECTION
Status: DISCONTINUED | OUTPATIENT
Start: 2024-08-21 | End: 2024-08-21 | Stop reason: HOSPADM

## 2024-08-21 RX ORDER — EPHEDRINE SULFATE 50 MG/ML
INJECTION INTRAVENOUS AS NEEDED
Status: DISCONTINUED | OUTPATIENT
Start: 2024-08-21 | End: 2024-08-21

## 2024-08-21 RX ORDER — MIDAZOLAM HYDROCHLORIDE 2 MG/2ML
INJECTION, SOLUTION INTRAMUSCULAR; INTRAVENOUS
Status: COMPLETED | OUTPATIENT
Start: 2024-08-21 | End: 2024-08-21

## 2024-08-21 RX ORDER — SODIUM CHLORIDE, SODIUM LACTATE, POTASSIUM CHLORIDE, CALCIUM CHLORIDE 600; 310; 30; 20 MG/100ML; MG/100ML; MG/100ML; MG/100ML
50 INJECTION, SOLUTION INTRAVENOUS CONTINUOUS
Status: DISCONTINUED | OUTPATIENT
Start: 2024-08-21 | End: 2024-08-21 | Stop reason: HOSPADM

## 2024-08-21 RX ORDER — ONDANSETRON 2 MG/ML
4 INJECTION INTRAMUSCULAR; INTRAVENOUS EVERY 6 HOURS PRN
Status: CANCELLED | OUTPATIENT
Start: 2024-08-21

## 2024-08-21 RX ORDER — OXYCODONE AND ACETAMINOPHEN 5; 325 MG/1; MG/1
1 TABLET ORAL ONCE
Status: CANCELLED | OUTPATIENT
Start: 2024-08-21 | End: 2024-08-21

## 2024-08-21 RX ORDER — MIDAZOLAM HYDROCHLORIDE 2 MG/2ML
INJECTION, SOLUTION INTRAMUSCULAR; INTRAVENOUS AS NEEDED
Status: DISCONTINUED | OUTPATIENT
Start: 2024-08-21 | End: 2024-08-21

## 2024-08-21 RX ORDER — ONDANSETRON 2 MG/ML
4 INJECTION INTRAMUSCULAR; INTRAVENOUS ONCE AS NEEDED
Status: DISCONTINUED | OUTPATIENT
Start: 2024-08-21 | End: 2024-08-21 | Stop reason: HOSPADM

## 2024-08-21 RX ORDER — OXYCODONE AND ACETAMINOPHEN 5; 325 MG/1; MG/1
1 TABLET ORAL EVERY 4 HOURS PRN
Qty: 20 TABLET | Refills: 0 | Status: SHIPPED | OUTPATIENT
Start: 2024-08-21

## 2024-08-21 RX ORDER — DEXAMETHASONE SODIUM PHOSPHATE 10 MG/ML
INJECTION, SOLUTION INTRAMUSCULAR; INTRAVENOUS AS NEEDED
Status: DISCONTINUED | OUTPATIENT
Start: 2024-08-21 | End: 2024-08-21

## 2024-08-21 RX ORDER — FENTANYL CITRATE 50 UG/ML
INJECTION, SOLUTION INTRAMUSCULAR; INTRAVENOUS
Status: COMPLETED | OUTPATIENT
Start: 2024-08-21 | End: 2024-08-21

## 2024-08-21 RX ORDER — CHLORHEXIDINE GLUCONATE 40 MG/ML
SOLUTION TOPICAL DAILY PRN
Status: DISCONTINUED | OUTPATIENT
Start: 2024-08-21 | End: 2024-08-21 | Stop reason: HOSPADM

## 2024-08-21 RX ORDER — ASPIRIN 81 MG/1
81 TABLET ORAL 2 TIMES DAILY
Qty: 28 TABLET | Refills: 0 | Status: SHIPPED | OUTPATIENT
Start: 2024-08-21

## 2024-08-21 RX ORDER — BUPIVACAINE HYDROCHLORIDE 5 MG/ML
INJECTION, SOLUTION EPIDURAL; INTRACAUDAL AS NEEDED
Status: DISCONTINUED | OUTPATIENT
Start: 2024-08-21 | End: 2024-08-21 | Stop reason: HOSPADM

## 2024-08-21 RX ORDER — BUPIVACAINE HYDROCHLORIDE 5 MG/ML
INJECTION, SOLUTION EPIDURAL; INTRACAUDAL
Status: COMPLETED | OUTPATIENT
Start: 2024-08-21 | End: 2024-08-21

## 2024-08-21 RX ORDER — CEFAZOLIN SODIUM 2 G/50ML
2000 SOLUTION INTRAVENOUS ONCE
Status: DISCONTINUED | OUTPATIENT
Start: 2024-08-21 | End: 2024-08-21

## 2024-08-21 RX ORDER — CHLORHEXIDINE GLUCONATE ORAL RINSE 1.2 MG/ML
15 SOLUTION DENTAL ONCE
Status: COMPLETED | OUTPATIENT
Start: 2024-08-21 | End: 2024-08-21

## 2024-08-21 RX ADMIN — LIDOCAINE HYDROCHLORIDE 50 MG: 10 INJECTION, SOLUTION EPIDURAL; INFILTRATION; INTRACAUDAL; PERINEURAL at 08:34

## 2024-08-21 RX ADMIN — SODIUM CHLORIDE, SODIUM LACTATE, POTASSIUM CHLORIDE, AND CALCIUM CHLORIDE 50 ML/HR: .6; .31; .03; .02 INJECTION, SOLUTION INTRAVENOUS at 07:40

## 2024-08-21 RX ADMIN — EPHEDRINE SULFATE 5 MG: 50 INJECTION, SOLUTION INTRAVENOUS at 09:11

## 2024-08-21 RX ADMIN — BUPIVACAINE 20 ML: 13.3 INJECTION, SUSPENSION, LIPOSOMAL INFILTRATION at 08:05

## 2024-08-21 RX ADMIN — PROPOFOL 150 MG: 10 INJECTION, EMULSION INTRAVENOUS at 08:34

## 2024-08-21 RX ADMIN — DEXAMETHASONE SODIUM PHOSPHATE 10 MG: 10 INJECTION INTRAMUSCULAR; INTRAVENOUS at 08:35

## 2024-08-21 RX ADMIN — FENTANYL CITRATE 50 MCG: 50 INJECTION, SOLUTION INTRAMUSCULAR; INTRAVENOUS at 08:03

## 2024-08-21 RX ADMIN — ONDANSETRON 4 MG: 2 INJECTION INTRAMUSCULAR; INTRAVENOUS at 08:35

## 2024-08-21 RX ADMIN — EPHEDRINE SULFATE 5 MG: 50 INJECTION, SOLUTION INTRAVENOUS at 09:43

## 2024-08-21 RX ADMIN — CHLORHEXIDINE GLUCONATE 0.12% ORAL RINSE 15 ML: 1.2 LIQUID ORAL at 07:39

## 2024-08-21 RX ADMIN — FENTANYL CITRATE 50 MCG: 50 INJECTION, SOLUTION INTRAMUSCULAR; INTRAVENOUS at 09:55

## 2024-08-21 RX ADMIN — MIDAZOLAM HYDROCHLORIDE 1 MG: 1 INJECTION, SOLUTION INTRAMUSCULAR; INTRAVENOUS at 08:31

## 2024-08-21 RX ADMIN — MIDAZOLAM HYDROCHLORIDE 1 MG: 1 INJECTION, SOLUTION INTRAMUSCULAR; INTRAVENOUS at 08:03

## 2024-08-21 RX ADMIN — BUPIVACAINE HYDROCHLORIDE 5 ML: 5 INJECTION, SOLUTION EPIDURAL; INTRACAUDAL at 08:05

## 2024-08-21 RX ADMIN — ROCURONIUM BROMIDE 50 MG: 10 INJECTION, SOLUTION INTRAVENOUS at 08:35

## 2024-08-21 RX ADMIN — ROCURONIUM BROMIDE 20 MG: 10 INJECTION, SOLUTION INTRAVENOUS at 09:55

## 2024-08-21 RX ADMIN — MIDAZOLAM HYDROCHLORIDE 1 MG: 2 INJECTION, SOLUTION INTRAMUSCULAR; INTRAVENOUS at 08:31

## 2024-08-21 NOTE — ANESTHESIA PREPROCEDURE EVALUATION
Procedure:  REPAIR ROTATOR CUFF ARTHROSCOPIC- Right shoulder arthroscopic rotator cuff repair, possible open subpectoral biceps tenodesis vs tenotomy (Right: Shoulder)    Relevant Problems   GI/HEPATIC   (+) Duodenal ulcer      MUSCULOSKELETAL   (+) Other idiopathic scoliosis, thoracolumbar region      Denies recent fever, cough or other symptom of upper respiratory tract infection.    Confirmed NPO appropriate    Physical Exam    Airway    Mallampati score: II  TM Distance: <3 FB  Neck ROM: full     Dental   No notable dental hx     Cardiovascular      Pulmonary      Other Findings  post-pubertal.      4/29/22 Exercise Stress Echo: The study is normal, after maximal exercise.    2/18/22 TTE: Normal biventricular systolic function. No significant valvular disease.    Anesthesia Plan  ASA Score- 2     Anesthesia Type- general with ASA Monitors.         Additional Monitors:     Airway Plan: ETT.    Comment: Single shot right interscalene block with exparel for post-operative pain management. Discussed benefits and risks, including bleeding, infection, nerve injury, local anesthetic systemic toxicity, Maryanne's syndrome, phrenic nerve blockade and failure.       Plan Factors-Exercise tolerance (METS): >4 METS.Exercise comment: Able to climb two flights of stairs without cardiopulmonary limitation.    Chart reviewed.        Patient is not a current smoker.  Patient did not smoke on day of surgery.            Induction- intravenous.    Postoperative Plan- Plan for postoperative opioid use. Planned trial extubation        Informed Consent- Anesthetic plan and risks discussed with patient.

## 2024-08-21 NOTE — INTERVAL H&P NOTE
H&P reviewed. After examining the patient I find no changes in the patients condition since the H&P had been written. Patient was seen and evaluated in the office where continued nonoperative vs surgical management of Right shoulder rotator cuff was discussed. In light of patients persistent shoulder pain and decreased function, MRI imaging, patient would like to move forward with surgical intervention. Risks of surgical intervention discussed in the office with patient and detailed consent obtained. Patient will go to OR on 08/21/2024 with Dr Roach for Right shoulder arthroscopic rotator cuff repair, possible open subpectoral biceps tenodesis vs tenotomy.     14 point ROS in office   Musculoskeletal Right shoulder pain      Heart RRR  Lungs CTA BL   Abdomen Present nontender       Right Shoulder:   Active range of motion   50 degrees forward flexion with pain  50 degrees abduction with pain  70 degrees external rotation   Lower thoracic internal rotation    Passive range of motion   150 degrees of forward flexion with pain at terminal flexion  There is no tenderness present over the shoulder.   Forward flexion testing 3/5 with pain  External rotation testing 4-/5 with discomfort  Internal rotation testing 5/5   Gann test is negative   The patient is neurovascularly intact distally in the extremity.      Vitals:    08/21/24 0724   BP: 158/70   Pulse: 67   Resp: 16   Temp: 98 °F (36.7 °C)   SpO2: 98%

## 2024-08-21 NOTE — DISCHARGE INSTR - AVS FIRST PAGE
Shoulder Surgery: Postoperative Instructions  Dr. Akshat Roach  Diet    You may resume your regular diet as soon as possible  Medication    Take the pain medication as prescribed   Take pain medication with food   While taking pain medications, you may NOT operate a vehicle, heavy machinery, or appliances   While taking pain medication, you may NOT drink alcoholic beverages   If you have any reactions to your medications, stop taking them and call my office   Please keep in mind that constipation is a very common side effect of taking narcotic pain medication. Take precautions to prevent constipation:  o Drink plenty of water (6-8 glasses of 8 oz. a day)  o Avoid alcohol, caffeine, and dairy products  o Eat plenty of fiber (fruits, vegetables, and whole grains)  o Take an over the counter stool softener (Colace or Dulcolax)  o Patients that have had upper extremity surgery should take frequent walks  Activity    Minimize activity the day of surgery    DO NOT USE HEAT    Please keep ice applied to the shoulder for at least the first 72 hours or as long as pain or swelling persists. Do not apply ice directly to skin, or allow water to leak on your dressing.   Place a pillow behind your elbow while lying down or sleeping. Sleeping in a more upright position (recliner) may be more comfortable initially. You should NOT roll onto the operative shoulder.   You are in an immobilizer or sling and it should remain on at all times except when showering until your first postoperative visit   Open and close your hand 10 times every day for about 1 minute. You may also flex and extend your elbow each day when your sling is removed for showering. Be sure to keep your elbow at your side.   DO NOT actively (on your own) lift your operative arm away from the side of your body or rotate it out away from your body.    DO NOT use exercise equipment unless otherwise instructed.  Sling/ Immobilizer    Use the sling/immobilizer at all  times and while sleeping until your next office appointment.  Showering    You may shower 4 days after surgery unless told otherwise. DO NOT immerse the shoulder under water and DO NOT rub the incision. Place new Band-Aids over the sutures after showering.    You may sponge bathe for the first 72 hours, taking care to keep the dressing clean and dry.  Dressing Care    It is normal to get some bloody wound seepage through the bandage. DO NOT BE ALARMED.    If the dressing gets soaked with wound seepage, place more gauze over the dressing and secure with tape. If this soaks through remove the entire dressing and replace with STERILE gauze and tape    Remove all dressings at 72 hours after surgery. If there is still some wound seepage, apply a fresh STERILE gauze over the incisions and secure with tape.    DO NOT TOUCH OR REMOVE THE SUTURES!!  Emergency/Follow-Up   Please notify my office at [phone] if you develop any fever (101 deg or above), unexpected warmth, redness or swelling. Please call if your fingers become cold, purple, numb, or there is excessive bleeding.   Please call the office within 24 hours at [phone] to schedule a follow up appt within 7-10 days from surgery.

## 2024-08-21 NOTE — ANESTHESIA PROCEDURE NOTES
Peripheral Block    Patient location during procedure: holding area  Start time: 8/21/2024 8:05 AM  Reason for block: at surgeon's request and post-op pain management  Staffing  Performed by: Taj Michele MD  Authorized by: Taj Michele MD    Preanesthetic Checklist  Completed: patient identified, IV checked, site marked, risks and benefits discussed, surgical consent, monitors and equipment checked, pre-op evaluation and timeout performed  Peripheral Block  Patient position: supine (Head of bed up 30 degrees)  Prep: ChloraPrep  Patient monitoring: continuous pulse oximetry, frequent blood pressure checks and heart rate  Block type: Interscalene  Laterality: right  Injection technique: single-shot  Procedures: ultrasound guided, Ultrasound guidance required for the procedure to increase accuracy and safety of medication placement and decrease risk of complications.  Ultrasound permanent image saved  bupivacaine (PF) (MARCAINE) 0.5 % injection 20 mL - Perineural   5 mL - 8/21/2024 8:05:00 AM  bupivacaine liposomal (EXPAREL) 1.3 % injection 20 mL - Perineural   20 mL - 8/21/2024 8:05:00 AM  midazolam (VERSED) injection 0.5 mg - Intravenous   1 mg - 8/21/2024 8:03:00 AM  fentanyl citrate (PF) 100 MCG/2ML 50 mcg - Intravenous   50 mcg - 8/21/2024 8:03:00 AM  Needle  Needle type: Stimuplex   Needle gauge: 22 G  Needle length: 2 in  Needle localization: ultrasound guidance  Assessment  Injection assessment: frequent aspiration, injected with ease, negative aspiration, no paresthesia on injection, no symptoms of intraneural/intravenous injection, negative for heart rate change, needle tip visualized at all times and incremental injection  Paresthesia pain: none  Post-procedure:  site cleaned  patient tolerated the procedure well with no immediate complications

## 2024-08-21 NOTE — ANESTHESIA POSTPROCEDURE EVALUATION
Post-Op Assessment Note    CV Status:  Stable  Pain Score: 0    Pain management: adequate       Mental Status:  Awake   Hydration Status:  Euvolemic   PONV Controlled:  Controlled   Airway Patency:  Patent     Post Op Vitals Reviewed: Yes    No anethesia notable event occurred.    Staff: CRNA               BP   131/62   Temp   96   Pulse  64   Resp   14   SpO2   97

## 2024-08-21 NOTE — OP NOTE
OPERATIVE REPORT  PATIENT NAME: Rosario Morales    :  1950  MRN: [id number]  Pt Location: [facility] OR ROOM 04    SURGERY DATE: 2024    Surgeons and Role:     * Akshat Roach,  - Primary     * Meagan Shukla PA-C - Assisting    Preop Diagnosis:  Tear of right supraspinatus tendon [M75.101]  Partial-thickness tear proximal bicep tendon    Post-Op Diagnosis Codes:     * Tear of right supraspinatus tendon [M75.101]  Full-thickness supraspinatus tear, near full-thickness retracted superior subscapularis tear, partial-thickness tear proximal biceps tendon, degenerative labral tearing, mild glenohumeral osteoarthritis    Procedure(s):  Right - REPAIR ROTATOR CUFF ARTHROSCOPIC- Right shoulder arthroscopic rotator cuff repair. open subpectoral biceps tenodesis. extensive debridement and acromioplasty    Specimen(s):  * No specimens in log *    Estimated Blood Loss:   Minimal    Drains:  * No LDAs found *    Anesthesia Type:   General w/ Interscalene Block, 10 cc 0.5% Marcaine local anesthetic    Operative Indications:  Tear of right supraspinatus tendon [M75.101]  Full-thickness traumatic rotator cuff tear with persistent pain, weakness, and limited active range of motion.    Operative Findings:  Full-thickness retracted supraspinatus tear, near full-thickness retracted superior subscapularis tendon tear, high-grade partial-thickness tearing of proximal biceps tendon, degenerative labral tearing, mild glenohumeral degenerative changes, subacromial spur.      Complications:   None    Procedure and Technique:    Antibiotics: Ancef 1 g  Implants:  Arthrex 2.6 mm triple loaded fiber tack anchor x 3, 4.75 mm bio composite swivel lock anchor x 2, 2.6 x 8.5 mm proximal biceps tenodesis button    The patient was seen in the preoperative holding area and the appropriate site of surgery was confirmed and the patient was marked. Upon bringing the patient back to the operating room she was placed supine on the operating room  table and general anesthesia was provided. The patient was placed in the lateral decubitus position with the right side up. The patient was held in position with a beanbag reinforce with a seat belt and tape. All bony prominences were appropriately padded and the brachial plexus was offloaded with the appropriate ramp pillow functioning as an axillary roll. An exam under anesthesia was performed and displayed near full passive range of motion with instability. The right upper extremity was prepped and draped in the usual sterile fashion and placed in 10 lbs of in-line traction. A preoperative time-out was performed to once again confirm the site of surgery and procedure.    A posterior arthroscopic viewing portal was made with an 11 blade. The arthroscopic camera was inserted. Upon entering the glenohumeral joint space an anterior portal was made under direct visualization with the aid of an 18 gauge spinal needle. An anterior cannula was inserted, followed by an arthroscopic probe, and a diagnostic arthroscopy was performed. Diagnostic arthroscopy revealed full-thickness retracted supraspinatus tear, near full-thickness retracted subscapularis tear at the superior border, degenerative labral tearing, mild glenohumeral degenerative changes. At this time a meniscal biter was inserted to release the biceps tendon. This was followed by an arthroscopic shaver used to debride the proximal biceps tendon stump, superior labrum, anterior labrum, posterior labrum, small area of unstable partial-thickness cartilage defect from the central humeral head, undersurface of torn supraspinatus tendon, and superior border of subscapularis tendon. The arthroscope was removed and the arm was taken out of inline traction and attention was turned to biceps tenodesis.  A 15 blade was used to make a longitudinal incision over the inferior aspect of the pectoralis major tendon.  Bovie cautery was used to achieve hemostasis.  Vlad  scissors were used to dissect to the inferior border of the pectoralis major tendon.  Blunt dissection was performed into the appropriate interval and long head of biceps tendon was retrieved.  A fiber loop was used to place a whipstitch from the musculotendinous junction extending proximally.  Each limb of suture was then passed in opposite directions to the biceps tenodesis button.  Pectoralis major was retracted superior laterally and short head biceps was retracted medially to reveal latissimus dorsi tendon.  Appropriate site of tenodesis was marked with the Bovie cautery with the elbow in full extension.  The near cortex was drilled and tenodesis button was inserted to the far cortex and deployed.  Appropriate tensioning of sutures was performed and a free needle was used to pass 1 limb of suture through the biceps.  A knot was tied to secure the tenodesis.  Suture ends were cut and residual biceps tendon and was sharply debrided.  Surgical site was copiously irrigated and fascia and subcutaneous closure were performed with 2-0 Vicryl suture.  Patient was placed back into a 10 pounds of inline traction and surgical gloves were changed.  Arthroscope was inserted back into the glenohumeral joint for subscapularis repair.    A 70 degree arthroscope was inserted to better visualize the footprint on the lesser tuberosity.  Rotator interval was debrided including anterior capsule to reveal coracoid process.  Subscapularis was appropriately freed with an arthroscopic elevator anteriorly, posteriorly, and superiorly.  Proper mobilization to the appropriate point on the footprint was noted.  Radiofrequency device was used to remove soft tissue from the lesser tuberosity and this was followed by arthroscopic shaver to lightly debride the footprint on the lesser tuberosity.  A fiber tape was then passed to the retracted superior lateral portion of the subscapularis tendon.  Proper mobilization was confirmed.  Suture ends  were loaded into a swivel lock anchor.  An appropriate awl was used to make a starter hole for the SwiveLock anchor.  Sutures were appropriately tensioned prior to final insertion of the anchor.  Proper reduction and tensioning of the subscapularis repair was confirmed and arthroscopic pictures were taken.  The 30 degree arthroscope was inserted for further visualization of the repair.  Repair was stable to internal and external rotation and probing.  At this time arthroscope was removed and placed into the subacromial space for remainder of rotator cuff repair.    A lateral portal was made 3 cm off of the lateral aspect of the acromion in line with the rotator cuff tear. The arthroscopic shaver was inserted and a bursectomy was performed. Arthroscopic radiofrequency ablation device was used to remove soft tissue off the undersurface of the acromion. At this time, the arthroscopic camera was placed in the lateral portal and the remainder of the bursectomy was performed posteriorly. A large subacromial spur was visualized on the inferior aspect of the anterolateral acromion . An arthroscopic eduardo was used to perform an acromioplasty. Next, attention was turned to the rotator cuff tear. After visualization of the rotator cuff and associated tear, the tear morphology was characterized.  Accessory lateral portal was made off of the lateral acromion for anchor placement. Soft tissue was removed off the footprint on greater tuberosity. An arthroscopic shaver was used to lightly decorticate the rotator cuff footprint. The supraspinatus was properly released with arthroscopic shaver and elevator and mobilized to the appropriate point on the rotator cuff footprint without over tensioning.  A self punching 2.6 mm triple loaded fiber tack anchor was inserted in the anterior aspect of the medial row fixation.  Sutures were shuttled out of the anterior portal and a second triple loaded anchor was placed posterior to the first  and the medial row fixation.  At this time sutures were passed through the rotator cuff tendon near the musculotendinous junction and horizontal mattress fashion from posterior to anterior.  2 of the 3 sutures were utilized from the posterior anchor and all 3 were utilized from the anterior anchor.  Sutures were appropriately tied and proper reduction and tensioning of the medial row fixation was confirmed.  Attention was then turned to the lateral fixation.  Radiofrequency device was used to remove soft tissue from the site of the lateral row fixation.  1 suture from each knot was then loaded into a swivel lock anchor after proper retrieval.  An awl was used to make a starter hole in the anterior aspect of the lateral row fixation.  Bone was noted to be very soft in this area, likely secondary to impaction injury from inferior glenohumeral dislocation.  Appropriate tensioning of sutures was performed prior to insertion of SwiveLock anchor.  Though the bone was soft, proper seating and tensioning of the swivel lock along with sutures was confirmed.  This process repeated with a second lateral row suture anchor posterior to the first with the remaining suture ends.  After making a starter hole, the swivel lock anchor was inserted after appropriately tensioning sutures.  Proper reduction and tensioning of the rotator cuff repair was confirmed upon probing and rotating internally and externally.  Final arthroscopic pictures were taken.    Arthroscopic incisions were closed with 3-0 nylon suture and biceps tenodesis site was closed with 4-0 Monocryl suture.  10 cc of 0.5% Marcaine local anesthetic were used at the biceps tenodesis site.  Steri-Strips and A sterile dressing were applied and the patient was placed in a sling and abduction pillow. The patient tolerated the procedure well and was transferred to the PACU without complication.     I was present for the entire procedure. A qualified resident physician was not  available, and A physician assistant was required during the procedure for tissue retraction and handling, assistance topic MRI equipment due to minimally invasive nature of surgical case, anchor insertion, and suturing.    Postoperatively the patient will be nonweightbearing right upper extremity with sling and abduction pillow.  She is instructed to take aspirin 81 mg twice daily for DVT prophylaxis for 2 weeks postoperatively.  I will see her in the office as scheduled on 8/27/2024.  Due to poor bone quality and tear size we will hold off on formal physical therapy until 5 weeks postoperatively.  No active range of motion until 6 weeks postoperatively.      Patient Disposition:  PACU         SIGNATURE: Akshat Roach DO  DATE: August 21, 2024  TIME: 11:24 AM

## 2024-08-27 ENCOUNTER — OFFICE VISIT (OUTPATIENT)
Dept: OBGYN CLINIC | Facility: CLINIC | Age: 74
End: 2024-08-27

## 2024-08-27 VITALS
BODY MASS INDEX: 22.47 KG/M2 | HEIGHT: 61 IN | HEART RATE: 65 BPM | WEIGHT: 119 LBS | DIASTOLIC BLOOD PRESSURE: 76 MMHG | SYSTOLIC BLOOD PRESSURE: 124 MMHG

## 2024-08-27 DIAGNOSIS — Z98.890 S/P ARTHROSCOPY OF LEFT SHOULDER: ICD-10-CM

## 2024-08-27 DIAGNOSIS — Z48.89 AFTERCARE FOLLOWING SURGERY: Primary | ICD-10-CM

## 2024-08-27 PROCEDURE — 99024 POSTOP FOLLOW-UP VISIT: CPT | Performed by: ORTHOPAEDIC SURGERY

## 2024-08-27 NOTE — PROGRESS NOTES
"Patient Name:  Rosario Morales  MRN:  [id number]    Assessment & Plan     1. Aftercare following surgery  2. S/P arthroscopy of left shoulder    Approximately 6 day s/p Right shoulder arthroscopic rotator cuff repair, open subpectoral biceps tenodesis, extensive debridement and acromioplasty performed on 08/21/2024  Overall, patient doing well s/p surgical intervention  Arthroscopic images reviewed and sutures removed without complications  Maintain sling throughout the day and when sleeping, removing only for hygenic purposes, PT sessions and home exercises 3 times daily  Paitent will be starting PT approximately 5-6 weeks post operatively. Patient will attend evaluation next week, but continue to make appointments accordingly.  Maintain nonweightbearing status   Demonstrated home exercises including pendulums, scapular retractions, and elbow ROM.   Follow up 4 weeks for reevaluation    History of the Present Illness   Rosario Morales is a 73 y.o. female approximately 6 day s/p Right shoulder arthroscopic rotator cuff repair, open subpectoral biceps tenodesis, extensive debridement and acromioplasty performed on 08/21/2024. Today, patient reports she is doing well since surgery. She has maintained the sling. She does complain of throat hoarseness since surgery and difficulty singing. Overall, patient doing well.       Review of Systems     Review of Systems   Constitutional:  Negative for chills and fever.   HENT:  Negative for congestion.    Respiratory:  Negative for cough, chest tightness and shortness of breath.    Cardiovascular:  Negative for chest pain and palpitations.   Gastrointestinal:  Negative for abdominal pain.   Endocrine: Negative for cold intolerance and heat intolerance.   Neurological:  Negative for syncope.   Psychiatric/Behavioral:  Negative for confusion.        Physical Exam     /76   Pulse 65   Ht 5' 1\" (1.549 m)   Wt 54 kg (119 lb)   BMI 22.48 kg/m²     Right Shoulder:   Surgical " incisions well healing without sign of infection  Elbow range of motion 0 to 120 degrees without pain  Full composite fist   The patient is neurovascularly intact distally in the extremity.      Data Review     I have personally reviewed pertinent films in PACS, and my interpretation follows.    No new imaging     Social History     Tobacco Use    Smoking status: Never    Smokeless tobacco: Never   Vaping Use    Vaping status: Never Used   Substance Use Topics    Alcohol use: Yes     Alcohol/week: 7.0 standard drinks of alcohol     Types: 7 Glasses of wine per week     Comment: daily    Drug use: No           Procedures  None     Meagan Shukla PA-C

## 2024-09-04 ENCOUNTER — APPOINTMENT (OUTPATIENT)
Dept: PHYSICAL THERAPY | Facility: CLINIC | Age: 74
End: 2024-09-04
Payer: MEDICARE

## 2024-09-24 ENCOUNTER — OFFICE VISIT (OUTPATIENT)
Dept: OBGYN CLINIC | Facility: CLINIC | Age: 74
End: 2024-09-24

## 2024-09-24 VITALS
WEIGHT: 119 LBS | BODY MASS INDEX: 22.47 KG/M2 | SYSTOLIC BLOOD PRESSURE: 124 MMHG | HEART RATE: 66 BPM | HEIGHT: 61 IN | DIASTOLIC BLOOD PRESSURE: 74 MMHG

## 2024-09-24 DIAGNOSIS — Z48.89 AFTERCARE FOLLOWING SURGERY: Primary | ICD-10-CM

## 2024-09-24 DIAGNOSIS — Z98.890 S/P ARTHROSCOPY OF LEFT SHOULDER: ICD-10-CM

## 2024-09-24 PROCEDURE — 99024 POSTOP FOLLOW-UP VISIT: CPT | Performed by: ORTHOPAEDIC SURGERY

## 2024-09-24 NOTE — PROGRESS NOTES
Patient Name:  Rosario Morales  MRN:  [id number]    Assessment & Plan     1. Aftercare following surgery  2. S/P arthroscopy of left shoulder    Approximately 5 week s/p Right shoulder arthroscopic rotator cuff repair, open subpectoral biceps tenodesis, extensive debridement and acromioplasty performed on 08/21/2024  Overall, patient doing well s/p surgical intervention  Advised patient to start outpatient PT tomorrow while following post op protocol  Maintain sling when out in public and when sleeping. Can remove in the home if resting. In 1 week, may discontinue sling completely. Avoiding reaching and lifting with the Right upper extremity. She will progress from passive to AAROM over the next few weeks.   Continue home exercises  Patient cleared to drive, continue the sling; if patient uncomfortable driving with nondominant hand, would avoid driving until sling discontinued and after PT sessions. Start in the parking lot and with local driving.   Patient cleared to play the piano at [facility]  Follow up 6 weeks for reevaluation     History of the Present Illness   Rosario Morales is a 73 y.o. female approximately 5 week s/p Right shoulder arthroscopic rotator cuff repair, open subpectoral biceps tenodesis, extensive debridement and acromioplasty performed on 08/21/2024. Today, patient reports she is doing well s/p surgical intervention. She admits she has PT scheduled for tomorrow. She admits while at home, she does not use the sling all the time. She does not like feeling stiff. She does use the Right arm to eat. She has been practicing the piano this week.     Review of Systems     Review of Systems   Constitutional:  Negative for chills and fever.   HENT:  Negative for congestion.    Respiratory:  Negative for cough, chest tightness and shortness of breath.    Cardiovascular:  Negative for chest pain and palpitations.   Gastrointestinal:  Negative for abdominal pain.   Endocrine: Negative for cold intolerance and heat  "intolerance.   Neurological:  Negative for syncope.   Psychiatric/Behavioral:  Negative for confusion.        Physical Exam     /74   Pulse 66   Ht 5' 1\" (1.549 m)   Wt 54 kg (119 lb)   BMI 22.48 kg/m²     Left Shoulder:   Surgical incisions well healing without sign of infection  Passive range of motion   90 degrees of forward flexion   Full composite fist  The patient is neurovascularly intact distally in the extremity.      Data Review     I have personally reviewed pertinent films in PACS, and my interpretation follows.    No new images     Social History     Tobacco Use    Smoking status: Never    Smokeless tobacco: Never   Vaping Use    Vaping status: Never Used   Substance Use Topics    Alcohol use: Yes     Alcohol/week: 7.0 standard drinks of alcohol     Types: 7 Glasses of wine per week     Comment: daily    Drug use: No           Procedures  None     Meagan Shukla PA-C     "

## 2024-09-25 ENCOUNTER — OFFICE VISIT (OUTPATIENT)
Dept: PHYSICAL THERAPY | Facility: CLINIC | Age: 74
End: 2024-09-25
Payer: MEDICARE

## 2024-09-25 DIAGNOSIS — Z98.890 S/P RIGHT ROTATOR CUFF REPAIR: Primary | ICD-10-CM

## 2024-09-25 PROCEDURE — 97161 PT EVAL LOW COMPLEX 20 MIN: CPT

## 2024-09-25 PROCEDURE — 97110 THERAPEUTIC EXERCISES: CPT

## 2024-09-25 NOTE — PROGRESS NOTES
PT Evaluation     Today's date: 2024  Patient name: Rosario Morales  : 1950  MRN: [id number]  Referring provider: Meagan Shukla PA-C  Dx:   Encounter Diagnosis     ICD-10-CM    1. S/P right rotator cuff repair  Z98.890           Start Time: 0900  Stop Time: 1000  Total time in clinic (min): 60 minutes    Assessment  Impairments: abnormal or restricted ROM, impaired physical strength, lacks appropriate home exercise program and participation limitations  Symptom irritability: low    Assessment details: Pt is a pleasant 72 y/o female presenting to outpatient physical therapy 5 weeks s/p R RTC repair.  Upon examination AROM of left upper extremity WNL, noted weakness in shoulder and bicep musculature on left side.  Deferred AROM and strength testing of RUE at this time per protocol.  In PROM of right shoulder able to achieve 90 degrees of fwd flexion, 45 degrees of abduction, 10 degrees of ER and 15 degrees of IR within empty end feel for all and no reproduction of pain. Educated pt on continued adherence to post-operative precautions, encouraged pt usage of cryotherapy for pain, educated on frequency, duration per protocol, pt verbalized understanding.  Patient will benefit from skilled physical therapy, including therapeutic exercise, stretching, manual therapy, and modalities prn per post-op protocol to improve their level of function, increase overall quality of life, and to aid in post operative recovery.    Dispensed home exercise program and educated patient on proper technique, frequency. Patient demonstrated understanding and was advised to call us if she has any further questions.   Understanding of Dx/Px/POC: good     Prognosis: good    Goals  STG to be achieved by 4 weeks  -Pt will be independent with basic HEP  -Pt will improve ROM by 25% per post operative protocol in order to improve functional mobility  -Pt will report 2/10 at worst in order to facilitate return to PLOF    LTG to be  achieved by Discharge  -Pt will be independent with comprehensive HEP  -Pt will improve MMT scores to WFL in all deficient planes in order to facilitate ease of functional activity  -Pt will improve ROM to WFL in all deficient planes in order to improve functional mobility  -Pt will report 0/10 at worst in order to demonstrate return to PLOF         Plan  Patient would benefit from: skilled physical therapy  Referral necessary: No  Planned modality interventions: cryotherapy    Planned therapy interventions: flexibility, functional ROM exercises, home exercise program, joint mobilization, manual therapy, massage, neuromuscular re-education, patient/caregiver education, strengthening, stretching, therapeutic activities and therapeutic exercise    Frequency: 2x week  Duration in weeks: 12  Plan of Care beginning date: 9/25/2024  Plan of Care expiration date: 12/18/2024  Treatment plan discussed with: patient        Subjective Evaluation    History of Present Illness  Date of onset: 6/11/2024  Date of surgery: 8/21/2024  Mechanism of injury: Rosario is a 73 y.o. female presenting to physical therapy on 09/25/24 with referral from MD 5 weeks s/p RTC repair on 8/12/2024.  Pt states that 6/11 of this year she was walking in a parking lot and did not see the curb, she caught her foot, tripped and fell on her right shoulder and dislocated it w/ subsequent tear of RTC and bicep tendon, per MD.  Surgery performed by Dr. Roach, arthroscopic repair. Since then has been doing exercises at home, does not experience pain but arm does get achy at times, states it also does feel tight at times.  Pt states she primarily is wearing sling out in public at sleeping per MD orders, states she only sometimes wears it at home.  Pt reports she has been sleeping exclusively on her couch with her upper body elevated, denies any discomfort at night.  Pt does experience some pain in her right bicep at times, admits she is not really icing the  shoulder at all.           Quality of life: good    Pain  Current pain ratin  At best pain ratin  At worst pain ratin  Location: Right shoulder, posterior aspect, right bicep  Quality: dull ache  Relieving factors: support and rest  Aggravating factors: nothing  Progression: improved    Hand dominance: right          Objective     Active Range of Motion   Left Shoulder   Normal active range of motion    Left Elbow   Normal active range of motion    Right Elbow   Normal active range of motion    Left Wrist   Normal active range of motion    Right Wrist   Normal active range of motion    Passive Range of Motion     Right Shoulder   Flexion: 90 degrees   Abduction: 45 degrees   External rotation 45°: 10 degrees   Internal rotation 45°: 15 degrees     Strength/Myotome Testing     Left Shoulder     Planes of Motion   Flexion: 4   Abduction: 4     Isolated Muscles   Biceps: 4   Triceps: 4+     Left Elbow   Flexion: 4  Extension: 4+             Precautions: S/P RTC repair 24    POC expires Unit limit Auth  expiration date PT/OT + Visit Limit?   25 BOMN  BOMN                 Visit/Unit Tracking  AUTH Status:  Date               N/A Used 1               Remaining                       Manuals             PROM R Shldr per protocol JK            STM R pec minor                                       Neuro Re-Ed             Gripping HEP            Webslide: M/L             Webslide: IR/ER             Webslide: Triceps ext                                                    Ther Ex             Pt education HEP, precautions, recovery            UBE 2'/2'             Pulleys scaption             Finger ladder                                                                              Ther Activity                                       Gait Training                                       Modalities

## 2024-09-27 ENCOUNTER — OFFICE VISIT (OUTPATIENT)
Dept: PHYSICAL THERAPY | Facility: CLINIC | Age: 74
End: 2024-09-27
Payer: MEDICARE

## 2024-09-27 DIAGNOSIS — Z98.890 S/P RIGHT ROTATOR CUFF REPAIR: Primary | ICD-10-CM

## 2024-09-27 PROCEDURE — 97140 MANUAL THERAPY 1/> REGIONS: CPT | Performed by: PHYSICAL THERAPIST

## 2024-09-27 PROCEDURE — 97110 THERAPEUTIC EXERCISES: CPT | Performed by: PHYSICAL THERAPIST

## 2024-09-27 NOTE — PROGRESS NOTES
"Daily Note     Today's date: 2024  Patient name: Rosario Morales  : 1950  MRN: [id number]  Referring provider: Meagan Shukla PA-C  Dx:   Encounter Diagnosis     ICD-10-CM    1. S/P right rotator cuff repair  Z98.890           Start Time: 1030  Stop Time: 1115  Total time in clinic (min): 45 minutes    Subjective: Patient reports that her pain has been manageable and she has been weaning out of her sling as able.      Objective: See treatment diary below      Assessment: Tolerated treatment well. Patient demonstrated fatigue post treatment and would benefit from continued PT. Patient with limited shoulder ER with painful, guarded end range.       Plan: Continue per plan of care.  Progress treatment as tolerated.   Progress treament per protocol.      Precautions: S/P RTC repair 24    POC expires Unit limit Auth  expiration date PT/OT + Visit Limit?   25 BOMN  BOMN                 Visit/Unit Tracking  AUTH Status:  Date              N/A Used 1 2              Remaining                       Manuals            PROM R Shldr per protocol JK GR           STM R pec minor                                       Neuro Re-Ed             Gripping HEP            Webslide: M/L             Webslide: IR/ER             Webslide: Triceps ext             Scap squeezes  20x5\"                                     Ther Ex             Pt education HEP, precautions, recovery            UBE 2'/2'             Pulleys scaption  5 min           Finger ladder             Pendulums  CW/CCW circles x30 ea.                                                               Ther Activity                                       Gait Training                                       Modalities                                            "

## 2024-10-02 ENCOUNTER — OFFICE VISIT (OUTPATIENT)
Dept: PHYSICAL THERAPY | Facility: CLINIC | Age: 74
End: 2024-10-02
Payer: MEDICARE

## 2024-10-02 DIAGNOSIS — Z98.890 S/P RIGHT ROTATOR CUFF REPAIR: Primary | ICD-10-CM

## 2024-10-02 PROCEDURE — 97140 MANUAL THERAPY 1/> REGIONS: CPT

## 2024-10-02 PROCEDURE — 97110 THERAPEUTIC EXERCISES: CPT

## 2024-10-02 PROCEDURE — 97112 NEUROMUSCULAR REEDUCATION: CPT

## 2024-10-02 NOTE — PROGRESS NOTES
"Daily Note     Today's date: 10/2/2024  Patient name: Rosario Morales  : 1950  MRN: [id number]  Referring provider: Meagan Shukla PA-C  Dx:   Encounter Diagnosis     ICD-10-CM    1. S/P right rotator cuff repair  Z98.890           Start Time: 914  Stop Time: 957  Total time in clinic (min): 43 minutes    Subjective: pt noted that she still wearing the sling to protect her R shoulder at this time but she is aware that she can come out of the sling per MD orders at 6 weeks.   Noted some increase stiffness this morning.       Objective: See treatment diary below      Assessment:  Continued with treatment session  s/p RTC repair on 2024. At this time pt is 6 weeks OOS and can progress within protocol. Advised pt that she should not have any pain with progressions and if she does to immediately tell therapist assistant prior to completing.    Tolerated treatment well. PROM progressions well with + stiffness but no muscle guarding present.  Patient exhibited good technique with therapeutic exercises and would benefit from continued PT  S/P treatment session no immediate changes noted.     DOMS may be noted. Advised that she may ice if she has increase in pain and soreness.     Plan: Continue per plan of care.      Precautions: S/P RTC repair 24    POC expires Unit limit Auth  expiration date PT/OT + Visit Limit?   25 BOMN  BOMN                 Visit/Unit Tracking  AUTH Status:  Date  10/            N/A Used 1 2 3             Remaining                       Manuals  10/2          PROM R Shldr per protocol JK GR SC          STM R pec minor                                       Neuro Re-Ed             Gripping HEP            Webslide: M/L   Ytb 2x 10           Webslide: IR/ER   YTB 2x 10           Webslide: Triceps ext   NV          Scap squeezes  20x5\"                                     Ther Ex             Pt education HEP, precautions, recovery            UBE 2'/2'   5'/5'        "   Pulleys scaption  5 min 5  min           Finger ladder             Pendulums  CW/CCW circles x30 ea. CW/CCW circles x30 ea.                                                              Ther Activity                                       Gait Training                                       Modalities             CP    Advised for HEP

## 2024-10-04 ENCOUNTER — OFFICE VISIT (OUTPATIENT)
Dept: PHYSICAL THERAPY | Facility: CLINIC | Age: 74
End: 2024-10-04
Payer: MEDICARE

## 2024-10-04 DIAGNOSIS — Z98.890 S/P RIGHT ROTATOR CUFF REPAIR: Primary | ICD-10-CM

## 2024-10-04 PROCEDURE — 97110 THERAPEUTIC EXERCISES: CPT | Performed by: PHYSICAL MEDICINE & REHABILITATION

## 2024-10-04 PROCEDURE — 97140 MANUAL THERAPY 1/> REGIONS: CPT | Performed by: PHYSICAL MEDICINE & REHABILITATION

## 2024-10-04 PROCEDURE — 97112 NEUROMUSCULAR REEDUCATION: CPT | Performed by: PHYSICAL MEDICINE & REHABILITATION

## 2024-10-04 NOTE — PROGRESS NOTES
"Daily Note     Today's date: 10/4/2024  Patient name: Rosario Morales  : 1950  MRN: [id number]  Referring provider: Meagan Shukla PA-C  Dx:   Encounter Diagnosis     ICD-10-CM    1. S/P right rotator cuff repair  Z98.890                    Subjective: Patient reports some increased soreness to begin session. Has been further weaning out of sling.     Objective: See treatment diary below    Assessment:  Tolerated treatment well overall. Patient exhibited good technique with therapeutic exercises and would benefit from continued PT. Continue progress nv per protocol and patient tolerance.     Plan: Continue per plan of care.      Precautions: S/P RTC repair 24    POC expires Unit limit Auth  expiration date PT/OT + Visit Limit?   25 BOMN  BOMN                 Visit/Unit Tracking  AUTH Status:  Date 9/25 9/27 10/2 10/4           N/A Used 1 2 3 4            Remaining                     Manuals 9/25 9/27 10/2 10/4         PROM R Shldr per protocol JK GR SC LH         STM R pec minor                                       Neuro Re-Ed    10/4         Gripping HEP            Webslide: M/L   Ytb 2x 10  YTB 2x10 ea         Webslide: IR/ER   YTB 2x 10  YTB 2x10 ea         Webslide: Triceps ext   NV YTB 2x10         Scap squeezes  20x5\"                                     Ther Ex    10/4         Pt education HEP, precautions, recovery            UBE 2'/2'   5'/5' 4'/4'         Pulleys scaption  5 min 5  min  5'         Finger ladder             Pendulums  CW/CCW circles x30 ea. CW/CCW circles x30 ea. 30x ea                                                             Ther Activity                                       Gait Training                                       Modalities             CP    Advised for HEP                               "

## 2024-10-09 ENCOUNTER — OFFICE VISIT (OUTPATIENT)
Dept: PHYSICAL THERAPY | Facility: CLINIC | Age: 74
End: 2024-10-09
Payer: MEDICARE

## 2024-10-09 DIAGNOSIS — Z98.890 S/P RIGHT ROTATOR CUFF REPAIR: Primary | ICD-10-CM

## 2024-10-09 PROCEDURE — 97140 MANUAL THERAPY 1/> REGIONS: CPT

## 2024-10-09 PROCEDURE — 97110 THERAPEUTIC EXERCISES: CPT

## 2024-10-09 NOTE — PROGRESS NOTES
"Daily Note     Today's date: 10/9/2024  Patient name: Rosario Morales  : 1950  MRN: [id number]  Referring provider: Meagan Shukla PA-C  Dx:   Encounter Diagnosis     ICD-10-CM    1. S/P right rotator cuff repair  Z98.890                      Subjective: Patient reports that she has finally came out of the sling during the day. She is only wearing it at night when sleeping to make sure she isn't moving her arm in directions she shouldn't be.       Objective: See treatment diary below      Assessment: Tolerated treatment well. Initiated shoulder AAROM exercises including table slides and cane exercises with good tolerance. She did have increased discomfort with shoulder ER AAROM with cane, however with cues for correct performance and technique she was able to complete. Provided with updated HEP; verbalized understanding it's completion. Patient demonstrated fatigue post treatment, exhibited good technique with therapeutic exercises, and would benefit from continued PT      Plan: Continue per plan of care.      Precautions: S/P RTC repair 24  Access Code: [email or web address]  POC expires Unit limit Auth  expiration date PT/OT + Visit Limit?   25 BOMN  BOMN                 Visit/Unit Tracking  AUTH Status:  Date 9/25 9/27 10/2 10/4 10/9          N/A Used 1 2 3 4 5           Remaining                     Manuals 9/25 9/27 10/2 10/4 10/9        PROM R Shldr per protocol JK GR SC LH BE        STM R pec minor                                       Neuro Re-Ed    10/4         Gripping HEP            Webslide: M/L   Ytb 2x 10  YTB 2x10 ea         Webslide: IR/ER   YTB 2x 10  YTB 2x10 ea         Webslide: Triceps ext   NV YTB 2x10         Scap squeezes  20x5\"                                     Ther Ex    10/4         Pt education HEP, precautions, recovery    Updated HEP        UBE    5'/5' 4'/4' 3'/3'        Pulleys scaption  5 min 5  min  5' 5'        Finger ladder             Pendulums  CW/CCW circles x30 ea. CW/CCW " "circles x30 ea. 30x ea         Table slides flex, scapt      5\"x20        Supine shoulder flex AAROM with cane     5\"x10         Supine shoulder ER AAROM with cane     5\"x10                     Ther Activity                                       Gait Training                                       Modalities             CP    Advised for HEP                                 "

## 2024-10-11 ENCOUNTER — OFFICE VISIT (OUTPATIENT)
Dept: PHYSICAL THERAPY | Facility: CLINIC | Age: 74
End: 2024-10-11
Payer: MEDICARE

## 2024-10-11 DIAGNOSIS — Z98.890 S/P RIGHT ROTATOR CUFF REPAIR: Primary | ICD-10-CM

## 2024-10-11 PROCEDURE — 97110 THERAPEUTIC EXERCISES: CPT

## 2024-10-11 PROCEDURE — 97140 MANUAL THERAPY 1/> REGIONS: CPT

## 2024-10-11 NOTE — PROGRESS NOTES
"Daily Note     Today's date: 10/11/2024  Patient name: Rosario Morales  : 1950  MRN: [id number]  Referring provider: Megaan Shukla PA-C  Dx:   Encounter Diagnosis     ICD-10-CM    1. S/P right rotator cuff repair  Z98.890                      Subjective: Patient denies any increased soreness after her last session.       Objective: See treatment diary below      Assessment: Tolerated treatment well. Increased reps for shoulder AAROM exercises with cane with improving shoulder ROM achieved compared to previous session. Patient demonstrated fatigue post treatment, exhibited good technique with therapeutic exercises, and would benefit from continued PT      Plan: Continue per plan of care.      Precautions: S/p Right shoulder arthroscopic rotator cuff repair, subscapularis repair, open subpectoral biceps tenodesis, acromioplasty, extensive debridement performed 2024   No resisted elbow flexion or internal rotation   Access Code: [email or web address]  POC expires Unit limit Auth  expiration date PT/OT + Visit Limit?   25 BOMN  BOMN                 Visit/Unit Tracking  AUTH Status:  Date 9/25 9/27 10/2 10/4 10/9 10/11         N/A Used 1 2 3 4 5 6          Remaining                     Manuals 9/25 9/27 10/2 10/4 10/9 10/11       PROM R Shldr per protocol JK GR SC LH BE BE       STM R pec minor                                       Neuro Re-Ed    10/4         Gripping HEP            Webslide: M/L   Ytb 2x 10  YTB 2x10 ea         Webslide: IR/ER   YTB 2x 10  YTB 2x10 ea  DC       Webslide: Triceps ext   NV YTB 2x10  DC       Scap squeezes  20x5\"                                     Ther Ex    10/4         Pt education HEP, precautions, recovery    Updated HEP        UBE    5'/5' 4'/4' 3'/3' 3'/3'       Pulleys scaption  5 min 5  min  5' 5' 5'       Finger ladder      5\"x15       Pendulums  CW/CCW circles x30 ea. CW/CCW circles x30 ea. 30x ea         Table slides flex, scapt      5\"x20 5\" x20 ea       Supine shoulder " "flex AAROM with cane     5\"x10  5\" 2x10        Supine shoulder ER AAROM with cane     5\"x10 5\" 1x10        Seated shoulder ER stretch      5\"x15        Webslide: ER ONLY   YTB 2x 10  YTB 2x10          Ther Activity                                       Gait Training                                       Modalities             CP    Advised for HEP                                   "

## 2024-10-14 ENCOUNTER — OFFICE VISIT (OUTPATIENT)
Dept: PHYSICAL THERAPY | Facility: CLINIC | Age: 74
End: 2024-10-14
Payer: MEDICARE

## 2024-10-14 DIAGNOSIS — Z98.890 S/P RIGHT ROTATOR CUFF REPAIR: Primary | ICD-10-CM

## 2024-10-14 PROCEDURE — 97140 MANUAL THERAPY 1/> REGIONS: CPT

## 2024-10-14 PROCEDURE — 97110 THERAPEUTIC EXERCISES: CPT

## 2024-10-14 NOTE — PROGRESS NOTES
"Daily Note     Today's date: 10/14/2024  Patient name: Rosario Morales  : 1950  MRN: [id number]  Referring provider: Meagan Shukla PA-C  Dx:   Encounter Diagnosis     ICD-10-CM    1. S/P right rotator cuff repair  Z98.890                      Subjective: Patient reports that her shoulder is feeling pretty good overall today. Minimal stiffness noted but there are times where she forgets there was anything wrong with her shoulder.       Objective: See treatment diary below      Assessment: Tolerated treatment well. Pt continues to make good improvements towards her long term goals. She demonstrated a tight end feel in all shoulder directions but overall is doing well.. Patient demonstrated fatigue post treatment, exhibited good technique with therapeutic exercises, and would benefit from continued PT      Plan: Continue per plan of care.      Precautions: S/p Right shoulder arthroscopic rotator cuff repair, subscapularis repair, open subpectoral biceps tenodesis, acromioplasty, extensive debridement performed 2024   No resisted elbow flexion or internal rotation   Access Code: [email or web address]  POC expires Unit limit Auth  expiration date PT/OT + Visit Limit?   25 BOMN  BOMN                 Visit/Unit Tracking  AUTH Status:  Date 9/25 9/27 10/2 10/4 10/9 10/11 10/14        N/A Used 1 2 3 4 5 6 7         Remaining                     Manuals 9/25 9/27 10/2 10/4 10/9 10/11 10/14      PROM R Shldr per protocol JK GR SC LH BE BE MM      STM R pec minor                                       Neuro Re-Ed    10/4         Gripping HEP            Webslide: M/L   Ytb 2x 10  YTB 2x10 ea         Webslide: IR/ER   YTB 2x 10  YTB 2x10 ea  DC       Webslide: Triceps ext   NV YTB 2x10  DC       Scap squeezes  20x5\"                                     Ther Ex    10/4         Pt education HEP, precautions, recovery    Updated HEP        UBE    5'/5' 4'/4' 3'/3' 3'/3' 3'/3'      Pulleys scaption  5 min 5  min  5' 5' 5' 5'    " "  Finger ladder      5\"x15 5\"x15      Pendulums  CW/CCW circles x30 ea. CW/CCW circles x30 ea. 30x ea         Table slides flex, scapt      5\"x20 5\" x20 ea 5\" x20 ea      Supine shoulder flex AAROM with cane     5\"x10  5\" 2x10  5\" 2x10       Supine shoulder ER AAROM with cane     5\"x10 5\" 1x10  5\" 1x10       Seated shoulder ER stretch      5\"x15  5\"x15       Webslide: ER ONLY   YTB 2x 10  YTB 2x10          Ther Activity                                       Gait Training                                       Modalities             CP    Advised for HEP                                   "

## 2024-10-16 ENCOUNTER — APPOINTMENT (OUTPATIENT)
Dept: PHYSICAL THERAPY | Facility: CLINIC | Age: 74
End: 2024-10-16
Payer: MEDICARE

## 2024-10-18 ENCOUNTER — APPOINTMENT (OUTPATIENT)
Dept: PHYSICAL THERAPY | Facility: CLINIC | Age: 74
End: 2024-10-18
Payer: MEDICARE

## 2024-10-21 ENCOUNTER — OFFICE VISIT (OUTPATIENT)
Dept: PHYSICAL THERAPY | Facility: CLINIC | Age: 74
End: 2024-10-21
Payer: MEDICARE

## 2024-10-21 DIAGNOSIS — Z98.890 S/P RIGHT ROTATOR CUFF REPAIR: Primary | ICD-10-CM

## 2024-10-21 PROCEDURE — 97140 MANUAL THERAPY 1/> REGIONS: CPT

## 2024-10-21 PROCEDURE — 97110 THERAPEUTIC EXERCISES: CPT

## 2024-10-21 NOTE — PROGRESS NOTES
"Daily Note     Today's date: 10/21/2024  Patient name: Rosario Morales  : 1950  MRN: [id number]  Referring provider: Meagan Shukla PA-C  Dx:   Encounter Diagnosis     ICD-10-CM    1. S/P right rotator cuff repair  Z98.890                      Subjective: Patient reports that she went grocery shopping for the first time by herself the other day and her bicep was quite sore following this due to pushing the heavy cart.       Objective: See treatment diary below      Assessment: Tolerated treatment well. Progressed with addition of standing shoulder AAROM and sidelying AROM exercises to promote improved shoulder strength and ROM. She was greatly challenged with standing AAROM exercises with UT compensations noted. VC and visual cues of mirror used to promote proper technique. Patient demonstrated fatigue post treatment, exhibited good technique with therapeutic exercises, and would benefit from continued PT      Plan: Continue per plan of care.      Precautions: S/p Right shoulder arthroscopic rotator cuff repair, subscapularis repair, open subpectoral biceps tenodesis, acromioplasty, extensive debridement performed 2024   No resisted elbow flexion or internal rotation   Access Code: [email or web address]  POC expires Unit limit Auth  expiration date PT/OT + Visit Limit?   25 BOMN  BOMN                 Visit/Unit Tracking  AUTH Status:  Date 9/25 9/27 10/2 10/4 10/9 10/11 10/14 10/21       N/A Used 1 2 3 4 5 6 7 8        Remaining                     Manuals 9/25 9/27 10/2 10/4 10/9 10/11 10/14 10/21     PROM R Shldr per protocol JK GR SC LH BE BE MM BE     STM R pec minor                                       Neuro Re-Ed    10/4         Gripping HEP            Webslide: M/L   Ytb 2x 10  YTB 2x10 ea         Scap squeezes  20x5\"           Prone Y,T,I   R only             Prone rows   R only                           Ther Ex    10/4         Pt education HEP, precautions, recovery    Updated HEP        UBE    5'/5' " "4'/4' 3'/3' 3'/3' 3'/3' 3'/3'     Pulleys scaption  5 min 5  min  5' 5' 5' 5' 5'     Finger ladder      5\"x15 5\"x15      Table slides flex, scapt      5\"x20 5\" x20 ea 5\" x20 ea DC     Supine shoulder flex AAROM with cane     5\"x10  5\" 2x10  5\" 2x10       Supine shoulder ER AAROM with cane     5\"x10 5\" 1x10  5\" 1x10       Seated shoulder ER stretch      5\"x15  5\"x15       Webslide: ER ONLY   YTB 2x 10  YTB 2x10          Standing shoulder flex, scapt AAROM with cane         5\" x15  ea     Sidelying shoulder abduction AROM        2x10      Sidelying shoulder ER AROM        2x10     Wall slides flex        5\"x10                   Ther Activity                                       Gait Training                                       Modalities             CP    Advised for HEP                                     "

## 2024-10-23 ENCOUNTER — OFFICE VISIT (OUTPATIENT)
Dept: PHYSICAL THERAPY | Facility: CLINIC | Age: 74
End: 2024-10-23
Payer: MEDICARE

## 2024-10-23 ENCOUNTER — APPOINTMENT (OUTPATIENT)
Dept: PHYSICAL THERAPY | Facility: CLINIC | Age: 74
End: 2024-10-23
Payer: MEDICARE

## 2024-10-23 DIAGNOSIS — Z98.890 S/P RIGHT ROTATOR CUFF REPAIR: Primary | ICD-10-CM

## 2024-10-23 PROCEDURE — 97140 MANUAL THERAPY 1/> REGIONS: CPT

## 2024-10-23 PROCEDURE — 97110 THERAPEUTIC EXERCISES: CPT

## 2024-10-23 PROCEDURE — 97112 NEUROMUSCULAR REEDUCATION: CPT

## 2024-10-23 NOTE — PROGRESS NOTES
Daily Note     Today's date: 10/23/2024  Patient name: Rosario Morales  : 1950  MRN: [id number]  Referring provider: Meagan Shukla PA-C  Dx:   Encounter Diagnosis     ICD-10-CM    1. S/P right rotator cuff repair  Z98.890                      Subjective: Patient reports that she was sore after her last visit, however this only lasted one day. She is excited that she can now use her flat iron to style her hair, although has difficulties reaching to the back most part of her hair.       Objective: See treatment diary below      Assessment: Tolerated treatment well. Patient challenged with TB ER due to RTC weakness, required VC's to avoid trunk rotation during completion. She reported increased muscular fatigue at conclusion of session. Continues to make gradual gains towards full shoulder PROM. Patient demonstrated fatigue post treatment, exhibited good technique with therapeutic exercises, and would benefit from continued PT      Plan: Continue per plan of care.      Precautions: S/p Right shoulder arthroscopic rotator cuff repair, subscapularis repair, open subpectoral biceps tenodesis, acromioplasty, extensive debridement performed 2024   No resisted elbow flexion or internal rotation   Access Code: [email or web address]  POC expires Unit limit Auth  expiration date PT/OT + Visit Limit?   25 BOMN  BOMN                 Visit/Unit Tracking  AUTH Status:  Date 9/25 9/27 10/2 10/4 10/9 10/11 10/14 10/21 10/23      N/A Used 1 2 3 4 5 6 7 8 9       Remaining                     Manuals 9/25 9/27 10/2 10/4 10/9 10/11 10/14 10/21 10/23    PROM R Shldr per protocol JK GR SC LH BE BE MM BE BE    STM R pec minor                                       Neuro Re-Ed    10/4         Gripping HEP            Webslide: M/L   Ytb 2x 10  YTB 2x10 ea     YTB 2x10 ea    Prone Y,T,I   R only             Prone rows   R only                           Ther Ex    10/4         Pt education HEP, precautions, recovery    Updated HEP       "  UBE    5'/5' 4'/4' 3'/3' 3'/3' 3'/3' 3'/3' 3'/3'    Pulleys scaption  5 min 5  min  5' 5' 5' 5' 5'     Finger ladder      5\"x15 5\"x15      Table slides flex, scapt      5\"x20 5\" x20 ea 5\" x20 ea DC     Supine shoulder flex AAROM with cane     5\"x10  5\" 2x10  5\" 2x10       Supine shoulder ER AAROM with cane     5\"x10 5\" 1x10  5\" 1x10       Seated shoulder ER stretch      5\"x15  5\"x15       Webslide: ER ONLY   YTB 2x 10  YTB 2x10      YTB 2x10    Standing shoulder flex, scapt AAROM with cane         5\" x15  ea 5\" 2x10     Sidelying shoulder abduction AROM        2x10      Sidelying shoulder ER AROM        2x10     Wall slides flex        5\"x10  5\"x10                 Ther Activity                                       Gait Training                                       Modalities             CP    Advised for HEP                                       "

## 2024-10-25 ENCOUNTER — APPOINTMENT (OUTPATIENT)
Dept: PHYSICAL THERAPY | Facility: CLINIC | Age: 74
End: 2024-10-25
Payer: MEDICARE

## 2024-10-30 ENCOUNTER — EVALUATION (OUTPATIENT)
Dept: PHYSICAL THERAPY | Facility: CLINIC | Age: 74
End: 2024-10-30
Payer: MEDICARE

## 2024-10-30 DIAGNOSIS — Z98.890 S/P RIGHT ROTATOR CUFF REPAIR: Primary | ICD-10-CM

## 2024-10-30 PROCEDURE — 97110 THERAPEUTIC EXERCISES: CPT

## 2024-10-30 PROCEDURE — 97112 NEUROMUSCULAR REEDUCATION: CPT

## 2024-10-30 PROCEDURE — 97140 MANUAL THERAPY 1/> REGIONS: CPT

## 2024-10-30 NOTE — PROGRESS NOTES
PT Re-Evaluation     Today's date: 10/30/2024  Patient name: Rosario Morales  : 1950  MRN: [id number]  Referring provider: Meagan Shukla PA-C  Dx:   Encounter Diagnosis     ICD-10-CM    1. S/P right rotator cuff repair  Z98.890                      Assessment  Impairments: abnormal or restricted ROM, impaired physical strength, lacks appropriate home exercise program and participation limitations  Symptom irritability: low    Assessment details: Rosario Morales is a 74 y.o. female presenting to physical therapy for a reevaluation s/p right shoulder arthroscopic rotator cuff repair, subscapularis repair, open subpectoral biceps tenodesis, acromioplasty, extensive debridement performed 24. Since their initial evaluation, she reports 60% improvement in her shoulder. The patient has demonstrated improved shoulder PROM from her initial evaluation. Shoulder strength and AROM measurements today appropriate for her post operative status and given ROM deficits she had prior to her surgery following shoulder dislocation. Subjectively, she reports improved functional use of her shoulder for dressing and notes muscular soreness after using her arm. Despite her improvements, she continues with decreased shoulder active and passive ROM, as well as decreased strength leading to limitations in their ability to complete ADLs, household chores, and recreational activities of gym classes and playing piano. This patient would benefit from continued PT services to address their impairments and functional limitations to maximize functional outcome.    Understanding of Dx/Px/POC: good     Prognosis: good    Goals  STG to be achieved by 4 weeks  -Pt will be independent with basic HEP MET  -Pt will improve ROM by 25% per post operative protocol in order to improve functional mobility MET  -Pt will report 2/10 at worst in order to facilitate return to PLOF NOT MET    LTG to be achieved by Discharge  -Pt will be independent with  comprehensive HEP  -Pt will improve MMT scores to WFL in all deficient planes in order to facilitate ease of functional activity  -Pt will improve ROM to WFL in all deficient planes in order to improve functional mobility  -Pt will be able to participate in recreational gym classes without difficulties or restrictions due to her shoulder.   -Pt will be able to complete all household chores without difficulties or restrictions due to her shoulder.     Plan  Patient would benefit from: skilled physical therapy  Planned modality interventions: cryotherapy    Planned therapy interventions: flexibility, functional ROM exercises, home exercise program, joint mobilization, manual therapy, massage, neuromuscular re-education, patient/caregiver education, strengthening, stretching, therapeutic activities and therapeutic exercise    Frequency: 2x week  Duration in weeks: 12  Plan of Care beginning date: 9/25/2024  Plan of Care expiration date: 12/18/2024  Treatment plan discussed with: patient      Subjective Evaluation    History of Present Illness  Mechanism of injury: Rosario reports 60% improvement since beginning PT services. She notes improvement in range of motion since initial evaluation. She reports that she is has been able to start lifting and reaching with her shoulder to about shoulder height without help. She reports greater ease with dressing and styling her hair, although describes difficulties still with reaching fully behind her head. She does report a aching soreness on the anterior aspect of the shoulder after using her arm too much and at night especially. She is unable to reach her arm above shoulder height without use of a cane for her stretches or opposite arm. She also describes strength deficits for household lifting. She has not returned to her yoga, circuit, or rachel exercise classes due to restrictions/difficulties with overhead lifting or planks. Her next follow up with her surgeon is 11/6/24.    Quality of life: good    Patient Goals  Patient goals for therapy: decreased pain, increased motion, increased strength, return to sport/leisure activities and independence with ADLs/IADLs    Pain  Current pain ratin  At best pain ratin  At worst pain rating: 3  Location: Right shoulder, posterior aspect, right bicep  Quality: dull ache  Relieving factors: support and rest  Aggravating factors: nothing  Progression: improved    Hand dominance: right    Treatments  Previous treatment: physical therapy and immobilization  Current treatment: physical therapy      Objective     Active Range of Motion   Left Shoulder   Normal active range of motion    Right Shoulder   Flexion: 88 (UT compensation) degrees with pain  Abduction: 66 (UT compensation) degrees with pain  External rotation BTH: C2 with pain  Internal rotation BTB: sacrum with pain    Left Elbow   Normal active range of motion    Right Elbow   Normal active range of motion    Left Wrist   Normal active range of motion    Right Wrist   Normal active range of motion    Passive Range of Motion     Right Shoulder   Flexion: 130 degrees with pain  Abduction: 118 degrees   External rotation 45°: 50 degrees with pain  Internal rotation 45°: 55 degrees with pain    Strength/Myotome Testing     Left Shoulder   Normal muscle strength    Right Shoulder     Planes of Motion   Flexion: 2   Abduction: 2   External rotation at 0°: 2+   Internal rotation at 0°: 2+     Left Elbow   Flexion: 4  Extension: 4+             Precautions: S/p Right shoulder arthroscopic rotator cuff repair, subscapularis repair, open subpectoral biceps tenodesis, acromioplasty, extensive debridement performed 2024   No resisted elbow flexion or internal rotation   Access Code: [email or web address]    POC expires Unit limit Auth  expiration date PT/OT + Visit Limit?   24 BOMN N/A BOMN                 Visit/Unit Tracking  AUTH Status:  Date 9/25 9/27 10/2 10/4 10/9 10/11 10/14 10/21 10/23  "10/30     N/A Used 1 2 3 4 5 6 7 8 9 10      Remaining                     Manuals 9/25 9/27 10/2 10/4 10/9 10/11 10/14 10/21 10/23 10/30   PROM R Shldr per protocol JK GR SC LH BE BE MM BE BE BE   STM R pec minor                                       Neuro Re-Ed    10/4         Gripping HEP            Webslide: M/L   Ytb 2x 10  YTB 2x10 ea     YTB 2x10 ea YTB 2x10 ea   Prone Y,T,I   R only          10x ea    Prone rows   R only           2x10                Ther Ex    10/4         Pt education HEP, precautions, recovery    Updated HEP     Re-evaluation,  HEP review   UBE    5'/5' 4'/4' 3'/3' 3'/3' 3'/3' 3'/3' 3'/3' 3'/3'   Pulleys scaption  5 min 5  min  5' 5' 5' 5' 5'  5'   Finger ladder      5\"x15 5\"x15      Supine shoulder flex AAROM with cane     5\"x10  5\" 2x10  5\" 2x10       Supine shoulder ER AAROM with cane     5\"x10 5\" 1x10  5\" 1x10       Seated shoulder ER stretch      5\"x15  5\"x15       Webslide: ER ONLY   YTB 2x 10  YTB 2x10      YTB 2x10 YTB 2x10   Standing shoulder flex, scapt AAROM with cane         5\" x15  ea 5\" 2x10     Sidelying shoulder abduction AROM        2x10      Sidelying shoulder ER AROM        2x10     Wall slides flex        5\"x10  5\"x10                 Ther Activity                                       Gait Training                                       Modalities             CP    Advised for HEP                               "

## 2024-11-01 ENCOUNTER — OFFICE VISIT (OUTPATIENT)
Dept: PHYSICAL THERAPY | Facility: CLINIC | Age: 74
End: 2024-11-01
Payer: MEDICARE

## 2024-11-01 DIAGNOSIS — Z98.890 S/P RIGHT ROTATOR CUFF REPAIR: Primary | ICD-10-CM

## 2024-11-01 PROCEDURE — 97140 MANUAL THERAPY 1/> REGIONS: CPT

## 2024-11-01 PROCEDURE — 97110 THERAPEUTIC EXERCISES: CPT

## 2024-11-01 PROCEDURE — 97112 NEUROMUSCULAR REEDUCATION: CPT

## 2024-11-01 NOTE — PROGRESS NOTES
"Daily Note     Today's date: 2024  Patient name: Rosario Morales  : 1950  MRN: [id number]  Referring provider: Meagan Shukla PA-C  Dx:   Encounter Diagnosis     ICD-10-CM    1. S/P right rotator cuff repair  Z98.890                      Subjective: Patient reports that she was excited she could participate in rachel the other day. She had to modify her movements in some ways because she didn't use any weights and didn't do any overhead or shoulder height movements of her arm. But it felt good to do the circular movements of her arms below shoulder height while performing the dance moves.       Objective: See treatment diary below      Assessment: Tolerated treatment well. Patient with much improved shoulder AAROM standing with use of cane in flexion and scaption planes; almost achieving end range motion. Strength continues to improve per protocol. Patient demonstrated fatigue post treatment, exhibited good technique with therapeutic exercises, and would benefit from continued PT      Plan: Progress treament per protocol.      Precautions: S/p Right shoulder arthroscopic rotator cuff repair, subscapularis repair, open subpectoral biceps tenodesis, acromioplasty, extensive debridement performed 2024   No resisted elbow flexion or internal rotation   Access Code: [email or web address]    POC expires Unit limit Auth  expiration date PT/OT + Visit Limit?   24 BOMN N/A BOMN                 Visit/Unit Tracking  AUTH Status:  Date 9/25 9/27 10/2 10/4 10/9 10/11 10/14 10/21 10/23 10/30 11/1    N/A Used 1 2 3 4 5 6 7 8 9 10 11     Remaining                     Manuals 11/1 9/27 10/2 10/4 10/9 10/11 10/14 10/21 10/23 10/30   PROM R Shldr per protocol BE GR SC LH BE BE MM BE BE BE                             Neuro Re-Ed    10/4         Lissett: M/L RTB 3\" 2x10 ea  Ytb 2x 10  YTB 2x10 ea     YTB 2x10 ea YTB 2x10 ea   Prone Y,T,I   R only 10x ea         10x ea    Prone rows   R only  2x10          2x10              " "  Ther Ex    10/4         Pt education     Updated HEP     Re-evaluation,  HEP review   UBE    5'/5' 4'/4' 3'/3' 3'/3' 3'/3' 3'/3' 3'/3' 3'/3'   Pulleys scaption  5 min 5  min  5' 5' 5' 5' 5'  5'   Finger ladder DC     5\"x15 5\"x15      Supine shoulder flex AAROM with cane     5\"x10  5\" 2x10  5\" 2x10       Supine shoulder ER AAROM with cane DC    5\"x10 5\" 1x10  5\" 1x10       Seated shoulder ER stretch DC     5\"x15  5\"x15       Webslide: ER ONLY YTB 3x10  YTB 2x 10  YTB 2x10      YTB 2x10 YTB 2x10   Standing shoulder flex, scapt AAROM with cane  2x15 ea       5\" x15  ea 5\" 2x10     Sidelying shoulder abduction AROM        2x10      Sidelying shoulder ER AROM        2x10     Wall slides flex 5\"x15       5\"x10  5\"x10                 Ther Activity                                       Gait Training                                       Modalities             CP    Advised for HEP                               "

## 2024-11-04 ENCOUNTER — OFFICE VISIT (OUTPATIENT)
Dept: PHYSICAL THERAPY | Facility: CLINIC | Age: 74
End: 2024-11-04
Payer: MEDICARE

## 2024-11-04 DIAGNOSIS — Z98.890 S/P RIGHT ROTATOR CUFF REPAIR: Primary | ICD-10-CM

## 2024-11-04 PROCEDURE — 97110 THERAPEUTIC EXERCISES: CPT | Performed by: PHYSICAL THERAPIST

## 2024-11-04 PROCEDURE — 97140 MANUAL THERAPY 1/> REGIONS: CPT | Performed by: PHYSICAL THERAPIST

## 2024-11-04 PROCEDURE — 97112 NEUROMUSCULAR REEDUCATION: CPT | Performed by: PHYSICAL THERAPIST

## 2024-11-04 NOTE — PROGRESS NOTES
"Daily Note     Today's date: 2024  Patient name: Rosario Morales  : 1950  MRN: [id number]  Referring provider: Meagan Shukla PA-C  Dx:   Encounter Diagnosis     ICD-10-CM    1. S/P right rotator cuff repair  Z98.890           Start Time: 1131          Subjective: Shoulder has been feeling pretty good.  Has noticed improved strength and ROM in her shoulder.      Objective: See treatment diary below      Assessment: Tolerated treatment well. Patient demonstrated fatigue post treatment and would benefit from continued PT.  Patient had some fatigue and soreness with t-and shoulder ER and soreness with AAROM ABD.  [place] good after her session.      Plan: Continue per plan of care.  Progress treatment as tolerated.       Precautions: S/p Right shoulder arthroscopic rotator cuff repair, subscapularis repair, open subpectoral biceps tenodesis, acromioplasty, extensive debridement performed 2024   No resisted elbow flexion or internal rotation   Access Code: [email or web address]    POC expires Unit limit Auth  expiration date PT/OT + Visit Limit?   24 BOMN N/A BOMN                 Visit/Unit Tracking  AUTH Status:  Date 9/25 9/27 10/2 10/4 10/9 10/11 10/14 10/21 10/23 10/30 11/1    N/A Used 1 2 3 4 5 6 7 8 9 10 11     Remaining                     Manuals 11/1 11/4 10/2 10/4 10/9 10/11 10/14 10/21 10/23 10/30   PROM R Shldr per protocol BE JF SC LH BE BE MM BE BE BE                             Neuro Re-Ed    10/4         [place]: M/L RTB 3\" 2x10 ea RTB 3\" 2x10 ea Ytb 2x 10  YTB 2x10 ea     YTB 2x10 ea YTB 2x10 ea   Prone Y,T,I   R only 10x ea 10x ea        10x ea    Prone rows   R only  2x10  2x10         2x10                Ther Ex    10/4         Pt education     Updated HEP     Re-evaluation,  HEP review   UBE  3'/3' 3'/3' 5'/5' 4'/4' 3'/3' 3'/3' 3'/3' 3'/3' 3'/3' 3'/3'   Pulleys scaption   5  min  5' 5' 5' 5' 5'  5'   Finger ladder DC     5\"x15 5\"x15      Supine shoulder flex AAROM with cane     5\"x10  5\" " "2x10  5\" 2x10       Supine shoulder ER AAROM with cane DC    5\"x10 5\" 1x10  5\" 1x10       Seated shoulder ER stretch DC     5\"x15  5\"x15       Webslide: ER ONLY YTB 3x10 YTB 3x10 YTB 2x 10  YTB 2x10      YTB 2x10 YTB 2x10   Standing shoulder flex, scapt AAROM with cane  2x15 ea 2x15 ea      5\" x15  ea 5\" 2x10     Sidelying shoulder abduction AROM        2x10      Sidelying shoulder ER AROM        2x10     Wall slides flex 5\"x15 5\"x15      5\"x10  5\"x10                 Ther Activity                                       Gait Training                                       Modalities             CP    Advised for HEP                                 "

## 2024-11-06 ENCOUNTER — OFFICE VISIT (OUTPATIENT)
Dept: PHYSICAL THERAPY | Facility: CLINIC | Age: 74
End: 2024-11-06
Payer: MEDICARE

## 2024-11-06 ENCOUNTER — OFFICE VISIT (OUTPATIENT)
Dept: OBGYN CLINIC | Facility: CLINIC | Age: 74
End: 2024-11-06

## 2024-11-06 VITALS
HEART RATE: 70 BPM | WEIGHT: 119 LBS | DIASTOLIC BLOOD PRESSURE: 82 MMHG | SYSTOLIC BLOOD PRESSURE: 127 MMHG | BODY MASS INDEX: 22.47 KG/M2 | HEIGHT: 61 IN

## 2024-11-06 DIAGNOSIS — Z48.89 AFTERCARE FOLLOWING SURGERY: Primary | ICD-10-CM

## 2024-11-06 DIAGNOSIS — Z98.890 S/P ARTHROSCOPY OF LEFT SHOULDER: ICD-10-CM

## 2024-11-06 DIAGNOSIS — Z98.890 S/P RIGHT ROTATOR CUFF REPAIR: Primary | ICD-10-CM

## 2024-11-06 PROCEDURE — 99024 POSTOP FOLLOW-UP VISIT: CPT | Performed by: ORTHOPAEDIC SURGERY

## 2024-11-06 PROCEDURE — 97110 THERAPEUTIC EXERCISES: CPT | Performed by: PHYSICAL THERAPIST

## 2024-11-06 PROCEDURE — 97112 NEUROMUSCULAR REEDUCATION: CPT | Performed by: PHYSICAL THERAPIST

## 2024-11-06 NOTE — PROGRESS NOTES
"Daily Note     Today's date: 2024  Patient name: Rosario Morales  : 1950  MRN: [id number]  Referring provider: Meagan Shukla PA-C  Dx:   Encounter Diagnosis     ICD-10-CM    1. S/P right rotator cuff repair  Z98.890                      Subjective: Saw surgeon today and he was pleased with progress.  She is happy with function of her arm      Objective: See treatment diary below      Assessment: Tolerated treatment well. Patient demonstrated fatigue post treatment and would benefit from continued PT.  Strength continues to improve.  Progressing well.  Increased resistance with T-band ER today.      Plan: Continue per plan of care.  Progress treatment as tolerated.       Precautions: S/p Right shoulder arthroscopic rotator cuff repair, subscapularis repair, open subpectoral biceps tenodesis, acromioplasty, extensive debridement performed 2024   No resisted elbow flexion or internal rotation   Access Code: [email or web address]    POC expires Unit limit Auth  expiration date PT/OT + Visit Limit?   24 BOMN N/A BOMN                 Visit/Unit Tracking  AUTH Status:  Date 9/25 9/27 10/2 10/4 10/9 10/11 10/14 10/21 10/23 10/30 11/1    N/A Used 1 2 3 4 5 6 7 8 9 10 11     Remaining                     Manuals 11/1 11/4 11/6 10/4 10/9 10/11 10/14 10/21 10/23 10/30   PROM R Shldr per protocol BE JF JF LH BE BE MM BE BE BE                             Neuro Re-Ed    10/4         Webslide: M/L RTB 3\" 2x10 ea RTB 3\" 2x10 ea RTB 3\" 2x10 ea  YTB 2x10 ea     YTB 2x10 ea YTB 2x10 ea   Prone Y,T,I   R only 10x ea 10x ea 10x ea       10x ea    Prone rows   R only  2x10  2x10  2x10        2x10                Ther Ex    10/4         Pt education     Updated HEP     Re-evaluation,  HEP review   UBE  3'/3' 3'/3' 4'/4'' 4'/4' 3'/3' 3'/3' 3'/3' 3'/3' 3'/3' 3'/3'   Pulleys scaption    5' 5' 5' 5' 5'  5'   Finger ladder DC     5\"x15 5\"x15      Supine shoulder flex AAROM with cane     5\"x10  5\" 2x10  5\" 2x10       Supine shoulder " "ER AAROM with cane DC    5\"x10 5\" 1x10  5\" 1x10       Seated shoulder ER stretch DC     5\"x15  5\"x15       Webslide: ER ONLY YTB 3x10 YTB 3x10 RTB 3x 10  YTB 2x10      YTB 2x10 YTB 2x10   Standing shoulder flex, scapt AAROM with cane  2x15 ea 2x15 ea 2x15 ea     5\" x15  ea 5\" 2x10     Sidelying shoulder abduction AROM        2x10      Sidelying shoulder ER AROM        2x10     Wall slides flex 5\"x15 5\"x15 5\"x15     5\"x10  5\"x10                 Ther Activity                                       Gait Training                                       Modalities             CP    Advised for HEP                                   "

## 2024-11-06 NOTE — PROGRESS NOTES
Patient Name:  Rosario Morales  MRN:  [id number]    Assessment & Plan     1. Aftercare following surgery  2. S/P arthroscopy of left shoulder    Approximately 11 weeks s/p Right shoulder arthroscopic rotator cuff repair, open subpectoral biceps tenodesis, extensive debridement and acromioplasty performed on 08/21/2024  Overall, patient doing well s/p surgical intervention  She can continue her activities, advised to avoid any heavy lifting, she is still early in the healing process.   Continue with active range of motion exercises and progress with gentle strengthening with formal physical therapy and home exercise program per protocol.  Follow up 3 months     History of the Present Illness   Rosario Morales is a 74 y.o. female approximately 11 week s/p Right shoulder arthroscopic rotator cuff repair, open subpectoral biceps tenodesis, extensive debridement and acromioplasty performed on 08/21/2024. Today, patient reports she is doing well s/p surgical intervention. She has been compliant with going to physical therapy and doing her home exercises. Patient has been able to return to all her normal activities and went to [facility], denies any heavy lifting.  She feels her night pain has improved significantly along with her range of motion. Patient will take Ibuprofen for pain if she needs to.   Review of Systems     Review of Systems   Constitutional:  Negative for chills and fever.   HENT:  Negative for congestion, ear pain and sore throat.    Eyes:  Negative for pain and visual disturbance.   Respiratory:  Negative for cough, chest tightness and shortness of breath.    Cardiovascular:  Negative for chest pain and palpitations.   Gastrointestinal:  Negative for abdominal pain and vomiting.   Endocrine: Negative for cold intolerance and heat intolerance.   Genitourinary:  Negative for dysuria and hematuria.   Musculoskeletal:  Positive for arthralgias. Negative for back pain.   Skin:  Negative for color change and rash.  "  Neurological:  Negative for seizures and syncope.   Psychiatric/Behavioral:  Negative for confusion.    All other systems reviewed and are negative.      Physical Exam     /82   Pulse 70   Ht 5' 1\" (1.549 m)   Wt 54 kg (119 lb)   BMI 22.48 kg/m²     Right Shoulder:   Surgical incisions well healing without sign of infection  Active range of motion  95 forward flexion  Abduction 80  External rotation 70  Internation rotation SI joint   Forward Flexion strength 4/5  Internal Rotat  Passive range of motion   150 degrees of forward flexion   Full composite fist  The patient is neurovascularly intact distally in the extremity.      Data Review     I have personally reviewed pertinent films in PACS, and my interpretation follows.    No new images     Social History     Tobacco Use    Smoking status: Never    Smokeless tobacco: Never   Vaping Use    Vaping status: Never Used   Substance Use Topics    Alcohol use: Yes     Alcohol/week: 7.0 standard drinks of alcohol     Types: 7 Glasses of wine per week     Comment: daily    Drug use: No           Procedures  None     Scribe Attestation      I,:  Maddy Chicas am acting as a scribe while in the presence of the attending physician.:       I,:  Akshat Roach, DO personally performed the services described in this documentation    as scribed in my presence.:            "

## 2024-11-09 ENCOUNTER — OFFICE VISIT (OUTPATIENT)
Dept: URGENT CARE | Facility: MEDICAL CENTER | Age: 74
End: 2024-11-09
Payer: MEDICARE

## 2024-11-09 VITALS
SYSTOLIC BLOOD PRESSURE: 122 MMHG | DIASTOLIC BLOOD PRESSURE: 72 MMHG | HEART RATE: 90 BPM | HEIGHT: 61 IN | TEMPERATURE: 98 F | RESPIRATION RATE: 18 BRPM | WEIGHT: 119 LBS | BODY MASS INDEX: 22.47 KG/M2 | OXYGEN SATURATION: 98 %
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
## 2024-11-09 DIAGNOSIS — S30.861A TICK BITE OF ABDOMEN, INITIAL ENCOUNTER: Primary | ICD-10-CM

## 2024-11-09 PROCEDURE — G0463 HOSPITAL OUTPT CLINIC VISIT: HCPCS | Performed by: FAMILY MEDICINE

## 2024-11-09 PROCEDURE — 99213 OFFICE O/P EST LOW 20 MIN: CPT | Performed by: FAMILY MEDICINE

## 2024-11-09 RX ORDER — DOXYCYCLINE 100 MG/1
200 TABLET ORAL ONCE
Qty: 2 TABLET | Refills: 0 | Status: SHIPPED | OUTPATIENT
Start: 2024-11-09 | End: 2024-11-09

## 2024-11-09 NOTE — PROGRESS NOTES
[facility]        NAME: Rosario Morales is a 74 y.o. female  : 1950    MRN: [id number]  DATE: 2024  TIME: 10:57 AM    Assessment and Plan   Tick bite of abdomen, initial encounter [S30.861A, W57.XXXA]  1. 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      Patient Instructions       Follow up with PCP in 3-5 days.  Proceed to  ER if symptoms worsen.    If tests have been performed at [facility], our office will contact you with results if changes need to be made to the care plan discussed with you at the visit.  You can review your full results on [facility].    Chief Complaint     Chief Complaint   Patient presents with    Tick Bite     Patient was hiking on Thursday and noticed a tick to left abdomen; part of tick still present          History of Present Illness       Was outside hiking on . Discovered tick attached to abd today        Review of Systems   Review of Systems   Constitutional:  Negative for activity change, chills, fatigue and fever.   Respiratory:  Negative for shortness of breath.    Cardiovascular:  Negative for chest pain.   Gastrointestinal:  Negative for nausea and vomiting.         Current Medications       Current Outpatient Medications:     doxycycline (ADOXA) 100 MG tablet, Take 2 tablets (200 mg total) by mouth 1 (one) time for 1 dose, Disp: 2 tablet, Rfl: 0    Ascorbic Acid (vitamin C) 1000 MG tablet, Take 1,000 mg by mouth daily, Disp: , Rfl:     Calcium Carb-Cholecalciferol (Calcium 500 + D) 500-5 MG-MCG TABS, Take 2 tablets by mouth in the morning, Disp: , Rfl:     cyanocobalamin (VITAMIN B-12) 100 mcg tablet, Take by mouth daily, Disp: , Rfl:     cycloSPORINE (RESTASIS) 0.05 % ophthalmic emulsion, Administer 1 drop to both eyes 2 (two) times a day, Disp: , Rfl:     Ibuprofen (IBU PO), Take by mouth, Disp: , Rfl:  "    Current Allergies     Allergies as of 11/09/2024 - Reviewed 11/09/2024   Allergen Reaction Noted    Other Other (See Comments) and Sneezing 04/05/2021            The following portions of the patient's history were reviewed and updated as appropriate: allergies, current medications, past family history, past medical history, past social history, past surgical history and problem list.     Past Medical History:   Diagnosis Date    Acute bronchitis 03/30/2015    Acute upper respiratory infection 04/16/2014    Allergic blepharitis 06/18/2015    Benign neoplasm of skin     Plantar fasciitis 06/18/2015    Thumb pain 06/18/2015       Past Surgical History:   Procedure Laterality Date    BREAST EXCISIONAL BIOPSY Left     1992    COLONOSCOPY      EGD      2021    ND SURGICAL ARTHROSCOPY SHOULDER W/ROTATOR CUFF RPR Right 08/21/2024    Procedure: REPAIR ROTATOR CUFF ARTHROSCOPIC- Right shoulder arthroscopic rotator cuff repair, open subpectoral biceps tenodesis, extensive debridement and acromioplasty;  Surgeon: Akshat Roach DO;  Location: [facility] OR;  Service: Orthopedics    SHOULDER SURGERY Right 08/21/2024    TUBAL LIGATION      LAST ASSESSED: [id number]       Family History   Problem Relation Age of Onset    Migraines Mother     Osteoporosis Mother     Scoliosis Mother     Throat cancer Father     Depression Daughter         WITH ANXIETY     Migraines Daughter     Rheum arthritis Daughter     Urinary tract infection Daughter     No Known Problems Daughter     Depression Son         WITH ANXIETY     Breast cancer Neg Hx     Colon cancer Neg Hx     Uterine cancer Neg Hx     Cervical cancer Neg Hx     Ovarian cancer Neg Hx          Medications have been verified.        Objective   /72   Pulse 90   Temp 98 °F (36.7 °C) (Temporal)   Resp 18   Ht 5' 1\" (1.549 m)   Wt 54 kg (119 lb)   SpO2 98%   BMI 22.48 kg/m²   No LMP recorded. Patient is postmenopausal.       Physical Exam     Physical Exam  Vitals " reviewed.   Constitutional:       Appearance: Normal appearance.   HENT:      Head: Normocephalic and atraumatic.   Cardiovascular:      Rate and Rhythm: Normal rate and regular rhythm.   Skin:     Findings: Lesion present.      Comments: Area of puncture on left lower abd with surrounding erythema. Attempted to removed small amount of either scab or tick remaining but unable to grasp with splinter forceps.   Neurological:      Mental Status: She is alert.

## 2024-11-11 ENCOUNTER — OFFICE VISIT (OUTPATIENT)
Dept: PHYSICAL THERAPY | Facility: CLINIC | Age: 74
End: 2024-11-11
Payer: MEDICARE

## 2024-11-11 DIAGNOSIS — Z98.890 S/P RIGHT ROTATOR CUFF REPAIR: Primary | ICD-10-CM

## 2024-11-11 PROCEDURE — 97112 NEUROMUSCULAR REEDUCATION: CPT

## 2024-11-11 PROCEDURE — 97110 THERAPEUTIC EXERCISES: CPT

## 2024-11-11 NOTE — PROGRESS NOTES
"Daily Note     Today's date: 2024  Patient name: Rosario Morales  : 1950  MRN: [id number]  Referring provider: Meagan Shukla PA-C  Dx:   Encounter Diagnosis     ICD-10-CM    1. S/P right rotator cuff repair  Z98.890           Start Time: 1115  Stop Time: 1200  Total time in clinic (min): 45 minutes    Subjective: Pt noted no changes since last treatment session.       Objective: See treatment diary below      Assessment:  Continued with treatment session at this time increase ROM into flexion noticed with AAROM. Tolerated treatment well. Patient exhibited good technique with therapeutic exercises and would benefit from continued PT. S/p treatment session, noted feeling better and looser in R shoulder.      May have additional  soreness.       Plan: Continue per plan of care.      Precautions: S/p Right shoulder arthroscopic rotator cuff repair, subscapularis repair, open subpectoral biceps tenodesis, acromioplasty, extensive debridement performed 2024   No resisted elbow flexion or internal rotation   Access Code: [email or web address]    POC expires Unit limit Auth  expiration date PT/OT + Visit Limit?   24 BOMN N/A BOMN                 Visit/Unit Tracking  AUTH Status:  Date 9/25 9/27 10/2 10/4 10/9 10/11 10/14 10/21 10/23 10/30 11/1    N/A Used 1 2 3 4 5 6 7 8 9 10 11     Remaining                     Manuals 11/1 11/4 11/6 11/11  10/11 10/14 10/21 10/23 10/30   PROM R Shldr per protocol BE JF JF SC  BE MM BE BE BE                             Neuro Re-Ed             Webslide: M/L RTB 3\" 2x10 ea RTB 3\" 2x10 ea RTB 3\" 2x10 ea  RTB 3\" 2x 10 e.a     YTB 2x10 ea YTB 2x10 ea   Prone Y,T,I   R only 10x ea 10x ea 10x ea 10x ea.       10x ea    Prone rows   R only  2x10  2x10  2x10  NV       2x10                Ther Ex             Pt education          Re-evaluation,  HEP review   UBE  3'/3' 3'/3' 4'/4'' 4'/4'  3'/3' 3'/3' 3'/3' 3'/3' 3'/3'   Lalitha scaption      5' 5' 5'  5'   Finger ladder DC     5\"x15 " "5\"x15      Supine shoulder flex AAROM with cane      5\" 2x10  5\" 2x10       Supine shoulder ER AAROM with cane DC     5\" 1x10  5\" 1x10       Seated shoulder ER stretch DC     5\"x15  5\"x15       Webslide: ER ONLY YTB 3x10 YTB 3x10 RTB 3x 10  RTB 20x.      YTB 2x10 YTB 2x10   Standing shoulder flex, scapt AAROM with cane  2x15 ea 2x15 ea 2x15 ea 2x10 5\"     5\" x15  ea 5\" 2x10     Sidelying shoulder abduction AROM        2x10      Sidelying shoulder ER AROM        2x10     Wall slides flex 5\"x15 5\"x15 5\"x15 5\" 2x 15    5\"x10  5\"x10                 Ther Activity                                       Gait Training                                       Modalities             CP    Advised for HEP                                     "

## 2024-11-13 ENCOUNTER — OFFICE VISIT (OUTPATIENT)
Dept: PHYSICAL THERAPY | Facility: CLINIC | Age: 74
End: 2024-11-13
Payer: MEDICARE

## 2024-11-13 DIAGNOSIS — Z98.890 S/P RIGHT ROTATOR CUFF REPAIR: Primary | ICD-10-CM

## 2024-11-13 PROCEDURE — 97140 MANUAL THERAPY 1/> REGIONS: CPT

## 2024-11-13 PROCEDURE — 97110 THERAPEUTIC EXERCISES: CPT

## 2024-11-13 NOTE — PROGRESS NOTES
"Daily Note     Today's date: 2024  Patient name: Rosario Morales  : 1950  MRN: [id number]  Referring provider: Meagan Shukla PA-C  Dx:   Encounter Diagnosis     ICD-10-CM    1. S/P right rotator cuff repair  Z98.890                      Subjective: Patient reports that she went to an exercise class earlier this week where they used resistance bands. She says that she modified the exercises the class was doing to be more similar to the TB exercises we do in therapy. She says that being able to see herself in the mirror doing the exercises to avoid the UT compensation.       Objective: See treatment diary below      Assessment: Tolerated treatment well. Continues to demonstrate improving shoulder active and active assisted ROM. Progressed wall slides with addition of eccentric lower with increased difficulties noted. Patient demonstrated fatigue post treatment, exhibited good technique with therapeutic exercises, and would benefit from continued PT      Plan: Continue per plan of care.      Precautions: S/p Right shoulder arthroscopic rotator cuff repair, subscapularis repair, open subpectoral biceps tenodesis, acromioplasty, extensive debridement performed 2024   No resisted elbow flexion or internal rotation   Access Code: [email or web address]    POC expires Unit limit Auth  expiration date PT/OT + Visit Limit?   24 BOMN N/A BOMN                 Visit/Unit Tracking  AUTH Status:  Date  9/27 10/2 10/4 10/9 10/11 10/14 10/21 10/23 10/30 11/1 11/13   N/A Used  2 3 4 5 6 7 8 9 10 11 12    Remaining                     Manuals 11/1 11/4 11/6 11/11 11/13  10/14 10/21 10/23 10/30   PROM R Shldr per protocol BE JF JF SC BE  MM BE BE BE                             Neuro Re-Ed             Webslide: M/L RTB 3\" 2x10 ea RTB 3\" 2x10 ea RTB 3\" 2x10 ea  RTB 3\" 2x 10 e.a     YTB 2x10 ea YTB 2x10 ea   Prone Y,T,I   R only 10x ea 10x ea 10x ea 10x ea.       10x ea    Prone rows   R only  2x10  2x10  2x10  NV       2x10 " "               Ther Ex             Pt education          Re-evaluation,  HEP review   UBE  3'/3' 3'/3' 4'/4'' 4'/4' 4'/4'  3'/3' 3'/3' 3'/3' 3'/3'   Supine shoulder flex AAROM with cane     No cane   10x  5\" 2x10       Webslide: ER ONLY YTB 3x10 YTB 3x10 RTB 3x 10  RTB 20x.      YTB 2x10 YTB 2x10   Standing shoulder flex, scapt AAROM with cane  2x15 ea 2x15 ea 2x15 ea 2x10 5\"  5\" 2x15 ea   5\" x15  ea 5\" 2x10     Sidelying shoulder abduction AROM     2x10   2x10      Sidelying shoulder ER AROM     2x10   2x10     Wall slides flex 5\"x15 5\"x15 5\"x15 5\" 2x 15 5\"x10 ecc lowering    5\"x10  5\"x10                 Ther Activity                                       Gait Training                                       Modalities             CP    Advised for HEP                                       "

## 2024-11-18 ENCOUNTER — OFFICE VISIT (OUTPATIENT)
Dept: PHYSICAL THERAPY | Facility: CLINIC | Age: 74
End: 2024-11-18
Payer: MEDICARE

## 2024-11-18 DIAGNOSIS — Z98.890 S/P RIGHT ROTATOR CUFF REPAIR: Primary | ICD-10-CM

## 2024-11-18 PROCEDURE — 97140 MANUAL THERAPY 1/> REGIONS: CPT

## 2024-11-18 PROCEDURE — 97110 THERAPEUTIC EXERCISES: CPT

## 2024-11-18 NOTE — PROGRESS NOTES
"Daily Note     Today's date: 2024  Patient name: Rosario Morales  : 1950  MRN: [id number]  Referring provider: Meagan Shukla PA-C  Dx:   Encounter Diagnosis     ICD-10-CM    1. S/P right rotator cuff repair  Z98.890                      Subjective: Patient reports that she did a [employer] class and did some mild weight lifting of one weight with the assistance of both her arms.       Objective: See treatment diary below      Assessment: Tolerated treatment well. Patient able to progress with additional reps of scapular stabilization and RTC strengthening exercises as outlined below. No adverse effects of progressions. Patient demonstrated fatigue post treatment, exhibited good technique with therapeutic exercises, and would benefit from continued PT      Plan: Continue per plan of care.      Precautions: S/p Right shoulder arthroscopic rotator cuff repair, subscapularis repair, open subpectoral biceps tenodesis, acromioplasty, extensive debridement performed 2024   No resisted elbow flexion or internal rotation   Access Code: [email or web address]    POC expires Unit limit Auth  expiration date PT/OT + Visit Limit?   24 BOMN N/A BOMN                 Visit/Unit Tracking  AUTH Status:  Date 11/18  10/2 10/4 10/9 10/11 10/14 10/21 10/23 10/30 11/1 11/13   N/A Used 13  3 4 5 6 7 8 9 10 11 12    Remaining                     Manuals 11/1 11/4 11/6 11/11 11/13 11/18 10/14 10/21 10/23 10/30   PROM R Shldr per protocol BE JF JF SC BE BE MM BE BE BE                             Neuro Re-Ed             Webslide: M/L RTB 3\" 2x10 ea RTB 3\" 2x10 ea RTB 3\" 2x10 ea  RTB 3\" 2x 10 e.a  RTB 3x10 ea   YTB 2x10 ea YTB 2x10 ea   Prone Y,T,I   R only 10x ea 10x ea 10x ea 10x ea.       10x ea    Prone rows   R only  2x10  2x10  2x10  NV       2x10                Ther Ex             Pt education          Re-evaluation,  HEP review   UBE  3'/3' 3'/3' 4'/4'' 4'/4' 4'/4' 4'/4' 3'/3' 3'/3' 3'/3' 3'/3'   Supine shoulder flex AAROM with " "cane     No cane   10x No cane   15x 5\" 2x10       Webslide: ER ONLY YTB 3x10 YTB 3x10 RTB 3x 10  RTB 20x.   RTB 3x10   YTB 2x10 YTB 2x10   Standing shoulder flex, scapt AAROM with cane  2x15 ea 2x15 ea 2x15 ea 2x10 5\"  5\" 2x15 ea   5\" x15  ea 5\" 2x10     Sidelying shoulder abduction AROM     2x10 3x10  2x10      Sidelying shoulder ER AROM     2x10 2x10  2x10     Wall slides flex 5\"x15 5\"x15 5\"x15 5\" 2x 15 5\"x10 ecc lowering  5\"x10 ecc lowering  5\"x10  5\"x10                 Ther Activity                                       Gait Training                                       Modalities             CP    Advised for HEP                                         "

## 2024-11-20 ENCOUNTER — OFFICE VISIT (OUTPATIENT)
Dept: PHYSICAL THERAPY | Facility: CLINIC | Age: 74
End: 2024-11-20
Payer: MEDICARE

## 2024-11-20 DIAGNOSIS — Z98.890 S/P RIGHT ROTATOR CUFF REPAIR: Primary | ICD-10-CM

## 2024-11-20 PROCEDURE — 97110 THERAPEUTIC EXERCISES: CPT

## 2024-11-20 PROCEDURE — 97140 MANUAL THERAPY 1/> REGIONS: CPT

## 2024-11-20 NOTE — PROGRESS NOTES
"Daily Note     Today's date: 2024  Patient name: Rosario Morales  : 1950  MRN: [id number]  Referring provider: Meagan Shukla PA-C  Dx:   Encounter Diagnosis     ICD-10-CM    1. S/P right rotator cuff repair  Z98.890                      Subjective: Patient reports that she is able to lift her arm overhead when laying on her back, but isn't able to lift it beyond 90 degrees when standing and isn't sure why.      Objective: See treatment diary below      Assessment: Tolerated treatment well. Progressed with additional resistance for scapular stabilization exercises with increased challenge noted. Trialed performance of sidelying exercises with 1# weight, however patient expressed increased soreness and was unable to complete with proper form therefore removed weight. Explained to patient differences between gravity eliminated and against gravity positions with the implications these positions have for muscle strength requirements. Patient demonstrated fatigue post treatment, exhibited good technique with therapeutic exercises, and would benefit from continued PT      Plan: Progress treatment as tolerated.       Precautions: S/p Right shoulder arthroscopic rotator cuff repair, subscapularis repair, open subpectoral biceps tenodesis, acromioplasty, extensive debridement performed 2024   No resisted elbow flexion or internal rotation   Access Code: [email or web address]    POC expires Unit limit Auth  expiration date PT/OT + Visit Limit?   24 BOMN N/A BOMN                 Visit/Unit Tracking  AUTH Status:  Date 11/18 11/20   10/9 10/11 10/14 10/21 10/23 10/30 11/1 11/13   N/A Used 13 14   5 6 7 8 9 10 11 12    Remaining                     Manuals 11/1 11/4 11/6 11/11 11/13 11/18 11/20  10/23 10/30   PROM R Shldr per protocol BE JF JF SC BE BE BE  BE BE                             Neuro Re-Ed             Webslide: M/L RTB 3\" 2x10 ea RTB 3\" 2x10 ea RTB 3\" 2x10 ea  RTB 3\" 2x 10 e.a  RTB 3x10 ea GTB 3\" 2x10 ea  " " YTB 2x10 ea YTB 2x10 ea   Prone Y,T,I   R only 10x ea 10x ea 10x ea 10x ea.       10x ea    Prone rows   R only  2x10  2x10  2x10  NV       2x10                Ther Ex             Pt education          Re-evaluation,  HEP review   UBE  3'/3' 3'/3' 4'/4'' 4'/4' 4'/4' 4'/4' 4'/4'  3'/3' 3'/3'   Supine shoulder flex AAROM with cane     No cane   10x No cane   15x       Webslide: ER ONLY YTB 3x10 YTB 3x10 RTB 3x 10  RTB 20x.   RTB 3x10 RTB 3x10  YTB 2x10 YTB 2x10   Standing shoulder flex, scapt AAROM with cane  2x15 ea 2x15 ea 2x15 ea 2x10 5\"  5\" 2x15 ea    5\" 2x10     Sidelying shoulder abduction AROM     2x10 3x10 3x10      Sidelying shoulder ER AROM     2x10 2x10 3x10      Wall slides flex 5\"x15 5\"x15 5\"x15 5\" 2x 15 5\"x10 ecc lowering  5\"x10 ecc lowering 5\"x15 ecc lowering  5\"x10    Sidelying shoulder flexion with ranger        YTB x15       Ther Activity                                       Gait Training                                       Modalities             CP    Advised for HEP                                           "

## 2024-11-25 ENCOUNTER — OFFICE VISIT (OUTPATIENT)
Dept: PHYSICAL THERAPY | Facility: CLINIC | Age: 74
End: 2024-11-25
Payer: MEDICARE

## 2024-11-25 DIAGNOSIS — Z98.890 S/P RIGHT ROTATOR CUFF REPAIR: Primary | ICD-10-CM

## 2024-11-25 PROCEDURE — 97110 THERAPEUTIC EXERCISES: CPT

## 2024-11-25 PROCEDURE — 97112 NEUROMUSCULAR REEDUCATION: CPT

## 2024-11-25 PROCEDURE — 97140 MANUAL THERAPY 1/> REGIONS: CPT

## 2024-11-25 NOTE — PROGRESS NOTES
"Daily Note     Today's date: 2024  Patient name: Rosario Morales  : 1950  MRN: [id number]  Referring provider: Meagan Shukla PA-C  Dx:   Encounter Diagnosis     ICD-10-CM    1. S/P right rotator cuff repair  Z98.890                      Subjective: Patient reports that her shoulder is feeling good today.       Objective: See treatment diary below      Assessment: Tolerated treatment well. Continues to be challenged with sidelying AROM exercises, most notably shoulder abduction. She was able to complete this today without soreness in her shoulder. Patient demonstrated fatigue post treatment, exhibited good technique with therapeutic exercises, and would benefit from continued PT      Plan: Continue per plan of care.      Precautions: S/p Right shoulder arthroscopic rotator cuff repair, subscapularis repair, open subpectoral biceps tenodesis, acromioplasty, extensive debridement performed 2024   No resisted elbow flexion or internal rotation   Access Code: [email or web address]    POC expires Unit limit Auth  expiration date PT/OT + Visit Limit?   24 BOMN N/A BOMN                 Visit/Unit Tracking  AUTH Status:  Date 11/18 11/20 11/25  10/9 10/11 10/14 10/21 10/23 10/30 11/1 11/13   N/A Used 13 14 15  5 6 7 8 9 10 11 12    Remaining                     Manuals 11/1 11/4 11/6 11/11 11/13 11/18 11/20 11/25  10/30   PROM R Shldr per protocol BE JF JF SC BE BE BE BE  BE                             Neuro Re-Ed             Webslide: M/L RTB 3\" 2x10 ea RTB 3\" 2x10 ea RTB 3\" 2x10 ea  RTB 3\" 2x 10 e.a  RTB 3x10 ea GTB 3\" 2x10 ea  GTB 3\" 2x10 ea   YTB 2x10 ea   Prone Y,T,I   R only 10x ea 10x ea 10x ea 10x ea.       10x ea    Prone rows   R only  2x10  2x10  2x10  NV       2x10                Ther Ex             Pt education          Re-evaluation,  HEP review   UBE  3'/3' 3'/3' 4'/4'' 4'/4' 4'/4' 4'/4' 4'/4' 4'/4'  3'/3'   Supine shoulder flex AAROM with cane     No cane   10x No cane   15x       Webslide: ER " "ONLY YTB 3x10 YTB 3x10 RTB 3x 10  RTB 20x.   RTB 3x10 RTB 3x10 RTB 3x10  YTB 2x10   Standing shoulder flex, scapt AAROM with cane  2x15 ea 2x15 ea 2x15 ea 2x10 5\"  5\" 2x15 ea        Sidelying shoulder abduction AROM     2x10 3x10 3x10 3x10     Sidelying shoulder ER AROM     2x10 2x10 3x10 3x10     Wall slides flex 5\"x15 5\"x15 5\"x15 5\" 2x 15 5\"x10 ecc lowering  5\"x10 ecc lowering 5\"x15 ecc lowering 5\"x15 ecc lowering     Sidelying shoulder flexion with ranger        YTB x15  YTB x15      Ther Activity                                       Gait Training                                       Modalities             CP    Advised for HEP                                             "

## 2024-11-27 ENCOUNTER — EVALUATION (OUTPATIENT)
Dept: PHYSICAL THERAPY | Facility: CLINIC | Age: 74
End: 2024-11-27
Payer: MEDICARE

## 2024-11-27 DIAGNOSIS — Z98.890 S/P RIGHT ROTATOR CUFF REPAIR: Primary | ICD-10-CM

## 2024-11-27 PROCEDURE — 97140 MANUAL THERAPY 1/> REGIONS: CPT

## 2024-11-27 PROCEDURE — 97110 THERAPEUTIC EXERCISES: CPT

## 2024-11-27 NOTE — PROGRESS NOTES
PT Re-Evaluation     Today's date: 2024  Patient name: Rosario Morales  : 1950  MRN: [id number]  Referring provider: Meagan Shukla PA-C  Dx:   Encounter Diagnosis     ICD-10-CM    1. S/P right rotator cuff repair  Z98.890                      Assessment  Impairments: abnormal or restricted ROM, impaired physical strength, lacks appropriate home exercise program and participation limitations  Symptom irritability: low    Assessment details: Rosario Morales is a 74 y.o. female presenting to physical therapy for a reevaluation s/p right shoulder arthroscopic rotator cuff repair, subscapularis repair, open subpectoral biceps tenodesis, acromioplasty, extensive debridement performed 24. Since their initial evaluation, she reports 70% improvement in her shoulder. The patient has demonstrated continued improvements in both her shoulder active and passive ROM as well as improved shoulder strength. Her shoulder flexion and abduction PROM is largely WFL, however she does continue to display significant tightness with shoulder ER and IR ROMs. She also displays deficits in her shoulder strength and AROM above shoulder heights. Due to these deficits, she continues with limitations in their ability to complete ADLs, household chores, and recreational activities of gym classes and playing piano. This patient would benefit from continued PT services to address their impairments and functional limitations to maximize functional outcome.  She was provided with an updated HEP and demonstrated/verbalized understanding it's completion.  Understanding of Dx/Px/POC: good     Prognosis: good    Goals  STG to be achieved by 4 weeks  -Pt will be independent with basic HEP MET  -Pt will improve ROM by 25% per post operative protocol in order to improve functional mobility MET  -Pt will report 2/10 at worst in order to facilitate return to PLOF. NOT MET    LTG to be achieved by Discharge  -Pt will be independent with  comprehensive HEP. MET  -Pt will improve MMT scores to WFL in all deficient planes in order to facilitate ease of functional activity NOT MET  -Pt will improve AROM to WFL in all deficient planes in order to improve functional mobility. NOT MET  -Pt will be able to participate in recreational gym classes without difficulties or restrictions due to her shoulder. NOT MET  -Pt will be able to complete all household chores without difficulties or restrictions due to her shoulder. NOT MET    Plan  Patient would benefit from: skilled physical therapy  Planned modality interventions: cryotherapy    Planned therapy interventions: flexibility, functional ROM exercises, home exercise program, joint mobilization, manual therapy, massage, neuromuscular re-education, patient/caregiver education, strengthening, stretching, therapeutic activities and therapeutic exercise    Frequency: 2x week  Duration in weeks: 8  Plan of Care beginning date: 11/27/2024  Plan of Care expiration date: 1/22/2025  Treatment plan discussed with: patient        Subjective Evaluation    History of Present Illness  Mechanism of injury: Rosario reports 70% improvement since beginning PT services. She reports that she doesn't have any pain or discomfort in her shoulder at this time. She notes that her shoulder motion continues to improve gradually as does her strength with her ability to lift her arm. She has started carrying in light to medium grocery bags and can do so as long as the arm is by her side. She did return to her recreational workout classes, but does frequently modify the exercises due to her ROM difficulties and strength difficulties for weight lifting. She continue to have difficulties with reaching her arm above shoulder height and is unable to lift any objects above that height too. She has difficulties reaching fully behind her head in order to fully style the back of her hair.           Quality of life: good    Patient Goals  Patient  goals for therapy: decreased pain, increased motion, increased strength, return to sport/leisure activities and independence with ADLs/IADLs    Pain  Current pain ratin  At best pain ratin  At worst pain ratin  Location: Right shoulder, posterior aspect, right bicep  Quality: dull ache  Relieving factors: support and rest  Aggravating factors: lifting and overhead activity  Progression: improved    Hand dominance: right    Treatments  Previous treatment: physical therapy and immobilization  Current treatment: physical therapy        Objective     Active Range of Motion   Left Shoulder   Normal active range of motion    Right Shoulder   Flexion: 88 (UT compensation) degrees with pain  Abduction: 70 (UT compensation) degrees with pain  External rotation BTH: C7 with pain  Internal rotation BTB: L5 with pain    Left Elbow   Normal active range of motion    Right Elbow   Normal active range of motion    Left Wrist   Normal active range of motion    Right Wrist   Normal active range of motion    Passive Range of Motion     Right Shoulder   Flexion: 150 degrees   Abduction: 150 degrees   External rotation 90°: 60 degrees   Internal rotation 90°: 52 degrees     Strength/Myotome Testing     Left Shoulder   Normal muscle strength    Right Shoulder     Planes of Motion   Flexion: 2+   Abduction: 2+   External rotation at 0°: 2+   Internal rotation at 0°: 2+     Left Elbow   Flexion: 4  Extension: 4+             Precautions: S/p Right shoulder arthroscopic rotator cuff repair, subscapularis repair, open subpectoral biceps tenodesis, acromioplasty, extensive debridement performed 2024   No resisted elbow flexion or internal rotation   Access Code: [email or web address]    POC expires Unit limit Auth  expiration date PT/OT + Visit Limit?   25 BOMN N/A BOMN                 Visit/Unit Tracking  AUTH Status:  Date 11/18 11/20 11/25 11/27  10/11 10/14 10/21 10/23 10/30 11/1 11/13   N/A Used 13 14 15 16  6 7 8 9 10 11 12     "Remaining                     Manuals 11/1 11/4 11/6 11/11 11/13 11/18 11/20 11/25 11/27 10/30   PROM R Shldr per protocol BE JF JF SC BE BE BE BE BE BE                             Neuro Re-Ed             Webslide: M/L RTB 3\" 2x10 ea RTB 3\" 2x10 ea RTB 3\" 2x10 ea  RTB 3\" 2x 10 e.a  RTB 3x10 ea GTB 3\" 2x10 ea  GTB 3\" 2x10 ea   YTB 2x10 ea   Prone Y,T,I   R only 10x ea 10x ea 10x ea 10x ea.       10x ea    Prone rows   R only  2x10  2x10  2x10  NV       2x10                Ther Ex             Pt education         Re-eval, HEP review  Re-evaluation,  HEP review   UBE  3'/3' 3'/3' 4'/4'' 4'/4' 4'/4' 4'/4' 4'/4' 4'/4' 4'/4' 3'/3'   Supine shoulder flex AAROM with cane     No cane   10x No cane   15x   No cane  20x    Webslide: ER ONLY YTB 3x10 YTB 3x10 RTB 3x 10  RTB 20x.   RTB 3x10 RTB 3x10 RTB 3x10  YTB 2x10   Standing shoulder flex, scapt AAROM with cane  2x15 ea 2x15 ea 2x15 ea 2x10 5\"  5\" 2x15 ea        Sidelying shoulder abduction AROM     2x10 3x10 3x10 3x10 3x10    Sidelying shoulder ER AROM     2x10 2x10 3x10 3x10     Wall slides flex 5\"x15 5\"x15 5\"x15 5\" 2x 15 5\"x10 ecc lowering  5\"x10 ecc lowering 5\"x15 ecc lowering 5\"x15 ecc lowering     Sidelying shoulder flexion with ranger        YTB x15  YTB x15      Standing shoulder IR stretch strap         10\"x10    Standing shoulder ER stretch at wall         10\"x10    Ther Activity                                       Gait Training                                       Modalities             CP    Advised for HEP                                 "

## 2024-12-01 NOTE — PROGRESS NOTES
"Daily Note     Today's date: 2024  Patient name: Rosario Morales  : 1950  MRN: [id number]  Referring provider: Meagan Shukla PA-C  Dx:   Encounter Diagnosis     ICD-10-CM    1. S/P right rotator cuff repair  Z98.890                      Subjective: Patient reports that she did think about trying to do some lifting of her arm when prepping for thanksgiving dinner, however didn't because of how difficult it is to lift her arm.       Objective: See treatment diary below      Assessment: Patient tolerated treatment session well. Able to progress with additional reps and resistances for prone scapular stabilization exercises as outlined below. She was able to complete this without shoulder pain, however did report muscular fatigue. Patient demonstrated good technique with therapeutic exercises and would benefit from additional PT services.       Plan: Continue per plan of care.      Precautions: S/p Right shoulder arthroscopic rotator cuff repair, subscapularis repair, open subpectoral biceps tenodesis, acromioplasty, extensive debridement performed 2024   No resisted elbow flexion or internal rotation   Access Code: [email or web address]    POC expires Unit limit Auth  expiration date PT/OT + Visit Limit?   25 BOMN N/A BOMN                 Visit/Unit Tracking  AUTH Status:  Date 11/18 11/20 11/25 11/27 12/2  10/14 10/21 10/23 10/30 11/1 11/13   N/A Used 13 14 15 16 17  7 8 9 10 11 12    Remaining                     Manuals     PROM R Shldr per protocol BE JF JF SC BE BE BE BE BE BE                                Neuro Re-Ed              Webslide: M/L RTB 3\" 2x10 ea RTB 3\" 2x10 ea RTB 3\" 2x10 ea  RTB 3\" 2x 10 e.a  RTB 3x10 ea GTB 3\" 2x10 ea  GTB 3\" 2x10 ea   GTB 3\" 3x10 ea     Prone Y,T,I   R only 10x ea 10x ea 10x ea 10x ea.       20x ea     Prone rows   R only  2x10  2x10  2x10  NV       2# x20                   Ther Ex              Pt education         " "Re-eval, HEP review      UBE  3'/3' 3'/3' 4'/4'' 4'/4' 4'/4' 4'/4' 4'/4' 4'/4' 4'/4' 4'/4'    Supine shoulder flex AAROM with cane     No cane   10x No cane   15x   No cane  20x     Webslide: ER ONLY YTB 3x10 YTB 3x10 RTB 3x 10  RTB 20x.   RTB 3x10 RTB 3x10 RTB 3x10  RTB 3x10    Standing shoulder flex, scapt AAROM with cane  2x15 ea 2x15 ea 2x15 ea 2x10 5\"  5\" 2x15 ea         Sidelying shoulder abduction AROM     2x10 3x10 3x10 3x10 3x10     Sidelying shoulder ER AROM     2x10 2x10 3x10 3x10      Wall slides flex 5\"x15 5\"x15 5\"x15 5\" 2x 15 5\"x10 ecc lowering  5\"x10 ecc lowering 5\"x15 ecc lowering 5\"x15 ecc lowering  5\"x15 ecc lowering    Sidelying shoulder flexion with ranger        YTB x15  YTB x15       Standing shoulder IR stretch strap         10\"x10 10\"x10    Standing shoulder ER stretch at wall         10\"x10 10\"x10     Ther Activity                                          Gait Training                                          Modalities              CP    Advised for HEP                                   "

## 2024-12-02 ENCOUNTER — OFFICE VISIT (OUTPATIENT)
Dept: PHYSICAL THERAPY | Facility: CLINIC | Age: 74
End: 2024-12-02
Payer: MEDICARE

## 2024-12-02 DIAGNOSIS — Z98.890 S/P RIGHT ROTATOR CUFF REPAIR: Primary | ICD-10-CM

## 2024-12-02 PROCEDURE — 97110 THERAPEUTIC EXERCISES: CPT

## 2024-12-02 PROCEDURE — 97140 MANUAL THERAPY 1/> REGIONS: CPT

## 2024-12-02 PROCEDURE — 97112 NEUROMUSCULAR REEDUCATION: CPT

## 2024-12-04 ENCOUNTER — OFFICE VISIT (OUTPATIENT)
Dept: PHYSICAL THERAPY | Facility: CLINIC | Age: 74
End: 2024-12-04
Payer: MEDICARE

## 2024-12-04 DIAGNOSIS — Z98.890 S/P RIGHT ROTATOR CUFF REPAIR: Primary | ICD-10-CM

## 2024-12-04 PROCEDURE — 97110 THERAPEUTIC EXERCISES: CPT

## 2024-12-04 PROCEDURE — 97140 MANUAL THERAPY 1/> REGIONS: CPT

## 2024-12-04 PROCEDURE — 97112 NEUROMUSCULAR REEDUCATION: CPT

## 2024-12-04 NOTE — PROGRESS NOTES
"Daily Note     Today's date: 2024  Patient name: Rosario Morales  : 1950  MRN: [id number]  Referring provider: Meagan Shukla PA-C  Dx:   Encounter Diagnosis     ICD-10-CM    1. S/P right rotator cuff repair  Z98.890           Start Time: 0800  Stop Time: 0850  Total time in clinic (min): 50 minutes    Subjective: Presents to therapy noting some soreness in her biceps, but not pain.      Objective: See treatment diary below      Assessment: Tolerated treatment well, continuing to focus on right shoulder strengthening and range of motion. Occasional tactile cueing to correct elbow flexion compensation with ER. Challenged by doorway ER stretch secondary to discomfort. Minimal cueing due to consistency performing HEP. Patient demonstrated fatigue post treatment and would benefit from continued PT      Plan: Continue per plan of care.      Precautions: S/p Right shoulder arthroscopic rotator cuff repair, subscapularis repair, open subpectoral biceps tenodesis, acromioplasty, extensive debridement performed 2024   No resisted elbow flexion or internal rotation   Access Code: [email or web address]    POC expires Unit limit Auth  expiration date PT/OT + Visit Limit?   25 BOMN N/A BOMN                 Visit/Unit Tracking  AUTH Status:  Date 11/18 11/20 11/25 11/27 12/2 12/4 10/14 10/21 10/23 10/30 11/1 11/13   N/A Used 13 14 15 16 17 18 7 8 9 10 11 12    Remaining                     Manuals    PROM R Shldr per protocol BE JF JF SC BE BE BE BE BE BE JS                               Neuro Re-Ed              Webslide: M/L RTB 3\" 2x10 ea RTB 3\" 2x10 ea RTB 3\" 2x10 ea  RTB 3\" 2x 10 e.a  RTB 3x10 ea GTB 3\" 2x10 ea  GTB 3\" 2x10 ea   GTB 3\" 3x10 ea  GTB  3''  3 x 10  ea   Prone Y,T,I   R only 10x ea 10x ea 10x ea 10x ea.       20x ea  20x  ea   Prone rows   R only  2x10  2x10  2x10  NV       2# x20  2#  x20                 Ther Ex              Pt education       " "  Re-eval, HEP review      UBE  3'/3' 3'/3' 4'/4'' 4'/4' 4'/4' 4'/4' 4'/4' 4'/4' 4'/4' 4'/4' 4'/4'   Supine shoulder flex AAROM with cane     No cane   10x No cane   15x   No cane  20x     Webslide: ER ONLY YTB 3x10 YTB 3x10 RTB 3x 10  RTB 20x.   RTB 3x10 RTB 3x10 RTB 3x10  RTB 3x10 RTB  3x10   Standing shoulder flex, scapt AAROM with cane  2x15 ea 2x15 ea 2x15 ea 2x10 5\"  5\" 2x15 ea         Sidelying shoulder abduction AROM     2x10 3x10 3x10 3x10 3x10     Sidelying shoulder ER AROM     2x10 2x10 3x10 3x10      Wall slides flex 5\"x15 5\"x15 5\"x15 5\" 2x 15 5\"x10 ecc lowering  5\"x10 ecc lowering 5\"x15 ecc lowering 5\"x15 ecc lowering  5\"x15 ecc lowering 5'' x 15  Ecc  lowering   Sidelying shoulder flexion with ranger        YTB x15  YTB x15       Standing shoulder IR stretch strap         10\"x10 10\"x10 10x10''   Standing shoulder ER stretch at wall         10\"x10 10\"x10  10x10''   Ther Activity                                          Gait Training                                          Modalities              CP    Advised for HEP                                     "

## 2024-12-09 ENCOUNTER — OFFICE VISIT (OUTPATIENT)
Dept: PHYSICAL THERAPY | Facility: CLINIC | Age: 74
End: 2024-12-09
Payer: MEDICARE

## 2024-12-09 DIAGNOSIS — Z98.890 S/P RIGHT ROTATOR CUFF REPAIR: Primary | ICD-10-CM

## 2024-12-09 PROCEDURE — 97110 THERAPEUTIC EXERCISES: CPT

## 2024-12-09 PROCEDURE — 97140 MANUAL THERAPY 1/> REGIONS: CPT

## 2024-12-09 NOTE — PROGRESS NOTES
"Daily Note     Today's date: 2024  Patient name: Rosario Morales  : 1950  MRN: [id number]  Referring provider: Meagan Shukla PA-C  Dx:   Encounter Diagnosis     ICD-10-CM    1. S/P right rotator cuff repair  Z98.890                      Subjective: Patient reports that she has been trying to use her right shoulder more for every day activities. She did begin vacuuming with her right shoulder and notices fatigue following.     Objective: See treatment diary below      Assessment: Tolerated treatment well. Demonstrates gradually improving shoulder AROM, however does fatigue with additional reps of exercises. Mild loss of shoulder ER PROM, however other motions are largely WFL. Patient demonstrated fatigue post treatment, exhibited good technique with therapeutic exercises, and would benefit from continued PT      Plan: Continue per plan of care.      Precautions: S/p Right shoulder arthroscopic rotator cuff repair, subscapularis repair, open subpectoral biceps tenodesis, acromioplasty, extensive debridement performed 2024   No resisted elbow flexion or internal rotation   Access Code: [email or web address]    POC expires Unit limit Auth  expiration date PT/OT + Visit Limit?   25 BOMN N/A BOMN                 Visit/Unit Tracking  AUTH Status:  Date 11/18 11/20 11/25 11/27 12/2 12/4 12/9  10/23 10/30 11/1 11/13   N/A Used 13 14 15 16 17 18 19  9 10 11 12    Remaining                     Manuals    PROM R Shldr per protocol BE JF JF SC BE BE BE BE BE BE JS                               Neuro Re-Ed              Webslide: M/L GTB   3\" 3x10 ea  RTB 3\" 2x10 ea RTB 3\" 2x10 ea  RTB 3\" 2x 10 e.a  RTB 3x10 ea GTB 3\" 2x10 ea  GTB 3\" 2x10 ea   GTB 3\" 3x10 ea  GTB  3''  3 x 10  ea   Prone Y,T,I   R only  10x ea 10x ea 10x ea.       20x ea  20x  ea   Prone rows   R only   2x10  2x10  NV       2# x20  2#  x20                 Ther Ex              Pt education       " "  Re-eval, HEP review      UBE  4'/4' 3'/3' 4'/4'' 4'/4' 4'/4' 4'/4' 4'/4' 4'/4' 4'/4' 4'/4' 4'/4'   Supine shoulder flex AAROM with cane No cane   20x     No cane   10x No cane   15x   No cane  20x     Webslide: ER ONLY RTB 3x10 YTB 3x10 RTB 3x 10  RTB 20x.   RTB 3x10 RTB 3x10 RTB 3x10  RTB 3x10 RTB  3x10   Standing shoulder flex, scapt AAROM with cane   2x15 ea 2x15 ea 2x10 5\"  5\" 2x15 ea         Sidelying shoulder abduction AROM 3x10    2x10 3x10 3x10 3x10 3x10     Sidelying shoulder ER AROM 3x10     2x10 2x10 3x10 3x10      Wall slides flex  5\"x15 5\"x15 5\" 2x 15 5\"x10 ecc lowering  5\"x10 ecc lowering 5\"x15 ecc lowering 5\"x15 ecc lowering  5\"x15 ecc lowering 5'' x 15  Ecc  lowering   Sidelying shoulder flexion with ranger        YTB x15  YTB x15       Standing shoulder IR stretch strap         10\"x10 10\"x10 10x10''   Standing shoulder ER stretch at wall         10\"x10 10\"x10  10x10''   Ther Activity                                          Gait Training                                          Modalities              CP    Advised for HEP                                       "

## 2024-12-11 ENCOUNTER — OFFICE VISIT (OUTPATIENT)
Dept: PHYSICAL THERAPY | Facility: CLINIC | Age: 74
End: 2024-12-11
Payer: MEDICARE

## 2024-12-11 DIAGNOSIS — Z98.890 S/P RIGHT ROTATOR CUFF REPAIR: Primary | ICD-10-CM

## 2024-12-11 PROCEDURE — 97110 THERAPEUTIC EXERCISES: CPT

## 2024-12-11 PROCEDURE — 97140 MANUAL THERAPY 1/> REGIONS: CPT

## 2024-12-11 PROCEDURE — 97112 NEUROMUSCULAR REEDUCATION: CPT

## 2024-12-11 NOTE — PROGRESS NOTES
"Daily Note     Today's date: 2024  Patient name: Rosario Morales  : 1950  MRN: [id number]  Referring provider: Meagan Shukla PA-C  Dx:   Encounter Diagnosis     ICD-10-CM    1. S/P right rotator cuff repair  Z98.890                      Subjective: Patient reports that she notices improved abilities to reach behind her back to get dressed and put on her coat.     Objective: See treatment diary below      Assessment: Tolerated treatment well. Patient with improving shoulder AROM in all directions in gravity eliminated positions. She does continue with deficits in shoulder AROM against gravity with \"pinching\" and \"catching\" sensation reported throughout the range.  Patient demonstrated fatigue post treatment, exhibited good technique with therapeutic exercises, and would benefit from continued PT      Plan: Continue per plan of care.      Precautions: S/p Right shoulder arthroscopic rotator cuff repair, subscapularis repair, open subpectoral biceps tenodesis, acromioplasty, extensive debridement performed 2024   No resisted elbow flexion or internal rotation   Access Code: [email or web address]    POC expires Unit limit Auth  expiration date PT/OT + Visit Limit?   25 BOMN N/A BOMN                 Visit/Unit Tracking  AUTH Status:  Date 11/18 11/20 11/25 11/27 12/2 12/4 12/9 12/11  10/30 11/1 11/13   N/A Used 13 14 15 16 17 18 19 20  10 11 12    Remaining                     Manuals    PROM R Shldr per protocol BE BE JF SC BE BE BE BE BE BE JS                               Neuro Re-Ed              Webslide: M/L GTB   3\" 3x10 ea   RTB 3\" 2x10 ea  RTB 3\" 2x 10 e.a  RTB 3x10 ea GTB 3\" 2x10 ea  GTB 3\" 2x10 ea   GTB 3\" 3x10 ea  GTB  3''  3 x 10  ea   Prone Y,T,I   R only  1# 10x ea dir  10x ea 10x ea.       20x ea  20x  ea   Prone rows   R only   3# x20  2x10  NV       2# x20  2#  x20                 Ther Ex              Pt education         Re-eval, " "HEP review      UBE  4'/4' 4'/4' 4'/4'' 4'/4' 4'/4' 4'/4' 4'/4' 4'/4' 4'/4' 4'/4' 4'/4'   Supine shoulder flex AAROM with cane No cane   20x  No cane 30x    No cane   10x No cane   15x   No cane  20x     Webslide: ER ONLY RTB 3x10  RTB 3x 10  RTB 20x.   RTB 3x10 RTB 3x10 RTB 3x10  RTB 3x10 RTB  3x10   Standing shoulder flex, scapt AAROM with cane    2x15 ea 2x10 5\"  5\" 2x15 ea         Sidelying shoulder abduction AROM 3x10 3x10   2x10 3x10 3x10 3x10 3x10     Sidelying shoulder ER AROM 3x10  3x10   2x10 2x10 3x10 3x10      Wall slides flex  5'' x 15  Ecc  lowering 5\"x15 5\" 2x 15 5\"x10 ecc lowering  5\"x10 ecc lowering 5\"x15 ecc lowering 5\"x15 ecc lowering  5\"x15 ecc lowering 5'' x 15  Ecc  lowering   Sidelying shoulder flexion with ranger        YTB x15  YTB x15       Standing shoulder IR stretch strap  10\"x10       10\"x10 10\"x10 10x10''   Standing shoulder ER stretch at wall         10\"x10 10\"x10  10x10''   Ther Activity                                          Gait Training                                          Modalities              CP    Advised for HEP                                         "

## 2024-12-16 ENCOUNTER — OFFICE VISIT (OUTPATIENT)
Dept: PHYSICAL THERAPY | Facility: CLINIC | Age: 74
End: 2024-12-16
Payer: MEDICARE

## 2024-12-16 DIAGNOSIS — Z98.890 S/P RIGHT ROTATOR CUFF REPAIR: Primary | ICD-10-CM

## 2024-12-16 PROCEDURE — 97140 MANUAL THERAPY 1/> REGIONS: CPT

## 2024-12-16 PROCEDURE — 97110 THERAPEUTIC EXERCISES: CPT

## 2024-12-16 PROCEDURE — 97112 NEUROMUSCULAR REEDUCATION: CPT

## 2024-12-16 NOTE — PROGRESS NOTES
Daily Note     Today's date: 2024  Patient name: Rosario Morales  : 1950  MRN: [id number]  Referring provider: Meagan Shukla PA-C  Dx:   Encounter Diagnosis     ICD-10-CM    1. S/P right rotator cuff repair  Z98.890           Start Time: 0800  Stop Time: 0852  Total time in clinic (min): 52 minutes    Subjective: Presents to therapy noting she can lift her arm above her head with some discomfort and stretch (patient demonstrates AROM shoulder flexion). States she is now able to put her coat on easier.      Objective: See treatment diary below      Assessment: Patient able to complete full program, emphasizing range of motion and scapular strengthening. Continues to demonstrate visibly limited PROM IR, improving over previous few sessions. Patient subjectively reported improved tolerance to resisted prone strengthening, as well as decreased muscular fatigue performing shoulder flexion with eccentric lowering. Difficulty isolating proper rotator cuff musculature performing S/L external rotation, but able to complete full repetitions. Patient demonstrated fatigue post treatment, exhibited good technique with therapeutic exercises, and would benefit from continued PT      Plan: Continue per plan of care.      Precautions: S/p Right shoulder arthroscopic rotator cuff repair, subscapularis repair, open subpectoral biceps tenodesis, acromioplasty, extensive debridement performed 2024   No resisted elbow flexion or internal rotation   Access Code: [email or web address]    POC expires Unit limit Auth  expiration date PT/OT + Visit Limit?   25 BOMN N/A BOMN                 Visit/Unit Tracking  AUTH Status:  Date 11/18 11/20 11/25 11/27 12/2 12/4 12/9 12/11 12/16 10/30 11/1 11/13   N/A Used 13 14 15 16 17 18 19 20 21 10 11 12    Remaining                     Manuals    PROM R Shldr per protocol BE BE JS SC BE BE BE BE BE BE JS                              "  Neuro Re-Ed              Webslide: M/L GTB   3\" 3x10 ea    RTB 3\" 2x 10 e.a  RTB 3x10 ea GTB 3\" 2x10 ea  GTB 3\" 2x10 ea   GTB 3\" 3x10 ea  GTB  3''  3 x 10  ea   Prone Y,T,I   R only  1# 10x ea dir  1#  15x  Ea dir 10x ea.       20x ea  20x  ea   Prone rows   R only   3# x20  3#  x20 NV       2# x20  2#  x20                 Ther Ex              Pt education         Re-eval, HEP review      UBE  4'/4' 4'/4' 4'/4'' 4'/4' 4'/4' 4'/4' 4'/4' 4'/4' 4'/4' 4'/4' 4'/4'   Supine shoulder flex AAROM with cane No cane   20x  No cane 30x  No  Cane  30x  No cane   10x No cane   15x   No cane  20x     Webslide: ER ONLY RTB 3x10   RTB 20x.   RTB 3x10 RTB 3x10 RTB 3x10  RTB 3x10 RTB  3x10   Standing shoulder flex, scapt AAROM with cane     2x10 5\"  5\" 2x15 ea         Sidelying shoulder abduction AROM 3x10 3x10 1#  x10  2x10 3x10 3x10 3x10 3x10     Sidelying shoulder ER AROM 3x10  3x10 1#  2x10  2x10 2x10 3x10 3x10      Wall slides flex  5'' x 15  Ecc  lowering 5''x15  Ecc   lower 5\" 2x 15 5\"x10 ecc lowering  5\"x10 ecc lowering 5\"x15 ecc lowering 5\"x15 ecc lowering  5\"x15 ecc lowering 5'' x 15  Ecc  lowering   Sidelying shoulder flexion with ranger        YTB x15  YTB x15       Standing shoulder IR stretch strap  10\"x10 10x10''      10\"x10 10\"x10 10x10''   Standing shoulder ER stretch at wall         10\"x10 10\"x10  10x10''   Ther Activity                                          Gait Training                                          Modalities              CP                                              "

## 2024-12-18 ENCOUNTER — OFFICE VISIT (OUTPATIENT)
Dept: PHYSICAL THERAPY | Facility: CLINIC | Age: 74
End: 2024-12-18
Payer: MEDICARE

## 2024-12-18 DIAGNOSIS — Z98.890 S/P RIGHT ROTATOR CUFF REPAIR: Primary | ICD-10-CM

## 2024-12-18 PROCEDURE — 97110 THERAPEUTIC EXERCISES: CPT

## 2024-12-18 PROCEDURE — 97140 MANUAL THERAPY 1/> REGIONS: CPT

## 2024-12-18 NOTE — PROGRESS NOTES
"Daily Note     Today's date: 2024  Patient name: Rosario Morales  : 1950  MRN: [id number]  Referring provider: Meagan Shukla PA-C  Dx:   Encounter Diagnosis     ICD-10-CM    1. S/P right rotator cuff repair  Z98.890           Start Time: 0900  Stop Time: 09  Total time in clinic (min): 42 minutes    Subjective: Pt noted that yesterday she was at the [facility] and noted having some p! In her opp shoulder last night needing to sleep on her R shoulder instead.       Objective: See treatment diary below      Assessment:  Continued with treatment session at this time she was able to complete all exercises with no changes in pain status. Noted as she was completing prone ITY and rows the more she did the looser she felt. Tolerated treatment well. Patient exhibited good technique with therapeutic exercises and would benefit from continued PT      Plan: Continue per plan of care.      Precautions: S/p Right shoulder arthroscopic rotator cuff repair, subscapularis repair, open subpectoral biceps tenodesis, acromioplasty, extensive debridement performed 2024   No resisted elbow flexion or internal rotation   Access Code: [email or web address]    POC expires Unit limit Auth  expiration date PT/OT + Visit Limit?   25 BOMN N/A BOMN                 Visit/Unit Tracking  AUTH Status:  Date 11/18 11/20 11/25 11/27 12/2 12/4 12/9 12/11 12/16 12/18  11/13   N/A Used 13 14 15 16 17 18 19 20 21 22  12    Remaining                     Manuals    PROM R Shldr per protocol BE BE JS SC  BE BE BE BE BE JS                               Neuro Re-Ed              Webslide: M/L GTB   3\" 3x10 ea      RTB 3x10 ea GTB 3\" 2x10 ea  GTB 3\" 2x10 ea   GTB 3\" 3x10 ea  GTB  3''  3 x 10  ea   Prone Y,T,I   R only  1# 10x ea dir  1#  15x  Ea dir 1# 15x ea.       20x ea  20x  ea   Prone rows   R only   3# x20  3#  x20 3# 2x10       2# x20  2#  x20                 Ther Ex              Pt " "education         Re-eval, HEP review      UBE  4'/4' 4'/4' 4'/4'' 4'/'4  4'/4' 4'/4' 4'/4' 4'/4' 4'/4' 4'/4'   Supine shoulder flex AAROM with cane No cane   20x  No cane 30x  No  Cane  30x   No cane   15x   No cane  20x     Webslide: ER ONLY RTB 3x10     RTB 3x10 RTB 3x10 RTB 3x10  RTB 3x10 RTB  3x10   Standing shoulder flex, scapt AAROM with cane               Sidelying shoulder abduction AROM 3x10 3x10 1#  x10 1# 10x   3x10 3x10 3x10 3x10     Sidelying shoulder ER AROM 3x10  3x10 1#  2x10 1# 2x 10   2x10 3x10 3x10      Wall slides flex  5'' x 15  Ecc  lowering 5''x15  Ecc   lower 5\" 2xc 10 ecc lowering   5\"x10 ecc lowering 5\"x15 ecc lowering 5\"x15 ecc lowering  5\"x15 ecc lowering 5'' x 15  Ecc  lowering   Sidelying shoulder flexion with ranger        YTB x15  YTB x15       Standing shoulder IR stretch strap  10\"x10 10x10'' 10\"x 10      10\"x10 10\"x10 10x10''   Standing shoulder ER stretch at wall         10\"x10 10\"x10  10x10''   Ther Activity                                          Gait Training                                          Modalities              CP                                                "

## 2024-12-23 ENCOUNTER — APPOINTMENT (OUTPATIENT)
Dept: PHYSICAL THERAPY | Facility: CLINIC | Age: 74
End: 2024-12-23
Payer: MEDICARE

## 2024-12-26 ENCOUNTER — APPOINTMENT (OUTPATIENT)
Dept: PHYSICAL THERAPY | Facility: CLINIC | Age: 74
End: 2024-12-26
Payer: MEDICARE

## 2024-12-30 ENCOUNTER — EVALUATION (OUTPATIENT)
Dept: PHYSICAL THERAPY | Facility: CLINIC | Age: 74
End: 2024-12-30
Payer: MEDICARE

## 2024-12-30 DIAGNOSIS — Z98.890 S/P RIGHT ROTATOR CUFF REPAIR: Primary | ICD-10-CM

## 2024-12-30 PROCEDURE — 97110 THERAPEUTIC EXERCISES: CPT

## 2024-12-30 PROCEDURE — 97112 NEUROMUSCULAR REEDUCATION: CPT

## 2024-12-30 PROCEDURE — 97140 MANUAL THERAPY 1/> REGIONS: CPT

## 2024-12-30 NOTE — PROGRESS NOTES
PT Re-Evaluation     Today's date: 2024  Patient name: Rosario Morales  : 1950  MRN: [id number]  Referring provider: Meagan Shukla PA-C  Dx:   Encounter Diagnosis     ICD-10-CM    1. S/P right rotator cuff repair  Z98.890                      Assessment  Impairments: abnormal or restricted ROM, impaired physical strength, lacks appropriate home exercise program, pain with function and participation limitations  Symptom irritability: low    Assessment details: Rosario Morales is a 74 y.o. female presenting to physical therapy for a reevaluation s/p right shoulder arthroscopic rotator cuff repair, subscapularis repair, open subpectoral biceps tenodesis, acromioplasty, extensive debridement performed 24. Since their initial evaluation, she reports 75% improvement in her shoulder. Since her previous re-evaluation, she has demonstrated significant improvements in her shoulder AROM and strength. Her shoulder PROM is largely WFL, however does have loss of end range shoulder ER and IR motions. Functionally, she reports improved ability to style her hair and reach behind her back to put on her coat. She does continue to report soreness in her shoulder causing her mild difficulties with AROM above shoulder height as well as lifting any objects of weight. She also continues with loss of shoulder strength leading to limitations in her ability to complete ADLs, household chores, and recreational activities of gym classes and playing piano. This patient would benefit from continued PT services to address their impairments and functional limitations to maximize functional outcome.  Understanding of Dx/Px/POC: good     Prognosis: good    Goals  STG to be achieved by 4 weeks  -Pt will be independent with basic HEP MET  -Pt will improve ROM by 25% per post operative protocol in order to improve functional mobility MET  -Pt will report 2/10 at worst in order to facilitate return to PLOF. NOT MET    LTG to be achieved  by Discharge  -Pt will be independent with comprehensive HEP. MET  -Pt will improve MMT scores to WFL in all deficient planes in order to facilitate ease of functional activity NOT MET  -Pt will improve AROM to WFL in all deficient planes in order to improve functional mobility. NOT MET  -Pt will be able to participate in recreational gym classes without difficulties or restrictions due to her shoulder. NOT MET  -Pt will be able to complete all household chores without difficulties or restrictions due to her shoulder. NOT MET    Plan  Patient would benefit from: skilled physical therapy  Planned modality interventions: cryotherapy    Planned therapy interventions: flexibility, functional ROM exercises, home exercise program, joint mobilization, manual therapy, massage, neuromuscular re-education, patient/caregiver education, strengthening, stretching, therapeutic activities and therapeutic exercise    Frequency: 2x week  Duration in weeks: 8  Plan of Care beginning date: 12/30/2024  Plan of Care expiration date: 2/24/2025  Treatment plan discussed with: patient        Subjective Evaluation    History of Present Illness  Mechanism of injury: Rosario reports 75% improvement since beginning PT services. She reports improvements in her shoulder ROM. She reports that she has been able to now reach above shoulder height and open kitchen cabinets. She says that she is also able to reach behind her head to style her hair much easier as well as reach behind her back to put on her coat. She attributes these improvements to her improved shoulder strength. She says that she does still have discomfort with these overhead movements of the shoulder or moving out to the side which is slowly improving. She says that lifting household objects that are of weight (ex: wood logs, closing piano lid, dishes/pots/pans) are challenging for her to do without assistance of the left shoulder. She also continues to have to modify workout classes  due to weights and weightbearing activities on that arm (planking).           Quality of life: good    Patient Goals  Patient goals for therapy: decreased pain, increased motion, increased strength, return to sport/leisure activities and independence with ADLs/IADLs    Pain  Current pain ratin  At best pain ratin  At worst pain rating: 3  Location: Right shoulder, posterior aspect, right bicep  Quality: dull ache  Relieving factors: support and rest  Aggravating factors: lifting and overhead activity  Progression: improved    Hand dominance: right    Treatments  Previous treatment: physical therapy and immobilization  Current treatment: physical therapy        Objective     Active Range of Motion   Left Shoulder   Normal active range of motion    Right Shoulder   Flexion: 137 degrees with pain  Abduction: 130 degrees with pain  External rotation BTH: T3 with pain  Internal rotation BTB: T10 with pain    Left Elbow   Normal active range of motion    Right Elbow   Normal active range of motion    Left Wrist   Normal active range of motion    Right Wrist   Normal active range of motion    Passive Range of Motion     Right Shoulder   Flexion: 155 degrees   Abduction: 160 degrees   External rotation 90°: 60 degrees   Internal rotation 90°: 58 degrees     Strength/Myotome Testing     Left Shoulder   Normal muscle strength    Right Shoulder     Planes of Motion   Flexion: 3+   Abduction: 3   External rotation at 0°: 3+   Internal rotation at 0°: 4-     Left Elbow   Flexion: 4  Extension: 4+             Precautions: S/p Right shoulder arthroscopic rotator cuff repair, subscapularis repair, open subpectoral biceps tenodesis, acromioplasty, extensive debridement performed 2024   No resisted elbow flexion or internal rotation   Access Code: [email or web address]    POC expires Unit limit Auth  expiration date PT/OT + Visit Limit?   25 BOMN N/A BOMN                 Visit/Unit Tracking  AUTH Status:  Date 11/18 11/20 11/25  "11/27 12/2 12/4 12/9 12/11 12/16 12/18 12/30    N/A Used 13 14 15 16 17 18 19 20 21 22 23     Remaining                     Manuals 12/9 12/11 12/16 12/18 12/30 11/18 11/20 11/25 11/27 12/2 12/4   PROM R Shldr per protocol BE BE JS SC BE BE BE BE BE BE JS                               Neuro Re-Ed              Webslide: M/L GTB   3\" 3x10 ea      RTB 3x10 ea GTB 3\" 2x10 ea  GTB 3\" 2x10 ea   GTB 3\" 3x10 ea  GTB  3''  3 x 10  ea   Prone Y,T,I   R only  1# 10x ea dir  1#  15x  Ea dir 1# 15x ea.  1# 2x10 ea      20x ea  20x  ea   Prone rows   R only   3# x20  3#  x20 3# 2x10  3# 3x10     2# x20  2#  x20                 Ther Ex              Pt education     Re-evaluation, HEP review    Re-eval, HEP review      UBE  4'/4' 4'/4' 4'/4'' 4'/'4  4'/4' 4'/4' 4'/4' 4'/4' 4'/4' 4'/4'   Supine shoulder flex AAROM with cane No cane   20x  No cane 30x  No  Cane  30x   No cane   15x   No cane  20x     Webslide: ER ONLY RTB 3x10     RTB 3x10 RTB 3x10 RTB 3x10  RTB 3x10 RTB  3x10   Sidelying shoulder abduction AROM 3x10 3x10 1#  x10 1# 10x  1# 2x10 3x10 3x10 3x10 3x10     Sidelying shoulder ER AROM 3x10  3x10 1#  2x10 1# 2x 10  1# 2x10  2x10 3x10 3x10      Wall slides flex  5'' x 15  Ecc  lowering 5''x15  Ecc   lower 5\" 2xc 10 ecc lowering   5\"x10 ecc lowering 5\"x15 ecc lowering 5\"x15 ecc lowering  5\"x15 ecc lowering 5'' x 15  Ecc  lowering   Sidelying shoulder flexion with ranger        YTB x15  YTB x15       Standing shoulder IR stretch strap  10\"x10 10x10'' 10\"x 10      10\"x10 10\"x10 10x10''   Standing shoulder ER stretch at wall         10\"x10 10\"x10  10x10''   Wall slide with TB resistance     NV         Ther Activity                                          Gait Training                                          Modalities              CP                                        "

## 2025-01-02 ENCOUNTER — OFFICE VISIT (OUTPATIENT)
Dept: PHYSICAL THERAPY | Facility: CLINIC | Age: 75
End: 2025-01-02
Payer: MEDICARE

## 2025-01-02 DIAGNOSIS — Z98.890 S/P RIGHT ROTATOR CUFF REPAIR: Primary | ICD-10-CM

## 2025-01-02 PROCEDURE — 97140 MANUAL THERAPY 1/> REGIONS: CPT

## 2025-01-02 PROCEDURE — 97110 THERAPEUTIC EXERCISES: CPT

## 2025-01-02 PROCEDURE — 97112 NEUROMUSCULAR REEDUCATION: CPT

## 2025-01-02 NOTE — PROGRESS NOTES
"Daily Note     Today's date: 2025  Patient name: Rosario Morales  : 1950  MRN: [id number]  Referring provider: Meagan Shukla PA-C  Dx:   Encounter Diagnosis     ICD-10-CM    1. S/P right rotator cuff repair  Z98.890                      Subjective: Pt noted that sis able to raise her R arm up now but still has pain while doing so.       Objective: See treatment diary below      Assessment: Continued with treatment session with focus on R shoulder. Was able to complete all without changes In pain. Tolerated treatment well. Patient exhibited good technique with therapeutic exercises and would benefit from continued PT. S/P treatment session, no changes noted.       Plan: Continue per plan of care.      Precautions: S/p Right shoulder arthroscopic rotator cuff repair, subscapularis repair, open subpectoral biceps tenodesis, acromioplasty, extensive debridement performed 2024   No resisted elbow flexion or internal rotation   Access Code: [email or web address]    POC expires Unit limit Auth  expiration date PT/OT + Visit Limit?   25 BOMN N/A BOMN                 Visit/Unit Tracking  AUTH Status:  Date    N/A Used 13 14 15 16 17 18 19 20 21 22 23 1    Remaining                     Manuals    PROM R Shldr per protocol BE BE JS SC BE SC  BE BE BE JS                               Neuro Re-Ed              Webslide: M/L GTB   3\" 3x10 ea        GTB 3\" 2x10 ea   GTB 3\" 3x10 ea  GTB  3''  3 x 10  ea   Prone Y,T,I   R only  1# 10x ea dir  1#  15x  Ea dir 1# 15x ea.  1# 2x10 ea  1# 2x 10     20x ea  20x  ea   Prone rows   R only   3# x20  3#  x20 3# 2x10  3# 3x10 3# 3x 10     2# x20  2#  x20                 Ther Ex              Pt education     Re-evaluation, HEP review    Re-eval, HEP review      UBE  4'/4' 4'/4' 4'/4'' 4'/'4  5'/'5  4'/4' 4'/4' 4'/4' 4'/4'   Supine shoulder flex AAROM with cane No " "cane   20x  No cane 30x  No  Cane  30x      No cane  20x     Webslide: ER ONLY RTB 3x10       RTB 3x10  RTB 3x10 RTB  3x10   Sidelying shoulder abduction AROM 3x10 3x10 1#  x10 1# 10x  1# 2x10 1# 2x 10   3x10 3x10     Sidelying shoulder ER AROM 3x10  3x10 1#  2x10 1# 2x 10  1# 2x10  1# 2x 10   3x10      Wall slides flex  5'' x 15  Ecc  lowering 5''x15  Ecc   lower 5\" 2xc 10 ecc lowering     5\"x15 ecc lowering  5\"x15 ecc lowering 5'' x 15  Ecc  lowering   Sidelying shoulder flexion with ranger         YTB x15       Standing shoulder IR stretch strap  10\"x10 10x10'' 10\"x 10      10\"x10 10\"x10 10x10''   Standing shoulder ER stretch at wall         10\"x10 10\"x10  10x10''   Wall slide with TB resistance     NV         Ther Activity                                          Gait Training                                          Modalities              CP                                        "

## 2025-01-06 ENCOUNTER — OFFICE VISIT (OUTPATIENT)
Age: 75
End: 2025-01-06
Payer: MEDICARE

## 2025-01-06 ENCOUNTER — HOSPITAL ENCOUNTER (INPATIENT)
Facility: HOSPITAL | Age: 75
LOS: 2 days | Discharge: HOME | DRG: 378 | End: 2025-01-08
Payer: MEDICARE

## 2025-01-06 ENCOUNTER — APPOINTMENT (EMERGENCY)
Dept: RADIOLOGY | Facility: HOSPITAL | Age: 75
DRG: 378 | End: 2025-01-06
Payer: MEDICARE

## 2025-01-06 ENCOUNTER — APPOINTMENT (OUTPATIENT)
Dept: PHYSICAL THERAPY | Facility: CLINIC | Age: 75
End: 2025-01-06
Payer: MEDICARE

## 2025-01-06 ENCOUNTER — NURSE TRIAGE (OUTPATIENT)
Dept: OTHER | Facility: OTHER | Age: 75
End: 2025-01-06

## 2025-01-06 VITALS
WEIGHT: 125 LBS | HEART RATE: 88 BPM | SYSTOLIC BLOOD PRESSURE: 98 MMHG | HEIGHT: 61 IN | DIASTOLIC BLOOD PRESSURE: 64 MMHG | RESPIRATION RATE: 18 BRPM | BODY MASS INDEX: 23.6 KG/M2 | OXYGEN SATURATION: 98 % | TEMPERATURE: 97.5 F

## 2025-01-06 DIAGNOSIS — D64.9 ANEMIA: ICD-10-CM

## 2025-01-06 DIAGNOSIS — K26.9 DUODENAL ULCER: ICD-10-CM

## 2025-01-06 DIAGNOSIS — R06.09 EXERTIONAL DYSPNEA: ICD-10-CM

## 2025-01-06 DIAGNOSIS — D62 ACUTE BLOOD LOSS ANEMIA: ICD-10-CM

## 2025-01-06 DIAGNOSIS — R55 VASOVAGAL SYNCOPE: ICD-10-CM

## 2025-01-06 DIAGNOSIS — K92.1 MELENA: Primary | ICD-10-CM

## 2025-01-06 DIAGNOSIS — K92.2 UGIB (UPPER GASTROINTESTINAL BLEED): Primary | ICD-10-CM

## 2025-01-06 LAB
2HR DELTA HS TROPONIN: 0 NG/L
ABO GROUP BLD: NORMAL
ALBUMIN SERPL BCG-MCNC: 3.9 G/DL (ref 3.5–5)
ALP SERPL-CCNC: 51 U/L (ref 34–104)
ALT SERPL W P-5'-P-CCNC: 12 U/L (ref 7–52)
ANION GAP SERPL CALCULATED.3IONS-SCNC: 6 MMOL/L (ref 4–13)
AST SERPL W P-5'-P-CCNC: 17 U/L (ref 13–39)
BASOPHILS # BLD AUTO: 0.05 THOUSANDS/ΜL (ref 0–0.1)
BASOPHILS NFR BLD AUTO: 1 % (ref 0–1)
BILIRUB SERPL-MCNC: 0.32 MG/DL (ref 0.2–1)
BLD GP AB SCN SERPL QL: NEGATIVE
BNP SERPL-MCNC: 21 PG/ML (ref 0–100)
BUN SERPL-MCNC: 28 MG/DL (ref 5–25)
CALCIUM SERPL-MCNC: 8.6 MG/DL (ref 8.4–10.2)
CARDIAC TROPONIN I PNL SERPL HS: 4 NG/L (ref ?–50)
CARDIAC TROPONIN I PNL SERPL HS: 4 NG/L (ref ?–50)
CHLORIDE SERPL-SCNC: 107 MMOL/L (ref 96–108)
CO2 SERPL-SCNC: 25 MMOL/L (ref 21–32)
CREAT SERPL-MCNC: 0.7 MG/DL (ref 0.6–1.3)
D DIMER PPP FEU-MCNC: <0.27 UG/ML FEU
EOSINOPHIL # BLD AUTO: 0.17 THOUSAND/ΜL (ref 0–0.61)
EOSINOPHIL NFR BLD AUTO: 2 % (ref 0–6)
ERYTHROCYTE [DISTWIDTH] IN BLOOD BY AUTOMATED COUNT: 12.2 % (ref 11.6–15.1)
GFR SERPL CREATININE-BSD FRML MDRD: 86 ML/MIN/1.73SQ M
GLUCOSE SERPL-MCNC: 101 MG/DL (ref 65–140)
HCT VFR BLD AUTO: 27.1 % (ref 34.8–46.1)
HGB BLD-MCNC: 9 G/DL (ref 11.5–15.4)
IMM GRANULOCYTES # BLD AUTO: 0.04 THOUSAND/UL (ref 0–0.2)
IMM GRANULOCYTES NFR BLD AUTO: 0 % (ref 0–2)
LYMPHOCYTES # BLD AUTO: 1.82 THOUSANDS/ΜL (ref 0.6–4.47)
LYMPHOCYTES NFR BLD AUTO: 20 % (ref 14–44)
MCH RBC QN AUTO: 30.6 PG (ref 26.8–34.3)
MCHC RBC AUTO-ENTMCNC: 33.2 G/DL (ref 31.4–37.4)
MCV RBC AUTO: 92 FL (ref 82–98)
MONOCYTES # BLD AUTO: 0.58 THOUSAND/ΜL (ref 0.17–1.22)
MONOCYTES NFR BLD AUTO: 7 % (ref 4–12)
NEUTROPHILS # BLD AUTO: 6.26 THOUSANDS/ΜL (ref 1.85–7.62)
NEUTS SEG NFR BLD AUTO: 70 % (ref 43–75)
NRBC BLD AUTO-RTO: 0 /100 WBCS
PLATELET # BLD AUTO: 228 THOUSANDS/UL (ref 149–390)
PMV BLD AUTO: 9.3 FL (ref 8.9–12.7)
POTASSIUM SERPL-SCNC: 3.9 MMOL/L (ref 3.5–5.3)
PROT SERPL-MCNC: 6.1 G/DL (ref 6.4–8.4)
RBC # BLD AUTO: 2.94 MILLION/UL (ref 3.81–5.12)
RH BLD: POSITIVE
SODIUM SERPL-SCNC: 138 MMOL/L (ref 135–147)
SPECIMEN EXPIRATION DATE: NORMAL
WBC # BLD AUTO: 8.92 THOUSAND/UL (ref 4.31–10.16)

## 2025-01-06 PROCEDURE — 85379 FIBRIN DEGRADATION QUANT: CPT

## 2025-01-06 PROCEDURE — 99215 OFFICE O/P EST HI 40 MIN: CPT

## 2025-01-06 PROCEDURE — 99285 EMERGENCY DEPT VISIT HI MDM: CPT

## 2025-01-06 PROCEDURE — 86900 BLOOD TYPING SEROLOGIC ABO: CPT

## 2025-01-06 PROCEDURE — 71045 X-RAY EXAM CHEST 1 VIEW: CPT

## 2025-01-06 PROCEDURE — 99222 1ST HOSP IP/OBS MODERATE 55: CPT | Performed by: INTERNAL MEDICINE

## 2025-01-06 PROCEDURE — 86923 COMPATIBILITY TEST ELECTRIC: CPT

## 2025-01-06 PROCEDURE — 99284 EMERGENCY DEPT VISIT MOD MDM: CPT

## 2025-01-06 PROCEDURE — 85025 COMPLETE CBC W/AUTO DIFF WBC: CPT

## 2025-01-06 PROCEDURE — 86901 BLOOD TYPING SEROLOGIC RH(D): CPT

## 2025-01-06 PROCEDURE — 83550 IRON BINDING TEST: CPT | Performed by: PHYSICIAN ASSISTANT

## 2025-01-06 PROCEDURE — G2211 COMPLEX E/M VISIT ADD ON: HCPCS

## 2025-01-06 PROCEDURE — 80053 COMPREHEN METABOLIC PANEL: CPT

## 2025-01-06 PROCEDURE — 36415 COLL VENOUS BLD VENIPUNCTURE: CPT

## 2025-01-06 PROCEDURE — 86850 RBC ANTIBODY SCREEN: CPT

## 2025-01-06 PROCEDURE — 83540 ASSAY OF IRON: CPT | Performed by: PHYSICIAN ASSISTANT

## 2025-01-06 PROCEDURE — 83880 ASSAY OF NATRIURETIC PEPTIDE: CPT

## 2025-01-06 PROCEDURE — 96360 HYDRATION IV INFUSION INIT: CPT

## 2025-01-06 PROCEDURE — 93005 ELECTROCARDIOGRAM TRACING: CPT

## 2025-01-06 PROCEDURE — 82728 ASSAY OF FERRITIN: CPT | Performed by: PHYSICIAN ASSISTANT

## 2025-01-06 PROCEDURE — 84484 ASSAY OF TROPONIN QUANT: CPT

## 2025-01-06 RX ORDER — CALCIUM CARBONATE 500 MG/1
1000 TABLET, CHEWABLE ORAL DAILY PRN
Status: DISCONTINUED | OUTPATIENT
Start: 2025-01-06 | End: 2025-01-08 | Stop reason: HOSPADM

## 2025-01-06 RX ORDER — ONDANSETRON 2 MG/ML
4 INJECTION INTRAMUSCULAR; INTRAVENOUS EVERY 6 HOURS PRN
Status: DISCONTINUED | OUTPATIENT
Start: 2025-01-06 | End: 2025-01-08 | Stop reason: HOSPADM

## 2025-01-06 RX ORDER — PANTOPRAZOLE SODIUM 40 MG/1
40 TABLET, DELAYED RELEASE ORAL 2 TIMES DAILY
Qty: 180 TABLET | Refills: 0 | Status: CANCELLED | OUTPATIENT
Start: 2025-01-06 | End: 2026-01-01

## 2025-01-06 RX ORDER — ASCORBIC ACID 500 MG
1000 TABLET ORAL DAILY
Status: DISCONTINUED | OUTPATIENT
Start: 2025-01-07 | End: 2025-01-08 | Stop reason: HOSPADM

## 2025-01-06 RX ORDER — LANOLIN ALCOHOL/MO/W.PET/CERES
2 CREAM (GRAM) TOPICAL DAILY
Status: DISCONTINUED | OUTPATIENT
Start: 2025-01-06 | End: 2025-01-08 | Stop reason: HOSPADM

## 2025-01-06 RX ORDER — SODIUM CHLORIDE 9 MG/ML
100 INJECTION, SOLUTION INTRAVENOUS CONTINUOUS
Status: DISCONTINUED | OUTPATIENT
Start: 2025-01-06 | End: 2025-01-08 | Stop reason: HOSPADM

## 2025-01-06 RX ADMIN — SODIUM CHLORIDE 80 MG: 9 INJECTION, SOLUTION INTRAVENOUS at 16:31

## 2025-01-06 RX ADMIN — SODIUM CHLORIDE 1000 ML: 0.9 INJECTION, SOLUTION INTRAVENOUS at 15:01

## 2025-01-06 RX ADMIN — Medication 2 TABLET: at 18:20

## 2025-01-06 RX ADMIN — SODIUM CHLORIDE 8 MG/HR: 9 INJECTION, SOLUTION INTRAVENOUS at 17:30

## 2025-01-06 RX ADMIN — SODIUM CHLORIDE 100 ML/HR: 0.9 INJECTION, SOLUTION INTRAVENOUS at 18:13

## 2025-01-06 NOTE — ASSESSMENT & PLAN NOTE
Pt endorses syncopal episode on 01/04; pt admits getting hot and nauseous while sitting/playing a board game; daughter found patient on floor of bathroom where she had passed out and thrown up.; LOC duration a few seconds.  Suspect vasovagal in etiology 2/2 symptomatic anemia in setting of acute upper GI bleed  Nonfocal neuroexam  Troponin benign, trend; ECG w/o ischemia  C/w telemetry  S/p normal stress echo in 2022  At this point, I do not think repeat echo is warranted

## 2025-01-06 NOTE — TELEPHONE ENCOUNTER
"Appointment scheduled for today at 11:45am. Advised to call back anytime with questions or concerns.       Reason for Disposition   [1] MILD dizziness (e.g., walking normally) AND [2] has NOT been evaluated by doctor (or NP/PA) for this  (Exception: Dizziness caused by heat exposure, sudden standing, or poor fluid intake.)    Answer Assessment - Initial Assessment Questions  1. DESCRIPTION: \"Describe your dizziness.\"      Feels like going to past out when bends forward  2. LIGHTHEADED: \"Do you feel lightheaded?\" (e.g., somewhat faint, woozy, weak upon standing)      Woozy  3. VERTIGO: \"Do you feel like either you or the room is spinning or tilting?\" (i.e., vertigo)      Denies  4. SEVERITY: \"How bad is it?\"  \"Do you feel like you are going to faint?\" \"Can you stand and walk?\"      Mild right now. Did pass out over the weekend  5. ONSET:  \"When did the dizziness begin?\"      Started 2 days ago  6. AGGRAVATING FACTORS: \"Does anything make it worse?\" (e.g., standing, change in head position)      Bending forward  7. HEART RATE: \"Can you tell me your heart rate?\" \"How many beats in 15 seconds?\"  (Note: Not all patients can do this.)        Unable to do at this time, but states seems okay.  8. CAUSE: \"What do you think is causing the dizziness?\" (e.g., decreased fluids or food, diarrhea, emotional distress, heat exposure, new medicine, sudden standing, vomiting; unknown)      Unsure possible ulcer  9. RECURRENT SYMPTOM: \"Have you had dizziness before?\" If Yes, ask: \"When was the last time?\" \"What happened that time?\"      Had vertigo in the past and did pass out then as well. Was then diagnosed with peptic ulcer.  10. OTHER SYMPTOMS: \"Do you have any other symptoms?\" (e.g., fever, chest pain, vomiting, diarrhea, bleeding)        Vomited 2 days ago after fainting, stools are black and tarry, has felt warm but did not check temperature. Two days ago had chills.     Has been on Motrin for a few months due to shoulder pain, " Rotator cuff repair back in August. hx of stomach ulcer    Protocols used: Dizziness - Lightheadedness-Adult-AH

## 2025-01-06 NOTE — PLAN OF CARE
Problem: PAIN - ADULT  Goal: Verbalizes/displays adequate comfort level or baseline comfort level  Description: Interventions:  - Encourage patient to monitor pain and request assistance  - Assess pain using appropriate pain scale  - Administer analgesics based on type and severity of pain and evaluate response  - Implement non-pharmacological measures as appropriate and evaluate response  - Consider cultural and social influences on pain and pain management  - Notify physician/advanced practitioner if interventions unsuccessful or patient reports new pain  Outcome: Progressing     Problem: INFECTION - ADULT  Goal: Absence or prevention of progression during hospitalization  Description: INTERVENTIONS:  - Assess and monitor for signs and symptoms of infection  - Monitor lab/diagnostic results  - Monitor all insertion sites, i.e. indwelling lines, tubes, and drains  - Monitor endotracheal if appropriate and nasal secretions for changes in amount and color  - [place] appropriate cooling/warming therapies per order  - Administer medications as ordered  - Instruct and encourage patient and family to use good hand hygiene technique  - Identify and instruct in appropriate isolation precautions for identified infection/condition  Outcome: Progressing  Goal: Absence of fever/infection during neutropenic period  Description: INTERVENTIONS:  - Monitor WBC    Outcome: Progressing     Problem: SAFETY ADULT  Goal: Patient will remain free of falls  Description: INTERVENTIONS:  - Educate patient/family on patient safety including physical limitations  - Instruct patient to call for assistance with activity   - Consult OT/PT to assist with strengthening/mobility   - Keep Call bell within reach  - Keep bed low and locked with side rails adjusted as appropriate  - Keep care items and personal belongings within reach  - Initiate and maintain comfort rounds  - Make Fall Risk Sign visible to staff  - Offer Toileting every 2 Hours,  in advance of need  - Initiate/Maintain alarm  - Obtain necessary fall risk management equipment:   - Apply yellow socks and bracelet for high fall risk patients  - Consider moving patient to room near nurses station  Outcome: Progressing  Goal: Maintain or return to baseline ADL function  Description: INTERVENTIONS:  -  Assess patient's ability to carry out ADLs; assess patient's baseline for ADL function and identify physical deficits which impact ability to perform ADLs (bathing, care of mouth/teeth, toileting, grooming, dressing, etc.)  - Assess/evaluate cause of self-care deficits   - Assess range of motion  - Assess patient's mobility; develop plan if impaired  - Assess patient's need for assistive devices and provide as appropriate  - Encourage maximum independence but intervene and supervise when necessary  - Involve family in performance of ADLs  - Assess for home care needs following discharge   - Consider OT consult to assist with ADL evaluation and planning for discharge  - Provide patient education as appropriate  Outcome: Progressing  Goal: Maintains/Returns to pre admission functional level  Description: INTERVENTIONS:  - Perform AM-PAC 6 Click Basic Mobility/ Daily Activity assessment daily.  - Set and communicate daily mobility goal to care team and patient/family/caregiver.   - Collaborate with rehabilitation services on mobility goals if consulted  - Perform Range of Motion 3 times a day.  - Reposition patient every 2 hours.  - Dangle patient 3 times a day  - Stand patient 3 times a day  - Ambulate patient 3 times a day  - Out of bed to chair 3 times a day   - Out of bed for meals 3 times a day  - Out of bed for toileting  - Record patient progress and toleration of activity level   Outcome: Progressing     Problem: DISCHARGE PLANNING  Goal: Discharge to home or other facility with appropriate resources  Description: INTERVENTIONS:  - Identify barriers to discharge w/patient and caregiver  -  Arrange for needed discharge resources and transportation as appropriate  - Identify discharge learning needs (meds, wound care, etc.)  - Arrange for interpretive services to assist at discharge as needed  - Refer to Case Management Department for coordinating discharge planning if the patient needs post-hospital services based on physician/advanced practitioner order or complex needs related to functional status, cognitive ability, or social support system  Outcome: Progressing     Problem: Knowledge Deficit  Goal: Patient/family/caregiver demonstrates understanding of disease process, treatment plan, medications, and discharge instructions  Description: Complete learning assessment and assess knowledge base.  Interventions:  - Provide teaching at level of understanding  - Provide teaching via preferred learning methods  Outcome: Progressing     Problem: GASTROINTESTINAL - ADULT  Goal: Minimal or absence of nausea and/or vomiting  Description: INTERVENTIONS:  - Administer IV fluids if ordered to ensure adequate hydration  - Maintain NPO status until nausea and vomiting are resolved  - Nasogastric tube if ordered  - Administer ordered antiemetic medications as needed  - Provide nonpharmacologic comfort measures as appropriate  - Advance diet as tolerated, if ordered  - Consider nutrition services referral to assist patient with adequate nutrition and appropriate food choices  Outcome: Progressing  Goal: Maintains or returns to baseline bowel function  Description: INTERVENTIONS:  - Assess bowel function  - Encourage oral fluids to ensure adequate hydration  - Administer IV fluids if ordered to ensure adequate hydration  - Administer ordered medications as needed  - Encourage mobilization and activity  - Consider nutritional services referral to assist patient with adequate nutrition and appropriate food choices  Outcome: Progressing  Goal: Maintains adequate nutritional intake  Description: INTERVENTIONS:  - Monitor  percentage of each meal consumed  - Identify factors contributing to decreased intake, treat as appropriate  - Assist with meals as needed  - Monitor I&O, weight, and lab values if indicated  - Obtain nutrition services referral as needed  Outcome: Progressing  Goal: Establish and maintain optimal ostomy function  Description: INTERVENTIONS:  - Assess bowel function  - Encourage oral fluids to ensure adequate hydration  - Administer IV fluids if ordered to ensure adequate hydration   - Administer ordered medications as needed  - Encourage mobilization and activity  - Nutrition services referral to assist patient with appropriate food choices  - Assess stoma site  - Consider wound care consult   Outcome: Progressing  Goal: Oral mucous membranes remain intact  Description: INTERVENTIONS  - Assess oral mucosa and hygiene practices  - Implement preventative oral hygiene regimen  - Implement oral medicated treatments as ordered  - Initiate Nutrition services referral as needed  Outcome: Progressing

## 2025-01-06 NOTE — H&P
H&P - Hospitalist   Name: Rosario Morales 74 y.o. female I MRN: [id number]  Unit/Bed#: ED 12 I Date of Admission: 1/6/2025   Date of Service: 1/6/2025 I Hospital Day: 0     Assessment & Plan  Acute upper GI bleeding  PMH of PUD who presents from PCP's office w/ c/o onset of dark, tarry stools since 01/04; associated syncopal episode and MOHAN (uncharacteristic for baseline)  Admits to use Motrin 400 mg BID post shoulder surgery since 08/2024   H/o UGIB in the past; uses Pepcid intermittently/PRN  EGD (11/29/21) w/ schatzki's ring, hiatal hernia, antritis, and a duodenal bulb ulcer  Consult GI  Clear liquid diet; n.p.o. at midnight  Will need repeat EGD  C/W PPI infusion  Baseline Hgb 13-14; Hgb 9 on admit; trend H&H q 8 hrs   Benign abdominal exam; with clinical instability/acute pain can consider high-volume CT bleeding scan   Syncope  Pt endorses syncopal episode on 01/04; pt admits getting hot and nauseous while sitting/playing a board game; daughter found patient on floor of bathroom where she had passed out and thrown up.; LOC duration a few seconds.  Suspect vasovagal in etiology 2/2 symptomatic anemia in setting of acute upper GI bleed  Nonfocal neuroexam  Troponin benign, trend; ECG w/o ischemia  C/w telemetry  S/p normal stress echo in 2022  At this point, I do not think repeat echo is warranted  Duodenal ulcer  H/o GI bleed   EGD (11/29/21) w/ schatzki's ring, hiatal hernia, antritis, and a duodenal bulb ulcer  Patient denies pain instructed to take H2 blocker or PPI daily after this incident  Acute blood loss anemia  Recent Labs     01/06/25  1256   HGB 9.0*     Baseline Hgb 13-14   H&H q 8 hrs   Likely 2/2 GI bleed  Transfuse w/ Hgb < 7   F/u iron panel       VTE Pharmacologic Prophylaxis:   Moderate Risk (Score 3-4) - Pharmacological DVT Prophylaxis Contraindicated. Sequential Compression Devices Ordered.  Code Status: FULL CODE  Discussion with family: Patient declined call to .     Anticipated  Length of Stay: Patient will be admitted on an inpatient basis with an anticipated length of stay of greater than 2 midnights secondary to UBIG.    History of Present Illness   Chief Complaint: Syncope and dark-colored stools    Rosario Morales is a 74 y.o. female with a PMH of peptic ulcer disease who presents with syncope and dark-colored stools. Pt presented from PCP's office w/ c/o onset of dark, tarry stools since 01/04. She is w/ associated syncopal episode and MOHAN (uncharacteristic for baseline).  Patient notes severe shortness of breath while ambulating up stairs and while vacuuming in her home.  She admits to use Motrin 400 mg BID post shoulder surgery since 08/2024. She has a h/o UGIB in the past but notes she was never instructed to use maintenance H2 or PPI therapy.  She only uses Pepcid intermittently/PRN. EGD (11/29/21) w/ schatzki's ring, hiatal hernia, antritis, and a duodenal bulb ulcer.    Pt endorses syncopal episode on 01/04; pt admits to getting hot and nauseous while sitting/playing a board game. Pt got up to ambulate to the restroom and was found by her daughter on floor of bathroom where she had passed out and thrown up. LOC duration a few seconds.  She denies sage chest pain, diaphoresis, MOHAN prior to 01/04 abdominal pain, nausea, vomiting, dysuria.    Review of Systems   Constitutional:  Positive for fatigue. Negative for activity change, appetite change, chills, diaphoresis and fever.   Respiratory:  Positive for shortness of breath. Negative for cough and chest tightness.    Cardiovascular:  Negative for chest pain, palpitations and leg swelling.   Gastrointestinal:  Positive for blood in stool and nausea. Negative for abdominal pain, constipation, diarrhea and vomiting.   Genitourinary:  Negative for difficulty urinating and dysuria.   Musculoskeletal:  Negative for arthralgias and back pain.   Neurological:  Positive for syncope. Negative for dizziness, light-headedness and headaches.    Psychiatric/Behavioral:  Negative for agitation, behavioral problems and confusion.        Historical Information   Past Medical History:   Diagnosis Date    Acute bronchitis 03/30/2015    Acute upper respiratory infection 04/16/2014    Allergic blepharitis 06/18/2015    Benign neoplasm of skin     Plantar fasciitis 06/18/2015    Thumb pain 06/18/2015     Past Surgical History:   Procedure Laterality Date    BREAST EXCISIONAL BIOPSY Left     1992    COLONOSCOPY      EGD      2021    IN SURGICAL ARTHROSCOPY SHOULDER W/ROTATOR CUFF RPR Right 08/21/2024    Procedure: REPAIR ROTATOR CUFF ARTHROSCOPIC- Right shoulder arthroscopic rotator cuff repair, open subpectoral biceps tenodesis, extensive debridement and acromioplasty;  Surgeon: Akshat Roach DO;  Location: [facility] OR;  Service: Orthopedics    SHOULDER SURGERY Right 08/21/2024    TUBAL LIGATION      LAST ASSESSED: [id number]     Social History     Tobacco Use    Smoking status: Never    Smokeless tobacco: Never   Vaping Use    Vaping status: Never Used   Substance and Sexual Activity    Alcohol use: Yes     Alcohol/week: 7.0 standard drinks of alcohol     Types: 7 Glasses of wine per week     Comment: daily    Drug use: No    Sexual activity: Yes     Partners: Male     Birth control/protection: Surgical     E-Cigarette/Vaping    E-Cigarette Use Never User      E-Cigarette/Vaping Substances    Nicotine No     THC No     CBD No     Flavoring No     Other No     Unknown No      Family history non-contributory  Social History:  Marital Status: /Civil Union   Occupation: Retired  Patient Pre-hospital Living Situation: Home  Patient Pre-hospital Level of Mobility: walks  Patient Pre-hospital Diet Restrictions: None     Meds/Allergies   I have reviewed home medications with patient personally.  Prior to Admission medications    Medication Sig Start Date End Date Taking? Authorizing Provider   Ascorbic Acid (vitamin C) 1000 MG tablet Take 1,000 mg by mouth  daily    Historical Provider, MD   Calcium Carb-Cholecalciferol (Calcium 500 + D) 500-5 MG-MCG TABS Take 2 tablets by mouth in the morning    Historical Provider, MD   cyanocobalamin (VITAMIN B-12) 100 mcg tablet Take by mouth daily    Historical Provider, MD   cycloSPORINE (RESTASIS) 0.05 % ophthalmic emulsion Administer 1 drop to both eyes 2 (two) times a day    Historical Provider, MD   Ibuprofen (IBU PO) Take by mouth    Historical Provider, MD     Allergies   Allergen Reactions    Other Other (See Comments) and Sneezing     CATS-itchy eyes, heaviness in chest       Objective :  Temp:  [97.5 °F (36.4 °C)-97.8 °F (36.6 °C)] 97.8 °F (36.6 °C)  HR:  [81-88] 81  BP: ()/(49-64) 111/56  Resp:  [18] 18  SpO2:  [98 %-100 %] 99 %  O2 Device: None (Room air)    Physical Exam  Constitutional:       General: She is not in acute distress.     Appearance: She is normal weight. She is not toxic-appearing.   HENT:      Head: Normocephalic.      Right Ear: External ear normal.      Left Ear: External ear normal.      Nose: Nose normal.      Mouth/Throat:      Mouth: Mucous membranes are moist.      Pharynx: Oropharynx is clear.   Eyes:      Extraocular Movements: Extraocular movements intact.      Conjunctiva/sclera: Conjunctivae normal.   Cardiovascular:      Rate and Rhythm: Normal rate and regular rhythm.      Pulses: Normal pulses.      Heart sounds: Normal heart sounds.   Pulmonary:      Effort: Pulmonary effort is normal. No respiratory distress.      Breath sounds: Normal breath sounds. No wheezing or rales.   Abdominal:      General: Abdomen is flat. Bowel sounds are normal. There is no distension.      Palpations: Abdomen is soft.      Tenderness: There is no abdominal tenderness. There is no guarding or rebound.      Hernia: No hernia is present.   Musculoskeletal:         General: Tenderness present. No swelling or deformity. Normal range of motion.      Cervical back: Normal range of motion.   Skin:      General: Skin is warm and dry.      Coloration: Skin is pale (Slight). Skin is not jaundiced.      Findings: No bruising or lesion.   Neurological:      General: No focal deficit present.      Mental Status: She is alert. Mental status is at baseline.   Psychiatric:         Mood and Affect: Mood normal.         Behavior: Behavior normal.         Lab Results: I have reviewed the following results:  Results from last 7 days   Lab Units 01/06/25  1256   WBC Thousand/uL 8.92   HEMOGLOBIN g/dL 9.0*   HEMATOCRIT % 27.1*   PLATELETS Thousands/uL 228   SEGS PCT % 70   LYMPHO PCT % 20   MONO PCT % 7   EOS PCT % 2     Results from last 7 days   Lab Units 01/06/25  1256   SODIUM mmol/L 138   POTASSIUM mmol/L 3.9   CHLORIDE mmol/L 107   CO2 mmol/L 25   BUN mg/dL 28*   CREATININE mg/dL 0.70   ANION GAP mmol/L 6   CALCIUM mg/dL 8.6   ALBUMIN g/dL 3.9   TOTAL BILIRUBIN mg/dL 0.32   ALK PHOS U/L 51   ALT U/L 12   AST U/L 17   GLUCOSE RANDOM mg/dL 101             Lab Results   Component Value Date    HGBA1C 5.4 04/05/2021           Imaging Results Review: I reviewed radiology reports from this admission including: chest xray.  Other Study Results Review: EKG was reviewed.     Administrative Statements   I have spent a total time of 65 minutes in caring for this patient on the day of the visit/encounter including Diagnostic results, Prognosis, Risks and benefits of tx options, Instructions for management, Patient and family education, Importance of tx compliance, Risk factor reductions, Impressions, Counseling / Coordination of care, Documenting in the medical record, Reviewing / ordering tests, medicine, procedures  , Obtaining or reviewing history  , and Communicating with other healthcare professionals .    ** Please Note: This note has been constructed using a voice recognition system. **

## 2025-01-06 NOTE — ASSESSMENT & PLAN NOTE
Recent Labs     01/06/25  1256   HGB 9.0*     Baseline Hgb 13-14   H&H q 8 hrs   Likely 2/2 GI bleed  Transfuse w/ Hgb < 7   F/u iron panel

## 2025-01-06 NOTE — ASSESSMENT & PLAN NOTE
H/o GI bleed   EGD (11/29/21) w/ schatzki's ring, hiatal hernia, antritis, and a duodenal bulb ulcer  Patient denies pain instructed to take H2 blocker or PPI daily after this incident

## 2025-01-06 NOTE — ASSESSMENT & PLAN NOTE
PMH of PUD who presents from PCP's office w/ c/o onset of dark, tarry stools since 01/04; associated syncopal episode and MOHAN (uncharacteristic for baseline)  Admits to use Motrin 400 mg BID post shoulder surgery since 08/2024   H/o UGIB in the past; uses Pepcid intermittently/PRN  EGD (11/29/21) w/ schatzki's ring, hiatal hernia, antritis, and a duodenal bulb ulcer  Consult GI  Clear liquid diet; n.p.o. at midnight  Will need repeat EGD  C/W PPI infusion  Baseline Hgb 13-14; Hgb 9 on admit; trend H&H q 8 hrs   Benign abdominal exam; with clinical instability/acute pain can consider high-volume CT bleeding scan

## 2025-01-06 NOTE — ASSESSMENT & PLAN NOTE
EGD from 11/29/21 revealed schatzki's ring, hiatal hernia, antritis, and a duodenal bulb ulcer.

## 2025-01-06 NOTE — PROGRESS NOTES
Name: Rosario Morales      : 1950      MRN: [id number]  Encounter Provider: Meagan Mason PA-C  Encounter Date: 2025   Encounter department: [facility] [address]  :  Assessment & Plan  Melena  BP in office is 98/64 while sitting, upon standing it was 110/62. POCT ECG in office was compared to prior from 24, there is no change. Recommended evaluation in the ER at this time due to orthostatic hypotension and history of GI bleeds. She was agreeable to the plan,  her  is going to drive her.        Duodenal ulcer  EGD from 21 revealed schatzki's ring, hiatal hernia, antritis, and a duodenal bulb ulcer.        Vasovagal syncope  Likely due to anemia from underlying GI bleed.  BP in office is 98/64.             Depression Screening and Follow-up Plan:   Patient's depression screening was negative with an [place]  Depression Scale score of  .   Falls Plan of Care: balance, strength, and gait training instructions were provided.       History of Present Illness     Patient is a 73 yo female that presents today for a syncopal episode. On Saturday, she was walking up the stairs and felt lightheaded. She was then sitting down playing a game and she started to feel hot and nauseous. She went to the bathroom and sat on the floor, passed out, and threw up. She admits to LOC for a few seconds. She continues to feel lightheaded on exertion, headache, and SOB. She has had an increase in ibuprofen use over the past few months due to a recent rotator cuff tear. She has a history of PUD that was treated with a PPI.       Review of Systems   Constitutional:  Negative for appetite change, chills and fever.   HENT:  Positive for rhinorrhea and trouble swallowing. Negative for ear discharge, ear pain, sinus pressure, sinus pain and sore throat.    Respiratory:  Positive for shortness of breath. Negative for cough, chest tightness and wheezing.    Cardiovascular:   "Negative for chest pain.   Gastrointestinal:  Positive for constipation. Negative for abdominal pain, blood in stool, diarrhea, nausea and vomiting.        Melena   Genitourinary:  Negative for difficulty urinating.   Musculoskeletal:  Negative for arthralgias and myalgias.   Skin:  Negative for rash.   Neurological:  Positive for dizziness and light-headedness. Negative for headaches.       Objective   BP 98/64 (BP Location: Left arm, Patient Position: Sitting, Cuff Size: Standard)   Pulse 88   Temp 97.5 °F (36.4 °C) (Tympanic Core)   Resp 18   Ht 5' 1\" (1.549 m)   Wt 56.7 kg (125 lb)   SpO2 98%   BMI 23.62 kg/m²      Physical Exam  Vitals and nursing note reviewed.   Constitutional:       General: She is awake. She is not in acute distress.     Appearance: Normal appearance. She is well-developed, well-groomed and normal weight.   HENT:      Head: Normocephalic and atraumatic.      Right Ear: Hearing and external ear normal.      Left Ear: Hearing and external ear normal.      Nose: Nose normal.      Mouth/Throat:      Lips: Pink.      Mouth: Mucous membranes are moist.   Eyes:      General: Lids are normal. Vision grossly intact. Gaze aligned appropriately.      Conjunctiva/sclera: Conjunctivae normal.   Neck:      Vascular: No carotid bruit.      Trachea: Trachea and phonation normal.   Cardiovascular:      Rate and Rhythm: Normal rate and regular rhythm.      Heart sounds: Normal heart sounds, S1 normal and S2 normal. No murmur heard.     No friction rub. No gallop.   Pulmonary:      Effort: Pulmonary effort is normal. No respiratory distress.      Breath sounds: Normal breath sounds and air entry. No decreased breath sounds, wheezing, rhonchi or rales.   Abdominal:      General: Abdomen is flat.   Musculoskeletal:         General: No swelling.      Cervical back: Neck supple.      Right lower leg: No edema.      Left lower leg: No edema.   Skin:     General: Skin is warm.      Capillary Refill: " Capillary refill takes less than 2 seconds.   Neurological:      Mental Status: She is alert.   Psychiatric:         Attention and Perception: Attention and perception normal.         Mood and Affect: Mood and affect normal.         Speech: Speech normal.         Behavior: Behavior normal. Behavior is cooperative.         Thought Content: Thought content normal.         Cognition and Memory: Cognition and memory normal.         Judgment: Judgment normal.

## 2025-01-06 NOTE — ED PROVIDER NOTES
Time reflects when diagnosis was documented in both MDM as applicable and the Disposition within this note       Time User Action Codes Description Comment    1/6/2025  3:52 PM César Pereira Add [K92.1] Melena     1/6/2025  3:58 PM César Pereira Remove [K92.1] Melena     1/6/2025  3:58 PM César Pereira Add [K92.2] UGIB (upper gastrointestinal bleed)     1/6/2025  3:58 PM César Pereira Add [D64.9] Anemia     1/6/2025  3:58 PM César Pereira Add [R06.09] Exertional dyspnea           ED Disposition       ED Disposition   Admit    Condition   Stable    Date/Time   Mon Jan 6, 2025  3:58 PM    Comment   Case was discussed with carlos and the patient's admission status was agreed to be Admission Status: inpatient status to the service of Dr. gonzalez .               Assessment & Plan       Medical Decision Making  74y F p/w dark, tarry stools and exertional dyspnea x2d    Ddx includes DDX including but not limited to: pneumonia, pleural effusion, CHF, PE, PTX, ACS, MI, anemia, metabolic abnormality, renal failure, UGIB, LGIB    Plan: bloodwork, imaging, IVF    Blood pressure improved after IV fluid administration.  Patient is guaiac positive with grossly black stool noted on examination.  She had a hemoglobin drop from approximately 13.5 down to 9 today.  Concern for ongoing exertional dyspnea and hemoglobin drop, concern for ongoing upper GI bleed.  Protonix bolus and gtt. ordered.  Discussed with internal medicine, will admit for further evaluation of presumed upper GI bleed.  During patient stay in emergency department she remained hemodynamically stable.  She is well-appearing.  Remainder of examination unrevealing, dimer negative, chest x-ray without acute findings, ECG without acute findings, troponin negative. Patient admitted for further evaluation.    Amount and/or Complexity of Data Reviewed  Labs: ordered. Decision-making details documented in ED Course.  Radiology: ordered and independent  interpretation performed.    Risk  Decision regarding hospitalization.        ED Course as of 01/06/25 1605   Mon Jan 06, 2025   1329 Comprehensive metabolic panel(!)  BUN elevated, given clinical context could be prerenal or 2/2 UGIB   1330 CBC and differential(!)  Hgb drop when compared to prior   1330 hs TnI 0hr: 4   1544 ECG interpretation by me.  Normal sinus rhythm at 80.  Normal intervals.  Normal axis.  No acute ST or T wave changes.  Compared to prior nonspecific T wave changes in aVL.  No ST elevation.       Medications   pantoprazole (PROTONIX) 80 mg in sodium chloride 0.9 % 100 mL IVPB (has no administration in time range)   pantoprazole (PROTONIX) 80 mg in sodium chloride 0.9 % 100 mL infusion (has no administration in time range)   sodium chloride 0.9 % bolus 1,000 mL (1,000 mL Intravenous New Bag 1/6/25 1501)       ED Risk Strat Scores                          SBIRT 20yo+      Flowsheet Row Most Recent Value   Initial Alcohol Screen: US AUDIT-C     1. How often do you have a drink containing alcohol? 6 Filed at: 01/06/2025 1250   2. How many drinks containing alcohol do you have on a typical day you are drinking?  0 Filed at: 01/06/2025 1250   3b. FEMALE Any Age, or MALE 65+: How often do you have 4 or more drinks on one occassion? 0 Filed at: 01/06/2025 1250   Audit-C Score 6 Filed at: 01/06/2025 1250   ALEJANDRO: How many times in the past year have you...    Used an illegal drug or used a prescription medication for non-medical reasons? Never Filed at: 01/06/2025 1250                            History of Present Illness       Chief Complaint   Patient presents with    GI Problem     Pt arrives from PCP for possible upper GI bleed. Pt feeling lightheaded, dyspnea on exertion, black and sticky stools since Saturday. Pt also states she had a syncopal episode on Saturday. Hx of peptic ulcer in 2021.        Past Medical History:   Diagnosis Date    Acute bronchitis 03/30/2015    Acute upper respiratory  infection 04/16/2014    Allergic blepharitis 06/18/2015    Benign neoplasm of skin     Plantar fasciitis 06/18/2015    Thumb pain 06/18/2015      Past Surgical History:   Procedure Laterality Date    BREAST EXCISIONAL BIOPSY Left     1992    COLONOSCOPY      EGD      2021    [facility] [facility] ARTHROSCOPY SHOULDER W/ROTATOR CUFF RPR Right 08/21/2024    Procedure: REPAIR ROTATOR CUFF ARTHROSCOPIC- Right shoulder arthroscopic rotator cuff repair, open subpectoral biceps tenodesis, extensive debridement and acromioplasty;  Surgeon: Akshat Roach DO;  Location: [facility];  Service: Orthopedics    SHOULDER SURGERY Right 08/21/2024    TUBAL LIGATION      LAST ASSESSED: [id number]      Family History   Problem Relation Age of Onset    Migraines Mother     Osteoporosis Mother     Scoliosis Mother     Throat cancer Father     Depression Daughter         WITH ANXIETY     Migraines Daughter     Rheum arthritis Daughter     Urinary tract infection Daughter     No Known Problems Daughter     Depression Son         WITH ANXIETY     Breast cancer Neg Hx     Colon cancer Neg Hx     Uterine cancer Neg Hx     Cervical cancer Neg Hx     Ovarian cancer Neg Hx       Social History     Tobacco Use    Smoking status: Never    Smokeless tobacco: Never   Vaping Use    Vaping status: Never Used   Substance Use Topics    Alcohol use: Yes     Alcohol/week: 7.0 standard drinks of alcohol     Types: 7 Glasses of wine per week     Comment: daily    Drug use: No      E-Cigarette/Vaping    E-Cigarette Use Never User       E-Cigarette/Vaping Substances    Nicotine No     THC No     CBD No     Flavoring No     Other No     Unknown No       I have reviewed and agree with the history as documented.     Patient is a 74-year-old female with a past medical history significant dyslipidemia presenting to the emergency department for evaluation of exertional dyspnea and dark-colored stool.  Patient reports a distant history of peptic ulcer disease.  She reports  occasional use of Pepcid but is not currently on any regular medications.  She reports black tarry stools beginning on Saturday.  She notes dyspnea on exertion/lightheadedness starting around the same time.  She describes an event where she lost consciousness for a few seconds in the bathroom without reported shaking, urinating on herself, tongue biting.  She reports spontaneous recovery from this and that this was ultimately witnessed event without head strike.  She notes persistent lightheadedness yesterday with dyspnea.  She denies nausea or dizziness.  Denies shortness of breath at rest, chest pain, chest tightness, lower extremity swelling.  She reports today she went to go see her doctor and could not go up a full flight of stairs on account of her exertional dyspnea.  She stopped retirement up to recover.  She reports some constipation and ongoing dark-colored stool since Saturday.        Review of Systems   Constitutional:  Negative for chills and fever.   HENT:  Negative for ear pain and sore throat.    Eyes:  Negative for pain and visual disturbance.   Respiratory:  Positive for shortness of breath. Negative for cough.    Cardiovascular:  Negative for chest pain, palpitations and leg swelling.   Gastrointestinal:  Positive for blood in stool. Negative for abdominal pain and vomiting.   Genitourinary:  Negative for dysuria and hematuria.   Musculoskeletal:  Negative for arthralgias and back pain.   Skin:  Negative for color change and rash.   Neurological:  Negative for seizures and syncope.   All other systems reviewed and are negative.          Objective       ED Triage Vitals [01/06/25 1246]   Temperature Pulse Blood Pressure Respirations SpO2 Patient Position - Orthostatic VS   97.8 °F (36.6 °C) 82 (!) 96/49 18 100 % Sitting      Temp Source Heart Rate Source BP Location FiO2 (%) Pain Score    Temporal Monitor Left arm -- --      Vitals      Date and Time Temp Pulse SpO2 Resp BP Pain Score FACES Pain  Rating User   01/06/25 1530 -- -- -- -- 103/49 -- -- RORO   01/06/25 1246 97.8 °F (36.6 °C) 82 100 % 18 96/49 -- -- KW            Physical Exam  Vitals and nursing note reviewed. Exam conducted with a chaperone present.   Constitutional:       General: She is not in acute distress.     Appearance: Normal appearance. She is not ill-appearing, toxic-appearing or diaphoretic.      Comments: Patient sitting in no acute distress   HENT:      Head: Normocephalic and atraumatic.   Eyes:      General: No scleral icterus.        Right eye: No discharge.         Left eye: No discharge.      Extraocular Movements: Extraocular movements intact.      Conjunctiva/sclera: Conjunctivae normal.   Cardiovascular:      Rate and Rhythm: Normal rate.      Pulses: Normal pulses.      Heart sounds: Normal heart sounds. No murmur heard.     No friction rub. No gallop.      Comments: 2+ radial pulses bilaterally  Pulmonary:      Effort: Pulmonary effort is normal. No respiratory distress.      Breath sounds: Normal breath sounds. No stridor. No wheezing, rhonchi or rales.      Comments: Lungs clear to auscultation, no rales, wheezing, rhonchi  Abdominal:      General: Abdomen is flat. Bowel sounds are normal. There is no distension.      Palpations: Abdomen is soft.      Tenderness: There is no abdominal tenderness. There is no guarding or rebound.      Comments: Abdomen soft, nontender to palpation, no rigidity, guarding, rebound   Genitourinary:     Rectum: Guaiac result positive.      Comments: Guiac positive, black stool appreciated  Musculoskeletal:         General: No swelling. Normal range of motion.      Cervical back: Normal range of motion. No rigidity.      Right lower leg: No edema.      Left lower leg: No edema.      Comments: No LE edema   Skin:     General: Skin is warm and dry.      Capillary Refill: Capillary refill takes less than 2 seconds.      Coloration: Skin is not jaundiced.      Findings: No bruising or lesion.    Neurological:      General: No focal deficit present.      Mental Status: She is alert and oriented to person, place, and time. Mental status is at baseline.   Psychiatric:         Mood and Affect: Mood normal.         Behavior: Behavior normal.         Thought Content: Thought content normal.         Judgment: Judgment normal.         Results Reviewed       Procedure Component Value Units Date/Time    HS Troponin I 2hr [[id number]]  (Normal) Collected: 01/06/25 1458    Lab Status: Final result Specimen: Blood from Arm, Left Updated: 01/06/25 1530     hs TnI 2hr 4 ng/L      Delta 2hr hsTnI 0 ng/L     HS Troponin I 4hr [[id number]]     Lab Status: No result Specimen: Blood     D-dimer, quantitative [[id number]]  (Normal) Collected: 01/06/25 1419    Lab Status: Final result Specimen: Blood from Arm, Left Updated: 01/06/25 1455     D-Dimer, Quant <0.27 ug/ml FEU     Narrative:      In the evaluation for possible pulmonary embolism, in the appropriate (Well's Score of 4 or less) patient, the age adjusted d-dimer cutoff for this patient can be calculated as:    Age x 0.01 (in ug/mL) for Age-adjusted D-dimer exclusion threshold for a patient over 50 years.    B-Type Natriuretic Peptide(BNP) [[id number]]  (Normal) Collected: 01/06/25 1256    Lab Status: Final result Specimen: Blood from Arm, Left Updated: 01/06/25 1426     BNP 21 pg/mL     HS Troponin 0hr (reflex protocol) [[id number]]  (Normal) Collected: 01/06/25 1256    Lab Status: Final result Specimen: Blood from Arm, Left Updated: 01/06/25 1326     hs TnI 0hr 4 ng/L     Comprehensive metabolic panel [[id number]]  (Abnormal) Collected: 01/06/25 1256    Lab Status: Final result Specimen: Blood from Arm, Left Updated: 01/06/25 1320     Sodium 138 mmol/L      Potassium 3.9 mmol/L      Chloride 107 mmol/L      CO2 25 mmol/L      ANION GAP 6 mmol/L      BUN 28 mg/dL      Creatinine 0.70 mg/dL      Glucose 101 mg/dL      Calcium 8.6 mg/dL      AST 17 U/L      ALT 12 U/L       Alkaline Phosphatase 51 U/L      Total Protein 6.1 g/dL      Albumin 3.9 g/dL      Total Bilirubin 0.32 mg/dL      eGFR 86 ml/min/1.73sq m     Narrative:      National Kid[employer] Foundation guidelines for Chronic Kidney Disease (CKD):     Stage 1 with normal or high GFR (GFR > 90 mL/min/1.73 square meters)    Stage 2 Mild CKD (GFR = 60-89 mL/min/1.73 square meters)    Stage 3A Moderate CKD (GFR = 45-59 mL/min/1.73 square meters)    Stage 3B Moderate CKD (GFR = 30-44 mL/min/1.73 square meters)    Stage 4 Severe CKD (GFR = 15-29 mL/min/1.73 square meters)    Stage 5 End Stage CKD (GFR <15 mL/min/1.73 square meters)  Note: GFR calculation is accurate only with a steady state creatinine    CBC and differential [[id number]]  (Abnormal) Collected: 01/06/25 1256    Lab Status: Final result Specimen: Blood from Arm, Left Updated: 01/06/25 1305     WBC 8.92 Thousand/uL      RBC 2.94 Million/uL      Hemoglobin 9.0 g/dL      Hematocrit 27.1 %      MCV 92 fL      MCH 30.6 pg      MCHC 33.2 g/dL      RDW 12.2 %      MPV 9.3 fL      Platelets 228 Thousands/uL      nRBC 0 /100 WBCs      Segmented % 70 %      Immature Grans % 0 %      Lymphocytes % 20 %      Monocytes % 7 %      Eosinophils Relative 2 %      Basophils Relative 1 %      Absolute Neutrophils 6.26 Thousands/µL      Absolute Immature Grans 0.04 Thousand/uL      Absolute Lymphocytes 1.82 Thousands/µL      Absolute Monocytes 0.58 Thousand/µL      Eosinophils Absolute 0.17 Thousand/µL      Basophils Absolute 0.05 Thousands/µL             XR chest 1 view portable   ED Interpretation by César Pereira DO (01/06 2208)   No infiltrate, effusion ptx          Procedures    ED Medication and Procedure Management   Prior to Admission Medications   Prescriptions Last Dose Informant Patient Reported? Taking?   Ascorbic Acid (vitamin C) 1000 MG tablet  Self Yes No   Sig: Take 1,000 mg by mouth daily   Calcium Carb-Cholecalciferol (Calcium 500 + D) 500-5 MG-MCG TABS   Self Yes No   Sig: Take 2 tablets by mouth in the morning   Ibuprofen (IBU PO)  Self Yes No   Sig: Take by mouth   cyanocobalamin (VITAMIN B-12) 100 mcg tablet  Self Yes No   Sig: Take by mouth daily   cycloSPORINE (RESTASIS) 0.05 % ophthalmic emulsion  Self Yes No   Sig: Administer 1 drop to both eyes 2 (two) times a day      Facility-Administered Medications: None     Patient's Medications   Discharge Prescriptions    No medications on file     No discharge procedures on file.  ED SEPSIS DOCUMENTATION   Time reflects when diagnosis was documented in both MDM as applicable and the Disposition within this note       Time User Action Codes Description Comment    1/6/2025  3:52 PM César Pereira Add [K92.1] Melena     1/6/2025  3:58 PM César Pereira Remove [K92.1] Melena     1/6/2025  3:58 PM César Pereira Add [K92.2] UGIB (upper gastrointestinal bleed)     1/6/2025  3:58 PM César Pereira Add [D64.9] Anemia     1/6/2025  3:58 PM César Pereira Add [R06.09] Exertional dyspnea                  César Pereira DO  01/06/25 1708

## 2025-01-07 ENCOUNTER — APPOINTMENT (INPATIENT)
Dept: GASTROENTEROLOGY | Facility: HOSPITAL | Age: 75
DRG: 378 | End: 2025-01-07
Payer: MEDICARE

## 2025-01-07 ENCOUNTER — ANESTHESIA (INPATIENT)
Dept: GASTROENTEROLOGY | Facility: HOSPITAL | Age: 75
DRG: 378 | End: 2025-01-07
Payer: MEDICARE

## 2025-01-07 ENCOUNTER — ANESTHESIA EVENT (INPATIENT)
Dept: GASTROENTEROLOGY | Facility: HOSPITAL | Age: 75
DRG: 378 | End: 2025-01-07
Payer: MEDICARE

## 2025-01-07 ENCOUNTER — APPOINTMENT (INPATIENT)
Dept: NON INVASIVE DIAGNOSTICS | Facility: HOSPITAL | Age: 75
DRG: 378 | End: 2025-01-07
Payer: MEDICARE

## 2025-01-07 LAB
ANION GAP SERPL CALCULATED.3IONS-SCNC: 4 MMOL/L (ref 4–13)
AORTIC ROOT: 2.9 CM
ASCENDING AORTA: 2.7 CM
ATRIAL RATE: 80 BPM
BSA FOR ECHO PROCEDURE: 1.53 M2
BUN SERPL-MCNC: 24 MG/DL (ref 5–25)
CALCIUM SERPL-MCNC: 7.8 MG/DL (ref 8.4–10.2)
CHLORIDE SERPL-SCNC: 112 MMOL/L (ref 96–108)
CO2 SERPL-SCNC: 23 MMOL/L (ref 21–32)
CREAT SERPL-MCNC: 0.64 MG/DL (ref 0.6–1.3)
DOP CALC LVOT AREA: 3.8 CM2
DOP CALC LVOT DIAMETER: 2.2 CM
E WAVE DECELERATION TIME: 263 MS
E/A RATIO: 1.08
ERYTHROCYTE [DISTWIDTH] IN BLOOD BY AUTOMATED COUNT: 12.3 % (ref 11.6–15.1)
FERRITIN SERPL-MCNC: 52 NG/ML (ref 11–307)
FRACTIONAL SHORTENING: 42 (ref 28–44)
GFR SERPL CREATININE-BSD FRML MDRD: 88 ML/MIN/1.73SQ M
GLUCOSE SERPL-MCNC: 100 MG/DL (ref 65–140)
GLUCOSE SERPL-MCNC: 92 MG/DL (ref 65–140)
HCT VFR BLD AUTO: 21.1 % (ref 34.8–46.1)
HGB BLD-MCNC: 7 G/DL (ref 11.5–15.4)
HGB BLD-MCNC: 7.6 G/DL (ref 11.5–15.4)
INTERVENTRICULAR SEPTUM IN DIASTOLE (PARASTERNAL SHORT AXIS VIEW): 1 CM
INTERVENTRICULAR SEPTUM: 1 CM (ref 0.6–1.1)
IRON SATN MFR SERPL: 12 % (ref 15–50)
IRON SERPL-MCNC: 38 UG/DL (ref 50–212)
LAAS-AP2: 14.2 CM2
LAAS-AP4: 13.2 CM2
LEFT ATRIUM SIZE: 2.9 CM
LEFT ATRIUM VOLUME (MOD BIPLANE): 30 ML
LEFT ATRIUM VOLUME INDEX (MOD BIPLANE): 19.6 ML/M2
LEFT INTERNAL DIMENSION IN SYSTOLE: 2.1 CM (ref 2.1–4)
LEFT VENTRICULAR INTERNAL DIMENSION IN DIASTOLE: 3.6 CM (ref 3.5–6)
LEFT VENTRICULAR POSTERIOR WALL IN END DIASTOLE: 0.9 CM
LEFT VENTRICULAR STROKE VOLUME: 38 ML
LV EF US.2D.A4C+ESTIMATED: 76 %
LVSV (TEICH): 38 ML
MCH RBC QN AUTO: 30.8 PG (ref 26.8–34.3)
MCHC RBC AUTO-ENTMCNC: 33.2 G/DL (ref 31.4–37.4)
MCV RBC AUTO: 93 FL (ref 82–98)
MV E'TISSUE VEL-LAT: 15 CM/S
MV E'TISSUE VEL-SEP: 10 CM/S
MV PEAK A VEL: 0.83 M/S
MV PEAK E VEL: 90 CM/S
MV STENOSIS PRESSURE HALF TIME: 76 MS
MV VALVE AREA P 1/2 METHOD: 2.89
P AXIS: 72 DEGREES
PLATELET # BLD AUTO: 182 THOUSANDS/UL (ref 149–390)
PMV BLD AUTO: 9.5 FL (ref 8.9–12.7)
POTASSIUM SERPL-SCNC: 4 MMOL/L (ref 3.5–5.3)
PR INTERVAL: 156 MS
QRS AXIS: 65 DEGREES
QRSD INTERVAL: 76 MS
QT INTERVAL: 380 MS
QTC INTERVAL: 438 MS
RBC # BLD AUTO: 2.27 MILLION/UL (ref 3.81–5.12)
RIGHT ATRIUM AREA SYSTOLE A4C: 13 CM2
RIGHT VENTRICLE ID DIMENSION: 3.2 CM
SL CV LEFT ATRIUM LENGTH A2C: 4.8 CM
SL CV LV EF: 60
SL CV PED ECHO LEFT VENTRICLE DIASTOLIC VOLUME (MOD BIPLANE) 2D: 53 ML
SL CV PED ECHO LEFT VENTRICLE SYSTOLIC VOLUME (MOD BIPLANE) 2D: 15 ML
SODIUM SERPL-SCNC: 139 MMOL/L (ref 135–147)
T WAVE AXIS: 71 DEGREES
TIBC SERPL-MCNC: 313.6 UG/DL (ref 250–450)
TR MAX PG: 21 MMHG
TR PEAK VELOCITY: 2.3 M/S
TRANSFERRIN SERPL-MCNC: 224 MG/DL (ref 203–362)
TRICUSPID ANNULAR PLANE SYSTOLIC EXCURSION: 2.2 CM
TRICUSPID VALVE PEAK REGURGITATION VELOCITY: 2.29 M/S
UIBC SERPL-MCNC: 276 UG/DL (ref 155–355)
VENTRICULAR RATE: 80 BPM
WBC # BLD AUTO: 5.4 THOUSAND/UL (ref 4.31–10.16)

## 2025-01-07 PROCEDURE — 93306 TTE W/DOPPLER COMPLETE: CPT

## 2025-01-07 PROCEDURE — 93010 ELECTROCARDIOGRAM REPORT: CPT | Performed by: INTERNAL MEDICINE

## 2025-01-07 PROCEDURE — 99233 SBSQ HOSP IP/OBS HIGH 50: CPT | Performed by: INTERNAL MEDICINE

## 2025-01-07 PROCEDURE — 93306 TTE W/DOPPLER COMPLETE: CPT | Performed by: INTERNAL MEDICINE

## 2025-01-07 PROCEDURE — P9016 RBC LEUKOCYTES REDUCED: HCPCS

## 2025-01-07 PROCEDURE — 99223 1ST HOSP IP/OBS HIGH 75: CPT | Performed by: PHYSICIAN ASSISTANT

## 2025-01-07 PROCEDURE — 85018 HEMOGLOBIN: CPT | Performed by: INTERNAL MEDICINE

## 2025-01-07 PROCEDURE — 0W3P8ZZ CONTROL BLEEDING IN GASTROINTESTINAL TRACT, VIA NATURAL OR ARTIFICIAL OPENING ENDOSCOPIC: ICD-10-PCS | Performed by: INTERNAL MEDICINE

## 2025-01-07 PROCEDURE — 85027 COMPLETE CBC AUTOMATED: CPT

## 2025-01-07 PROCEDURE — NC001 PR NO CHARGE: Performed by: NURSE PRACTITIONER

## 2025-01-07 PROCEDURE — 30233N1 TRANSFUSION OF NONAUTOLOGOUS RED BLOOD CELLS INTO PERIPHERAL VEIN, PERCUTANEOUS APPROACH: ICD-10-PCS | Performed by: INTERNAL MEDICINE

## 2025-01-07 PROCEDURE — 82948 REAGENT STRIP/BLOOD GLUCOSE: CPT

## 2025-01-07 PROCEDURE — 43255 EGD CONTROL BLEEDING ANY: CPT | Performed by: INTERNAL MEDICINE

## 2025-01-07 PROCEDURE — 80048 BASIC METABOLIC PNL TOTAL CA: CPT

## 2025-01-07 RX ORDER — LIDOCAINE HYDROCHLORIDE 20 MG/ML
INJECTION, SOLUTION EPIDURAL; INFILTRATION; INTRACAUDAL; PERINEURAL AS NEEDED
Status: DISCONTINUED | OUTPATIENT
Start: 2025-01-07 | End: 2025-01-07

## 2025-01-07 RX ORDER — PROPOFOL 10 MG/ML
INJECTION, EMULSION INTRAVENOUS AS NEEDED
Status: DISCONTINUED | OUTPATIENT
Start: 2025-01-07 | End: 2025-01-07

## 2025-01-07 RX ORDER — SODIUM CHLORIDE, SODIUM LACTATE, POTASSIUM CHLORIDE, CALCIUM CHLORIDE 600; 310; 30; 20 MG/100ML; MG/100ML; MG/100ML; MG/100ML
125 INJECTION, SOLUTION INTRAVENOUS CONTINUOUS
Status: CANCELLED | OUTPATIENT
Start: 2025-01-07

## 2025-01-07 RX ORDER — EPINEPHRINE 0.1 MG/ML
INJECTION INTRAVENOUS AS NEEDED
Status: DISCONTINUED | OUTPATIENT
Start: 2025-01-07 | End: 2025-01-07 | Stop reason: HOSPADM

## 2025-01-07 RX ORDER — SODIUM CHLORIDE, SODIUM LACTATE, POTASSIUM CHLORIDE, CALCIUM CHLORIDE 600; 310; 30; 20 MG/100ML; MG/100ML; MG/100ML; MG/100ML
INJECTION, SOLUTION INTRAVENOUS CONTINUOUS PRN
Status: DISCONTINUED | OUTPATIENT
Start: 2025-01-07 | End: 2025-01-07

## 2025-01-07 RX ADMIN — ONDANSETRON 4 MG: 2 INJECTION INTRAMUSCULAR; INTRAVENOUS at 07:27

## 2025-01-07 RX ADMIN — PROPOFOL 100 MG: 10 INJECTION, EMULSION INTRAVENOUS at 12:20

## 2025-01-07 RX ADMIN — Medication 2 TABLET: at 08:02

## 2025-01-07 RX ADMIN — LIDOCAINE HYDROCHLORIDE 100 MG: 20 INJECTION, SOLUTION EPIDURAL; INFILTRATION; INTRACAUDAL; PERINEURAL at 12:19

## 2025-01-07 RX ADMIN — OXYCODONE HYDROCHLORIDE AND ACETAMINOPHEN 1000 MG: 500 TABLET ORAL at 08:02

## 2025-01-07 RX ADMIN — SODIUM CHLORIDE, SODIUM LACTATE, POTASSIUM CHLORIDE, AND CALCIUM CHLORIDE: .6; .31; .03; .02 INJECTION, SOLUTION INTRAVENOUS at 11:45

## 2025-01-07 RX ADMIN — DEXTRAN 70, GLYCERIN, HYPROMELLOSE 1 DROP: 1; 2; 3 SOLUTION/ DROPS OPHTHALMIC at 08:03

## 2025-01-07 RX ADMIN — EPINEPHRINE 0.02 MG: 0.1 INJECTION INTRAVENOUS at 12:29

## 2025-01-07 RX ADMIN — SODIUM CHLORIDE 8 MG/HR: 9 INJECTION, SOLUTION INTRAVENOUS at 02:06

## 2025-01-07 RX ADMIN — PROPOFOL 20 MG: 10 INJECTION, EMULSION INTRAVENOUS at 12:31

## 2025-01-07 RX ADMIN — DEXTRAN 70, GLYCERIN, HYPROMELLOSE 1 DROP: 1; 2; 3 SOLUTION/ DROPS OPHTHALMIC at 17:18

## 2025-01-07 RX ADMIN — PROPOFOL 40 MG: 10 INJECTION, EMULSION INTRAVENOUS at 12:24

## 2025-01-07 RX ADMIN — CYANOCOBALAMIN TAB 500 MCG 1000 MCG: 500 TAB at 08:02

## 2025-01-07 RX ADMIN — PROPOFOL 40 MG: 10 INJECTION, EMULSION INTRAVENOUS at 12:28

## 2025-01-07 RX ADMIN — SODIUM CHLORIDE 8 MG/HR: 9 INJECTION, SOLUTION INTRAVENOUS at 13:28

## 2025-01-07 RX ADMIN — IRON SUCROSE 300 MG: 20 INJECTION, SOLUTION INTRAVENOUS at 07:33

## 2025-01-07 RX ADMIN — SODIUM CHLORIDE 100 ML/HR: 0.9 INJECTION, SOLUTION INTRAVENOUS at 22:21

## 2025-01-07 NOTE — ASSESSMENT & PLAN NOTE
Pt endorses syncopal episode on 01/04; pt admits getting hot and nauseous while sitting/playing a board game; daughter found patient on floor of bathroom where she had passed out and thrown up.; LOC duration a few seconds.  Suspect vasovagal in etiology 2/2 symptomatic anemia in setting of acute upper GI bleed  Nonfocal neuroexam  Troponin benign, trend; ECG w/o ischemia  C/w telemetry  S/p normal stress echo in 2022  Recurrent vasovagal syncope early a.m. 1/7  Will check echo to complete workup though my suspicion for any acute findings is low

## 2025-01-07 NOTE — ASSESSMENT & PLAN NOTE
Patient with significant drop from baseline suspicious for upper GI bleeding.  Patient has history of this in 2021, also has been using ibuprofen up to 800 mg a day for a shoulder injury.  -Plan for EGD today to investigate  -Resuscitation with IV fluid and blood products ordered  -CT imaging of the abdomen if EGD unremarkable  -Recommend repeat ECHO during admission  -Continue PPI drip  -NPO  -Further recommendations based on endoscopy findings  -Absolutely no NSAIDs in the future, acetaminophen or Tylenol products would be preferred  -Discussed case with primary team  -Patient agrees with plan

## 2025-01-07 NOTE — CODE DOCUMENTATION
EKG ordered and completed   
Patient had complaints of feeling lightheaded and dizzy. Had a witnessed syncopal episode.   
Pause noted on telemetry at 07:21  
Attending Attestation (For Attendings USE Only)...

## 2025-01-07 NOTE — CONSULTS
Consultation - Gastroenterology   Name: Rosario Morales 74 y.o. female I MRN: [id number]  Unit/Bed#: 2 E 257-01 I Date of Admission: 1/6/2025   Date of Service: 1/7/2025 I Hospital Day: 1   Inpatient consult to gastroenterology  Consult performed by: Irene Salinas PA-C  Consult ordered by: Lesia Flores PA-C        Physician Requesting Evaluation: Mike Rosario MD   Reason for Evaluation / Principal Problem: Acute blood loss anemia, reported tarry stools    Assessment & Plan  Acute blood loss anemia  Patient with significant drop from baseline suspicious for upper GI bleeding.  Patient has history of this in 2021, also has been using ibuprofen up to 800 mg a day for a shoulder injury.  -Plan for EGD today to investigate  -Resuscitation with IV fluid and blood products ordered  -CT imaging of the abdomen if EGD unremarkable  -Recommend repeat ECHO during admission  -Continue PPI drip  -NPO  -Further recommendations based on endoscopy findings  -Absolutely no NSAIDs in the future, acetaminophen or Tylenol products would be preferred  -Discussed case with primary team  -Patient agrees with plan  Duodenal ulcer  History of duodenal ulcer in 2021 secondary to NSAIDs.  Suspicious for recurrence.      History of Present Illness   HPI:  Rosario Morales is a 74 y.o. female with history of peptic ulcer disease in 2021 secondary to NSAIDs who presents for syncope and melena.  Patient reports that on Saturday she began having shortness of breath with basic activities such as going up her stairs.  Patient began feeling diaphoretic and short of breath.  Patient then began having tarry black stools, and had a syncopal episode in the bathroom.  Reports that her daughter was able to catch her.  For a shoulder injury in August, patient has been taking up to 800 mg of ibuprofen daily.    On admission, hemoglobin 9 within normal baseline between 13 and 14.  Down to 7 this morning.  BUN 28.  Iron 38.    This morning at 7:30 AM a rapid  response was called on the patient secondary to near syncope while ambulating to the bathroom.  Fortunately the patient is alert and oriented at the bedside this morning.  Reports that she feels nauseous and weak, but much improved since the rapid response was called earlier this morning.  Denies abdominal pain.  During our encounter, bedside nursing hanging first unit of blood.  Pressure 96/47.    Last EGD was in November 2021 revealing duodenal bulb ulcer that was nonbleeding at the time.  Because was secondary to NSAID suspected.  Biopsies were negative for H. pylori at the time.  Last colonoscopy was performed by Dr. Holt in March 2024, which was normal according to report.  There is no family history of GI malignancy.  Her brother has ulcerative colitis.    Review of Systems   Constitutional:  Negative for appetite change, chills, diaphoresis, fatigue and unexpected weight change.   HENT:  Negative for sore throat and trouble swallowing.    Eyes:  Negative for discharge and redness.   Respiratory:  Positive for shortness of breath. Negative for cough and wheezing.    Cardiovascular:  Negative for chest pain and palpitations.   Gastrointestinal:  Negative for abdominal pain, anal bleeding, blood in stool, constipation, diarrhea, nausea, rectal pain and vomiting.   Endocrine: Negative for cold intolerance and heat intolerance.   Musculoskeletal:  Negative for joint swelling and myalgias.   Skin:  Negative for pallor and rash.   Neurological:  Positive for syncope and light-headedness. Negative for dizziness, tremors, weakness, numbness and headaches.   Hematological:  Negative for adenopathy. Does not bruise/bleed easily.   Psychiatric/Behavioral:  Negative for behavioral problems, confusion, dysphoric mood and sleep disturbance. The patient is not nervous/anxious.      I have reviewed the patient's PMH, PSH, Social History, Family History, Meds, and Allergies    Objective :  Temp:  [97.5 °F (36.4 °C)-98.2 °F  (36.8 °C)] 98.2 °F (36.8 °C)  HR:  [52-88] 66  BP: ()/(37-67) 103/57  Resp:  [18] 18  SpO2:  [89 %-100 %] 95 %  O2 Device: None (Room air)    Physical Exam  Constitutional:       General: She is not in acute distress.     Appearance: She is well-developed. She is not diaphoretic.      Comments: Generalized pallor   HENT:      Head: Normocephalic and atraumatic.   Eyes:      General: No scleral icterus.     Conjunctiva/sclera: Conjunctivae normal.      Pupils: Pupils are equal, round, and reactive to light.   Neck:      Thyroid: No thyromegaly.      Trachea: No tracheal deviation.   Cardiovascular:      Rate and Rhythm: Normal rate and regular rhythm.   Pulmonary:      Effort: Pulmonary effort is normal.   Abdominal:      General: Abdomen is flat. There is no distension.      Palpations: Abdomen is soft. Abdomen is not rigid. There is no mass.      Tenderness: There is no abdominal tenderness. There is no guarding or rebound.   Musculoskeletal:      Cervical back: Neck supple.   Lymphadenopathy:      Cervical: No cervical adenopathy.   Skin:     General: Skin is warm and dry.      Findings: No erythema or rash.   Neurological:      Mental Status: She is alert and oriented to person, place, and time.   Psychiatric:         Behavior: Behavior normal.         Lab Results: I have reviewed the following results:CBC/BMP:   .     01/07/25  0528   WBC 5.40   HGB 7.0*   HCT 21.1*      SODIUM 139   K 4.0   *   CO2 23   BUN 24   CREATININE 0.64   GLUC 92    , Creatinine Clearance: Estimated Creatinine Clearance: 59.1 mL/min (by C-G formula based on SCr of 0.64 mg/dL)., LFTs:   .     01/06/25  1256   AST 17   ALT 12   ALB 3.9   TBILI 0.32   ALKPHOS 51    , PTT/INR:No new results in last 24 hours. , Troponin,BNP:  .     01/06/25  1256 01/06/25  1458   HSTNI0 4  --    HSTNI2  --  4   BNP 21  --

## 2025-01-07 NOTE — PROGRESS NOTES
Progress Note - Hospitalist   Name: Rosario Morales 74 y.o. female I MRN: [id number]  Unit/Bed#: 2 E 257-01 I Date of Admission: 1/6/2025   Date of Service: 1/7/2025 I Hospital Day: 1    Assessment & Plan  Acute upper GI bleeding  PMH of PUD who presents from PCP's office w/ c/o onset of dark, tarry stools since 01/04; associated syncopal episode and MOHAN (uncharacteristic for baseline)  Admits to use Motrin 400 mg BID post shoulder surgery since 08/2024   H/o UGIB in the past; uses Pepcid intermittently/PRN  EGD (11/29/21) w/ schatzki's ring, hiatal hernia, antritis, and a duodenal bulb ulcer  Consult GI  Clear liquid diet; n.p.o. at midnight  Will need repeat EGD  C/W PPI infusion  Baseline Hgb 13-14; Hgb 9 on admit; trend H&H q 8 hrs   Benign abdominal exam; with clinical instability/acute pain can consider high-volume CT bleeding scan   IV Venofer given acute blood loss anemia and GI bleed  Transfuse for hemoglobin 7 on 1/7  Syncope  Pt endorses syncopal episode on 01/04; pt admits getting hot and nauseous while sitting/playing a board game; daughter found patient on floor of bathroom where she had passed out and thrown up.; LOC duration a few seconds.  Suspect vasovagal in etiology 2/2 symptomatic anemia in setting of acute upper GI bleed  Nonfocal neuroexam  Troponin benign, trend; ECG w/o ischemia  C/w telemetry  S/p normal stress echo in 2022  Recurrent vasovagal syncope early a.m. 1/7  Will check echo to complete workup though my suspicion for any acute findings is low  Duodenal ulcer  H/o GI bleed   EGD (11/29/21) w/ schatzki's ring, hiatal hernia, antritis, and a duodenal bulb ulcer  Patient denies pain instructed to take H2 blocker or PPI daily after this incident  Acute blood loss anemia  Recent Labs     01/06/25  1256 01/07/25  0528   HGB 9.0* 7.0*     Baseline Hgb 13-14   H&H q 8 hrs   Likely 2/2 GI bleed  Transfuse w/ Hgb < 7   Course of IV Venofer    VTE Pharmacologic Prophylaxis:   contraindicated  pharmacologic prophylaxis d/t presumed upper gi bleed.     Mobility:   Basic Mobility Inpatient Raw Score: 21  JH-HLM Goal: 6: Walk 10 steps or more  JH-HLM Achieved: 6: Walk 10 steps or more  JH-HLM Goal achieved. Continue to encourage appropriate mobility.    Patient Centered Rounds: I performed bedside rounds with nursing staff today.   Discussions with Specialists or Other Care Team Provider: GI    Education and Discussions with Family / Patient: Updated  () via phone.    Current Length of Stay: 1 day(s)  Current Patient Status: Inpatient   Certification Statement: The patient will continue to require additional inpatient hospital stay due to upper endoscopy and follow-up hemoglobin  Discharge Plan: Anticipate discharge tomorrow to home.    Code Status: Level 1 - Full Code    Subjective   Feeling well at the present time.  She noted feeling lightheaded after returning from bathroom.  She was up multiple times throughout the night urinating but has not had bowel movement since admission.  She had some nausea after the syncopal episode this morning but only dry heaves.    Objective :  Temp:  [97.5 °F (36.4 °C)-98.3 °F (36.8 °C)] 98.3 °F (36.8 °C)  HR:  [52-88] 69  BP: ()/(37-67) 92/46  Resp:  [18-98] 18  SpO2:  [89 %-100 %] 96 %  O2 Device: None (Room air)    Body mass index is 23.05 kg/m².     Input and Output Summary (last 24 hours):     Intake/Output Summary (Last 24 hours) at 1/7/2025 0930  Last data filed at 1/6/2025 1730  Gross per 24 hour   Intake 1100 ml   Output --   Net 1100 ml       Physical Exam  Constitutional:       Appearance: She is well-developed.   HENT:      Head: Normocephalic and atraumatic.      Nose: Nose normal.   Eyes:      General: No scleral icterus.     Conjunctiva/sclera: Conjunctivae normal.      Pupils: Pupils are equal, round, and reactive to light.   Neck:      Thyroid: No thyromegaly.      Vascular: No JVD.      Trachea: No tracheal deviation.    Cardiovascular:      Rate and Rhythm: Normal rate and regular rhythm.      Heart sounds: No murmur heard.     No friction rub. No gallop.   Pulmonary:      Effort: Pulmonary effort is normal. No respiratory distress.      Breath sounds: Normal breath sounds. No wheezing or rales.   Abdominal:      General: Bowel sounds are normal. There is no distension.      Palpations: Abdomen is soft. There is no mass.      Tenderness: There is no abdominal tenderness. There is no guarding or rebound.   Musculoskeletal:         General: No tenderness.      Cervical back: Normal range of motion and neck supple.   Lymphadenopathy:      Cervical: No cervical adenopathy.   Skin:     General: Skin is warm and dry.      Findings: No erythema or rash.   Neurological:      Mental Status: She is alert and oriented to person, place, and time.      Cranial Nerves: No cranial nerve deficit.   Psychiatric:         Behavior: Behavior normal.         Thought Content: Thought content normal.         Judgment: Judgment normal.           Lines/Drains:        Telemetry:  Telemetry Orders (From admission, onward)               24 Hour Telemetry Monitoring  Continuous x 24 Hours (Telem)        Question:  Reason for 24 Hour Telemetry  Answer:  Syncope suspected to be cardiac in origin                     Telemetry Reviewed: Sinus Bradycardia and Pause(s)  Indication for Continued Telemetry Use: Syncope               Lab Results: I have reviewed the following results:   Results from last 7 days   Lab Units 01/07/25  0528 01/06/25  1256   WBC Thousand/uL 5.40 8.92   HEMOGLOBIN g/dL 7.0* 9.0*   HEMATOCRIT % 21.1* 27.1*   PLATELETS Thousands/uL 182 228   SEGS PCT %  --  70   LYMPHO PCT %  --  20   MONO PCT %  --  7   EOS PCT %  --  2     Results from last 7 days   Lab Units 01/07/25  0528 01/06/25  1256   SODIUM mmol/L 139 138   POTASSIUM mmol/L 4.0 3.9   CHLORIDE mmol/L 112* 107   CO2 mmol/L 23 25   BUN mg/dL 24 28*   CREATININE mg/dL 0.64 0.70   ANION  GAP mmol/L 4 6   CALCIUM mg/dL 7.8* 8.6   ALBUMIN g/dL  --  3.9   TOTAL BILIRUBIN mg/dL  --  0.32   ALK PHOS U/L  --  51   ALT U/L  --  12   AST U/L  --  17   GLUCOSE RANDOM mg/dL 92 101         Results from last 7 days   Lab Units 01/07/25  0724   POC GLUCOSE mg/dl 100               Recent Cultures (last 7 days):               Last 24 Hours Medication List:     Current Facility-Administered Medications:     Artificial Tears Op Soln 1 drop, BID    ascorbic acid (VITAMIN C) tablet 1,000 mg, Daily    calcium carbonate (TUMS) chewable tablet 1,000 mg, Daily PRN    calcium carbonate-vitamin D 500 mg-5 mcg tablet 2 tablet, Daily    cyanocobalamin (VITAMIN B-12) tablet 1,000 mcg, Daily    iron sucrose (VENOFER) 300 mg in sodium chloride 0.9 % 250 mL IVPB, Daily, Last Rate: 300 mg (01/07/25 0733)    ondansetron (ZOFRAN) injection 4 mg, Q6H PRN    pantoprazole (PROTONIX) 80 mg in sodium chloride 0.9 % 100 mL infusion, Continuous, Last Rate: 8 mg/hr (01/07/25 0206)    sodium chloride 0.9 % infusion, Continuous, Last Rate: 100 mL/hr (01/06/25 1813)    Administrative Statements   Today, Patient Was Seen By: Mike Rosario MD      **Please Note: This note may have been constructed using a voice recognition system.**

## 2025-01-07 NOTE — ANESTHESIA PREPROCEDURE EVALUATION
Procedure:  EGD     73 yo F with PMHx of PUD who is admitted for SOB, black BM and acute blood loss anemia. Recent NSAID use after shoulder surgery. Baseline Hgb 13-14, on admission 9, this AM 7. RRT for near syncope on ambulation - s/p 1 pRBC. Denies n/v/hematemesis.    Denies previous complications from anesthesia, denies ALEXANDRO. Never smoker. METS>4, NPO>8h.    Relevant Problems   ANESTHESIA (within normal limits)      GI/HEPATIC   (+) Acute upper GI bleeding   (+) Duodenal ulcer      HEMATOLOGY   (+) Acute blood loss anemia      MUSCULOSKELETAL   (+) Other idiopathic scoliosis, thoracolumbar region      NEURO/PSYCH (within normal limits)   (-) CVA (cerebral vascular accident) (HCC)   (-) Seizures (HCC)      PULMONARY (within normal limits)   (-) Asthma   (-) Sleep apnea   (-) Smoking     Lab Results   Component Value Date/Time    WBC 5.40 01/07/2025 05:28 AM    WBC 6.3 06/05/2015 12:33 PM    RBC 2.27 (L) 01/07/2025 05:28 AM    RBC 4.46 06/05/2015 12:33 PM    HGB 7.0 (L) 01/07/2025 05:28 AM    HGB 13.5 06/05/2015 12:33 PM    HCT 21.1 (L) 01/07/2025 05:28 AM    HCT 40.2 06/05/2015 12:33 PM     01/07/2025 05:28 AM     06/05/2015 12:33 PM     Lab Results   Component Value Date/Time    SODIUM 139 01/07/2025 05:28 AM    K 4.0 01/07/2025 05:28 AM     (H) 01/07/2025 05:28 AM    CO2 23 01/07/2025 05:28 AM    BUN 24 01/07/2025 05:28 AM    CREATININE 0.64 01/07/2025 05:28 AM    GLUC 92 01/07/2025 05:28 AM     Lab Results   Component Value Date/Time    ALKPHOS 51 01/06/2025 12:56 PM    ALT 12 01/06/2025 12:56 PM    AST 17 01/06/2025 12:56 PM    ALB 3.9 01/06/2025 12:56 PM    TBILI 0.32 01/06/2025 12:56 PM     XR chest 1 view portable  Result Date: 1/6/2025  Impression: No acute cardiopulmonary disease.     Type and Screen:  O    Exercise stress TTE (2022):  The study is normal, after maximal exercise. 10.1 METS. The patient experienced no angina during the test. The patient achieved the target heart rate.  Arrhythmias during stress: rare PVCs.     TTE (2022):  Normal biV, no significant valvular heart disease    Physical Exam    Airway    Mallampati score: II  TM Distance: >3 FB  Neck ROM: full     Dental   No notable dental hx     Cardiovascular  Cardiovascular exam normal    Pulmonary  Pulmonary exam normal     Other Findings  post-pubertal.      Anesthesia Plan  ASA Score- 2     Anesthesia Type- IV sedation with anesthesia with ASA Monitors.         Additional Monitors:     Airway Plan:            Plan Factors-Exercise tolerance (METS): >4 METS.    Chart reviewed. EKG reviewed. Imaging results reviewed. Existing labs reviewed. Patient summary reviewed.    Patient is not a current smoker.              Induction- intravenous.    Postoperative Plan-     Perioperative Resuscitation Plan - Level 1 - Full Code.       Informed Consent- Anesthetic plan and risks discussed with patient.  I personally reviewed this patient with the CRNA. Discussed and agreed on the Anesthesia Plan with the CRNA..

## 2025-01-07 NOTE — RAPID RESPONSE
Rapid Response Note  Rosario Morales 74 y.o. female MRN: [id number]  Unit/Bed#: 2 E 257-01 Encounter: [id number]    Rapid Response Notification(s):   Response called date/time:  1/7/2025 7:24 AM  Response team arrival date/time:  1/7/2025 7:25 AM  Response end date/time:  1/7/2025 7:37 AM  Level of care:  Medsur  [facility]  Primary reason for rapid response call:  Acute change in neuro status and acute change in heart rhythm    Rapid Response Intervention(s):   Airway:  None  Breathing:  None  Circulation:  Electrocardiogram  Comments:  EKG, primary team to order 1u PRBC       Assessment:   Symptomatic anemia    Plan:   EKG within normal limits, plan for 1u PRBC per primary, bedrest until GI evaluation     Rapid Response Outcome:   Transfer:  Remain on floor  Primary service notified of transfer: Yes    Code Status: Level 1 (Full Code)      Family notified: Primary team to notify Family Member       Background/Situation:   Rosario Morales is a 74 y.o. female who presented yesterday with a complaint of dark tarry stools for several days. Today she was a rapid response for two episodes of near syncope following ambulation to the restroom. On arrival, the patient is nauseous and dry-heaving.    Review of Systems   Respiratory:  Negative for shortness of breath.    Cardiovascular:  Negative for chest pain.   Gastrointestinal:  Positive for nausea. Negative for diarrhea and vomiting.   Musculoskeletal:  Negative for arthralgias.   Neurological:  Positive for dizziness, syncope, weakness and light-headedness. Negative for headaches.       Objective:   Vitals:    01/07/25 0727 01/07/25 0728 01/07/25 0730 01/07/25 0733   BP:  103/57     BP Location:       Pulse: 70  67 66   Resp:       Temp:       TempSrc:       SpO2: 99%  99% 95%   Weight:       Height:         Physical Exam  Constitutional:       General: She is not in acute distress.     Appearance: She is ill-appearing.   HENT:      Head:  Normocephalic and atraumatic.      Mouth/Throat:      Mouth: Mucous membranes are dry.   Eyes:      Pupils: Pupils are equal, round, and reactive to light.   Cardiovascular:      Rate and Rhythm: Regular rhythm. Bradycardia present.      Pulses: Normal pulses.   Pulmonary:      Effort: Pulmonary effort is normal.   Abdominal:      General: Abdomen is flat.   Musculoskeletal:         General: No signs of injury.      Right lower leg: No edema.      Left lower leg: No edema.   Skin:     General: Skin is warm and dry.      Coloration: Skin is pale.   Neurological:      GCS: GCS eye subscore is 4. GCS verbal subscore is 5. GCS motor subscore is 6.

## 2025-01-07 NOTE — ASSESSMENT & PLAN NOTE
PMH of PUD who presents from PCP's office w/ c/o onset of dark, tarry stools since 01/04; associated syncopal episode and MOHAN (uncharacteristic for baseline)  Admits to use Motrin 400 mg BID post shoulder surgery since 08/2024   H/o UGIB in the past; uses Pepcid intermittently/PRN  EGD (11/29/21) w/ schatzki's ring, hiatal hernia, antritis, and a duodenal bulb ulcer  Consult GI  Clear liquid diet; n.p.o. at midnight  Will need repeat EGD  C/W PPI infusion  Baseline Hgb 13-14; Hgb 9 on admit; trend H&H q 8 hrs   Benign abdominal exam; with clinical instability/acute pain can consider high-volume CT bleeding scan   IV Venofer given acute blood loss anemia and GI bleed  Transfuse for hemoglobin 7 on 1/7

## 2025-01-07 NOTE — NURSING NOTE
Assumed care of pt from night nurse. PCA went to the room and helped the pt to the bathroom. As per PCA, on the way back to the bed, pt complains of dizziness. PCA said soon the pt reached the bed, pt passed out two times. Rapid response called. RN arrived to the room. Rapid response team at the bedside. Pt noted to be awake. Pt denies any pain, SOB, difficulty breathing. Pt placed on the cardiac monitor. EKG done. Pt complains of nausea. Zofran giving. Few minutes, pt states that nausea went away. Pt said that she feels better. Iron infusion initiated. Hgb is 7. Pt consented for blood. Second IV placed. Blood is currently on going. Pt is currently resting comfortably in bed. Pt denies any complains at this time. Will continue to monitor.

## 2025-01-07 NOTE — ANESTHESIA POSTPROCEDURE EVALUATION
Post-Op Assessment Note    CV Status:  Stable  Pain Score: 0    Pain management: adequate       Mental Status:  Alert and awake   Hydration Status:  Stable   PONV Controlled:  None   Airway Patency:  Patent and adequate     Post Op Vitals Reviewed: Yes    No anethesia notable event occurred.    Staff: Anesthesiologist, CRNA           Last Filed PACU Vitals:  Vitals Value Taken Time   Temp 98.5 °F (36.9 °C) 01/07/25 1238   Pulse 75 01/07/25 1258   /66 01/07/25 1258   Resp 16 01/07/25 1258   SpO2 94 % 01/07/25 1258       Modified Hailey:     Vitals Value Taken Time   Activity 2 01/07/25 1258   Respiration 2 01/07/25 1258   Circulation 2 01/07/25 1258   Consciousness 2 01/07/25 1258   Oxygen Saturation 2 01/07/25 1258     Modified Hailey Score: 10

## 2025-01-07 NOTE — PROGRESS NOTES
Pastoral Care Progress Note             01/07/25 0800   Clinical Encounter Type   Visited With Patient   Crisis Visit   (Rapid response)      arrived to rapid response with no family present. Med staff at bedside. Future availability provided for pt needs.

## 2025-01-07 NOTE — PLAN OF CARE
Problem: PAIN - ADULT  Goal: Verbalizes/displays adequate comfort level or baseline comfort level  Description: Interventions:  - Encourage patient to monitor pain and request assistance  - Assess pain using appropriate pain scale  - Administer analgesics based on type and severity of pain and evaluate response  - Implement non-pharmacological measures as appropriate and evaluate response  - Consider cultural and social influences on pain and pain management  - Notify physician/advanced practitioner if interventions unsuccessful or patient reports new pain  Outcome: Progressing     Problem: INFECTION - ADULT  Goal: Absence or prevention of progression during hospitalization  Description: INTERVENTIONS:  - Assess and monitor for signs and symptoms of infection  - Monitor lab/diagnostic results  - Monitor all insertion sites, i.e. indwelling lines, tubes, and drains  - Monitor endotracheal if appropriate and nasal secretions for changes in amount and color  - Hartman appropriate cooling/warming therapies per order  - Administer medications as ordered  - Instruct and encourage patient and family to use good hand hygiene technique  - Identify and instruct in appropriate isolation precautions for identified infection/condition  Outcome: Progressing  Goal: Absence of fever/infection during neutropenic period  Description: INTERVENTIONS:  - Monitor WBC    Outcome: Progressing     Problem: SAFETY ADULT  Goal: Patient will remain free of falls  Description: INTERVENTIONS:  - Educate patient/family on patient safety including physical limitations  - Instruct patient to call for assistance with activity   - Consult OT/PT to assist with strengthening/mobility   - Keep Call bell within reach  - Keep bed low and locked with side rails adjusted as appropriate  - Keep care items and personal belongings within reach  - Initiate and maintain comfort rounds  - Make Fall Risk Sign visible to staff  - Offer Toileting every 2 Hours,  in advance of need  - Initiate/Maintain alarm  - Obtain necessary fall risk management equipment:   - Apply yellow socks and bracelet for high fall risk patients  - Consider moving patient to room near nurses station  Outcome: Progressing  Goal: Maintain or return to baseline ADL function  Description: INTERVENTIONS:  -  Assess patient's ability to carry out ADLs; assess patient's baseline for ADL function and identify physical deficits which impact ability to perform ADLs (bathing, care of mouth/teeth, toileting, grooming, dressing, etc.)  - Assess/evaluate cause of self-care deficits   - Assess range of motion  - Assess patient's mobility; develop plan if impaired  - Assess patient's need for assistive devices and provide as appropriate  - Encourage maximum independence but intervene and supervise when necessary  - Involve family in performance of ADLs  - Assess for home care needs following discharge   - Consider OT consult to assist with ADL evaluation and planning for discharge  - Provide patient education as appropriate  Outcome: Progressing  Goal: Maintains/Returns to pre admission functional level  Description: INTERVENTIONS:  - Perform AM-PAC 6 Click Basic Mobility/ Daily Activity assessment daily.  - Set and communicate daily mobility goal to care team and patient/family/caregiver.   - Collaborate with rehabilitation services on mobility goals if consulted  - Perform Range of Motion 3 times a day.  - Reposition patient every 2 hours.  - Dangle patient 3 times a day  - Stand patient 3 times a day  - Ambulate patient 3 times a day  - Out of bed to chair 3 times a day   - Out of bed for meals 3 times a day  - Out of bed for toileting  - Record patient progress and toleration of activity level   Outcome: Progressing     Problem: DISCHARGE PLANNING  Goal: Discharge to home or other facility with appropriate resources  Description: INTERVENTIONS:  - Identify barriers to discharge w/patient and caregiver  -  Arrange for needed discharge resources and transportation as appropriate  - Identify discharge learning needs (meds, wound care, etc.)  - Arrange for interpretive services to assist at discharge as needed  - Refer to Case Management Department for coordinating discharge planning if the patient needs post-hospital services based on physician/advanced practitioner order or complex needs related to functional status, cognitive ability, or social support system  Outcome: Progressing     Problem: Knowledge Deficit  Goal: Patient/family/caregiver demonstrates understanding of disease process, treatment plan, medications, and discharge instructions  Description: Complete learning assessment and assess knowledge base.  Interventions:  - Provide teaching at level of understanding  - Provide teaching via preferred learning methods  Outcome: Progressing     Problem: GASTROINTESTINAL - ADULT  Goal: Minimal or absence of nausea and/or vomiting  Description: INTERVENTIONS:  - Administer IV fluids if ordered to ensure adequate hydration  - Maintain NPO status until nausea and vomiting are resolved  - Nasogastric tube if ordered  - Administer ordered antiemetic medications as needed  - Provide nonpharmacologic comfort measures as appropriate  - Advance diet as tolerated, if ordered  - Consider nutrition services referral to assist patient with adequate nutrition and appropriate food choices  Outcome: Progressing  Goal: Maintains or returns to baseline bowel function  Description: INTERVENTIONS:  - Assess bowel function  - Encourage oral fluids to ensure adequate hydration  - Administer IV fluids if ordered to ensure adequate hydration  - Administer ordered medications as needed  - Encourage mobilization and activity  - Consider nutritional services referral to assist patient with adequate nutrition and appropriate food choices  Outcome: Progressing  Goal: Maintains adequate nutritional intake  Description: INTERVENTIONS:  - Monitor  percentage of each meal consumed  - Identify factors contributing to decreased intake, treat as appropriate  - Assist with meals as needed  - Monitor I&O, weight, and lab values if indicated  - Obtain nutrition services referral as needed  Outcome: Progressing  Goal: Establish and maintain optimal ostomy function  Description: INTERVENTIONS:  - Assess bowel function  - Encourage oral fluids to ensure adequate hydration  - Administer IV fluids if ordered to ensure adequate hydration   - Administer ordered medications as needed  - Encourage mobilization and activity  - Nutrition services referral to assist patient with appropriate food choices  - Assess stoma site  - Consider wound care consult   Outcome: Progressing  Goal: Oral mucous membranes remain intact  Description: INTERVENTIONS  - Assess oral mucosa and hygiene practices  - Implement preventative oral hygiene regimen  - Implement oral medicated treatments as ordered  - Initiate Nutrition services referral as needed  Outcome: Progressing

## 2025-01-07 NOTE — PLAN OF CARE
Problem: PAIN - ADULT  Goal: Verbalizes/displays adequate comfort level or baseline comfort level  Description: Interventions:  - Encourage patient to monitor pain and request assistance  - Assess pain using appropriate pain scale  - Administer analgesics based on type and severity of pain and evaluate response  - Implement non-pharmacological measures as appropriate and evaluate response  - Consider cultural and social influences on pain and pain management  - Notify physician/advanced practitioner if interventions unsuccessful or patient reports new pain  Outcome: Progressing     Problem: INFECTION - ADULT  Goal: Absence or prevention of progression during hospitalization  Description: INTERVENTIONS:  - Assess and monitor for signs and symptoms of infection  - Monitor lab/diagnostic results  - Monitor all insertion sites, i.e. indwelling lines, tubes, and drains  - Monitor endotracheal if appropriate and nasal secretions for changes in amount and color  - [place] appropriate cooling/warming therapies per order  - Administer medications as ordered  - Instruct and encourage patient and family to use good hand hygiene technique  - Identify and instruct in appropriate isolation precautions for identified infection/condition  Outcome: Progressing  Goal: Absence of fever/infection during neutropenic period  Description: INTERVENTIONS:  - Monitor WBC    Outcome: Progressing     Problem: SAFETY ADULT  Goal: Patient will remain free of falls  Description: INTERVENTIONS:  - Educate patient/family on patient safety including physical limitations  - Instruct patient to call for assistance with activity   - Consult OT/PT to assist with strengthening/mobility   - Keep Call bell within reach  - Keep bed low and locked with side rails adjusted as appropriate  - Keep care items and personal belongings within reach  - Initiate and maintain comfort rounds  - Make Fall Risk Sign visible to staff  - Offer Toileting every  Hours,  in advance of need  - Initiate/Maintain alarm  - Obtain necessary fall risk management equipment:   - Apply yellow socks and bracelet for high fall risk patients  - Consider moving patient to room near nurses station  Outcome: Progressing  Goal: Maintain or return to baseline ADL function  Description: INTERVENTIONS:  -  Assess patient's ability to carry out ADLs; assess patient's baseline for ADL function and identify physical deficits which impact ability to perform ADLs (bathing, care of mouth/teeth, toileting, grooming, dressing, etc.)  - Assess/evaluate cause of self-care deficits   - Assess range of motion  - Assess patient's mobility; develop plan if impaired  - Assess patient's need for assistive devices and provide as appropriate  - Encourage maximum independence but intervene and supervise when necessary  - Involve family in performance of ADLs  - Assess for home care needs following discharge   - Consider OT consult to assist with ADL evaluation and planning for discharge  - Provide patient education as appropriate  Outcome: Progressing  Goal: Maintains/Returns to pre admission functional level  Description: INTERVENTIONS:  - Perform AM-PAC 6 Click Basic Mobility/ Daily Activity assessment daily.  - Set and communicate daily mobility goal to care team and patient/family/caregiver.   - Collaborate with rehabilitation services on mobility goals if consulted  - Perform Range of Motion  times a day.  - Reposition patient every  hours.  - Dangle patient  times a day  - Stand patient  times a day  - Ambulate patient  times a day  - Out of bed to chair  times a day   - Out of bed for meals times a day  - Out of bed for toileting  - Record patient progress and toleration of activity level   Outcome: Progressing     Problem: DISCHARGE PLANNING  Goal: Discharge to home or other facility with appropriate resources  Description: INTERVENTIONS:  - Identify barriers to discharge w/patient and caregiver  - Arrange for  needed discharge resources and transportation as appropriate  - Identify discharge learning needs (meds, wound care, etc.)  - Arrange for interpretive services to assist at discharge as needed  - Refer to Case Management Department for coordinating discharge planning if the patient needs post-hospital services based on physician/advanced practitioner order or complex needs related to functional status, cognitive ability, or social support system  Outcome: Progressing     Problem: Knowledge Deficit  Goal: Patient/family/caregiver demonstrates understanding of disease process, treatment plan, medications, and discharge instructions  Description: Complete learning assessment and assess knowledge base.  Interventions:  - Provide teaching at level of understanding  - Provide teaching via preferred learning methods  Outcome: Progressing     Problem: GASTROINTESTINAL - ADULT  Goal: Minimal or absence of nausea and/or vomiting  Description: INTERVENTIONS:  - Administer IV fluids if ordered to ensure adequate hydration  - Maintain NPO status until nausea and vomiting are resolved  - Nasogastric tube if ordered  - Administer ordered antiemetic medications as needed  - Provide nonpharmacologic comfort measures as appropriate  - Advance diet as tolerated, if ordered  - Consider nutrition services referral to assist patient with adequate nutrition and appropriate food choices  Outcome: Progressing  Goal: Maintains or returns to baseline bowel function  Description: INTERVENTIONS:  - Assess bowel function  - Encourage oral fluids to ensure adequate hydration  - Administer IV fluids if ordered to ensure adequate hydration  - Administer ordered medications as needed  - Encourage mobilization and activity  - Consider nutritional services referral to assist patient with adequate nutrition and appropriate food choices  Outcome: Progressing  Goal: Maintains adequate nutritional intake  Description: INTERVENTIONS:  - Monitor percentage  of each meal consumed  - Identify factors contributing to decreased intake, treat as appropriate  - Assist with meals as needed  - Monitor I&O, weight, and lab values if indicated  - Obtain nutrition services referral as needed  Outcome: Progressing  Goal: Establish and maintain optimal ostomy function  Description: INTERVENTIONS:  - Assess bowel function  - Encourage oral fluids to ensure adequate hydration  - Administer IV fluids if ordered to ensure adequate hydration   - Administer ordered medications as needed  - Encourage mobilization and activity  - Nutrition services referral to assist patient with appropriate food choices  - Assess stoma site  - Consider wound care consult   Outcome: Progressing  Goal: Oral mucous membranes remain intact  Description: INTERVENTIONS  - Assess oral mucosa and hygiene practices  - Implement preventative oral hygiene regimen  - Implement oral medicated treatments as ordered  - Initiate Nutrition services referral as needed  Outcome: Progressing

## 2025-01-07 NOTE — ASSESSMENT & PLAN NOTE
Recent Labs     01/06/25  1256 01/07/25  0528   HGB 9.0* 7.0*     Baseline Hgb 13-14   H&H q 8 hrs   Likely 2/2 GI bleed  Transfuse w/ Hgb < 7   Course of IV Venofer

## 2025-01-08 ENCOUNTER — TRANSITIONAL CARE MANAGEMENT (OUTPATIENT)
Age: 75
End: 2025-01-08

## 2025-01-08 ENCOUNTER — APPOINTMENT (OUTPATIENT)
Dept: PHYSICAL THERAPY | Facility: CLINIC | Age: 75
End: 2025-01-08
Payer: MEDICARE

## 2025-01-08 VITALS
BODY MASS INDEX: 23.03 KG/M2 | SYSTOLIC BLOOD PRESSURE: 114 MMHG | OXYGEN SATURATION: 98 % | WEIGHT: 122 LBS | HEART RATE: 85 BPM | HEIGHT: 61 IN | TEMPERATURE: 98.2 F | DIASTOLIC BLOOD PRESSURE: 63 MMHG | RESPIRATION RATE: 18 BRPM

## 2025-01-08 DIAGNOSIS — K26.9 DUODENAL ULCER: ICD-10-CM

## 2025-01-08 LAB
ABO GROUP BLD BPU: NORMAL
BPU ID: NORMAL
CARDIAC TROPONIN I PNL SERPL HS: 5 NG/L (ref ?–50)
CROSSMATCH: NORMAL
HGB BLD-MCNC: 7.4 G/DL (ref 11.5–15.4)
HGB BLD-MCNC: 8 G/DL (ref 11.5–15.4)
UNIT DISPENSE STATUS: NORMAL
UNIT PRODUCT CODE: NORMAL
UNIT PRODUCT VOLUME: 300 ML
UNIT RH: NORMAL

## 2025-01-08 PROCEDURE — 84484 ASSAY OF TROPONIN QUANT: CPT | Performed by: PHYSICIAN ASSISTANT

## 2025-01-08 PROCEDURE — 99239 HOSP IP/OBS DSCHRG MGMT >30: CPT | Performed by: INTERNAL MEDICINE

## 2025-01-08 PROCEDURE — 99232 SBSQ HOSP IP/OBS MODERATE 35: CPT | Performed by: PHYSICIAN ASSISTANT

## 2025-01-08 PROCEDURE — 85018 HEMOGLOBIN: CPT | Performed by: INTERNAL MEDICINE

## 2025-01-08 RX ORDER — PANTOPRAZOLE SODIUM 40 MG/1
40 TABLET, DELAYED RELEASE ORAL
Qty: 60 TABLET | Refills: 0 | Status: SHIPPED | OUTPATIENT
Start: 2025-01-08 | End: 2025-01-08 | Stop reason: SDUPTHER

## 2025-01-08 RX ORDER — PANTOPRAZOLE SODIUM 40 MG/1
40 TABLET, DELAYED RELEASE ORAL
Qty: 60 TABLET | Refills: 3 | Status: SHIPPED | OUTPATIENT
Start: 2025-01-08 | End: 2025-01-08 | Stop reason: SDUPTHER

## 2025-01-08 RX ORDER — ACETAMINOPHEN 325 MG/1
975 TABLET ORAL EVERY 6 HOURS PRN
Status: DISCONTINUED | OUTPATIENT
Start: 2025-01-08 | End: 2025-01-08 | Stop reason: HOSPADM

## 2025-01-08 RX ORDER — PANTOPRAZOLE SODIUM 40 MG/1
40 TABLET, DELAYED RELEASE ORAL
Status: DISCONTINUED | OUTPATIENT
Start: 2025-01-08 | End: 2025-01-08 | Stop reason: HOSPADM

## 2025-01-08 RX ORDER — PANTOPRAZOLE SODIUM 40 MG/1
40 TABLET, DELAYED RELEASE ORAL
Qty: 60 TABLET | Refills: 0 | Status: SHIPPED | OUTPATIENT
Start: 2025-01-08 | End: 2025-02-21

## 2025-01-08 RX ADMIN — ACETAMINOPHEN 975 MG: 325 TABLET ORAL at 09:24

## 2025-01-08 RX ADMIN — OXYCODONE HYDROCHLORIDE AND ACETAMINOPHEN 1000 MG: 500 TABLET ORAL at 09:02

## 2025-01-08 RX ADMIN — IRON SUCROSE 300 MG: 20 INJECTION, SOLUTION INTRAVENOUS at 09:03

## 2025-01-08 RX ADMIN — DEXTRAN 70, GLYCERIN, HYPROMELLOSE 1 DROP: 1; 2; 3 SOLUTION/ DROPS OPHTHALMIC at 09:04

## 2025-01-08 RX ADMIN — CYANOCOBALAMIN TAB 500 MCG 1000 MCG: 500 TAB at 09:02

## 2025-01-08 RX ADMIN — SODIUM CHLORIDE 100 ML/HR: 0.9 INJECTION, SOLUTION INTRAVENOUS at 09:13

## 2025-01-08 RX ADMIN — SODIUM CHLORIDE 8 MG/HR: 9 INJECTION, SOLUTION INTRAVENOUS at 00:58

## 2025-01-08 RX ADMIN — PANTOPRAZOLE SODIUM 40 MG: 40 TABLET, DELAYED RELEASE ORAL at 09:02

## 2025-01-08 RX ADMIN — Medication 2 TABLET: at 09:02

## 2025-01-08 NOTE — PLAN OF CARE
Problem: PAIN - ADULT  Goal: Verbalizes/displays adequate comfort level or baseline comfort level  Description: Interventions:  - Encourage patient to monitor pain and request assistance  - Assess pain using appropriate pain scale  - Administer analgesics based on type and severity of pain and evaluate response  - Implement non-pharmacological measures as appropriate and evaluate response  - Consider cultural and social influences on pain and pain management  - Notify physician/advanced practitioner if interventions unsuccessful or patient reports new pain  Outcome: Progressing     Problem: INFECTION - ADULT  Goal: Absence or prevention of progression during hospitalization  Description: INTERVENTIONS:  - Assess and monitor for signs and symptoms of infection  - Monitor lab/diagnostic results  - Monitor all insertion sites, i.e. indwelling lines, tubes, and drains  - Monitor endotracheal if appropriate and nasal secretions for changes in amount and color  - [place] appropriate cooling/warming therapies per order  - Administer medications as ordered  - Instruct and encourage patient and family to use good hand hygiene technique  - Identify and instruct in appropriate isolation precautions for identified infection/condition  Outcome: Progressing  Goal: Absence of fever/infection during neutropenic period  Description: INTERVENTIONS:  - Monitor WBC    Outcome: Progressing     Problem: SAFETY ADULT  Goal: Patient will remain free of falls  Description: INTERVENTIONS:  - Educate patient/family on patient safety including physical limitations  - Instruct patient to call for assistance with activity   - Consult OT/PT to assist with strengthening/mobility   - Keep Call bell within reach  - Keep bed low and locked with side rails adjusted as appropriate  - Keep care items and personal belongings within reach  - Initiate and maintain comfort rounds  - Make Fall Risk Sign visible to staff  - Offer Toileting every 2 Hours,  in advance of need  - Initiate/Maintain bed alarm  - Obtain necessary fall risk management equipment:   - Apply yellow socks and bracelet for high fall risk patients  - Consider moving patient to room near nurses station  Outcome: Progressing  Goal: Maintain or return to baseline ADL function  Description: INTERVENTIONS:  -  Assess patient's ability to carry out ADLs; assess patient's baseline for ADL function and identify physical deficits which impact ability to perform ADLs (bathing, care of mouth/teeth, toileting, grooming, dressing, etc.)  - Assess/evaluate cause of self-care deficits   - Assess range of motion  - Assess patient's mobility; develop plan if impaired  - Assess patient's need for assistive devices and provide as appropriate  - Encourage maximum independence but intervene and supervise when necessary  - Involve family in performance of ADLs  - Assess for home care needs following discharge   - Consider OT consult to assist with ADL evaluation and planning for discharge  - Provide patient education as appropriate  Outcome: Progressing  Goal: Maintains/Returns to pre admission functional level  Description: INTERVENTIONS:  - Perform AM-PAC 6 Click Basic Mobility/ Daily Activity assessment daily.  - Set and communicate daily mobility goal to care team and patient/family/caregiver.   - Collaborate with rehabilitation services on mobility goals if consulted  - Perform Range of Motion 3 times a day.  - Reposition patient every 2 hours.  - Dangle patient 3 times a day  - Stand patient 3 times a day  - Ambulate patient 3 times a day  - Out of bed to chair 3 times a day   - Out of bed for meals 3 times a day  - Out of bed for toileting  - Record patient progress and toleration of activity level   Outcome: Progressing     Problem: DISCHARGE PLANNING  Goal: Discharge to home or other facility with appropriate resources  Description: INTERVENTIONS:  - Identify barriers to discharge w/patient and caregiver  -  Arrange for needed discharge resources and transportation as appropriate  - Identify discharge learning needs (meds, wound care, etc.)  - Arrange for interpretive services to assist at discharge as needed  - Refer to Case Management Department for coordinating discharge planning if the patient needs post-hospital services based on physician/advanced practitioner order or complex needs related to functional status, cognitive ability, or social support system  Outcome: Progressing     Problem: Knowledge Deficit  Goal: Patient/family/caregiver demonstrates understanding of disease process, treatment plan, medications, and discharge instructions  Description: Complete learning assessment and assess knowledge base.  Interventions:  - Provide teaching at level of understanding  - Provide teaching via preferred learning methods  Outcome: Progressing     Problem: GASTROINTESTINAL - ADULT  Goal: Minimal or absence of nausea and/or vomiting  Description: INTERVENTIONS:  - Administer IV fluids if ordered to ensure adequate hydration  - Maintain NPO status until nausea and vomiting are resolved  - Nasogastric tube if ordered  - Administer ordered antiemetic medications as needed  - Provide nonpharmacologic comfort measures as appropriate  - Advance diet as tolerated, if ordered  - Consider nutrition services referral to assist patient with adequate nutrition and appropriate food choices  Outcome: Progressing  Goal: Maintains or returns to baseline bowel function  Description: INTERVENTIONS:  - Assess bowel function  - Encourage oral fluids to ensure adequate hydration  - Administer IV fluids if ordered to ensure adequate hydration  - Administer ordered medications as needed  - Encourage mobilization and activity  - Consider nutritional services referral to assist patient with adequate nutrition and appropriate food choices  Outcome: Progressing  Goal: Maintains adequate nutritional intake  Description: INTERVENTIONS:  - Monitor  percentage of each meal consumed  - Identify factors contributing to decreased intake, treat as appropriate  - Assist with meals as needed  - Monitor I&O, weight, and lab values if indicated  - Obtain nutrition services referral as needed  Outcome: Progressing  Goal: Establish and maintain optimal ostomy function  Description: INTERVENTIONS:  - Assess bowel function  - Encourage oral fluids to ensure adequate hydration  - Administer IV fluids if ordered to ensure adequate hydration   - Administer ordered medications as needed  - Encourage mobilization and activity  - Nutrition services referral to assist patient with appropriate food choices  - Assess stoma site  - Consider wound care consult   Outcome: Progressing  Goal: Oral mucous membranes remain intact  Description: INTERVENTIONS  - Assess oral mucosa and hygiene practices  - Implement preventative oral hygiene regimen  - Implement oral medicated treatments as ordered  - Initiate Nutrition services referral as needed  Outcome: Progressing

## 2025-01-08 NOTE — PROGRESS NOTES
Progress Note - Gastroenterology   Name: Rosario Morales 74 y.o. female I MRN: [id number]  Unit/Bed#: 2 E 257-01 I Date of Admission: 1/6/2025   Date of Service: 1/8/2025 I Hospital Day: 2    Assessment & Plan  Acute blood loss anemia  - Pt presented on 1/6 with melena and drop in Hb from 13 to 9 as well as a syncopal episode  - EGD 1/7 showed a single ulcer in the duodenum with oozing hemorrhage s/p clips and epi  - Hb stable at 8.0 today, no further overt bleeding  - Continue protonix 40 mg BID  - Stop NSAIDs  - Will arrange repeat EGD in 8 weeks to assess healing and to take biopsies  -Discussed case with primary team  -Patient agrees with plan  Duodenal ulcer  - EGD as above showed duodenal ulcer with oozing hemorrhage s/p clips and epi to achieve hemostasis    The patient is stable for discharge from a GI Standpoint.      Subjective She is feeling well today and will get discharged, discussed her NSAID use prior to admission.    Objective :  Temp:  [98.2 °F (36.8 °C)-99.6 °F (37.6 °C)] 98.2 °F (36.8 °C)  HR:  [66-85] 85  BP: ()/(44-63) 114/63  Resp:  [16-18] 18  SpO2:  [94 %-98 %] 98 %  O2 Device: None (Room air)    Physical Exam  General- No acute distress, no pallor, appears well      Lab Results: I have reviewed the following results:CBC/BMP:   .     01/08/25  1014   HGB 8.0*    , Creatinine Clearance: Estimated Creatinine Clearance: 59.1 mL/min (by C-G formula based on SCr of 0.64 mg/dL)., LFTs: No new results in last 24 hours.     Imaging Results Review: No pertinent imaging studies reviewed.  Other Study Results Review: No additional pertinent studies reviewed.

## 2025-01-08 NOTE — DISCHARGE SUMMARY
Discharge Summary - Rosario Morales 74 y.o. female MRN: [id number]  Unit/Bed#: 2 E 257-01 Encounter: [id number]    Admission Date:    1/6/2025   Discharge Date:   01/08/25   Admitting Diagnosis:   Anemia [D64.9]  Exertional dyspnea [R06.09]  UGIB (upper gastrointestinal bleed) [K92.2]  Admitting Provider:   Rosana Mejia MD  Discharge Provider:   Mike Rosario MD     Primary Care Physician at Discharge:   Mike Rosario MD,[phone]    HPI: From history and physical by Lesia Flores PA-C  Chief Complaint: Syncope and dark-colored stools     Rosario Morales is a 74 y.o. female with a PMH of peptic ulcer disease who presents with syncope and dark-colored stools. Pt presented from PCP's office w/ c/o onset of dark, tarry stools since 01/04. She is w/ associated syncopal episode and MOHAN (uncharacteristic for baseline).  Patient notes severe shortness of breath while ambulating up stairs and while vacuuming in her home.  She admits to use Motrin 400 mg BID post shoulder surgery since 08/2024. She has a h/o UGIB in the past but notes she was never instructed to use maintenance H2 or PPI therapy.  She only uses Pepcid intermittently/PRN. EGD (11/29/21) w/ schatzki's ring, hiatal hernia, antritis, and a duodenal bulb ulcer.     Pt endorses syncopal episode on 01/04; pt admits to getting hot and nauseous while sitting/playing a board game. Pt got up to ambulate to the restroom and was found by her daughter on floor of bathroom where she had passed out and thrown up. LOC duration a few seconds.  She denies sage chest pain, diaphoresis, MOHAN prior to 01/04 abdominal pain, nausea, vomiting, dysuria.    Procedures Performed:   EGD: 1/7/2025    Hospital Course:   Acute upper GI bleeding  PMH of PUD who presents from PCP's office w/ c/o onset of dark, tarry stools since 01/04; associated syncopal episode and MOHAN (uncharacteristic for baseline)  Admits to use Motrin 400 mg BID post shoulder surgery since 08/2024   H/o UGIB in  the past; uses Pepcid intermittently/PRN  EGD (11/29/21) w/ schatzki's ring, hiatal hernia, antritis, and a duodenal bulb ulcer  Consult GI  Clear liquid diet; n.p.o. at midnight  Will need repeat EGD  C/W PPI infusion  Baseline Hgb 13-14; Hgb 9 on admit; trend H&H q 8 hrs   Benign abdominal exam; with clinical instability/acute pain can consider high-volume CT bleeding scan   IV Venofer given acute blood loss anemia and GI bleed  Transfuse for hemoglobin 7.0 on 1/7  Discharge hemoglobin 8.0  Follow-up CBC next week  Syncope  Pt endorses syncopal episode on 01/04; pt admits getting hot and nauseous while sitting/playing a board game; daughter found patient on floor of bathroom where she had passed out and thrown up.; LOC duration a few seconds.  Suspect vasovagal in etiology 2/2 symptomatic anemia in setting of acute upper GI bleed  Nonfocal neuroexam  Troponin benign, trend; ECG w/o ischemia  C/w telemetry  S/p normal stress echo in 2022  Recurrent vasovagal syncope early a.m. 1/7  Will check echo to complete workup though my suspicion for any acute findings is low  Duodenal ulcer  H/o GI bleed   EGD (11/29/21) w/ schatzki's ring, hiatal hernia, antritis, and a duodenal bulb ulcer  Patient denies pain instructed to take H2 blocker or PPI daily after this incident  EGD 1/7/2025 with duodenal ulcer as below  No NSAIDs moving forward  Pantoprazole 40 mg twice daily  Follow-up EGD in 2 months per GI  Acute blood loss anemia       Recent Labs     01/06/25  1256 01/07/25  0528   HGB 9.0* 7.0*      Baseline Hgb 13-14   H&H q 8 hrs   Likely 2/2 GI bleed  Transfuse w/ Hgb < 7   Patient received 2X 300 mg Venofer infusion  Follow-up CBC and iron panel      Current Facility-Administered Medications:     acetaminophen (TYLENOL) tablet 975 mg, 975 mg, Oral, Q6H PRN, Mike Rosario MD, 975 mg at 01/08/25 0924    Artificial Tears Op Soln 1 drop, 1 drop, Both Eyes, BID, Lesia Flores PA-C, 1 drop at 01/08/25 0904    ascorbic acid  (VITAMIN C) tablet 1,000 mg, 1,000 mg, Oral, Daily, Lesia Flores PA-C, 1,000 mg at 01/08/25 0902    calcium carbonate (TUMS) chewable tablet 1,000 mg, 1,000 mg, Oral, Daily PRN, Lesia Flores PA-C    calcium carbonate-vitamin D 500 mg-5 mcg tablet 2 tablet, 2 tablet, Oral, Daily, Lesia Flores PA-C, 2 tablet at 01/08/25 0902    cyanocobalamin (VITAMIN B-12) tablet 1,000 mcg, 1,000 mcg, Oral, Daily, Lesia Flores PA-C, 1,000 mcg at 01/08/25 0902    iron sucrose (VENOFER) 300 mg in sodium chloride 0.9 % 250 mL IVPB, 300 mg, Intravenous, Daily, Mike Rosario MD, Last Rate: 166.7 mL/hr at 01/08/25 0903, 300 mg at 01/08/25 0903    ondansetron (ZOFRAN) injection 4 mg, 4 mg, Intravenous, Q6H PRN, Lesia Flores PA-C, 4 mg at 01/07/25 0727    pantoprazole (PROTONIX) EC tablet 40 mg, 40 mg, Oral, BID AC, Mike Rosario MD, 40 mg at 01/08/25 0902    sodium chloride 0.9 % infusion, 100 mL/hr, Intravenous, Continuous, Lesia Flores PA-C, Last Rate: 100 mL/hr at 01/08/25 0913, 100 mL/hr at 01/08/25 0913      Consulting Providers   GI    Significant Findings, Care, Treatment and Services Provided:   EGD:  IMPRESSION:  Schatzki ring in the Z-line  Mild edematous mucosa in the antrum  Single ulcer in the 1st part of the duodenum with oozing hemorrhage (Edwin IB); placed clips; injected epinephrine to address bleeding         Echocardiogram:      Left Ventricle: Left ventricular cavity size is normal. Wall thickness is normal. The left ventricular ejection fraction is 60%. Systolic function is normal. Wall motion is normal. Diastolic function is normal.    No significant change since prior echo 2/18/2022.      Complications:  None  Physical Exam:  General Appearance:    Alert, cooperative, no distress   Head:    Normocephalic, without obvious abnormality, atraumatic   Eyes:    PERRL, conjunctiva/corneas clear, EOM's intact       Nose:   Moist mucous membranes, no drainage or sinus tenderness   Throat:   No tenderness, no  exudates   Neck:   Supple, symmetrical, trachea midline, no JVD   Lungs:     Clear to auscultation bilaterally, respirations unlabored   Heart:    Regular rate and rhythm, S1 and S2 normal, no murmur, rub   or gallop   Abdomen: Soft, non-tender, positive bowel sounds, no masses, no organomegaly   Extremities:  No pedal edema, calf tenderness. Distal pulses palpable.   Neurologic:     CNII-XII intact.    Labs:   Lab Results   Component Value Date    WBC 5.40 01/07/2025    WBC 6.3 06/05/2015    RBC 2.27 (L) 01/07/2025    RBC 4.46 06/05/2015    HGB 8.0 (L) 01/08/2025    HGB 13.5 06/05/2015    HCT 21.1 (L) 01/07/2025    HCT 40.2 06/05/2015    MCV 93 01/07/2025    MCV 90 06/05/2015    MCH 30.8 01/07/2025    MCH 30.2 06/05/2015    RDW 12.3 01/07/2025    RDW 12.4 06/05/2015     01/07/2025     06/05/2015     Lab Results   Component Value Date    CREATININE 0.64 01/07/2025    CREATININE 0.7 06/05/2015    BUN 24 01/07/2025    BUN 12 06/05/2015     06/05/2015    K 4.0 01/07/2025    K 4.1 06/05/2015     (H) 01/07/2025     06/05/2015    CO2 23 01/07/2025    CO2 30.7 06/05/2015    GLUCOSE 81 06/05/2015    PROT 7.0 06/05/2015    ALKPHOS 51 01/06/2025    ALKPHOS 78 06/05/2015    ALT 12 01/06/2025    ALT 31 06/05/2015    AST 17 01/06/2025    AST 23 06/05/2015         Discharge Diagnosis:   Principal Problem:    Acute upper GI bleeding  Active Problems:    Syncope    Duodenal ulcer    Esophageal ring    Acute blood loss anemia  Resolved Problems:    * No resolved hospital problems. *      Condition at Discharge:   Good     Code Status: Level 1 - Full Code  Advance Directive and Living Will: <no information>  Power of :    POLST:      Discharge instructions/Information to patient and family:   See after visit summary for information provided to patient and family.      Provisions for Follow-Up Care:  See after visit summary for information related to follow-up care and any pertinent home health  orders.      Disposition:   Home    Planned Readmission:   No    Discharge Statement   I spent 32 minutes discharging the patient. This time was spent on the day of discharge. I had direct contact with the patient on the day of discharge. Additional documentation is required if more than 30 minutes were spent on discharge. Greater than 50% of the total time was spent examining patient, answering all patient questions, arranging and discussing plan of care with patient as well as directly providing post-discharge instructions. Additional time then spent on discharge activities.    Discharge Medications:  See after visit summary for reconciled discharge medications provided to patient and family.      Mike Rosario MD  1/8/2025,11:16 AM

## 2025-01-08 NOTE — ASSESSMENT & PLAN NOTE
- EGD as above showed duodenal ulcer with oozing hemorrhage s/p clips and epi to achieve hemostasis

## 2025-01-08 NOTE — CASE MANAGEMENT
Case Management Assessment & Discharge Planning Note    Patient name Rosario Morales  Location 2 [facility] 257/2 E 257-01 MRN [id number]  : 1950 Date 2025       Current Admission Date: 2025  Current Admission Diagnosis:Acute upper GI bleeding   Patient Active Problem List    Diagnosis Date Noted Date Diagnosed    Acute blood loss anemia 2025     Right shoulder pain, unspecified chronicity 2024     Closed dislocation of right shoulder, sequela 2024     Closed nondisplaced fracture of greater tuberosity of right humerus, initial encounter 2024     Dyslipidemia 12/15/2022     Duodenal ulcer 2021     Esophageal ring 2021     Acute upper GI bleeding 2021     Syncope 2021     Memory loss      Other idiopathic scoliosis, thoracolumbar region 2020     Other osteoporosis without current pathological fracture 2019     Asymptomatic postmenopausal status 2019     Other constipation 2019     Postmenopausal 2018     Midline cystocele 2016     Rectocele 2016     Uterine prolapse 2016     Osteopenia 2016       LOS (days): 2  Geometric Mean LOS (GMLOS) (days): 2.9  Days to GMLOS:1     OBJECTIVE:    Risk of Unplanned Readmission Score: 10.26      Current admission status: Inpatient     Preferred Pharmacy:   [id number] - [place] - [address] [address]  Phone: [phone] Fax: [phone]    Primary Care Provider: Mike Rosario MD    Primary Insurance: MEDICARE  Secondary Insurance: COMMERCIAL MISCELLANEOUS    ASSESSMENT:  Active Health Care Proxies       Rodo Morales Health Care Representative - Spouse   Primary Phone: [phone] (Mobile)                 Advance Directives  Does patient have a Health Care POA?: No  Was patient offered paperwork?: Yes (declined)  Does patient currently have a Health Care decision maker?: Yes, please see Health Care Proxy  section  Does patient have Advance Directives?: No  Was patient offered paperwork?: Yes (declined)  Primary Contact: Rodo Morales (Spouse)   [phone] (M)    Readmission Root Cause  30 Day Readmission: No    Patient Information  Admitted from:: Home  Mental Status: Alert  During Assessment patient was accompanied by: Not accompanied during assessment  Assessment information provided by:: Patient  Primary Caregiver: Self  Support Systems: Self, Spouse/significant other  County of Residence: [place]  What city do you live in?: [place]  Home entry access options. Select all that apply.:  ([facility] 22 - patient reports no issues or concerns accessing or nagivating home environment.)  Living Arrangements: Lives w/ Spouse/significant other    Activities of Daily Living Prior to Admission  Functional Status: Independent  Completes ADLs independently?: Yes  Ambulates independently?: Yes  Does patient use assisted devices?: No  Does patient currently have HHC?: No    Patient Information Continued  Does patient have prescription coverage?: Yes  Does patient receive dialysis treatments?: No  Does patient have a history of substance abuse?: No  Does patient have a history of Mental Health Diagnosis?: No        DISCHARGE DETAILS:    Discharge planning discussed with:: Patient at bedside.  Freedom of Choice: Yes  Comments - Freedom of Choice: FOC maintained - CM introduced self and role.  Reviewed DCP.  Patient anticipated for d/c home today.  No d/c needs identified, patient endorses same.  Aware CM will remain available through to d/c should any needs arise.  CM contacted family/caregiver?: No- see comments (declined - patient in contact with spouse)  Were Treatment Team discharge recommendations reviewed with patient/caregiver?: Yes  Did patient/caregiver verbalize understanding of patient care needs?: Yes  Were patient/caregiver advised of the risks associated with not following Treatment Team discharge recommendations?:  Yes    Requested Home Health Care         Is the patient interested in HHC at discharge?: No    DME Referral Provided  Referral made for DME?: No    Other Referral/Resources/Interventions Provided:  Interventions: None Indicated    Treatment Team Recommendation: Home  Discharge Destination Plan:: Home  Transport at Discharge : Family    IMM Given (Date):: 01/08/25  IMM Given to:: Patient (Verbal review of IMM with patient at bedside.  Understanding verbalized, no questions or concerns.  Original to medical records.)

## 2025-01-08 NOTE — ASSESSMENT & PLAN NOTE
- Pt presented on 1/6 with melena and drop in Hb from 13 to 9 as well as a syncopal episode  - EGD 1/7 showed a single ulcer in the duodenum with oozing hemorrhage s/p clips and epi  - Hb stable at 8.0 today, no further overt bleeding  - Continue protonix 40 mg BID  - Stop NSAIDs  - Will arrange repeat EGD in 8 weeks to assess healing and to take biopsies  -Discussed case with primary team  -Patient agrees with plan

## 2025-01-08 NOTE — PLAN OF CARE
Problem: PAIN - ADULT  Goal: Verbalizes/displays adequate comfort level or baseline comfort level  Description: Interventions:  - Encourage patient to monitor pain and request assistance  - Assess pain using appropriate pain scale  - Administer analgesics based on type and severity of pain and evaluate response  - Implement non-pharmacological measures as appropriate and evaluate response  - Consider cultural and social influences on pain and pain management  - Notify physician/advanced practitioner if interventions unsuccessful or patient reports new pain  Outcome: Progressing     Problem: INFECTION - ADULT  Goal: Absence or prevention of progression during hospitalization  Description: INTERVENTIONS:  - Assess and monitor for signs and symptoms of infection  - Monitor lab/diagnostic results  - Monitor all insertion sites, i.e. indwelling lines, tubes, and drains  - Monitor endotracheal if appropriate and nasal secretions for changes in amount and color  - [place] appropriate cooling/warming therapies per order  - Administer medications as ordered  - Instruct and encourage patient and family to use good hand hygiene technique  - Identify and instruct in appropriate isolation precautions for identified infection/condition  Outcome: Progressing  Goal: Absence of fever/infection during neutropenic period  Description: INTERVENTIONS:  - Monitor WBC    Outcome: Progressing     Problem: SAFETY ADULT  Goal: Patient will remain free of falls  Description: INTERVENTIONS:  - Educate patient/family on patient safety including physical limitations  - Instruct patient to call for assistance with activity   - Consult OT/PT to assist with strengthening/mobility   - Keep Call bell within reach  - Keep bed low and locked with side rails adjusted as appropriate  - Keep care items and personal belongings within reach  - Initiate and maintain comfort rounds  - Make Fall Risk Sign visible to staff  - Offer Toileting every 2 Hours,  in advance of need  - Initiate/Maintain bed alarm  - Obtain necessary fall risk management equipment:   - Apply yellow socks and bracelet for high fall risk patients  - Consider moving patient to room near nurses station  Outcome: Progressing  Goal: Maintain or return to baseline ADL function  Description: INTERVENTIONS:  -  Assess patient's ability to carry out ADLs; assess patient's baseline for ADL function and identify physical deficits which impact ability to perform ADLs (bathing, care of mouth/teeth, toileting, grooming, dressing, etc.)  - Assess/evaluate cause of self-care deficits   - Assess range of motion  - Assess patient's mobility; develop plan if impaired  - Assess patient's need for assistive devices and provide as appropriate  - Encourage maximum independence but intervene and supervise when necessary  - Involve family in performance of ADLs  - Assess for home care needs following discharge   - Consider OT consult to assist with ADL evaluation and planning for discharge  - Provide patient education as appropriate  Outcome: Progressing  Goal: Maintains/Returns to pre admission functional level  Description: INTERVENTIONS:  - Perform AM-PAC 6 Click Basic Mobility/ Daily Activity assessment daily.  - Set and communicate daily mobility goal to care team and patient/family/caregiver.   - Collaborate with rehabilitation services on mobility goals if consulted  - Perform Range of Motion 3 times a day.  - Reposition patient every 2 hours.  - Dangle patient 3 times a day  - Stand patient 3 times a day  - Ambulate patient 3 times a day  - Out of bed to chair 3 times a day   - Out of bed for meals 3 times a day  - Out of bed for toileting  - Record patient progress and toleration of activity level   Outcome: Progressing     Problem: DISCHARGE PLANNING  Goal: Discharge to home or other facility with appropriate resources  Description: INTERVENTIONS:  - Identify barriers to discharge w/patient and caregiver  -  Arrange for needed discharge resources and transportation as appropriate  - Identify discharge learning needs (meds, wound care, etc.)  - Arrange for interpretive services to assist at discharge as needed  - Refer to Case Management Department for coordinating discharge planning if the patient needs post-hospital services based on physician/advanced practitioner order or complex needs related to functional status, cognitive ability, or social support system  Outcome: Progressing     Problem: Knowledge Deficit  Goal: Patient/family/caregiver demonstrates understanding of disease process, treatment plan, medications, and discharge instructions  Description: Complete learning assessment and assess knowledge base.  Interventions:  - Provide teaching at level of understanding  - Provide teaching via preferred learning methods  Outcome: Progressing     Problem: GASTROINTESTINAL - ADULT  Goal: Minimal or absence of nausea and/or vomiting  Description: INTERVENTIONS:  - Administer IV fluids if ordered to ensure adequate hydration  - Maintain NPO status until nausea and vomiting are resolved  - Nasogastric tube if ordered  - Administer ordered antiemetic medications as needed  - Provide nonpharmacologic comfort measures as appropriate  - Advance diet as tolerated, if ordered  - Consider nutrition services referral to assist patient with adequate nutrition and appropriate food choices  Outcome: Progressing  Goal: Maintains or returns to baseline bowel function  Description: INTERVENTIONS:  - Assess bowel function  - Encourage oral fluids to ensure adequate hydration  - Administer IV fluids if ordered to ensure adequate hydration  - Administer ordered medications as needed  - Encourage mobilization and activity  - Consider nutritional services referral to assist patient with adequate nutrition and appropriate food choices  Outcome: Progressing  Goal: Maintains adequate nutritional intake  Description: INTERVENTIONS:  - Monitor  percentage of each meal consumed  - Identify factors contributing to decreased intake, treat as appropriate  - Assist with meals as needed  - Monitor I&O, weight, and lab values if indicated  - Obtain nutrition services referral as needed  Outcome: Progressing  Goal: Establish and maintain optimal ostomy function  Description: INTERVENTIONS:  - Assess bowel function  - Encourage oral fluids to ensure adequate hydration  - Administer IV fluids if ordered to ensure adequate hydration   - Administer ordered medications as needed  - Encourage mobilization and activity  - Nutrition services referral to assist patient with appropriate food choices  - Assess stoma site  - Consider wound care consult   Outcome: Progressing  Goal: Oral mucous membranes remain intact  Description: INTERVENTIONS  - Assess oral mucosa and hygiene practices  - Implement preventative oral hygiene regimen  - Implement oral medicated treatments as ordered  - Initiate Nutrition services referral as needed  Outcome: Progressing

## 2025-01-09 ENCOUNTER — PREP FOR PROCEDURE (OUTPATIENT)
Dept: GASTROENTEROLOGY | Facility: CLINIC | Age: 75
End: 2025-01-09

## 2025-01-09 ENCOUNTER — TELEPHONE (OUTPATIENT)
Dept: GASTROENTEROLOGY | Facility: CLINIC | Age: 75
End: 2025-01-09

## 2025-01-09 DIAGNOSIS — K26.9 DUODENAL ULCER: Primary | ICD-10-CM

## 2025-01-09 NOTE — TELEPHONE ENCOUNTER
Rafi Ponce - reviewed prep instructions    Scheduled date of EGD(as of today):3/3/25  Physician performing EGD:Jonathon  Location of EGD:[place]  Instructions reviewed with patient by:Aren chavez  Clearances: none

## 2025-01-09 NOTE — TELEPHONE ENCOUNTER
----- Message from Eleni Borja PA-C sent at 1/8/2025  1:43 PM EST -----  This patient needs an EGD with Dr. Holt in 8 weeks for  follow up of a duodenal ulcer. Please call her to schedule this, thanks!!

## 2025-01-10 ENCOUNTER — HOSPITAL ENCOUNTER (OUTPATIENT)
Facility: HOSPITAL | Age: 75
Setting detail: OBSERVATION
Discharge: HOME | End: 2025-01-11
Attending: EMERGENCY MEDICINE | Admitting: STUDENT IN AN ORGANIZED HEALTH CARE EDUCATION/TRAINING PROGRAM
Payer: MEDICARE

## 2025-01-10 ENCOUNTER — APPOINTMENT (EMERGENCY)
Dept: VASCULAR ULTRASOUND | Facility: HOSPITAL | Age: 75
End: 2025-01-10
Payer: MEDICARE

## 2025-01-10 ENCOUNTER — APPOINTMENT (EMERGENCY)
Dept: CT IMAGING | Facility: HOSPITAL | Age: 75
End: 2025-01-10
Payer: MEDICARE

## 2025-01-10 ENCOUNTER — OFFICE VISIT (OUTPATIENT)
Age: 75
End: 2025-01-10
Payer: MEDICARE

## 2025-01-10 ENCOUNTER — NURSE TRIAGE (OUTPATIENT)
Dept: OTHER | Facility: OTHER | Age: 75
End: 2025-01-10

## 2025-01-10 VITALS
HEART RATE: 90 BPM | OXYGEN SATURATION: 98 % | DIASTOLIC BLOOD PRESSURE: 60 MMHG | WEIGHT: 122 LBS | BODY MASS INDEX: 23.03 KG/M2 | RESPIRATION RATE: 16 BRPM | HEIGHT: 61 IN | SYSTOLIC BLOOD PRESSURE: 110 MMHG

## 2025-01-10 DIAGNOSIS — D62 ACUTE BLOOD LOSS ANEMIA: ICD-10-CM

## 2025-01-10 DIAGNOSIS — I80.9 SUPERFICIAL THROMBOPHLEBITIS: Primary | ICD-10-CM

## 2025-01-10 DIAGNOSIS — R60.0 EDEMA OF LEFT UPPER EXTREMITY: ICD-10-CM

## 2025-01-10 DIAGNOSIS — R20.0 NUMBNESS AND TINGLING: ICD-10-CM

## 2025-01-10 DIAGNOSIS — R55 SYNCOPE, UNSPECIFIED SYNCOPE TYPE: ICD-10-CM

## 2025-01-10 DIAGNOSIS — R20.2 NUMBNESS AND TINGLING: ICD-10-CM

## 2025-01-10 DIAGNOSIS — K26.9 DUODENAL ULCER: ICD-10-CM

## 2025-01-10 DIAGNOSIS — R41.82 ALTERED MENTAL STATUS: ICD-10-CM

## 2025-01-10 DIAGNOSIS — K92.2 ACUTE UPPER GI BLEEDING: Primary | ICD-10-CM

## 2025-01-10 DIAGNOSIS — H53.9 VISUAL CHANGES: ICD-10-CM

## 2025-01-10 PROBLEM — D64.9 ANEMIA: Status: ACTIVE | Noted: 2025-01-10

## 2025-01-10 PROBLEM — I80.8 THROMBOPHLEBITIS ARM: Status: ACTIVE | Noted: 2025-01-10

## 2025-01-10 LAB
ALBUMIN SERPL BCG-MCNC: 3.9 G/DL (ref 3.5–5)
ALP SERPL-CCNC: 53 U/L (ref 34–104)
ALT SERPL W P-5'-P-CCNC: 11 U/L (ref 7–52)
ANION GAP SERPL CALCULATED.3IONS-SCNC: 7 MMOL/L (ref 4–13)
AST SERPL W P-5'-P-CCNC: 19 U/L (ref 13–39)
BASOPHILS # BLD AUTO: 0.04 THOUSANDS/ΜL (ref 0–0.1)
BASOPHILS NFR BLD AUTO: 0 % (ref 0–1)
BILIRUB SERPL-MCNC: 0.5 MG/DL (ref 0.2–1)
BUN SERPL-MCNC: 11 MG/DL (ref 5–25)
CALCIUM SERPL-MCNC: 8.7 MG/DL (ref 8.4–10.2)
CHLORIDE SERPL-SCNC: 107 MMOL/L (ref 96–108)
CO2 SERPL-SCNC: 24 MMOL/L (ref 21–32)
CREAT SERPL-MCNC: 0.73 MG/DL (ref 0.6–1.3)
EOSINOPHIL # BLD AUTO: 0.09 THOUSAND/ΜL (ref 0–0.61)
EOSINOPHIL NFR BLD AUTO: 1 % (ref 0–6)
ERYTHROCYTE [DISTWIDTH] IN BLOOD BY AUTOMATED COUNT: 15.8 % (ref 11.6–15.1)
GFR SERPL CREATININE-BSD FRML MDRD: 81 ML/MIN/1.73SQ M
GLUCOSE SERPL-MCNC: 106 MG/DL (ref 65–140)
HCT VFR BLD AUTO: 26.6 % (ref 34.8–46.1)
HGB BLD-MCNC: 8.5 G/DL (ref 11.5–15.4)
IMM GRANULOCYTES # BLD AUTO: 0.05 THOUSAND/UL (ref 0–0.2)
IMM GRANULOCYTES NFR BLD AUTO: 1 % (ref 0–2)
LYMPHOCYTES # BLD AUTO: 1.81 THOUSANDS/ΜL (ref 0.6–4.47)
LYMPHOCYTES NFR BLD AUTO: 18 % (ref 14–44)
MCH RBC QN AUTO: 30 PG (ref 26.8–34.3)
MCHC RBC AUTO-ENTMCNC: 32 G/DL (ref 31.4–37.4)
MCV RBC AUTO: 94 FL (ref 82–98)
MONOCYTES # BLD AUTO: 1.16 THOUSAND/ΜL (ref 0.17–1.22)
MONOCYTES NFR BLD AUTO: 12 % (ref 4–12)
NEUTROPHILS # BLD AUTO: 6.78 THOUSANDS/ΜL (ref 1.85–7.62)
NEUTS SEG NFR BLD AUTO: 68 % (ref 43–75)
NRBC BLD AUTO-RTO: 0 /100 WBCS
PLATELET # BLD AUTO: 272 THOUSANDS/UL (ref 149–390)
PMV BLD AUTO: 9.7 FL (ref 8.9–12.7)
POTASSIUM SERPL-SCNC: 3.7 MMOL/L (ref 3.5–5.3)
PROT SERPL-MCNC: 6.3 G/DL (ref 6.4–8.4)
RBC # BLD AUTO: 2.83 MILLION/UL (ref 3.81–5.12)
SODIUM SERPL-SCNC: 138 MMOL/L (ref 135–147)
WBC # BLD AUTO: 9.93 THOUSAND/UL (ref 4.31–10.16)

## 2025-01-10 PROCEDURE — 80053 COMPREHEN METABOLIC PANEL: CPT

## 2025-01-10 PROCEDURE — 85025 COMPLETE CBC W/AUTO DIFF WBC: CPT

## 2025-01-10 PROCEDURE — 99223 1ST HOSP IP/OBS HIGH 75: CPT | Performed by: STUDENT IN AN ORGANIZED HEALTH CARE EDUCATION/TRAINING PROGRAM

## 2025-01-10 PROCEDURE — 36415 COLL VENOUS BLD VENIPUNCTURE: CPT

## 2025-01-10 PROCEDURE — 93971 EXTREMITY STUDY: CPT

## 2025-01-10 PROCEDURE — 99496 TRANSJ CARE MGMT HIGH F2F 7D: CPT

## 2025-01-10 PROCEDURE — 99284 EMERGENCY DEPT VISIT MOD MDM: CPT

## 2025-01-10 PROCEDURE — 93971 EXTREMITY STUDY: CPT | Performed by: INTERNAL MEDICINE

## 2025-01-10 PROCEDURE — 99285 EMERGENCY DEPT VISIT HI MDM: CPT

## 2025-01-10 PROCEDURE — 99215 OFFICE O/P EST HI 40 MIN: CPT

## 2025-01-10 PROCEDURE — 70496 CT ANGIOGRAPHY HEAD: CPT

## 2025-01-10 PROCEDURE — 70498 CT ANGIOGRAPHY NECK: CPT

## 2025-01-10 RX ORDER — CEPHALEXIN 500 MG/1
500 CAPSULE ORAL EVERY 6 HOURS SCHEDULED
Qty: 28 CAPSULE | Refills: 0 | Status: SHIPPED | OUTPATIENT
Start: 2025-01-10 | End: 2025-01-17

## 2025-01-10 RX ORDER — ENOXAPARIN SODIUM 100 MG/ML
40 INJECTION SUBCUTANEOUS DAILY
Status: DISCONTINUED | OUTPATIENT
Start: 2025-01-11 | End: 2025-01-10

## 2025-01-10 RX ORDER — ASCORBIC ACID 500 MG
1000 TABLET ORAL DAILY
Status: DISCONTINUED | OUTPATIENT
Start: 2025-01-10 | End: 2025-01-11 | Stop reason: HOSPADM

## 2025-01-10 RX ORDER — PANTOPRAZOLE SODIUM 40 MG/1
40 TABLET, DELAYED RELEASE ORAL
Status: DISCONTINUED | OUTPATIENT
Start: 2025-01-10 | End: 2025-01-11 | Stop reason: HOSPADM

## 2025-01-10 RX ORDER — ACETAMINOPHEN 325 MG/1
650 TABLET ORAL EVERY 6 HOURS PRN
Status: DISCONTINUED | OUTPATIENT
Start: 2025-01-10 | End: 2025-01-11 | Stop reason: HOSPADM

## 2025-01-10 RX ADMIN — CEFTRIAXONE SODIUM 1000 MG: 10 INJECTION, POWDER, FOR SOLUTION INTRAVENOUS at 20:02

## 2025-01-10 RX ADMIN — OXYCODONE HYDROCHLORIDE AND ACETAMINOPHEN 1000 MG: 500 TABLET ORAL at 14:49

## 2025-01-10 RX ADMIN — IOHEXOL 85 ML: 350 INJECTION, SOLUTION INTRAVENOUS at 12:54

## 2025-01-10 RX ADMIN — PANTOPRAZOLE SODIUM 40 MG: 40 TABLET, DELAYED RELEASE ORAL at 20:27

## 2025-01-10 RX ADMIN — DEXTRAN 70, GLYCERIN, HYPROMELLOSE 1 DROP: 1; 2; 3 SOLUTION/ DROPS OPHTHALMIC at 16:52

## 2025-01-10 RX ADMIN — ACETAMINOPHEN 650 MG: 325 TABLET, FILM COATED ORAL at 19:30

## 2025-01-10 RX ADMIN — DEXTRAN 70, GLYCERIN, HYPROMELLOSE 1 DROP: 1; 2; 3 SOLUTION/ DROPS OPHTHALMIC at 20:28

## 2025-01-10 NOTE — PROGRESS NOTES
Transition of Care Visit  Name: Rosario Morales      : 1950      MRN: [id number]  Encounter Provider: Meagan Mason PA-C  Encounter Date: 1/10/2025   Encounter department: [facility] INTERNAL MEDICINE [facility] [address]    Assessment & Plan  Acute upper GI bleeding  EGD in hospital revealed single ulcer in the first part of the duodenum with oozing hemorrhag, clips were placed and epi was injected to address bleeding. She received IV venofer and transfusion due to hgb of 7. Repeat CBC and iron panel on Monday.   Orders:    Iron Panel (Includes Ferritin, Iron Sat%, Iron, and TIBC); Future    Duodenal ulcer  EGD 25 with duodenal ulcer. Continue pantoprazole 40 mg BID. F/up EGD in 2 months.        Syncope, unspecified syncope type  Echo was unremarkable in hospital. Vasovagal secondary to symptomatic anemia in setting up .        Acute blood loss anemia  S/p 2 300 mg venofer infusions. Repeat iron panel and CBC on Monday.        Edema of left upper extremity  LUE warm, tender, erythematous, and edematous on exam. Likely d/t superficial thrombophlebitis. Recommended avoidance of NSAIDs and starting tylenol prn for pain. Start warm compresses TID.        Numbness and tingling  Due to transient numbness and tingling of left side of face and arm, recommended urgent evaluation in the ER to r/o stroke versus TIA. She denies any N/T in the office at this time. She and her  were agreeable to the plan.        Visual changes  Due to transient visual changes this morning, recommended urgent evaluation in the ER to r/o stroke versus TIA. She denies any current visual changes in the office at this time. She and her  were agreeable to the plan.             History of Present Illness     Transitional Care Management Review:   Rosario Morales is a 74 y.o. female here for TCM follow up. She presented to the ER 25 for syncope and dark-colored stools. She was found to have an upper GI bleed. She was  transfused and given IV venofer while in the hospital. Today, she complains of visual changes, N/T, and LUE erythema, edema, warmth to touch, and pain. She notes yesterday, her LUE where the IV's were placed became red, swollen, warm to touch, and extremely painful. She could not sleep last night due to the pain. She tried cold compresses and tylenol with minimal relief.    This morning, she was eating breakfast and began to experience left side of face and left arm numbness and tingling. It lasted a few minutes.     A little while later, she began to experience bilateral visual changes. She describes it as a prism. She is unsure if it felt like curtains closing. She denies any associated symptoms with it. She denies any personal or family history of stroke. She does have a history of visual changes when hitting her head, but nothing like this.     During the TCM phone call patient stated:  TCM Call       Date and time call was made  1/8/2025  2:40 PM    Hospital care reviewed  Records reviewed    Patient was hospitialized at  [facility]    Date of Admission  01/06/25    Date of discharge  01/08/25    Diagnosis  Acute upper GI bleeding    Disposition  Home    Were the patients medications reviewed and updated  Yes    Current Symptoms  None    Dizziness severity  Mild    Quality Character  Lightheadedness    Episode pattern  Constant    Cause  No known event; Gastroenteritis          TCM Call       Post hospital issues  None    Should patient be enrolled in anticoag monitoring?  No    Scheduled for follow up?  Yes    Patients specialists  Other (comment)    Other specialists names  [facility] Holt DO, [phone]    Did you obtain your prescribed medications  Yes    Do you need help managing your prescriptions or medications  No    Is transportation to your appointment needed  No    I have advised the patient to call PCP with any new or worsening symptoms  Maureen Ye, Manager    Living Arrangements   "Alone    Support System  Spouse    The type of support provided  Emotional    Do you have social support  Yes, as much as I need    Are you recieving any outpatient services  No    What type of services  Holter Monitor, Stress Test    Are you recieving home care services  No    Are you using any community resources  No    Current waiver services  No    Have you fallen in the last 12 months  No    Interperter language line needed  No    Counseling  Patient    Counseling topics  Importance of RX compliance          HPI  Review of Systems   Constitutional:  Positive for fatigue.   Eyes:  Positive for visual disturbance.   Neurological:  Positive for weakness and numbness.     Objective   /60 (BP Location: Left arm, Patient Position: Sitting, Cuff Size: Standard)   Pulse 90   Resp 16   Ht 5' 1\" (1.549 m)   Wt 55.3 kg (122 lb)   SpO2 98%   BMI 23.05 kg/m²     Physical Exam  Vitals and nursing note reviewed.   Constitutional:       General: She is awake. She is not in acute distress.     Appearance: Normal appearance. She is well-developed, well-groomed and normal weight.   HENT:      Head: Normocephalic and atraumatic.      Right Ear: Hearing and external ear normal.      Left Ear: Hearing and external ear normal.      Nose: Nose normal.      Mouth/Throat:      Lips: Pink.      Mouth: Mucous membranes are moist.   Eyes:      General: Lids are normal. Vision grossly intact. Gaze aligned appropriately.      Conjunctiva/sclera: Conjunctivae normal.   Neck:      Vascular: No carotid bruit.      Trachea: Trachea and phonation normal.   Cardiovascular:      Rate and Rhythm: Normal rate and regular rhythm.      Heart sounds: Normal heart sounds, S1 normal and S2 normal. No murmur heard.     No friction rub. No gallop.   Pulmonary:      Effort: Pulmonary effort is normal. No respiratory distress.      Breath sounds: Normal breath sounds and air entry. No decreased breath sounds, wheezing, rhonchi or rales. "   Abdominal:      General: Abdomen is flat.   Musculoskeletal:         General: No swelling.      Cervical back: Neck supple.      Right lower leg: No edema.      Left lower leg: No edema.   Skin:     General: Skin is warm.      Capillary Refill: Capillary refill takes less than 2 seconds.   Neurological:      Mental Status: She is alert.   Psychiatric:         Attention and Perception: Attention and perception normal.         Mood and Affect: Mood and affect normal.         Speech: Speech normal.         Behavior: Behavior normal. Behavior is cooperative.         Thought Content: Thought content normal.         Cognition and Memory: Cognition and memory normal.         Judgment: Judgment normal.       Medications have been reviewed by provider in current encounter

## 2025-01-10 NOTE — ASSESSMENT & PLAN NOTE
Echo was unremarkable in hospital. Vasovagal secondary to symptomatic anemia in setting up [facility].

## 2025-01-10 NOTE — TELEPHONE ENCOUNTER
"Regarding: Painful lump post hospital visit  ----- Message from Enedelia JUAREZ sent at 1/10/2025  7:08 AM EST -----  \"I have a lump under my skin. It's really sore and it's right above the site where they put the needle while I was in the hospital. It hurt so bad I could hardly sleep last night. I want to make sure it's not a potential infection.\"    "

## 2025-01-10 NOTE — ASSESSMENT & PLAN NOTE
74-year-old female patient with past medical history of duodenal ulcer, bronchitis, upper GI bleed, recently discharged on 1/8/2024.patient went to see PCP due to left upper extremity pain and swelling at the previous IV site however was referred to emergency room for evaluation due to patient has intermittent episodes of numbness and tingling along with aphasia.    CBC noted with hemoglobin 8.5.  CMP within normal limits.  CTA head and neck report noted negative for acute finding.  Upper extremity does not report reviewed noted with superficial thrombophlebitis.    Left upper extremity antecubital fossa superficial to popliteal artery to previous IV placement on recent hospitalization.  Currently she is afebrile, hemodynamically stable.  Started on IV ceftriaxone.  Ordered ice pack to left upper extremity.  Avoid NSAIDs due to PUD.  Continue Tylenol as needed.  Continue supportive care.

## 2025-01-10 NOTE — TELEPHONE ENCOUNTER
"Reason for Disposition  • Skin swelling at IV site (Exception: IV recently removed and small area of skin swelling)    Answer Assessment - Initial Assessment Questions  1. SYMPTOM:  \"What's the main symptom you're concerned about?\" (e.g., pain, redness, swelling, pus)         Pain and swelling in left arm where IV was. Patient states there is a large red lump above injection site.    2. ONSET: \"When did the pain  start?\"        Since IV was placed and taken out    3. IV WHAT: \"What kind of IV line do you have?\" (e.g., central line, PICC, peripheral IV)        Patient had peripheral IV during hospital stay    5. IV WHEN: \"When was this IV put in?\"        1/6/25-1/8/25    8. PAIN: \"Is there any pain?\" If Yes, ask: \"How bad is the pain?\"   (e.g., scale 1-10; or mild, moderate, severe)        Patient states that when she tries to use her arm pain is severe 8/10. Pain is keeping patient from sleeping    9. OTHER SYMPTOMS: \"Do you have any other symptoms?\" (e.g., fever, shaking chills, new weakness, pus)        Denies fever    Protocols used: IV Site (Skin) Symptoms-ADULT-AH    "

## 2025-01-10 NOTE — DISCHARGE INSTRUCTIONS
Tylenol, warm compresses  Watch and wait antibiotic-only if symptoms worsen  Follow up with primary care provider

## 2025-01-10 NOTE — H&P
H&P - Hospitalist   Name: Rosario Morales 74 y.o. female I MRN: [id number]  Unit/Bed#: ED 14 I Date of Admission: 1/10/2025   Date of Service: 1/10/2025 I Hospital Day: 0     Assessment & Plan  Thrombophlebitis arm  74-year-old female patient with past medical history of duodenal ulcer, bronchitis, upper GI bleed, recently discharged on 1/8/2024.patient went to see PCP due to left upper extremity pain and swelling at the previous IV site however was referred to emergency room for evaluation due to patient has intermittent episodes of numbness and tingling along with aphasia.    CBC noted with hemoglobin 8.5.  CMP within normal limits.  CTA head and neck report noted negative for acute finding.  Upper extremity does not report reviewed noted with superficial thrombophlebitis.    Left upper extremity antecubital fossa superficial to popliteal artery to previous IV placement on recent hospitalization.  Currently she is afebrile, hemodynamically stable.  Started on IV ceftriaxone.  Ordered ice pack to left upper extremity.  Avoid NSAIDs due to PUD.  Continue Tylenol as needed.  Continue supportive care.    Numbness and tingling  Patient was referred to emergency room by PCP due to her symptoms of left upper and lower emergency numbness, tingling, perioral numbness tingling, episode of aphasia which has been intermittent and ongoing since 2022.  Patient does report having episode of syncope and fall few years ago and has been having episodes intermittently.     Patient had negative MRI brain in 2023.  Currently during my encounter she is completely asymptomatic and oriented x 4.  Currently she denies any numbness, tingling, difficulty with speech, any other new complaint.  Patient reports that she gets the symptoms intermittently and she thinks is mostly from orthostatic hypotension.  Continue to monitor orthostatic vitals while here.    Duodenal ulcer  Past medical history of duodenal ulcer.  Continue Protonix 40 mg twice  daily  Anemia  Patient was recently hospitalized due to acute blood loss anemia secondary to duodenal ulcer thought to be due to NSAID use.,  Was also diagnosed with Schatzki's ring, and discharged on 1/8/2025.  Discharge hemoglobin 8.0.  Currently hemoglobin 8.5.  Repeat CBC tomorrow.  Continue Protonix.      VTE Pharmacologic Prophylaxis:   Will hold off on subcu pharmacological DVT prophylaxis given recent admission for acute GI bleed.  Code Status: Level 1 - Full Code       Anticipated Length of Stay: Patient will be admitted on an observation basis with an anticipated length of stay of less than 2 midnights secondary to left upper extremity thrombophlebitis.    History of Present Illness   Chief Complaint: Left upper extremity thrombophlebitis    Rosario Morales is a 74 y.o. female with a PMH of upper GI bleed, anemia, duodenal ulcer, bronchitis, history of orthostatic hypotension, syncope, patient was hospitalized due to acute blood loss anemia secondary to peptic ulcer disease and was discharged on 1/8/2025 who reports that she went to see her PCP due to left upper extremity redness, tenderness at previous IV line however PCP referred her to emergency room due to patient's intermittent episodes of perioral, left upper and lower extremity numbness tingling, aphasia.  Upon further evaluation she was noted to have left upper extremity thrombophlebitis initial plan was to discharge from the ER however given her intermittent episodes of numbness and tingling and aphasia patient was hospitalized under observation.  Patient reports that she has 2 symptoms intermittently for last 2 -3 years and had negative MRI in 2023.  Currently she is asymptomatic from that perspective.  Currently she denies any numbness, tingling, weakness, difficulty with speech, any other movements.  Currently her primary complaint is left upper extremity pain and swelling at superficial thrombophlebitis site.  Denies any other new complaints at  this time.    Review of Systems   Constitutional:  Negative for chills, diaphoresis, fatigue and fever.   HENT:  Negative for congestion.    Eyes:  Negative for visual disturbance.   Respiratory:  Negative for cough and shortness of breath.    Cardiovascular:  Negative for chest pain, palpitations and leg swelling.   Gastrointestinal:  Negative for abdominal pain, diarrhea, nausea and vomiting.   Endocrine: Negative for polyuria.   Genitourinary:  Negative for dysuria.   Neurological:  Negative for weakness.   Psychiatric/Behavioral:  Negative for agitation.        Historical Information   Past Medical History:   Diagnosis Date    Acute bronchitis 03/30/2015    Acute upper respiratory infection 04/16/2014    Allergic blepharitis 06/18/2015    Benign neoplasm of skin     Plantar fasciitis 06/18/2015    Thumb pain 06/18/2015     Past Surgical History:   Procedure Laterality Date    BREAST EXCISIONAL BIOPSY Left     1992    COLONOSCOPY      EGD      2021    [facility] SURGICAL ARTHROSCOPY SHOULDER W/ROTATOR CUFF RPR Right 08/21/2024    Procedure: REPAIR ROTATOR CUFF ARTHROSCOPIC- Right shoulder arthroscopic rotator cuff repair, open subpectoral biceps tenodesis, extensive debridement and acromioplasty;  Surgeon: Akshat Roach DO;  Location: [facility];  Service: Orthopedics    SHOULDER SURGERY Right 08/21/2024    TUBAL LIGATION      LAST ASSESSED: [id number]     Social History     Tobacco Use    Smoking status: Never    Smokeless tobacco: Never   Vaping Use    Vaping status: Never Used   Substance and Sexual Activity    Alcohol use: Yes     Alcohol/week: 7.0 standard drinks of alcohol     Types: 7 Glasses of wine per week     Comment: daily    Drug use: No    Sexual activity: Yes     Partners: Male     Birth control/protection: Surgical     E-Cigarette/Vaping    E-Cigarette Use Never User      E-Cigarette/Vaping Substances    Nicotine No     THC No     CBD No     Flavoring No     Other No     Unknown No      Family History    Problem Relation Age of Onset    Migraines Mother     Osteoporosis Mother     Scoliosis Mother     Throat cancer Father     Depression Daughter         WITH ANXIETY     Migraines Daughter     Rheum arthritis Daughter     Urinary tract infection Daughter     No Known Problems Daughter     Depression Son         WITH ANXIETY     Breast cancer Neg Hx     Colon cancer Neg Hx     Uterine cancer Neg Hx     Cervical cancer Neg Hx     Ovarian cancer Neg Hx      Social History:  Marital Status: /Civil Union       Meds/Allergies   I have reviewed home medications with patient personally.  Prior to Admission medications    Medication Sig Start Date End Date Taking? Authorizing Provider   cephalexin (KEFLEX) 500 mg capsule Take 1 capsule (500 mg total) by mouth every 6 (six) hours for 7 days 1/10/25 1/17/25 Yes JAY Del Rio   Ascorbic Acid (vitamin C) 1000 MG tablet Take 1,000 mg by mouth daily    Historical Provider, MD   Calcium Carb-Cholecalciferol (Calcium 500 + D) 500-5 MG-MCG TABS Take 2 tablets by mouth in the morning    Historical Provider, MD   cyanocobalamin (VITAMIN B-12) 100 mcg tablet Take by mouth daily    Historical Provider, MD   cycloSPORINE (RESTASIS) 0.05 % ophthalmic emulsion Administer 1 drop to both eyes 2 (two) times a day    Historical Provider, MD   pantoprazole (PROTONIX) 40 mg tablet Take 1 tablet (40 mg total) by mouth 2 (two) times a day before meals 1/8/25   Mike Rosario MD     Allergies   Allergen Reactions    Other Other (See Comments) and Sneezing     CATS-itchy eyes, heaviness in chest       Objective :  Temp:  [98.4 °F (36.9 °C)] 98.4 °F (36.9 °C)  HR:  [81-90] 84  BP: (104-133)/(51-60) 133/59  Resp:  [16-18] 17  SpO2:  [98 %-100 %] 99 %  O2 Device: None (Room air)    Physical Exam  Constitutional:       General: She is not in acute distress.     Appearance: Normal appearance. She is not ill-appearing, toxic-appearing or diaphoretic.   HENT:      Head: Normocephalic and  atraumatic.   Cardiovascular:      Rate and Rhythm: Normal rate.      Pulses: Normal pulses.   Pulmonary:      Effort: Pulmonary effort is normal. No respiratory distress.      Breath sounds: Normal breath sounds. No wheezing.   Abdominal:      General: Bowel sounds are normal. There is no distension.      Palpations: Abdomen is soft.      Tenderness: There is no abdominal tenderness.   Musculoskeletal:         General: Swelling (Left upper extremity antecubital fossa area noted with mild erythema, tenderness, no open wounds noted, no blister noted, no crepitus noted.) and tenderness present.   Skin:     Capillary Refill: Capillary refill takes less than 2 seconds.   Neurological:      Mental Status: She is alert and oriented to person, place, and time. Mental status is at baseline.      Comments: No speech abnormality noted on exam, no facial asymmetry noted, no focal gross motor deficit noted on exam.   Psychiatric:         Mood and Affect: Mood normal.         Behavior: Behavior normal.          Lines/Drains:            Lab Results: I have reviewed the following results:  Results from last 7 days   Lab Units 01/10/25  1235   WBC Thousand/uL 9.93   HEMOGLOBIN g/dL 8.5*   HEMATOCRIT % 26.6*   PLATELETS Thousands/uL 272   SEGS PCT % 68   LYMPHO PCT % 18   MONO PCT % 12   EOS PCT % 1     Results from last 7 days   Lab Units 01/10/25  1235   SODIUM mmol/L 138   POTASSIUM mmol/L 3.7   CHLORIDE mmol/L 107   CO2 mmol/L 24   BUN mg/dL 11   CREATININE mg/dL 0.73   ANION GAP mmol/L 7   CALCIUM mg/dL 8.7   ALBUMIN g/dL 3.9   TOTAL BILIRUBIN mg/dL 0.50   ALK PHOS U/L 53   ALT U/L 11   AST U/L 19   GLUCOSE RANDOM mg/dL 106         Results from last 7 days   Lab Units 01/07/25  0724   POC GLUCOSE mg/dl 100     Lab Results   Component Value Date    HGBA1C 5.4 04/05/2021           Imaging Results Review: No pertinent imaging studies reviewed.  Other Study Results Review: No additional pertinent studies reviewed.    Administrative  Statements   I have spent a total time of 75 minutes in caring for this patient on the day of the visit/encounter including Diagnostic results, Patient and family education, Impressions, Counseling / Coordination of care, Documenting in the medical record, Reviewing / ordering tests, medicine, procedures  , and Obtaining or reviewing history  .    ** Please Note: This note has been constructed using a voice recognition system. **

## 2025-01-10 NOTE — ASSESSMENT & PLAN NOTE
EGD in hospital revealed single ulcer in the first part of the duodenum with oozing hemorrhag, clips were placed and epi was injected to address bleeding. She received IV venofer and transfusion due to hgb of 7. Repeat CBC and iron panel on Monday.   Orders:    Iron Panel (Includes Ferritin, Iron Sat%, Iron, and TIBC); Future

## 2025-01-10 NOTE — ED PROVIDER NOTES
Time reflects when diagnosis was documented in both MDM as applicable and the Disposition within this note       Time User Action Codes Description Comment    1/10/2025 11:33 AM Adry Kauffman Add [I80.9] Superficial thrombophlebitis     1/10/2025  2:06 PM Adry Kauffman Add [R41.82] Altered mental status           ED Disposition       ED Disposition   Admit    Condition   Stable    Date/Time   Fri Ethan 10, 2025  2:06 PM    Comment                  Assessment & Plan       Medical Decision Making  Rosario Morales 73 y/o female patient who comes in for multiple complaints. Initially only complaint was her Left AC was red, hot, and tender to touch. Duplex resulted superficial thrombophlebitis. was getting her ready for discharge when she states- are you not going to get blood work and scan my head. I was very confused to why she was saying this until I read Dr. Rodriguez's PA-C note, which sent her here for CTA/TIA symptoms. Patient then tells me she has been having intermittent blurred vision, and tingling of her face and arm (bilaterally). She didn't know if she wanted the work up and then  and patient agreed with workup. CTA head and neck-negative. CBC and CMP negative. Spoke with [facility] Internal Medicine Provider about patient's case and patient was accepted for further evaluation.     Amount and/or Complexity of Data Reviewed  Labs: ordered. Decision-making details documented in ED Course.  Radiology: ordered. Decision-making details documented in ED Course.    Risk  Prescription drug management.  Decision regarding hospitalization.        ED Course as of 01/10/25 1514   Fri Ethan 10, 2025   1223 Patient's  stated that her PCP is concerned for CVA/TIA- wants blood work and CT head   1250 CBC and differential(!)  Hb 8.5     1321 CTA head and neck with and without contrast  IMPRESSION:     CT Brain:  No acute intracranial abnormality. Mild chronic microangiopathic changes.     CT Angiography:  Unremarkable CTA  neck and brain.            Medications   ascorbic acid (VITAMIN C) tablet 1,000 mg (1,000 mg Oral Given 1/10/25 1449)   cyanocobalamin (VITAMIN B-12) tablet 100 mcg (has no administration in time range)   Artificial Tears Op Soln 1 drop (has no administration in time range)   pantoprazole (PROTONIX) EC tablet 40 mg (has no administration in time range)   iohexol (OMNIPAQUE) 350 MG/ML injection (MULTI-DOSE) 85 mL (85 mL Intravenous Given 1/10/25 1254)       ED Risk Strat Scores                  Identification of Seniors at Risk      Flowsheet Row Most Recent Value   (ISAR) Identification of Seniors at Risk    Before the illness or injury that brought you to the Emergency, did you need someone to help you on a regular basis? 0 Filed at: 01/10/2025 1026   In the last 24 hours, have you needed more help than usual? 0 Filed at: 01/10/2025 1026   Have you been hospitalized for one or more nights during the past 6 months? 0 Filed at: 01/10/2025 1026   In general, do you see well? 0 Filed at: 01/10/2025 1026   In general, do you have serious problems with your memory? 0 Filed at: 01/10/2025 1026   Do you take more than three different medications every day? 1 Filed at: 01/10/2025 1026   ISAR Score 1 Filed at: 01/10/2025 1026                SBIRT 22yo+      Flowsheet Row Most Recent Value   Initial Alcohol Screen: US AUDIT-C     1. How often do you have a drink containing alcohol? 1 Filed at: 01/10/2025 1026   2. How many drinks containing alcohol do you have on a typical day you are drinking?  0 Filed at: 01/10/2025 1026   3b. FEMALE Any Age, or MALE 65+: How often do you have 4 or more drinks on one occassion? 1 Filed at: 01/10/2025 1026   Audit-C Score 2 Filed at: 01/10/2025 1026   ALEJANDRO: How many times in the past year have you...    Used an illegal drug or used a prescription medication for non-medical reasons? Never Filed at: 01/10/2025 1026                            History of Present Illness       Chief Complaint    Patient presents with    Arm Pain     Patient co left arm pain and swelling. Patient reports she was recently hospitalized  and the IV was in her left arm. Patient reports D/c on wednesday and swelling and pain started that night. Was seen at urgent and sent over for further evaluation. Denies SOB, CP.         Past Medical History:   Diagnosis Date    Acute bronchitis 03/30/2015    Acute upper respiratory infection 04/16/2014    Allergic blepharitis 06/18/2015    Benign neoplasm of skin     Plantar fasciitis 06/18/2015    Thumb pain 06/18/2015      Past Surgical History:   Procedure Laterality Date    BREAST EXCISIONAL BIOPSY Left     1992    COLONOSCOPY      EGD      2021    [place] SUR[facility]ICAL ARTHROSCOPY SHOULDER W/ROTATOR CUFF RPR Right 08/21/2024    Procedure: REPAIR ROTATOR CUFF ARTHROSCOPIC- Right shoulder arthroscopic rotator cuff repair, open subpectoral biceps tenodesis, extensive debridement and acromioplasty;  Surgeon: Akshat Roach DO;  Location: [facility] OR;  Service: Orthopedics    SHOULDER SURGERY Right 08/21/2024    TUBAL LIGATION      LAST ASSESSED: [id number]      Family History   Problem Relation Age of Onset    Migraines Mother     Osteoporosis Mother     Scoliosis Mother     Throat cancer Father     Depression Daughter         WITH ANXIETY     Migraines Daughter     Rheum arthritis Daughter     Urinary tract infection Daughter     No Known Problems Daughter     Depression Son         WITH ANXIETY     Breast cancer Neg Hx     Colon cancer Neg Hx     Uterine cancer Neg Hx     Cervical cancer Neg Hx     Ovarian cancer Neg Hx       Social History     Tobacco Use    Smoking status: Never    Smokeless tobacco: Never   Vaping Use    Vaping status: Never Used   Substance Use Topics    Alcohol use: Yes     Alcohol/week: 7.0 standard drinks of alcohol     Types: 7 Glasses of wine per week     Comment: daily    Drug use: No      E-Cigarette/Vaping    E-Cigarette Use Never User       E-Cigarette/Vaping  "Substances    Nicotine No     THC No     CBD No     Flavoring No     Other No     Unknown No       I have reviewed and agree with the history as documented.     75 y/o female patient presents to the ER for evaluation of arm pain. Patient stated that she was recently admitted for upper GI bleed. Patient had multiple  peripheral IV lines and was discharged two days ago. Patient reports that her left AC is erythematous, warm, and tender to touch. Patient has pain with range of motion. No noted drainage, open wounds.         Per chart review: 1/8/25 hospital note  \"74 y.o. female with a PMH of peptic ulcer disease who presents with syncope and dark-colored stools. Pt presented from PCP's office w/ c/o onset of dark, tarry stools since 01/04. She is w/ associated syncopal episode and MOHAN (uncharacteristic for baseline).  Patient notes severe shortness of breath while ambulating up stairs and while vacuuming in her home.  She admits to use Motrin 400 mg BID post shoulder surgery since 08/2024. She has a h/o UGIB in the past but notes she was never instructed to use maintenance H2 or PPI therapy.  She only uses Pepcid intermittently/PRN. EGD (11/29/21) w/ schatzki's ring, hiatal hernia, antritis, and a duodenal bulb ulcer.\"          Review of Systems   Constitutional:  Negative for chills and fever.   HENT:  Negative for ear pain and sore throat.    Eyes:  Negative for pain and visual disturbance.   Respiratory:  Negative for cough and shortness of breath.    Cardiovascular:  Negative for chest pain and palpitations.   Gastrointestinal:  Negative for abdominal pain and vomiting.   Genitourinary:  Negative for dysuria and hematuria.   Musculoskeletal:  Negative for arthralgias and back pain.   Skin:  Positive for color change and wound. Negative for rash.   Neurological:  Negative for seizures and syncope.   All other systems reviewed and are negative.          Objective       ED Triage Vitals [01/10/25 1021]   Temperature " Pulse Blood Pressure Respirations SpO2 Patient Position - Orthostatic VS   98.4 °F (36.9 °C) 81 104/51 18 100 % Sitting      Temp src Heart Rate Source BP Location FiO2 (%) Pain Score    -- Monitor Right arm -- --      Vitals      Date and Time Temp Pulse SpO2 Resp BP Pain Score FACES Pain Rating User   01/10/25 1243 -- 84 99 % 17 133/59 -- -- LF   01/10/25 1021 98.4 °F (36.9 °C) 81 100 % 18 104/51 -- -- JT            Physical Exam  Vitals and nursing note reviewed.   Constitutional:       General: She is not in acute distress.     Appearance: She is well-developed.   HENT:      Head: Normocephalic and atraumatic.      Right Ear: External ear normal.      Left Ear: External ear normal.   Eyes:      Conjunctiva/sclera: Conjunctivae normal.   Cardiovascular:      Rate and Rhythm: Normal rate and regular rhythm.      Pulses: Normal pulses.      Heart sounds: Normal heart sounds.   Pulmonary:      Effort: Pulmonary effort is normal. No respiratory distress.      Breath sounds: Normal breath sounds.   Abdominal:      Palpations: Abdomen is soft.      Tenderness: There is no abdominal tenderness.   Musculoskeletal:         General: No swelling.        Arms:       Cervical back: Neck supple.   Skin:     General: Skin is warm and dry.      Capillary Refill: Capillary refill takes less than 2 seconds.   Neurological:      Mental Status: She is alert and oriented to person, place, and time. Mental status is at baseline.   Psychiatric:         Mood and Affect: Mood normal.         Behavior: Behavior normal.         Results Reviewed       Procedure Component Value Units Date/Time    Comprehensive metabolic panel [[id number]]  (Abnormal) Collected: 01/10/25 1235    Lab Status: Final result Specimen: Blood from Arm, Right Updated: 01/10/25 1257     Sodium 138 mmol/L      Potassium 3.7 mmol/L      Chloride 107 mmol/L      CO2 24 mmol/L      ANION GAP 7 mmol/L      BUN 11 mg/dL      Creatinine 0.73 mg/dL      Glucose 106 mg/dL       Calcium 8.7 mg/dL      AST 19 U/L      ALT 11 U/L      Alkaline Phosphatase 53 U/L      Total Protein 6.3 g/dL      Albumin 3.9 g/dL      Total Bilirubin 0.50 mg/dL      eGFR 81 ml/min/1.73sq m     Narrative:      National Kid[employer] Disease Foundation guidelines for Chronic Kidney Disease (CKD):     Stage 1 with normal or high GFR (GFR > 90 mL/min/1.73 square meters)    Stage 2 Mild CKD (GFR = 60-89 mL/min/1.73 square meters)    Stage 3A Moderate CKD (GFR = 45-59 mL/min/1.73 square meters)    Stage 3B Moderate CKD (GFR = 30-44 mL/min/1.73 square meters)    Stage 4 Severe CKD (GFR = 15-29 mL/min/1.73 square meters)    Stage 5 End Stage CKD (GFR <15 mL/min/1.73 square meters)  Note: GFR calculation is accurate only with a steady state creatinine    CBC and differential [[id number]]  (Abnormal) Collected: 01/10/25 1235    Lab Status: Final result Specimen: Blood from Arm, Right Updated: 01/10/25 1247     WBC 9.93 Thousand/uL      RBC 2.83 Million/uL      Hemoglobin 8.5 g/dL      Hematocrit 26.6 %      MCV 94 fL      MCH 30.0 pg      MCHC 32.0 g/dL      RDW 15.8 %      MPV 9.7 fL      Platelets 272 Thousands/uL      nRBC 0 /100 WBCs      Segmented % 68 %      Immature Grans % 1 %      Lymphocytes % 18 %      Monocytes % 12 %      Eosinophils Relative 1 %      Basophils Relative 0 %      Absolute Neutrophils 6.78 Thousands/µL      Absolute Immature Grans 0.05 Thousand/uL      Absolute Lymphocytes 1.81 Thousands/µL      Absolute Monocytes 1.16 Thousand/µL      Eosinophils Absolute 0.09 Thousand/µL      Basophils Absolute 0.04 Thousands/µL             CTA head and neck with and without contrast   Final Interpretation by Edward Ferrer MD (01/10 2184)      CT Brain:  No acute intracranial abnormality. Mild chronic microangiopathic changes.      CT Angiography:  Unremarkable CTA neck and brain.            Workstation performed: [id number]         VAS upper limb venous duplex scan, unilateral/limited   Final Interpretation by  Lili Haley MD (01/10 1201)          Procedures    ED Medication and Procedure Management   Prior to Admission Medications   Prescriptions Last Dose Informant Patient Reported? Taking?   Ascorbic Acid (vitamin C) 1000 MG tablet  Self Yes No   Sig: Take 1,000 mg by mouth daily   Calcium Carb-Cholecalciferol (Calcium 500 + D) 500-5 MG-MCG TABS  Self Yes No   Sig: Take 2 tablets by mouth in the morning   cyanocobalamin (VITAMIN B-12) 100 mcg tablet  Self Yes No   Sig: Take by mouth daily   cycloSPORINE (RESTASIS) 0.05 % ophthalmic emulsion  Self Yes No   Sig: Administer 1 drop to both eyes 2 (two) times a day   pantoprazole (PROTONIX) 40 mg tablet   No No   Sig: Take 1 tablet (40 mg total) by mouth 2 (two) times a day before meals      Facility-Administered Medications: None     Patient's Medications   Discharge Prescriptions    CEPHALEXIN (KEFLEX) 500 MG CAPSULE    Take 1 capsule (500 mg total) by mouth every 6 (six) hours for 7 days       Start Date: 1/10/2025 End Date: 1/17/2025       Order Dose: 500 mg       Quantity: 28 capsule    Refills: 0     No discharge procedures on file.  ED SEPSIS DOCUMENTATION   Time reflects when diagnosis was documented in both MDM as applicable and the Disposition within this note       Time User Action Codes Description Comment    1/10/2025 11:33 AM Adry Kauffman Add [I80.9] Superficial thrombophlebitis     1/10/2025  2:06 PM Adry Kauffman Add [R41.82] Altered mental status                  JAY Del Rio  01/10/25 0348

## 2025-01-10 NOTE — ASSESSMENT & PLAN NOTE
Patient was recently hospitalized due to acute blood loss anemia secondary to duodenal ulcer thought to be due to NSAID use.,  Was also diagnosed with Schatzki's ring, and discharged on 1/8/2025.  Discharge hemoglobin 8.0.  Currently hemoglobin 8.5.  Repeat CBC tomorrow.  Continue Protonix.

## 2025-01-10 NOTE — ASSESSMENT & PLAN NOTE
Patient was referred to emergency room by PCP due to her symptoms of left upper and lower emergency numbness, tingling, perioral numbness tingling, episode of aphasia which has been intermittent and ongoing since 2022.  Patient does report having episode of syncope and fall few years ago and has been having episodes intermittently.     Patient had negative MRI brain in 2023.  Currently during my encounter she is completely asymptomatic and oriented x 4.  Currently she denies any numbness, tingling, difficulty with speech, any other new complaint.  Patient reports that she gets the symptoms intermittently and she thinks is mostly from orthostatic hypotension.  Continue to monitor orthostatic vitals while here.

## 2025-01-10 NOTE — TELEPHONE ENCOUNTER
Appointment scheduled for 1/10/25 at 9:20 am with Meagan Mason PA-C.   Home care advice provided and call back precautions reviewed. Patient verbalized understanding.

## 2025-01-11 VITALS
HEART RATE: 79 BPM | SYSTOLIC BLOOD PRESSURE: 100 MMHG | HEIGHT: 61 IN | DIASTOLIC BLOOD PRESSURE: 59 MMHG | WEIGHT: 124.78 LBS | RESPIRATION RATE: 17 BRPM | BODY MASS INDEX: 23.56 KG/M2 | OXYGEN SATURATION: 99 % | TEMPERATURE: 99.6 F

## 2025-01-11 PROCEDURE — 99239 HOSP IP/OBS DSCHRG MGMT >30: CPT | Performed by: STUDENT IN AN ORGANIZED HEALTH CARE EDUCATION/TRAINING PROGRAM

## 2025-01-11 RX ADMIN — PANTOPRAZOLE SODIUM 40 MG: 40 TABLET, DELAYED RELEASE ORAL at 09:31

## 2025-01-11 RX ADMIN — DEXTRAN 70, GLYCERIN, HYPROMELLOSE 1 DROP: 1; 2; 3 SOLUTION/ DROPS OPHTHALMIC at 09:31

## 2025-01-11 RX ADMIN — VITAM B12 100 MCG: 100 TAB at 09:31

## 2025-01-11 RX ADMIN — OXYCODONE HYDROCHLORIDE AND ACETAMINOPHEN 1000 MG: 500 TABLET ORAL at 09:31

## 2025-01-11 NOTE — ASSESSMENT & PLAN NOTE
74-year-old female patient with past medical history of duodenal ulcer, bronchitis, upper GI bleed, recently discharged on 1/8/2024.patient went to see PCP due to left upper extremity pain and swelling at the previous IV site however was referred to emergency room for evaluation due to patient has intermittent episodes of numbness and tingling along with aphasia.    CBC noted with hemoglobin 8.5.  CMP within normal limits.  CTA head and neck report noted negative for acute finding.  Upper extremity does not report reviewed noted with superficial thrombophlebitis in the cephalic vein.    Left upper extremity antecubital fossa noted with superficial thrombophlebitis due to previous IV site during recent hospitalization.  Today she reports left upper extremity has  significantly improved with IV ceftriaxone and ice packs.  Patient reports her  will  Keflex course that was prescribed by ED earlier.  Currently she reports feeling much better and eager to go home.  Currently she is afebrile, hemodynamically stable.  Ordered ice pack to left upper extremity.  Avoid NSAIDs due to PUD.  Continue Tylenol as needed.  Continue supportive care.  Outpatient follow-up with PCP.

## 2025-01-11 NOTE — DISCHARGE SUMMARY
Discharge Summary - Hospitalist   Name: Rosario Morales 74 y.o. female I MRN: [id number]  Unit/Bed#: -01 I Date of Admission: 1/10/2025   Date of Service: 1/11/2025 I Hospital Day: 0     Assessment & Plan  Thrombophlebitis arm  74-year-old female patient with past medical history of duodenal ulcer, bronchitis, upper GI bleed, recently discharged on 1/8/2024.patient went to see PCP due to left upper extremity pain and swelling at the previous IV site however was referred to emergency room for evaluation due to patient has intermittent episodes of numbness and tingling along with aphasia.    CBC noted with hemoglobin 8.5.  CMP within normal limits.  CTA head and neck report noted negative for acute finding.  Upper extremity does not report reviewed noted with superficial thrombophlebitis in the cephalic vein.    Left upper extremity antecubital fossa noted with superficial thrombophlebitis due to previous IV site during recent hospitalization.  Today she reports left upper extremity has  significantly improved with IV ceftriaxone and ice packs.  Patient reports her  will  Keflex course that was prescribed by ED earlier.  Currently she reports feeling much better and eager to go home.  Currently she is afebrile, hemodynamically stable.  Ordered ice pack to left upper extremity.  Avoid NSAIDs due to PUD.  Continue Tylenol as needed.  Continue supportive care.  Outpatient follow-up with PCP.    Numbness and tingling  Patient was referred to emergency room by PCP due to her symptoms of left upper and lower emergency numbness, tingling, perioral numbness tingling, episode of aphasia which has been intermittent and ongoing since 2022.  Patient does report having episode of syncope and fall few years ago and has been having episodes intermittently.     Patient had negative MRI brain in 2023.  Currently during my encounter she is completely asymptomatic and oriented x 4.  Currently she denies any numbness,  tingling, difficulty with speech, any other new complaint.  Patient reports that she gets the symptoms intermittently and she thinks is mostly from orthostatic hypotension.  Continue to monitor orthostatic vitals while here.  Outpatient follow-up with PCP.    Duodenal ulcer  Past medical history of duodenal ulcer.  Continue Protonix 40 mg twice daily  Anemia  Patient was recently hospitalized due to acute blood loss anemia secondary to duodenal ulcer thought to be due to NSAID use.,  Was also diagnosed with Schatzki's ring, and discharged on 1/8/2025.  Discharge hemoglobin 8.0.  Currently hemoglobin 8.5.  Outpatient follow-up with PCP.     Medical Problems       Resolved Problems  Date Reviewed: 1/10/2025   None       Discharging Physician / Practitioner: Arun Ramirez MD  PCP: Mike Rosario MD  Admission Date:   Admission Orders (From admission, onward)       Ordered        01/10/25 1406  Place in Observation  Once                          Discharge Date: 01/11/25        Reason for Admission: Left arm antecubital fossa thrombophlebitis    Hospital Course:     Rosario Morales is a 74 y.o. female with a PMH of upper GI bleed, anemia, duodenal ulcer, bronchitis, history of orthostatic hypotension, syncope, patient was hospitalized due to acute blood loss anemia secondary to peptic ulcer disease and was discharged on 1/8/2025 who reports that she went to see her PCP on 1/10/2025 due to left upper extremity redness, tenderness at previous IV line however PCP referred her to emergency room due to patient's intermittent episodes of perioral, left upper and lower extremity numbness tingling, aphasia.  Upon further evaluation she was noted to have left upper extremity thrombophlebitis initial plan was to discharge from the ER however given her intermittent episodes of numbness and tingling and aphasia patient was hospitalized under observation.  Patient reports that she has 2 symptoms intermittently for last 2 -3 years  "and had negative MRI in 2023 and was also seen by neurology for the same in the past.  Currently she is asymptomatic from that perspective.  Currently she denies any numbness, tingling, weakness, difficulty with speech, any other movements.  Reports left upper extremity thrombophlebitis significantly improved with IV ceftriaxone and ice packs.  Reports that  will  Keflex prescription that was sent by ED.  Currently she reports overall feeling much better and eager to go home.  I have recommended outpatient follow-up with primary care provider.  Refer to earlier notes for further clarification.        Please see above list of diagnoses and related plan for additional information.     Condition at Discharge: good    Discharge Day Visit / Exam:   Subjective: Seen during a.m. rounds.  Patient appears comfortable not in distress.  Currently reports left extremity redness and swelling has improved significantly with ice packs and since she was getting IV ceftriaxone.  Denies any recurrence of her earlier neurologic symptoms.  Currently she denies chest pain, dyspnea, fever, chills, nausea, vomiting, dizziness, lightheadedness, numbness, tingling, any other new complaints.  Reports overall feeling much better and eager to go home.    Vitals: Blood Pressure: 100/59 (01/11/25 0921)  Pulse: 79 (01/11/25 0921)  Temperature: 99.6 °F (37.6 °C) (01/10/25 2144)  Temp Source: Oral (01/10/25 1931)  Respirations: 17 (01/10/25 1931)  Height: 5' 1\" (154.9 cm) (01/10/25 1931)  Weight - Scale: 56.6 kg (124 lb 12.5 oz) (01/10/25 1931)  SpO2: 99 % (01/11/25 0921)  Physical Exam  Constitutional:       General: She is not in acute distress.     Appearance: Normal appearance. She is not ill-appearing, toxic-appearing or diaphoretic.   Cardiovascular:      Rate and Rhythm: Normal rate.      Pulses: Normal pulses.   Pulmonary:      Effort: Pulmonary effort is normal. No respiratory distress.      Breath sounds: Normal breath " sounds. No wheezing.   Abdominal:      General: Bowel sounds are normal. There is no distension.      Palpations: Abdomen is soft.      Tenderness: There is no abdominal tenderness.   Musculoskeletal:      Right lower leg: No edema.      Left lower leg: No edema.      Comments: Left antecubital fossa erythema, tenderness, edema significantly improved.   Neurological:      Mental Status: She is alert and oriented to person, place, and time. Mental status is at baseline.   Psychiatric:         Mood and Affect: Mood normal.         Behavior: Behavior normal.            Discharge instructions/Information to patient and family:   See after visit summary for information provided to patient and family.      Provisions for Follow-Up Care:  See after visit summary for information related to follow-up care and any pertinent home health orders.      Mobility at time of Discharge:   Basic Mobility Inpatient Raw Score: 24  JH-HLM Goal: 8: Walk 250 feet or more  JH-HLM Achieved: 8: Walk 250 feet ot more       Disposition:   Home    Planned Readmission:     Discharge Medications:  See after visit summary for reconciled discharge medications provided to patient and/or family.      Administrative Statements   Discharge Statement:  I have spent a total time of 35 minutes in caring for this patient on the day of the visit/encounter. >30 minutes of time was spent on: Diagnostic results, Patient and family education, Impressions, Counseling / Coordination of care, Documenting in the medical record, and Reviewing / ordering tests, medicine, procedures  .    **Please Note: This note may have been constructed using a voice recognition system**

## 2025-01-11 NOTE — ASSESSMENT & PLAN NOTE
Patient was referred to emergency room by PCP due to her symptoms of left upper and lower emergency numbness, tingling, perioral numbness tingling, episode of aphasia which has been intermittent and ongoing since 2022.  Patient does report having episode of syncope and fall few years ago and has been having episodes intermittently.     Patient had negative MRI brain in 2023.  Currently during my encounter she is completely asymptomatic and oriented x 4.  Currently she denies any numbness, tingling, difficulty with speech, any other new complaint.  Patient reports that she gets the symptoms intermittently and she thinks is mostly from orthostatic hypotension.  Continue to monitor orthostatic vitals while here.  Outpatient follow-up with PCP.

## 2025-01-11 NOTE — ASSESSMENT & PLAN NOTE
Patient was recently hospitalized due to acute blood loss anemia secondary to duodenal ulcer thought to be due to NSAID use.,  Was also diagnosed with Schatzki's ring, and discharged on 1/8/2025.  Discharge hemoglobin 8.0.  Currently hemoglobin 8.5.  Outpatient follow-up with PCP.

## 2025-01-13 ENCOUNTER — TELEPHONE (OUTPATIENT)
Age: 75
End: 2025-01-13

## 2025-01-13 ENCOUNTER — APPOINTMENT (OUTPATIENT)
Dept: PHYSICAL THERAPY | Facility: CLINIC | Age: 75
End: 2025-01-13
Payer: MEDICARE

## 2025-01-13 ENCOUNTER — TRANSITIONAL CARE MANAGEMENT (OUTPATIENT)
Age: 75
End: 2025-01-13

## 2025-01-13 DIAGNOSIS — Z98.890 S/P RIGHT ROTATOR CUFF REPAIR: Primary | ICD-10-CM

## 2025-01-13 NOTE — PROGRESS NOTES
"Daily Note     Today's date: 2025  Patient name: Rosario oMrales  : 1950  MRN: [id number]  Referring provider: Meagan Shukla PA-C  Dx:   Encounter Diagnosis     ICD-10-CM    1. S/P right rotator cuff repair  Z98.890                      Subjective: ***      Objective: See treatment diary below      Assessment: Tolerated treatment {Tolerated treatment :[id number]}. Patient {assessment:}      Plan: {PLAN:[id number]}     Precautions: S/p Right shoulder arthroscopic rotator cuff repair, subscapularis repair, open subpectoral biceps tenodesis, acromioplasty, extensive debridement performed 2024   No resisted elbow flexion or internal rotation   Access Code: [email or web address]    POC expires Unit limit Auth  expiration date PT/OT + Visit Limit?   25 BOMN N/A BOMN                 Visit/Unit Tracking  AUTH Status:  Date    N/A Used 13 14 15 16 17 18 19 20 21 22 23 1    Remaining                     Manuals    PROM R Shldr per protocol BE BE JS SC BE SC  BE BE BE JS                               Neuro Re-Ed              Webslide: M/L GTB   3\" 3x10 ea        GTB 3\" 2x10 ea   GTB 3\" 3x10 ea  GTB  3''  3 x 10  ea   Prone Y,T,I   R only  1# 10x ea dir  1#  15x  Ea dir 1# 15x ea.  1# 2x10 ea  1# 2x 10     20x ea  20x  ea   Prone rows   R only   3# x20  3#  x20 3# 2x10  3# 3x10 3# 3x 10     2# x20  2#  x20                 Ther Ex              Pt education     Re-evaluation, HEP review    Re-eval, HEP review      UBE  4'/4' 4'/4' 4'/4'' 4'/'4  5'/'5  4'/4' 4'/4' 4'/4' 4'/4'   Supine shoulder flex AAROM with cane No cane   20x  No cane 30x  No  Cane  30x      No cane  20x     Webslide: ER ONLY RTB 3x10       RTB 3x10  RTB 3x10 RTB  3x10   Sidelying shoulder abduction AROM 3x10 3x10 1#  x10 1# 10x  1# 2x10 1# 2x 10   3x10 3x10     Sidelying shoulder ER AROM 3x10  3x10 1#  2x10 1# 2x 10  1# " "2x10  1# 2x 10   3x10      Wall slides flex  5'' x 15  Ecc  lowering 5''x15  Ecc   lower 5\" 2xc 10 ecc lowering     5\"x15 ecc lowering  5\"x15 ecc lowering 5'' x 15  Ecc  lowering   Sidelying shoulder flexion with ranger         YTB x15       Standing shoulder IR stretch strap  10\"x10 10x10'' 10\"x 10      10\"x10 10\"x10 10x10''   Standing shoulder ER stretch at wall         10\"x10 10\"x10  10x10''   Wall slide with TB resistance     NV         Ther Activity                                          Gait Training                                          Modalities              CP                                          "

## 2025-01-13 NOTE — TELEPHONE ENCOUNTER
Patient returning Maureen's call for her TCM appointment. Patient stated that she is still coming in for her appointment and that she just wanted to confirm that her appointment with Dr. Rosario on the 29 of January was not canceled

## 2025-01-14 ENCOUNTER — APPOINTMENT (OUTPATIENT)
Dept: PHYSICAL THERAPY | Facility: CLINIC | Age: 75
End: 2025-01-14
Payer: MEDICARE

## 2025-01-15 ENCOUNTER — APPOINTMENT (OUTPATIENT)
Dept: PHYSICAL THERAPY | Facility: CLINIC | Age: 75
End: 2025-01-15
Payer: MEDICARE

## 2025-01-17 ENCOUNTER — OFFICE VISIT (OUTPATIENT)
Age: 75
End: 2025-01-17
Payer: MEDICARE

## 2025-01-17 VITALS
RESPIRATION RATE: 16 BRPM | DIASTOLIC BLOOD PRESSURE: 76 MMHG | BODY MASS INDEX: 23.03 KG/M2 | HEART RATE: 76 BPM | WEIGHT: 122 LBS | OXYGEN SATURATION: 98 % | HEIGHT: 61 IN | SYSTOLIC BLOOD PRESSURE: 100 MMHG

## 2025-01-17 DIAGNOSIS — I80.8 THROMBOPHLEBITIS ARM: Primary | ICD-10-CM

## 2025-01-17 DIAGNOSIS — D62 ACUTE BLOOD LOSS ANEMIA: ICD-10-CM

## 2025-01-17 DIAGNOSIS — R20.2 NUMBNESS AND TINGLING: ICD-10-CM

## 2025-01-17 DIAGNOSIS — K22.2 ESOPHAGEAL RING: ICD-10-CM

## 2025-01-17 DIAGNOSIS — I95.1 ORTHOSTATIC HYPOTENSION: ICD-10-CM

## 2025-01-17 DIAGNOSIS — K92.2 ACUTE UPPER GI BLEEDING: ICD-10-CM

## 2025-01-17 DIAGNOSIS — R20.0 NUMBNESS AND TINGLING: ICD-10-CM

## 2025-01-17 DIAGNOSIS — K27.9 PEPTIC ULCER DISEASE: ICD-10-CM

## 2025-01-17 DIAGNOSIS — K26.9 DUODENAL ULCER: ICD-10-CM

## 2025-01-17 PROCEDURE — 99215 OFFICE O/P EST HI 40 MIN: CPT

## 2025-01-17 NOTE — ASSESSMENT & PLAN NOTE
Patient sent to [place] ED on 1/6/2025 after presenting to our office-seen by Meagan for syncope and dark stools as well as dyspnea on exertion on 1/6/2025.  Had history of Motrin use post shoulder surgery on 8/2024.  Has a history of Schatzki's ring, UGIB, antritis, and duodenal bulb ulcer.  Hemoglobin 9 on admission compared to baseline of 13-14.  Given IV Venofer consulted by GI and had EGD which showed duodenal ulcer hemorrhage.

## 2025-01-17 NOTE — PROGRESS NOTES
Transition of Care Visit  Name: Rosario Morales      : 1950      MRN: [id number]  Encounter Provider: Tonja Richardson PA-C  Encounter Date: 2025   Encounter department: [facility] INTERNAL MEDICINE [facility] [address]    Assessment & Plan  Thrombophlebitis arm  Patient seen in office on 1/10/2025 by Meagan for edema of left upper extremity, tenderness, erythema.  Additionally had reported transient numbness and tingling of the left side of face and arm prior to office visit and was recommended urgent evaluation in the ER for stroke rule out versus TIA.  She presented to the ED same day and had workup which was positive for left upper extremity thrombophlebitis.  CTA of the head and neck was negative for any acute findings.  Patient was given IV ceftriaxone and ice packs.  She was admitted for observation and symptoms dramatically improved overnight.    In office today patient appears dramatically improved.  Arm is improved and symptoms are gone.  Minimal swelling remaining in left upper extremity.  Seems residual from infection/inflammation.       Numbness and tingling  Patient asymptomatic in the office today.  When evaluated in the ED-she thought maybe it was from orthostatic hypotension.       Acute upper GI bleeding  Patient sent to [place] ED on 2025 after presenting to our office-seen by Meagan for syncope and dark stools as well as dyspnea on exertion on 2025.  Had history of Motrin use post shoulder surgery on 2024.  Has a history of Schatzki's ring, UGIB, antritis, and duodenal bulb ulcer.  Hemoglobin 9 on admission compared to baseline of 13-14.  Given IV Venofer consulted by GI and had EGD which showed duodenal ulcer hemorrhage.       Duodenal ulcer  History of duodenal ulcer        Esophageal ring  As per EGD on 2025-Schatzki ring in the Z-line.       Peptic ulcer disease  Patient has known history of peptic ulcer disease.  Recommend complete avoidance of NSAIDs to reduce any  risk of recurrence of GI bleeding.       Acute blood loss anemia  Patient had hemoglobin of 9 on admission to the ED.  Given 2 infusions of IV Venofer.  Patient has lab work orders in to be checked for hopeful improvement of anemia.       Orthostatic hypotension  Orthostatic blood pressures checked in office.  Testing was negative.  No significant drop in systolic or diastolic blood pressures.  Discussed continued monitoring of blood pressures at home as well as symptoms.  She endorses that her fatigue seems to occur a few minutes after going from seated/lying positions to standing.  Appears more likely to be fatigue related to recent anemia/events            History of Present Illness     Transitional Care Management Review:   Rosario Morales is a 74 y.o. female here for TCM follow up.     During the TCM phone call patient stated:  TCM Call       Date and time call was made  1/13/2025  9:40 AM    Hospital care reviewed  Records reviewed    Patient was hospitialized at  [facility]    Date of Admission  01/10/25    Date of discharge  01/11/25    Diagnosis  Thrombophlebitis arm    Disposition  Home    Were the patients medications reviewed and updated  Yes    Current Symptoms  None    Dizziness severity  Mild    Quality Character  Lightheadedness    Episode pattern  Constant    Cause  No known event; Gastroenteritis          TCM Call       Post hospital issues  None    Should patient be enrolled in anticoag monitoring?  Yes    Scheduled for follow up?  Yes    Patients specialists  Other (comment)    Other specialists names  [facility] Holt DO, [phone]    Did you obtain your prescribed medications  Yes    Do you need help managing your prescriptions or medications  No    Is transportation to your appointment needed  No    I have advised the patient to call PCP with any new or worsening symptoms  Maureen Ye, Manager    Living Arrangements  Spouse or Significiant other    Support System  Spouse    The type  of support provided  Emotional; Physical    Do you have social support  Yes, as much as I need    Are you recieving any outpatient services  No    What type of services  Holter Monitor, Stress Test    Are you recieving home care services  No    Are you using any community resources  No    Current waiver services  No    Have you fallen in the last 12 months  No    Interperter language line needed  No    Counseling  Patient    Counseling topics  Importance of RX compliance          Patient is a 74 year old female presenting for TCM follow up.  Was recently evaluated in [facility]. for left upper extremity thrombophlebitis.  Had full workup as she was presenting with signs potential for stroke/TIA and office visit with Meagan on 1/10/2025.  Was admitted for observation, given IV antibiotics and ice packs which dramatically improved her condition/symptoms.  Was discharged on 1/11/2025.    Additionally she was seen in office by Meagan on 1/6/2025-reported    Feels like her arm is much better-no symptoms, no numbness or tingling, redness and pain have drastically improved.  Does state that sometimes she gets little fatigued when she is doing stuff around the house, thinks maybe she is doing a little too much and needs to take it slow.    Dizziness  Pertinent negatives include no abdominal pain, arthralgias, chest pain, chills, coughing, fatigue, fever, headaches, joint swelling, myalgias, nausea, numbness, sore throat, vomiting or weakness.     Review of Systems   Constitutional:  Negative for chills, fatigue and fever.   HENT:  Negative for ear discharge, ear pain, postnasal drip, rhinorrhea, sinus pressure, sinus pain, sore throat, tinnitus and trouble swallowing.    Eyes:  Negative for pain, discharge and itching.   Respiratory:  Negative for cough, shortness of breath and wheezing.    Cardiovascular:  Negative for chest pain, palpitations and leg swelling.   Gastrointestinal:  Negative for abdominal pain,  "constipation, diarrhea, nausea and vomiting.   Endocrine: Negative for polydipsia, polyphagia and polyuria.   Genitourinary:  Negative for difficulty urinating, frequency, hematuria and urgency.   Musculoskeletal:  Negative for arthralgias, joint swelling and myalgias.   Skin:  Negative for color change.   Allergic/Immunologic: Negative for environmental allergies.   Neurological:  Positive for dizziness. Negative for weakness, light-headedness, numbness and headaches.   Hematological:  Negative for adenopathy.   Psychiatric/Behavioral:  Negative for decreased concentration and sleep disturbance. The patient is not nervous/anxious.      Objective   BP 94/62 (BP Location: Right arm, Patient Position: Sitting)   Pulse 80   Resp 16   Ht 5' 1\" (1.549 m)   Wt 55.3 kg (122 lb)   SpO2 98%   BMI 23.05 kg/m²     Physical Exam  Vitals and nursing note reviewed.   Constitutional:       General: She is awake. She is not in acute distress.     Appearance: Normal appearance. She is well-developed, well-groomed and normal weight.   HENT:      Head: Normocephalic and atraumatic.      Right Ear: Hearing and external ear normal.      Left Ear: Hearing and external ear normal.      Nose: Nose normal.      Mouth/Throat:      Lips: Pink.      Mouth: Mucous membranes are moist.   Eyes:      General: Lids are normal. Vision grossly intact. Gaze aligned appropriately.      Conjunctiva/sclera: Conjunctivae normal.   Neck:      Vascular: No carotid bruit.      Trachea: Trachea and phonation normal.   Cardiovascular:      Rate and Rhythm: Normal rate and regular rhythm.      Heart sounds: Normal heart sounds, S1 normal and S2 normal. No murmur heard.     No friction rub. No gallop.   Pulmonary:      Effort: Pulmonary effort is normal. No respiratory distress.      Breath sounds: Normal breath sounds and air entry. No decreased breath sounds, wheezing, rhonchi or rales.   Abdominal:      General: Abdomen is flat. Bowel sounds are normal. "      Palpations: Abdomen is soft.      Tenderness: There is no abdominal tenderness.   Musculoskeletal:         General: No swelling.      Cervical back: Neck supple.      Right lower leg: No edema.      Left lower leg: No edema.   Skin:     General: Skin is warm.      Capillary Refill: Capillary refill takes less than 2 seconds.   Neurological:      Mental Status: She is alert.   Psychiatric:         Attention and Perception: Attention and perception normal.         Mood and Affect: Mood and affect normal.         Speech: Speech normal.         Behavior: Behavior normal. Behavior is cooperative.         Thought Content: Thought content normal.         Cognition and Memory: Cognition and memory normal.         Judgment: Judgment normal.       Medications have been reviewed by provider in current encounter

## 2025-01-17 NOTE — ASSESSMENT & PLAN NOTE
Patient asymptomatic in the office today.  When evaluated in the ED-she thought maybe it was from orthostatic hypotension.

## 2025-01-17 NOTE — ASSESSMENT & PLAN NOTE
Patient seen in office on 1/10/2025 by Meagan for edema of left upper extremity, tenderness, erythema.  Additionally had reported transient numbness and tingling of the left side of face and arm prior to office visit and was recommended urgent evaluation in the ER for stroke rule out versus TIA.  She presented to the ED same day and had workup which was positive for left upper extremity thrombophlebitis.  CTA of the head and neck was negative for any acute findings.  Patient was given IV ceftriaxone and ice packs.  She was admitted for observation and symptoms dramatically improved overnight.    In office today patient appears dramatically improved.  Arm is improved and symptoms are gone.  Minimal swelling remaining in left upper extremity.  Seems residual from infection/inflammation.

## 2025-01-17 NOTE — ASSESSMENT & PLAN NOTE
Patient had hemoglobin of 9 on admission to the ED.  Given 2 infusions of IV Venofer.  Patient has lab work orders in to be checked for hopeful improvement of anemia.

## 2025-01-20 ENCOUNTER — APPOINTMENT (OUTPATIENT)
Dept: PHYSICAL THERAPY | Facility: CLINIC | Age: 75
End: 2025-01-20
Payer: MEDICARE

## 2025-01-22 ENCOUNTER — OFFICE VISIT (OUTPATIENT)
Dept: PHYSICAL THERAPY | Facility: CLINIC | Age: 75
End: 2025-01-22
Payer: MEDICARE

## 2025-01-22 ENCOUNTER — APPOINTMENT (OUTPATIENT)
Dept: LAB | Facility: CLINIC | Age: 75
End: 2025-01-22
Payer: MEDICARE

## 2025-01-22 DIAGNOSIS — K92.2 ACUTE UPPER GI BLEEDING: ICD-10-CM

## 2025-01-22 DIAGNOSIS — D62 ACUTE BLOOD LOSS ANEMIA: ICD-10-CM

## 2025-01-22 DIAGNOSIS — E78.5 DYSLIPIDEMIA: ICD-10-CM

## 2025-01-22 DIAGNOSIS — Z98.890 S/P RIGHT ROTATOR CUFF REPAIR: Primary | ICD-10-CM

## 2025-01-22 DIAGNOSIS — M75.101 TEAR OF RIGHT SUPRASPINATUS TENDON: ICD-10-CM

## 2025-01-22 LAB
ALBUMIN SERPL BCG-MCNC: 4.1 G/DL (ref 3.5–5)
ALP SERPL-CCNC: 75 U/L (ref 34–104)
ALT SERPL W P-5'-P-CCNC: 14 U/L (ref 7–52)
ANION GAP SERPL CALCULATED.3IONS-SCNC: 10 MMOL/L (ref 4–13)
AST SERPL W P-5'-P-CCNC: 19 U/L (ref 13–39)
BASOPHILS # BLD AUTO: 0.09 THOUSANDS/ΜL (ref 0–0.1)
BASOPHILS NFR BLD AUTO: 2 % (ref 0–1)
BILIRUB SERPL-MCNC: 0.49 MG/DL (ref 0.2–1)
BUN SERPL-MCNC: 16 MG/DL (ref 5–25)
CALCIUM SERPL-MCNC: 9.2 MG/DL (ref 8.4–10.2)
CHLORIDE SERPL-SCNC: 104 MMOL/L (ref 96–108)
CHOLEST SERPL-MCNC: 203 MG/DL (ref ?–200)
CO2 SERPL-SCNC: 25 MMOL/L (ref 21–32)
CREAT SERPL-MCNC: 0.88 MG/DL (ref 0.6–1.3)
EOSINOPHIL # BLD AUTO: 0.16 THOUSAND/ΜL (ref 0–0.61)
EOSINOPHIL NFR BLD AUTO: 3 % (ref 0–6)
ERYTHROCYTE [DISTWIDTH] IN BLOOD BY AUTOMATED COUNT: 15.9 % (ref 11.6–15.1)
FERRITIN SERPL-MCNC: 292 NG/ML (ref 11–307)
GFR SERPL CREATININE-BSD FRML MDRD: 65 ML/MIN/1.73SQ M
GLUCOSE P FAST SERPL-MCNC: 97 MG/DL (ref 65–99)
HCT VFR BLD AUTO: 33.4 % (ref 34.8–46.1)
HDLC SERPL-MCNC: 69 MG/DL
HGB BLD-MCNC: 10.5 G/DL (ref 11.5–15.4)
IMM GRANULOCYTES # BLD AUTO: 0.02 THOUSAND/UL (ref 0–0.2)
IMM GRANULOCYTES NFR BLD AUTO: 0 % (ref 0–2)
IRON SATN MFR SERPL: 12 % (ref 15–50)
IRON SERPL-MCNC: 45 UG/DL (ref 50–212)
LDLC SERPL CALC-MCNC: 117 MG/DL (ref 0–100)
LYMPHOCYTES # BLD AUTO: 1.15 THOUSANDS/ΜL (ref 0.6–4.47)
LYMPHOCYTES NFR BLD AUTO: 24 % (ref 14–44)
MCH RBC QN AUTO: 30.1 PG (ref 26.8–34.3)
MCHC RBC AUTO-ENTMCNC: 31.4 G/DL (ref 31.4–37.4)
MCV RBC AUTO: 96 FL (ref 82–98)
MONOCYTES # BLD AUTO: 0.43 THOUSAND/ΜL (ref 0.17–1.22)
MONOCYTES NFR BLD AUTO: 9 % (ref 4–12)
NEUTROPHILS # BLD AUTO: 2.91 THOUSANDS/ΜL (ref 1.85–7.62)
NEUTS SEG NFR BLD AUTO: 62 % (ref 43–75)
NONHDLC SERPL-MCNC: 134 MG/DL
NRBC BLD AUTO-RTO: 0 /100 WBCS
PLATELET # BLD AUTO: 445 THOUSANDS/UL (ref 149–390)
PMV BLD AUTO: 9.3 FL (ref 8.9–12.7)
POTASSIUM SERPL-SCNC: 4 MMOL/L (ref 3.5–5.3)
PROT SERPL-MCNC: 6.8 G/DL (ref 6.4–8.4)
RBC # BLD AUTO: 3.49 MILLION/UL (ref 3.81–5.12)
SODIUM SERPL-SCNC: 139 MMOL/L (ref 135–147)
TIBC SERPL-MCNC: 378 UG/DL (ref 250–450)
TRANSFERRIN SERPL-MCNC: 270 MG/DL (ref 203–362)
TRIGL SERPL-MCNC: 83 MG/DL (ref ?–150)
UIBC SERPL-MCNC: 333 UG/DL (ref 155–355)
WBC # BLD AUTO: 4.76 THOUSAND/UL (ref 4.31–10.16)

## 2025-01-22 PROCEDURE — 80053 COMPREHEN METABOLIC PANEL: CPT

## 2025-01-22 PROCEDURE — 97110 THERAPEUTIC EXERCISES: CPT

## 2025-01-22 PROCEDURE — 36415 COLL VENOUS BLD VENIPUNCTURE: CPT

## 2025-01-22 PROCEDURE — 82728 ASSAY OF FERRITIN: CPT

## 2025-01-22 PROCEDURE — 85025 COMPLETE CBC W/AUTO DIFF WBC: CPT

## 2025-01-22 PROCEDURE — 97140 MANUAL THERAPY 1/> REGIONS: CPT

## 2025-01-22 PROCEDURE — 83550 IRON BINDING TEST: CPT

## 2025-01-22 PROCEDURE — 80061 LIPID PANEL: CPT

## 2025-01-22 PROCEDURE — 83540 ASSAY OF IRON: CPT

## 2025-01-22 NOTE — PROGRESS NOTES
"Daily Note     Today's date: 2025  Patient name: Rosario Morales  : 1950  MRN: [id number]  Referring provider: Meagan Shukla PA-C  Dx:   Encounter Diagnosis     ICD-10-CM    1. S/P right rotator cuff repair  Z98.890                      Subjective: Patient presents today following a hospital admission secondary to GI bleed from NSAID usage. She had low hemoglobin levels and anemia which led her to undergo blood and iron transfusions as well. She later had a second hospital admit due to Thrombophlebitis of her left arm.       Objective: See treatment diary below      Assessment: Due to this being patient's first visit back following a hospital admission, reduced intensity and resistances to patient's exercises today. She was able to complete all exercises without shoulder pain and without onset of any other symptoms related to thrombophlebitis or GI bleed. Tolerated treatment well. Patient demonstrated fatigue post treatment, exhibited good technique with therapeutic exercises, and would benefit from continued PT      Plan: Continue per plan of care.      Precautions: S/p Right shoulder arthroscopic rotator cuff repair, subscapularis repair, open subpectoral biceps tenodesis, acromioplasty, extensive debridement performed 2024   No resisted elbow flexion or internal rotation   Access Code: [email or web address]    POC expires Unit limit Auth  expiration date PT/OT + Visit Limit?   25 BOMN N/A BOMN                 Visit/Unit Tracking  AUTH Status:  Date    N/A Used 2           1    Remaining                     Manuals    PROM R Shldr per protocol BE BE JS SC BE SC BE BE BE BE JS                               Neuro Re-Ed              Webslide: M/L GTB   3\" 3x10 ea        GTB 3\" 2x10 ea   GTB 3\" 3x10 ea  GTB  3''  3 x 10  ea   Prone Y,T,I   R only  1# 10x ea dir  1#  15x  Ea dir 1# 15x ea.  1# 2x10 ea  1# 2x 10     20x ea  20x  ea " "  Prone rows   R only   3# x20  3#  x20 3# 2x10  3# 3x10 3# 3x 10     2# x20  2#  x20                 Ther Ex              Pt education     Re-evaluation, HEP review    Re-eval, HEP review      UBE  4'/4' 4'/4' 4'/4'' 4'/'4  5'/'5 3'/3' 4'/4' 4'/4' 4'/4' 4'/4'   Pulleys       3 min       Supine shoulder flex AAROM with cane No cane   20x  No cane 30x  No  Cane  30x      No cane  20x     Webslide: ER ONLY RTB 3x10       RTB 3x10  RTB 3x10 RTB  3x10   Sidelying shoulder abduction AROM 3x10 3x10 1#  x10 1# 10x  1# 2x10 1# 2x 10  2x10 3x10 3x10     Sidelying shoulder ER AROM 3x10  3x10 1#  2x10 1# 2x 10  1# 2x10  1# 2x 10  2x10 3x10      Wall slides flex  5'' x 15  Ecc  lowering 5''x15  Ecc   lower 5\" 2xc 10 ecc lowering    5\" 2x10 ecc lowering 5\"x15 ecc lowering  5\"x15 ecc lowering 5'' x 15  Ecc  lowering   Sidelying shoulder flexion with ranger         YTB x15       Standing shoulder IR stretch strap  10\"x10 10x10'' 10\"x 10      10\"x10 10\"x10 10x10''   Standing shoulder ER stretch at wall         10\"x10 10\"x10  10x10''   Wall slide with TB resistance     NV         Ther Activity                                          Gait Training                                          Modalities              CP                                            " Readings from Last 3 Encounters:   04/09/19 163 lb 12.8 oz (74.3 kg)   02/28/19 184 lb 9.6 oz (83.7 kg)   02/19/19 183 lb (83 kg)         Gen: Patient in NAD, resting comfortably  Neck: No JVD or bruits  Respiratory: faint wheezes  Chest: normal without deformity  Cardiovascular:RRR, S1S2, no mrg, normal PMI  Abdomen: Soft, mildly tender  Extremities: No clubbing, cyanosis, or edema (mild presacral edema)  Neurological/Psychiatric: AxO x4, no gross issues  Skin:  Warm and dry      Labs:  CBC:   Recent Labs     04/07/19 0320 04/08/19  0403 04/09/19  0503   WBC 17.9* 9.8 9.1   HGB 8.1* 7.8* 7.3*   HCT 24.3* 23.1* 21.9*   MCV 84.8 85.6 85.9    129* 114*     BMP:   Recent Labs     04/07/19 0320 04/08/19  0403 04/09/19  0503   * 134* 136   K 4.1 4.4 3.8   CL 96* 97* 98*   CO2 28 28 27   BUN 13 15 18   CREATININE <0.5* <0.5* <0.5*     MG:    Recent Labs     04/07/19 0320 04/08/19  0403 04/09/19  0503   MG 2.20 1.90 1.70*      PT/INR: No results for input(s): PROTIME, INR in the last 72 hours. APTT:   Recent Labs     04/07/19 0320 04/08/19  0403 04/09/19  0502   APTT 63.5* 80.5* 59.5*     Cardiac Enzymes:   No results for input(s): CKTOTAL, CKMB, CKMBINDEX, TROPONINI in the last 72 hours. Cardiac Studies:    ECHO   Definity contrast was used, but port not working properly.  Tevin Hoof left ventricular systolic function is moderately reduced with an   ejection fraction of 30-35 %. Inferolateral hypokinesia. Mid anteroseptal,   apical lateral and apical septal akinesia.   There is mild concentric left ventricular hypertrophy.   Normal left ventricular filling pressure.   Left ventricular cavity size is normal.   Mild thickening of the anterior leaflet of mitral valve.   Mild mitral regurgitation.   The left atrium is severely dilated.       Assessment and Plan   1. NSVT   - Known hx of VT s/p ICD, on Amio   - 3/11/2018 defib for VT< since then multiple NSVT getting ATP  2. New  PE 3/16/2019   - RV is normal  3. CAD   - s/p PCI to RCA 6/2011   - hx of PCi to LAD (post endograft AAA repair MI)1/2011  4. Ischemic cardiomyopathy: LVEF 30-35%  5. HTN  6. Hx of aortic aneurysm repair   - Per Dr. Wild Dasilva, on ASA/plavix for this  7. Hx of sigmoid colectomy for perf diverticulosis 3/14/2019   - Laparoscopic converted to Open left Colectomy   s/p Gerda's procedure for anastomotic leak   8. Metastatic lung CA, stage 4 with bone mets  9. Hypokalemia  10. Hypomag        PLAN  1. PT with pulm edema on CXR but little fluid on exam with little resp improvement after 9L net negative suggests likely mixed cardiac/pulm picture   - will increase lasix to 60mg IV this Am and watch for improvement  2. Would like to perform LHC/RHC but need pt to either be able to lay flat or need elective intubation for procedure, this was d/w family  3. Keep K elevated with diuresis  4. Given dropping LVEF< +wall motion and Ca on CT-> strongly suspicious of obstructive CAD  5. Cont ASA, IV lasix, toprol, zocor   - may consider d/c Amio following cath based on finding   - if negative cath, likely consult EP  6. Likely d/c amio following cath if PCI performed. If not, will consider EP      Critical care: 32 min.  Spoke with ICU RN and Dr. Gogo Thomas    Patient Active Problem List   Diagnosis    Elevated fasting glucose    CAD (coronary artery disease)    PAD (peripheral artery disease) ([facility] [address]    Essential hypertension    Mixed hyperlipidemia    Sprain of right foot    Sprain of anterior talofibular ligament of right ankle    Closed nondisplaced fracture of fifth right metatarsal bone    Closed fracture of proximal lateral malleolus of ankle with routine healing    Right foot pain    Right ankle pain    Nondisplaced fracture of fifth right metatarsal bone with routine healing    History of pulmonary emphysema    Chronic obstructive pulmonary disease with (acute) exacerbation (HCC)    Pulmonary nodule, left    Abnormal bone scan of thoracic spine    Back pain    Visit for monitoring Plavix therapy    History of ventricular tachycardia    Gallstones    Hemoptysis    Former smoker    Elevated PSA    Adenocarcinoma of left lung ([facility] [address]    Acute diverticulitis    Pancytopenia (HCC)    Diverticulitis of large intestine with perforation and abscess without bleeding    Diverticulitis    Chemotherapy-induced neutropenia (HCC)    Mucositis    Herpes zoster oticus    Hearing loss of right ear    Acute respiratory failure with hypoxia (HCC)    H/O colectomy    Primary malignant neoplasm of lung metastatic to other site [facility])    NSVT (nonsustained ventricular tachycardia) (HCC)    Other pulmonary embolism without acute cor pulmonale (HCC)    Cardiomyopathy (HCC)    Pneumonia of left lung due to infectious organism    Iron deficiency anemia due to chronic blood loss         Thank you for allowing me to participate in the care of your patient. Please call me with any questions [phone].       Daisy Swartz MD, [address] [place] [address] Cardiologist  [address]  [phone] [facility]  [phone] [address] [place] [address]  4/9/2019 8:59 AM

## 2025-01-24 ENCOUNTER — OFFICE VISIT (OUTPATIENT)
Dept: PHYSICAL THERAPY | Facility: CLINIC | Age: 75
End: 2025-01-24
Payer: MEDICARE

## 2025-01-24 DIAGNOSIS — Z98.890 S/P RIGHT ROTATOR CUFF REPAIR: Primary | ICD-10-CM

## 2025-01-24 PROCEDURE — 97140 MANUAL THERAPY 1/> REGIONS: CPT

## 2025-01-24 PROCEDURE — 97110 THERAPEUTIC EXERCISES: CPT

## 2025-01-24 PROCEDURE — 97112 NEUROMUSCULAR REEDUCATION: CPT

## 2025-01-24 NOTE — PROGRESS NOTES
"Daily Note     Today's date: 2025  Patient name: Rosario Morales  : 1950  MRN: [id number]  Referring provider: Meagan Shukla PA-C  Dx:   Encounter Diagnosis     ICD-10-CM    1. S/P right rotator cuff repair  Z98.890                      Subjective: Patient offers no complaints after last visit. Denies increased soreness following her first visit back to therapy following her hospitalizations.      Objective: See treatment diary below      Assessment: Tolerated treatment well. Was able to resume resistance based exercises as outlined below with good tolerance. Greatly challenged with shoulder scaption and abduction AROM. Educated on DOMS following resuming previous exercises completed in therapy sessions. Patient demonstrated fatigue post treatment, exhibited good technique with therapeutic exercises, and would benefit from continued PT      Plan: Continue per plan of care.      Precautions: S/p Right shoulder arthroscopic rotator cuff repair, subscapularis repair, open subpectoral biceps tenodesis, acromioplasty, extensive debridement performed 2024   No resisted elbow flexion or internal rotation   Access Code: [email or web address]    POC expires Unit limit Auth  expiration date PT/OT + Visit Limit?   25 BOMN N/A BOMN                 Visit/Unit Tracking  AUTH Status:  Date    N/A Used 2 3          1    Remaining                     Manuals    PROM R Shldr per protocol BE BE JS SC BE SC BE BE BE BE JS                               Neuro Re-Ed              Webslide: M/L GTB   3\" 3x10 ea        GTB 3\" 2x10 ea  GTB 3\" 3x10 ea  GTB  3''  3 x 10  ea   Prone Y,T,I   R only  1# 10x ea dir  1#  15x  Ea dir 1# 15x ea.  1# 2x10 ea  1# 2x 10   1# 2x10  20x ea  20x  ea   Prone rows   R only   3# x20  3#  x20 3# 2x10  3# 3x10 3# 3x 10   3# 2x10   2# x20  2#  x20                 Ther Ex              Pt education     Re-evaluation, HEP " "review    Re-eval, HEP review      UBE  4'/4' 4'/4' 4'/4'' 4'/'4  5'/'5 3'/3'  4'/4' 4'/4' 4'/4'   Lalitha       3 min       Webslide: ER ONLY RTB 3x10       RTB 2x10   RTB 3x10 RTB  3x10   Sidelying shoulder abduction AROM 3x10 3x10 1#  x10 1# 10x  1# 2x10 1# 2x 10  2x10  3x10     Sidelying shoulder ER AROM 3x10  3x10 1#  2x10 1# 2x 10  1# 2x10  1# 2x 10  2x10       Wall slides flex  5'' x 15  Ecc  lowering 5''x15  Ecc   lower 5\" 2xc 10 ecc lowering    5\" 2x10 ecc lowering 5\" x10 ecc lower   5\"x15 ecc lowering 5'' x 15  Ecc  lowering   Standing shoulder IR stretch strap  10\"x10 10x10'' 10\"x 10      10\"x10 10\"x10 10x10''   Standing shoulder ER stretch at wall         10\"x10 10\"x10  10x10''   Wall slide with TB resistance     NV   Unable       Shoulder 3 way raises         10x ea dir       Ther Activity                                          Gait Training                                          Modalities              CP                                              "

## 2025-01-27 ENCOUNTER — OFFICE VISIT (OUTPATIENT)
Dept: PHYSICAL THERAPY | Facility: CLINIC | Age: 75
End: 2025-01-27
Payer: MEDICARE

## 2025-01-27 DIAGNOSIS — Z98.890 S/P RIGHT ROTATOR CUFF REPAIR: Primary | ICD-10-CM

## 2025-01-27 PROCEDURE — 97110 THERAPEUTIC EXERCISES: CPT

## 2025-01-27 PROCEDURE — 97140 MANUAL THERAPY 1/> REGIONS: CPT

## 2025-01-27 NOTE — PROGRESS NOTES
"Daily Note     Today's date: 2025  Patient name: Rosario Morales  : 1950  MRN: [id number]  Referring provider: Meagan Shukla PA-C  Dx:   Encounter Diagnosis     ICD-10-CM    1. S/P right rotator cuff repair  Z98.890                      Subjective: Patient reports that her shoulder was sore after last visit, however she took a nap and woke up feeling much better. Her shoulder didn't bother her the rest of the weekend.       Objective: See treatment diary below      Assessment: Tolerated treatment well. Much improved shoulder AROM in all planes during shoulder 3 way raises and wall slides today compared to last visit and denied pain in the shoulder. She did have muscular fatigue with additional reps however. Patient demonstrated fatigue post treatment, exhibited good technique with therapeutic exercises, and would benefit from continued PT      Plan: Continue per plan of care.      Precautions: S/p Right shoulder arthroscopic rotator cuff repair, subscapularis repair, open subpectoral biceps tenodesis, acromioplasty, extensive debridement performed 2024   No resisted elbow flexion or internal rotation   Access Code: [email or web address]    POC expires Unit limit Auth  expiration date PT/OT + Visit Limit?   25 BOMN N/A BOMN                 Visit/Unit Tracking  AUTH Status:  Date    N/A Used 2 3 4         1    Remaining                     Manuals  12/   PROM R Shldr per protocol BE BE JS SC BE SC BE BE BE BE JS                               Neuro Re-Ed              Webslide: M/L GTB   3\" 3x10 ea        GTB 3\" 2x10 ea  GTB 3\" 3x10 ea  GTB  3''  3 x 10  ea   Prone Y,T,I   R only  1# 10x ea dir  1#  15x  Ea dir 1# 15x ea.  1# 2x10 ea  1# 2x 10   1# 2x10 1# 2x10 20x ea  20x  ea   Prone rows   R only   3# x20  3#  x20 3# 2x10  3# 3x10 3# 3x 10   3# 2x10  3# 3x10 2# x20  2#  x20                 Ther Ex              Pt education     " "Re-evaluation, HEP review         UBE  4'/4' 4'/4' 4'/4'' 4'/'4  5'/'5 3'/3'  4'/4' 4'/4' 4'/4'   Lalitha       3 min       Webslide: ER ONLY RTB 3x10       RTB 2x10  RTB 2x10  RTB 3x10 RTB  3x10   Sidelying shoulder abduction AROM 3x10 3x10 1#  x10 1# 10x  1# 2x10 1# 2x 10  2x10       Sidelying shoulder ER AROM 3x10  3x10 1#  2x10 1# 2x 10  1# 2x10  1# 2x 10  2x10       Wall slides flex  5'' x 15  Ecc  lowering 5''x15  Ecc   lower 5\" 2xc 10 ecc lowering    5\" 2x10 ecc lowering 5\" x10 ecc lower  5\" x20 ecc lower  5\"x15 ecc lowering 5'' x 15  Ecc  lowering   Standing shoulder IR stretch strap  10\"x10 10x10'' 10\"x 10       10\"x10 10x10''   Standing shoulder ER stretch at wall          10\"x10  10x10''   Wall slide with TB resistance     NV   Unable       Shoulder 3 way raises         10x ea dir   10x ea dir      Ther Activity                                          Gait Training                                          Modalities              CP                                                "

## 2025-01-29 ENCOUNTER — OFFICE VISIT (OUTPATIENT)
Age: 75
End: 2025-01-29
Payer: MEDICARE

## 2025-01-29 VITALS
RESPIRATION RATE: 16 BRPM | BODY MASS INDEX: 23.03 KG/M2 | TEMPERATURE: 97.8 F | HEIGHT: 61 IN | DIASTOLIC BLOOD PRESSURE: 64 MMHG | OXYGEN SATURATION: 98 % | WEIGHT: 122 LBS | SYSTOLIC BLOOD PRESSURE: 114 MMHG | HEART RATE: 78 BPM

## 2025-01-29 DIAGNOSIS — Z00.00 WELL ADULT EXAM: ICD-10-CM

## 2025-01-29 DIAGNOSIS — M85.80 OSTEOPENIA, UNSPECIFIED LOCATION: ICD-10-CM

## 2025-01-29 DIAGNOSIS — K26.9 DUODENAL ULCER: ICD-10-CM

## 2025-01-29 DIAGNOSIS — D62 ACUTE BLOOD LOSS ANEMIA: Primary | ICD-10-CM

## 2025-01-29 DIAGNOSIS — E55.9 VITAMIN D DEFICIENCY: ICD-10-CM

## 2025-01-29 DIAGNOSIS — E78.5 DYSLIPIDEMIA: ICD-10-CM

## 2025-01-29 PROCEDURE — 99214 OFFICE O/P EST MOD 30 MIN: CPT | Performed by: INTERNAL MEDICINE

## 2025-01-29 PROCEDURE — G2211 COMPLEX E/M VISIT ADD ON: HCPCS | Performed by: INTERNAL MEDICINE

## 2025-01-29 RX ORDER — FERROUS GLUCONATE 324(38)MG
324 TABLET ORAL
COMMUNITY

## 2025-01-29 NOTE — ASSESSMENT & PLAN NOTE
Continue pantoprazole twice daily.  Follow-up EGD as scheduled March 3  Orders:    Iron Panel (Includes Ferritin, Iron Sat%, Iron, and TIBC); Future

## 2025-01-29 NOTE — ASSESSMENT & PLAN NOTE
Slight increase in LDL, continue with low saturated fat diet and get back to exercise as able  Orders:    Basic metabolic panel; Future    CBC and differential; Future    Lipid panel; Future    Hepatic function panel; Future    TSH, 3rd generation with Free T4 reflex; Future

## 2025-01-29 NOTE — PROGRESS NOTES
Name: Rosario Morales      : 1950      MRN: [id number]  Encounter Provider: Mike Rosario MD  Encounter Date: 2025   Encounter department: [facility] [address]  :  Assessment & Plan  Acute blood loss anemia  Secondary to duodenal ulcer, hemoglobin up to 10.5, continue plan as below  Orders:    Iron Panel (Includes Ferritin, Iron Sat%, Iron, and TIBC); Future    Dyslipidemia  Slight increase in LDL, continue with low saturated fat diet and get back to exercise as able  Orders:    Basic metabolic panel; Future    CBC and differential; Future    Lipid panel; Future    Hepatic function panel; Future    TSH, 3rd generation with Free T4 reflex; Future    Osteopenia, unspecified location  Continue weightbearing exercise, calcium and vitamin D supplements.  Recheck bone density 2026  Orders:    Vitamin D 25 hydroxy; Future    Duodenal ulcer  Continue pantoprazole twice daily.  Follow-up EGD as scheduled March 3  Orders:    Iron Panel (Includes Ferritin, Iron Sat%, Iron, and TIBC); Future    Well adult exam    Orders:    Hemoglobin A1C; Future    Vitamin D deficiency    Orders:    Vitamin D 25 hydroxy; Future           History of Present Illness   F/u mmp, awv and review labs  Feeling better  JACQUELYN-hospitalized 2 wks ago w/ DU, hgb down to 7, s/p 1 U PRBC's.  Tolerating oral iron daily.  Schedlued EGD 3/3. Taking pantoprazole bid.   No further episodes of word finding difficulty since , first episode was  after covid vaccine.    Jacob is in a group home in [place] now.  Now w/c bound.    HPL-watching diet more closely, gave up cheese and ice cream for the most part.   CKD- hydrating better.   Osteopenia w/ elevated FRAX-declined anti-resorptive rx, repeat dxa due .  Plans to get back to wt bearing exercise.  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         Review of  "Systems   Constitutional:  Negative for appetite change, chills, diaphoresis, fatigue, fever and unexpected weight change.   HENT:  Negative for congestion, hearing loss and rhinorrhea.    Eyes:  Negative for visual disturbance.   Respiratory:  Negative for cough, chest tightness, shortness of breath and wheezing.    Cardiovascular:  Negative for chest pain, palpitations and leg swelling.   Gastrointestinal:  Negative for abdominal pain and blood in stool.   Endocrine: Negative for cold intolerance, heat intolerance, polydipsia and polyuria.   Genitourinary:  Negative for difficulty urinating, dysuria, frequency and urgency.   Musculoskeletal:  Negative for arthralgias and myalgias.   Skin:  Negative for rash.   Neurological:  Negative for dizziness, weakness, light-headedness and headaches.   Hematological:  Does not bruise/bleed easily.   Psychiatric/Behavioral:  Negative for dysphoric mood and sleep disturbance.        Objective   /64 (BP Location: Left arm)   Pulse 78   Temp 97.8 °F (36.6 °C) (Tympanic Core)   Resp 16   Ht 5' 1\" (1.549 m)   Wt 55.3 kg (122 lb)   SpO2 98%   BMI 23.05 kg/m²      Physical Exam  Constitutional:       Appearance: She is well-developed.   HENT:      Head: Normocephalic and atraumatic.      Nose: Nose normal.   Eyes:      General: No scleral icterus.     Conjunctiva/sclera: Conjunctivae normal.      Pupils: Pupils are equal, round, and reactive to light.   Neck:      Thyroid: No thyromegaly.      Vascular: No JVD.      Trachea: No tracheal deviation.   Cardiovascular:      Rate and Rhythm: Normal rate and regular rhythm.      Heart sounds: No murmur heard.     No friction rub. No gallop.   Pulmonary:      Effort: Pulmonary effort is normal. No respiratory distress.      Breath sounds: Normal breath sounds. No wheezing or rales.   Abdominal:      General: Bowel sounds are normal. There is no distension.      Palpations: Abdomen is soft. There is no mass.      Tenderness: " There is no abdominal tenderness. There is no guarding or rebound.   Musculoskeletal:         General: No tenderness.      Cervical back: Normal range of motion and neck supple.   Lymphadenopathy:      Cervical: No cervical adenopathy.   Skin:     General: Skin is warm and dry.      Findings: No erythema or rash.   Neurological:      Mental Status: She is alert and oriented to person, place, and time.      Cranial Nerves: No cranial nerve deficit.   Psychiatric:         Behavior: Behavior normal.         Thought Content: Thought content normal.         Judgment: Judgment normal.

## 2025-01-29 NOTE — ASSESSMENT & PLAN NOTE
Continue weightbearing exercise, calcium and vitamin D supplements.  Recheck bone density January 2026  Orders:    Vitamin D 25 hydroxy; Future

## 2025-01-29 NOTE — ASSESSMENT & PLAN NOTE
Secondary to duodenal ulcer, hemoglobin up to 10.5, continue plan as below  Orders:    Iron Panel (Includes Ferritin, Iron Sat%, Iron, and TIBC); Future

## 2025-01-30 ENCOUNTER — EVALUATION (OUTPATIENT)
Dept: PHYSICAL THERAPY | Facility: CLINIC | Age: 75
End: 2025-01-30
Payer: MEDICARE

## 2025-01-30 DIAGNOSIS — Z98.890 S/P RIGHT ROTATOR CUFF REPAIR: Primary | ICD-10-CM

## 2025-01-30 PROCEDURE — 97140 MANUAL THERAPY 1/> REGIONS: CPT

## 2025-01-30 PROCEDURE — 97110 THERAPEUTIC EXERCISES: CPT

## 2025-01-30 NOTE — PROGRESS NOTES
PT Re-Evaluation     Today's date: 2025  Patient name: Rosario Morales  : 1950  MRN: [id number]  Referring provider: Meagan Shukla PA-C  Dx:   Encounter Diagnosis     ICD-10-CM    1. S/P right rotator cuff repair  Z98.890                      Assessment  Impairments: abnormal or restricted ROM, impaired physical strength, lacks appropriate home exercise program, pain with function and participation limitations  Symptom irritability: low    Assessment details: Rosario Morales is a 74 y.o. female presenting to physical therapy for a reevaluation s/p right shoulder arthroscopic rotator cuff repair, subscapularis repair, open subpectoral biceps tenodesis, acromioplasty, extensive debridement performed 24. Since their initial evaluation, she reports 75% improvement in her shoulder. Since her previous re-evaluation, she has demonstrated minimal improvements in all objective measurements. However, patient has just resumed PT as she was hospitalized intermittently for approximately 2 weeks secondary to GI bleed and thrombophlebitis. During this time, she was unable to complete her HEP to allow for further strength gains. Upon returning to PT, she was able to resume all exercises and progressively add resistances to address her current deficits. The patient would benefit from additional PT services to promote improved shoulder strength and AROM to allow completion of ADLs, household chores, and recreational activities of gym classes and playing piano.   Understanding of Dx/Px/POC: good     Prognosis: good    Goals  STG to be achieved by 4 weeks  -Pt will be independent with basic HEP MET  -Pt will improve ROM by 25% per post operative protocol in order to improve functional mobility MET  -Pt will report 2/10 at worst in order to facilitate return to PLOF. NOT MET    LTG to be achieved by Discharge  -Pt will be independent with comprehensive HEP. MET  -Pt will improve MMT scores to WFL in all deficient planes  in order to facilitate ease of functional activity NOT MET  -Pt will improve AROM to WFL in all deficient planes in order to improve functional mobility. NOT MET  -Pt will be able to participate in recreational gym classes without difficulties or restrictions due to her shoulder. NOT MET  -Pt will be able to complete all household chores without difficulties or restrictions due to her shoulder. NOT MET    Plan  Patient would benefit from: skilled physical therapy  Planned modality interventions: cryotherapy    Planned therapy interventions: flexibility, functional ROM exercises, home exercise program, joint mobilization, manual therapy, massage, neuromuscular re-education, patient/caregiver education, strengthening, stretching, therapeutic activities and therapeutic exercise    Frequency: 2x week  Duration in weeks: 8  Plan of Care beginning date: 1/30/2025  Plan of Care expiration date: 3/27/2025  Treatment plan discussed with: patient        Subjective Evaluation    History of Present Illness  Mechanism of injury: Rosario reports 75% improvement since beginning PT services. Patient reports that her shoulder progress over the past month has been minimal due to an extended  hospital admission secondary to GI bleed and later thrombophlebitis. She feels that she has maintained her prior improvements in shoulder ROM and strength despite these hospitalizations. She does find some mild increased stiffness in her right shoulder secondary to inactivity or nonuse of her arm while hospitalized, but this does improve with repetitions of her stretches.  She says that she does still have discomfort in her shoulder with these overhead movements of the shoulder or moving out to the side. Specifically if she has to reach for her purse on the passenger seat of her car. She says that lifting household objects that are of weight (ex: wood logs, closing piano lid, dishes/pots/pans) are challenging for her to do without assistance of  the left shoulder. She has not returned to her exercise classes following her hospitalization.          Quality of life: good    Patient Goals  Patient goals for therapy: decreased pain, increased motion, increased strength, return to sport/leisure activities and independence with ADLs/IADLs    Pain  Current pain ratin  At best pain ratin  At worst pain ratin  Location: Right shoulder, posterior aspect, right bicep  Quality: dull ache (stiffness)  Relieving factors: support and rest  Aggravating factors: lifting and overhead activity (reaching out to the side)  Progression: improved    Hand dominance: right    Treatments  Previous treatment: physical therapy and immobilization  Current treatment: physical therapy        Objective     Active Range of Motion   Left Shoulder   Normal active range of motion    Right Shoulder   Flexion: 138 degrees with pain  Abduction: 137 degrees with pain  External rotation BTH: T3 with pain  Internal rotation BTB: T8 with pain    Left Elbow   Normal active range of motion    Right Elbow   Normal active range of motion    Left Wrist   Normal active range of motion    Right Wrist   Normal active range of motion    Passive Range of Motion     Right Shoulder   Flexion: 160 degrees   Abduction: 160 degrees   External rotation 90°: 60 degrees   Internal rotation 90°: 65 degrees     Strength/Myotome Testing     Left Shoulder   Normal muscle strength    Right Shoulder     Planes of Motion   Flexion: 3+   Abduction: 3 (pain)   External rotation at 0°: 3+   Internal rotation at 0°: 4-     Left Elbow   Flexion: 4  Extension: 4+             Precautions: S/p Right shoulder arthroscopic rotator cuff repair, subscapularis repair, open subpectoral biceps tenodesis, acromioplasty, extensive debridement performed 2024   No resisted elbow flexion or internal rotation   Access Code: [email or web address]    POC expires Unit limit Auth  expiration date PT/OT + Visit Limit?   3/27/25 BOMN N/A BOMN     "             Visit/Unit Tracking  AUTH Status:  Date 1/22 1/24 1/27 1/30 1/2   N/A Used 2 3 4 5        1    Remaining                     Manuals 12/9 12/11 12/16 12/18 12/30 1/2 1/22 1/24 1/27 1/30 12/4   PROM R Shldr per protocol BE BE JS SC BE SC BE BE BE BE JS                               Neuro Re-Ed              Webslide: M/L GTB   3\" 3x10 ea        GTB 3\" 2x10 ea   GTB  3''  3 x 10  ea   Prone Y,T,I   R only  1# 10x ea dir  1#  15x  Ea dir 1# 15x ea.  1# 2x10 ea  1# 2x 10   1# 2x10 1# 2x10  20x  ea   Prone rows   R only   3# x20  3#  x20 3# 2x10  3# 3x10 3# 3x 10   3# 2x10  3# 3x10  2#  x20                 Ther Ex              Pt education     Re-evaluation, HEP review     Re-evaluation, HEP review    UBE  4'/4' 4'/4' 4'/4'' 4'/'4  5'/'5 3'/3'  4'/4'  4'/4'   Pulleys       3 min       Webslide: ER ONLY RTB 3x10       RTB 2x10  RTB 2x10   RTB  3x10   Sidelying shoulder abduction AROM 3x10 3x10 1#  x10 1# 10x  1# 2x10 1# 2x 10  2x10   1# 2x10     Sidelying shoulder ER AROM 3x10  3x10 1#  2x10 1# 2x 10  1# 2x10  1# 2x 10  2x10   1# 2x10    Supine shoulder flexion AROM          1# 2x10    Wall slides flex  5'' x 15  Ecc  lowering 5''x15  Ecc   lower 5\" 2xc 10 ecc lowering    5\" 2x10 ecc lowering 5\" x10 ecc lower  5\" x20 ecc lower  5\" x20 ecc lower  5'' x 15  Ecc  lowering   Standing shoulder IR stretch strap  10\"x10 10x10'' 10\"x 10        10x10''   Standing shoulder ER stretch at wall           10x10''   Wall slide with TB resistance     NV   Unable       Shoulder 3 way raises         10x ea dir   10x ea dir  10x ea dir     Shoulder flexion and abd AAROM with TB resistance          GTB 10x ea dir     Ther Activity                                          Gait Training                                          Modalities              CP                                          "

## 2025-02-03 ENCOUNTER — OFFICE VISIT (OUTPATIENT)
Dept: PHYSICAL THERAPY | Facility: CLINIC | Age: 75
End: 2025-02-03
Payer: MEDICARE

## 2025-02-03 DIAGNOSIS — Z98.890 S/P RIGHT ROTATOR CUFF REPAIR: Primary | ICD-10-CM

## 2025-02-03 PROCEDURE — 97140 MANUAL THERAPY 1/> REGIONS: CPT

## 2025-02-03 PROCEDURE — 97110 THERAPEUTIC EXERCISES: CPT

## 2025-02-03 NOTE — PROGRESS NOTES
"Daily Note     Today's date: 2/3/2025  Patient name: Rosario Morales  : 1950  MRN: [id number]  Referring provider: Meagan Shukla PA-C  Dx:   Encounter Diagnosis     ICD-10-CM    1. S/P right rotator cuff repair  Z98.890           Start Time: 1015  Stop Time: 1100  Total time in clinic (min): 45 minutes    Subjective: Pt noted that  she has been trying to do more practical reaching in her day with R shoulder. Still noted that reaching overhead still causes some p!.       Objective: See treatment diary below      Assessment: Tolerated treatment well. Patient exhibited good technique with therapeutic exercises and would benefit from continued PT      Plan: Continue per plan of care.      Precautions: S/p Right shoulder arthroscopic rotator cuff repair, subscapularis repair, open subpectoral biceps tenodesis, acromioplasty, extensive debridement performed 2024   No resisted elbow flexion or internal rotation   Access Code: [email or web address]    POC expires Unit limit Auth  expiration date PT/OT + Visit Limit?   3/27/25 BOMN N/A BOMN                 Visit/Unit Tracking  AUTH Status:  Date 1/22 1/24 1/27 1/30 2/3       1/2   N/A Used 2 3 4 5 6       1    Remaining                     Manuals 12/9 12/11 12/16 12/18  12/30 1/2 1/22 1/24 1/27 1/30 2/3   PROM R Shldr per protocol BE BE JS SC BE SC BE BE BE BE SC                               Neuro Re-Ed              Webslide: M/L GTB   3\" 3x10 ea        GTB 3\" 2x10 ea      Prone Y,T,I   R only  1# 10x ea dir  1#  15x  Ea dir 1# 15x ea.  1# 2x10 ea  1# 2x 10   1# 2x10 1# 2x10     Prone rows   R only   3# x20  3#  x20 3# 2x10  3# 3x10 3# 3x 10   3# 2x10  3# 3x10                   Ther Ex              Pt education     Re-evaluation, HEP review     Re-evaluation, HEP review    UBE  4'/4' 4'/4' 4'/4'' 4'/'4  5'/'5 3'/3'  4'/4'  4'/4'   Pulleys       3 min       Webslide: ER ONLY RTB 3x10       RTB 2x10  RTB 2x10      Sidelying shoulder abduction AROM 3x10 3x10 1#  x10 1# 10x  " "1# 2x10 1# 2x 10  2x10   1# 2x10  1# 2x 10    Sidelying shoulder ER AROM 3x10  3x10 1#  2x10 1# 2x 10  1# 2x10  1# 2x 10  2x10   1# 2x10 1# 2x 10    Supine shoulder flexion AROM          1# 2x10    Wall slides flex  5'' x 15  Ecc  lowering 5''x15  Ecc   lower 5\" 2xc 10 ecc lowering    5\" 2x10 ecc lowering 5\" x10 ecc lower  5\" x20 ecc lower  5\" x20 ecc lower  5\" 2x 10 ecc lower   Standing shoulder IR stretch strap  10\"x10 10x10'' 10\"x 10           Standing shoulder ER stretch at wall              Wall slide with TB resistance     NV   Unable       Shoulder 3 way raises         10x ea dir   10x ea dir  10x ea dir  2x 10    Shoulder flexion and abd AAROM with TB resistance          GTB 10x ea dir  GTB 2x 10 ea.    Ther Activity                                          Gait Training                                          Modalities              CP                                          "

## 2025-02-05 ENCOUNTER — OFFICE VISIT (OUTPATIENT)
Dept: PHYSICAL THERAPY | Facility: CLINIC | Age: 75
End: 2025-02-05
Payer: MEDICARE

## 2025-02-05 DIAGNOSIS — Z98.890 S/P RIGHT ROTATOR CUFF REPAIR: Primary | ICD-10-CM

## 2025-02-05 PROCEDURE — 97140 MANUAL THERAPY 1/> REGIONS: CPT

## 2025-02-05 PROCEDURE — 97110 THERAPEUTIC EXERCISES: CPT

## 2025-02-05 PROCEDURE — 97112 NEUROMUSCULAR REEDUCATION: CPT

## 2025-02-05 NOTE — PROGRESS NOTES
"Daily Note     Today's date: 2025  Patient name: Rosario Morales  : 1950  MRN: [id number]  Referring provider: Meagan Shukla PA-C  Dx:   Encounter Diagnosis     ICD-10-CM    1. S/P right rotator cuff repair  Z98.890           Start Time: 0950  Stop Time: 1045  Total time in clinic (min): 55 minutes    Subjective: Pt noted no changes since last treatment session.       Objective: See treatment diary below      Assessment:  Continued with treatment session Tolerated treatment well. Patient exhibited good technique with therapeutic exercises and would benefit from continued PT  S/p treatment session,  noted no changes at this time.     Plan: Continue per plan of care.      Precautions: S/p Right shoulder arthroscopic rotator cuff repair, subscapularis repair, open subpectoral biceps tenodesis, acromioplasty, extensive debridement performed 2024   No resisted elbow flexion or internal rotation   Access Code: [email or web address]    POC expires Unit limit Auth  expiration date PT/OT + Visit Limit?   3/27/25 BOMN N/A BOMN                 Visit/Unit Tracking  AUTH Status:  Date  2/3 2/5      1/2   N/A Used 2 3 4 5 6 7      1    Remaining                     Manuals 2/5 12/11 12/16 12/18  12/30 1/2 1/22 1/24 1/27 1/30 2/3   PROM R Shldr per protocol SC BE JS SC BE SC BE BE BE BE SC                               Neuro Re-Ed              Webslide: M/L        GTB 3\" 2x10 ea      Prone Y,T,I   R only 1# 2x 10  1# 10x ea dir  1#  15x  Ea dir 1# 15x ea.  1# 2x10 ea  1# 2x 10   1# 2x10 1# 2x10     Prone rows   R only  3# 2x 10  3# x20  3#  x20 3# 2x10  3# 3x10 3# 3x 10   3# 2x10  3# 3x10                   Ther Ex              Pt education     Re-evaluation, HEP review     Re-evaluation, HEP review    UBE  5'/5' 4'/4' 4'/4'' 4'/'4  5'/'5 3'/3'  4'/4'  4'/4'   Pulleys       3 min       Webslide: ER ONLY        RTB 2x10  RTB 2x10      Sidelying shoulder abduction AROM  3x10 1#  x10 1# 10x  1# 2x10 1# 2x 10  " "2x10   1# 2x10  1# 2x 10    Sidelying shoulder ER AROM 1# 3x 10  3x10 1#  2x10 1# 2x 10  1# 2x10  1# 2x 10  2x10   1# 2x10 1# 2x 10    Supine shoulder flexion AROM          1# 2x10    Wall slides flex 2x 10 5\" ecc.  5'' x 15  Ecc  lowering 5''x15  Ecc   lower 5\" 2xc 10 ecc lowering    5\" 2x10 ecc lowering 5\" x10 ecc lower  5\" x20 ecc lower  5\" x20 ecc lower  5\" 2x 10 ecc lower   Standing shoulder IR stretch strap  10\"x10 10x10'' 10\"x 10           Standing shoulder ER stretch at wall              Wall slide with TB resistance     NV   Unable       Shoulder 3 way raises  3x 10        10x ea dir   10x ea dir  10x ea dir  2x 10    Shoulder flexion and abd AAROM with TB resistance GTB 2x 10 ea.          GTB 10x ea dir  GTB 2x 10 ea.    Ther Activity                                          Gait Training                                          Modalities              CP                                            "

## 2025-02-10 ENCOUNTER — OFFICE VISIT (OUTPATIENT)
Dept: PHYSICAL THERAPY | Facility: CLINIC | Age: 75
End: 2025-02-10
Payer: MEDICARE

## 2025-02-10 DIAGNOSIS — Z98.890 S/P RIGHT ROTATOR CUFF REPAIR: Primary | ICD-10-CM

## 2025-02-10 PROCEDURE — 97140 MANUAL THERAPY 1/> REGIONS: CPT

## 2025-02-10 PROCEDURE — 97110 THERAPEUTIC EXERCISES: CPT

## 2025-02-10 NOTE — PROGRESS NOTES
"Daily Note     Today's date: 2/10/2025  Patient name: Rosario Morales  : 1950  MRN: [id number]  Referring provider: Meagan Shukla PA-C  Dx:   Encounter Diagnosis     ICD-10-CM    1. S/P right rotator cuff repair  Z98.890                      Subjective: Pt reports she feels she is doing more with her right shoulder at home.      Objective: See treatment diary below      Assessment: Tolerated treatment well. No progressions added today as pt remains sufficiently challenged by current treatment protocol.  All TE's were completed today with min cueing for technique and no significant reproduction of pain.  Patient would benefit from continued PT      Plan: Continue per plan of care.      Precautions: S/p Right shoulder arthroscopic rotator cuff repair, subscapularis repair, open subpectoral biceps tenodesis, acromioplasty, extensive debridement performed 2024   No resisted elbow flexion or internal rotation   Access Code: [email or web address]    POC expires Unit limit Auth  expiration date PT/OT + Visit Limit?   3/27/25 BOMN N/A BOMN                 Visit/Unit Tracking  AUTH Status:  Date  2/3 2/5 2/10     1/2   N/A Used 2 3 4 5 6 7 8     1    Remaining                     Manuals 2/5 2/10 12/16 12/18  12/30 1/2 1/22 1/24 1/27 1/30 2/3   PROM R Shldr per protocol SC JK JS SC BE SC BE BE BE BE SC                               Neuro Re-Ed              Webslide: M/L        GTB 3\" 2x10 ea      Prone Y,T,I   R only 1# 2x 10  1# 2x 10  1#  15x  Ea dir 1# 15x ea.  1# 2x10 ea  1# 2x 10   1# 2x10 1# 2x10     Prone rows   R only  3# 2x 10  3# 3x 10  3#  x20 3# 2x10  3# 3x10 3# 3x 10   3# 2x10  3# 3x10                   Ther Ex              Pt education     Re-evaluation, HEP review     Re-evaluation, HEP review    UBE  5'/5' 5'/5' 4'/4'' 4'/'4  5'/'5 3'/3'  4'/4'  4'/4'   Pulleys       3 min       Webslide: ER ONLY        RTB 2x10  RTB 2x10      Sidelying shoulder abduction AROM  1# 2x 10  1#  x10 1# 10x  1# " "2x10 1# 2x 10  2x10   1# 2x10  1# 2x 10    Sidelying shoulder ER AROM 1# 3x 10  1# 3x 10  1#  2x10 1# 2x 10  1# 2x10  1# 2x 10  2x10   1# 2x10 1# 2x 10    Supine shoulder flexion AROM          1# 2x10    Wall slides flex 2x 10 5\" ecc.  2x 10 5\" ecc.  5''x15  Ecc   lower 5\" 2xc 10 ecc lowering    5\" 2x10 ecc lowering 5\" x10 ecc lower  5\" x20 ecc lower  5\" x20 ecc lower  5\" 2x 10 ecc lower   Standing shoulder IR stretch strap   10x10'' 10\"x 10           Standing shoulder ER stretch at wall              Wall slide with TB resistance     NV   Unable       Shoulder 3 way raises  3x 10  2x 10       10x ea dir   10x ea dir  10x ea dir  2x 10    Shoulder flexion and abd AAROM with TB resistance GTB 2x 10 ea.          GTB 10x ea dir  GTB 2x 10 ea.    Ther Activity                                          Gait Training                                          Modalities              CP                                              "

## 2025-02-12 ENCOUNTER — OFFICE VISIT (OUTPATIENT)
Dept: PHYSICAL THERAPY | Facility: CLINIC | Age: 75
End: 2025-02-12
Payer: MEDICARE

## 2025-02-12 DIAGNOSIS — Z98.890 S/P RIGHT ROTATOR CUFF REPAIR: Primary | ICD-10-CM

## 2025-02-12 PROCEDURE — 97112 NEUROMUSCULAR REEDUCATION: CPT

## 2025-02-12 PROCEDURE — 97140 MANUAL THERAPY 1/> REGIONS: CPT

## 2025-02-12 PROCEDURE — 97110 THERAPEUTIC EXERCISES: CPT

## 2025-02-12 NOTE — PROGRESS NOTES
"Daily Note     Today's date: 2025  Patient name: Rosario Morales  : 1950  MRN: [id number]  Referring provider: Meagan Shukla PA-C  Dx:   Encounter Diagnosis     ICD-10-CM    1. S/P right rotator cuff repair  Z98.890           Start Time: 947  Stop Time: 1030  Total time in clinic (min): 43 minutes    Subjective: pt noted no changes since last treatment session.       Objective: See treatment diary below      Assessment:  Continued with treatment sesson with no changes in pain status. Noticed having less of a challenge with the wall slider. Tolerated treatment well. Patient exhibited good technique with therapeutic exercises and would benefit from continued PT      Plan: Continue per plan of care.      Precautions: S/p Right shoulder arthroscopic rotator cuff repair, subscapularis repair, open subpectoral biceps tenodesis, acromioplasty, extensive debridement performed 2024   No resisted elbow flexion or internal rotation   Access Code: [email or web address]    POC expires Unit limit Auth  expiration date PT/OT + Visit Limit?   3/27/25 BOMN N/A BOMN                 Visit/Unit Tracking  AUTH Status:  Date  2/3 2/5 2/10 2/12    1/2   N/A Used 2 3 4 5 6 7 8 9     1    Remaining                     Manuals 2/5 2/10 2/12  12/30 1/2 1/22 1/24 1/27 1/30 2/3   PROM R Shldr per protocol SC JK SC  BE SC BE BE BE BE SC                               Neuro Re-Ed              Webslide: M/L        GTB 3\" 2x10 ea      Prone Y,T,I   R only 1# 2x 10  1# 2x 10  1# 3x 10   1# 2x10 ea  1# 2x 10   1# 2x10 1# 2x10     Prone rows   R only  3# 2x 10  3# 3x 10  3# 3x 10   3# 3x10 3# 3x 10   3# 2x10  3# 3x10                   Ther Ex              Pt education     Re-evaluation, HEP review     Re-evaluation, HEP review    UBE  5'/5' 5'/5' 5'5/   5'/'5 3'/3'  4'/4'  4'/4'   Pulleys       3 min       Webslide: ER ONLY        RTB 2x10  RTB 2x10      Sidelying shoulder abduction AROM  1# 2x 10  1# 2x 10   1# 2x10 1# 2x 10  " "2x10   1# 2x10  1# 2x 10    Sidelying shoulder ER AROM 1# 3x 10  1# 3x 10  1# 3 x 10   1# 2x10  1# 2x 10  2x10   1# 2x10 1# 2x 10    Supine shoulder flexion AROM          1# 2x10    Wall slides flex 2x 10 5\" ecc.  2x 10 5\" ecc.  2 x10 5\"     5\" 2x10 ecc lowering 5\" x10 ecc lower  5\" x20 ecc lower  5\" x20 ecc lower  5\" 2x 10 ecc lower   Standing shoulder IR stretch strap              Standing shoulder ER stretch at wall              Wall slide with TB resistance     NV   Unable       Shoulder 3 way raises  3x 10  2x 10       10x ea dir   10x ea dir  10x ea dir  2x 10    Shoulder flexion and abd AAROM with TB resistance GTB 2x 10 ea.   Gtb 3x 10        GTB 10x ea dir  GTB 2x 10 ea.    Ther Activity                                          Gait Training                                          Modalities              CP                                                "

## 2025-02-17 ENCOUNTER — OFFICE VISIT (OUTPATIENT)
Dept: PHYSICAL THERAPY | Facility: CLINIC | Age: 75
End: 2025-02-17
Payer: MEDICARE

## 2025-02-17 DIAGNOSIS — Z98.890 S/P RIGHT ROTATOR CUFF REPAIR: Primary | ICD-10-CM

## 2025-02-17 PROCEDURE — 97110 THERAPEUTIC EXERCISES: CPT

## 2025-02-17 PROCEDURE — 97112 NEUROMUSCULAR REEDUCATION: CPT

## 2025-02-17 NOTE — PROGRESS NOTES
"Daily Note     Today's date: 2025  Patient name: Rosario Morales  : 1950  MRN: [id number]  Referring provider: Meagan Shukla PA-C  Dx:   Encounter Diagnosis     ICD-10-CM    1. S/P right rotator cuff repair  Z98.890           Start Time: 927  Stop Time: 1013  Total time in clinic (min): 46 minutes    Subjective: pt noted that the first time she was able to clean her windows reaching up and having no pain.    No follow up with ortho noted at this time. Noted that she does not have this  scheduled yet.     Objective: See treatment diary below      Assessment:  Progressing well and noticed less difficulty with exercises at this time. Tolerated treatment well. Patient demonstrated fatigue post treatment, exhibited good technique with therapeutic exercises, and would benefit from continued PT  S/p treatment session,    Provided patient with updated HEP as well as call number for ortho's office.     Plan: Continue per plan of care.  Progress as able within protocol.      Precautions: S/p Right shoulder arthroscopic rotator cuff repair, subscapularis repair, open subpectoral biceps tenodesis, acromioplasty, extensive debridement performed 2024   No resisted elbow flexion or internal rotation     Access Code: [email or web address] - updated on     POC expires Unit limit Auth  expiration date PT/OT + Visit Limit?   3/27/25 BOMN N/A BOMN                 Visit/Unit Tracking  AUTH Status:  Date 1/22 1/24 1/27 1/30 2/3 2/5 2/10 2/12 2/17    1/2   N/A Used 2 3 4 5 6 7 8 9  10   1    Remaining                     Manuals 2/5 2/10 2/12 2/17   1/2 1/22 1/24 1/27 1/30 2/3   PROM R Shldr per protocol SC JK SC SC  SC BE BE BE BE SC                               Neuro Re-Ed              Webslide: M/L        GTB 3\" 2x10 ea      Prone Y,T,I   R only 1# 2x 10  1# 2x 10  1# 3x 10  1# 3x 10   1# 2x 10   1# 2x10 1# 2x10     Prone rows   R only  3# 2x 10  3# 3x 10  3# 3x 10  3# 3x 10   3# 3x 10   3# 2x10  3# 3x10                 " "  Ther Ex              Pt education          Re-evaluation, HEP review    UBE  5'/5' 5'/5' 5'5/ 5'/5'  5'/'5 3'/3'  4'/4'  4'/4'   Lalitha       3 min       [place]: ER ONLY        RTB 2x10  RTB 2x10      Sidelying shoulder abduction AROM  1# 2x 10  1# 2x 10  1# 3x 10   1# 2x 10  2x10   1# 2x10  1# 2x 10    Sidelying shoulder ER AROM 1# 3x 10  1# 3x 10  1# 3 x 10  1x 15 1#   1x 15 2#   1# 2x 10  2x10   1# 2x10 1# 2x 10    Supine shoulder flexion AROM          1# 2x10    Wall slides flex 2x 10 5\" ecc.  2x 10 5\" ecc.  2 x10 5\"   2x 10 5\" with ecc lowering.    5\" 2x10 ecc lowering 5\" x10 ecc lower  5\" x20 ecc lower  5\" x20 ecc lower  5\" 2x 10 ecc lower   Standing shoulder IR stretch strap              Standing shoulder ER stretch at wall              Wall slide with TB resistance        Unable       Shoulder 3 way raises  3x 10  2x 10       10x ea dir   10x ea dir  10x ea dir  2x 10    Shoulder flexion and abd AAROM with TB resistance GTB 2x 10 ea.   Gtb 3x 10   Review for Hep       GTB 10x ea dir  GTB 2x 10 ea.                                Ther Activity                                          Gait Training                                          Modalities              CP                                                  "

## 2025-02-19 ENCOUNTER — OFFICE VISIT (OUTPATIENT)
Dept: PHYSICAL THERAPY | Facility: CLINIC | Age: 75
End: 2025-02-19
Payer: MEDICARE

## 2025-02-19 DIAGNOSIS — Z98.890 S/P RIGHT ROTATOR CUFF REPAIR: Primary | ICD-10-CM

## 2025-02-19 PROCEDURE — 97110 THERAPEUTIC EXERCISES: CPT

## 2025-02-19 PROCEDURE — 97140 MANUAL THERAPY 1/> REGIONS: CPT

## 2025-02-19 NOTE — PROGRESS NOTES
"Daily Note     Today's date: 2025  Patient name: Rosario Morales  : 1950  MRN: [id number]  Referring provider: Meagan Shukla PA-C  Dx:   Encounter Diagnosis     ICD-10-CM    1. S/P right rotator cuff repair  Z98.890                      Subjective: Patient reports that today was the first time she was able to reach her arm all the way up to wash her hair, especially to the back of her head. She says that she went back to the  earlier this week for the first time since her hospitalization and she was able to resume some of the light strength training.       Objective: See treatment diary below      Assessment: Tolerated treatment well. Progressed patient with resistances as outlined below without pain reported, however did demonstrate compensatory movements with muscular fatigue. Patient demonstrated fatigue post treatment, exhibited good technique with therapeutic exercises, and would benefit from continued PT      Plan: Continue per plan of care.      Precautions: S/p Right shoulder arthroscopic rotator cuff repair, subscapularis repair, open subpectoral biceps tenodesis, acromioplasty, extensive debridement performed 2024   No resisted elbow flexion or internal rotation     Access Code: [email or web address] - updated on     POC expires Unit limit Auth  expiration date PT/OT + Visit Limit?   3/27/25 BOMN N/A BOMN                 Visit/Unit Tracking  AUTH Status:  Date 1/22 1/24 1/27 1/30 2/3 2/5 2/10 2/12 2/17  2/19  1/2   N/A Used 2 3 4 5 6 7 8 9  10 11  1    Remaining                     Manuals 2/5 2/10 2/12 2/17  2/19 1/ 2/3   PROM R Shldr per protocol SC JK SC SC BE SC BE BE BE BE SC                               Neuro Re-Ed              Webslide: M/L        GTB 3\" 2x10 ea      Prone Y,T,I   R only 1# 2x 10  1# 2x 10  1# 3x 10  1# 3x 10   1# 2x 10   1# 2x10 1# 2x10     Prone rows   R only  3# 2x 10  3# 3x 10  3# 3x 10  3# 3x 10   3# 3x 10   3# 2x10  3# 3x10                 " "  Ther Ex              Pt education          Re-evaluation, HEP review    UBE  5'/5' 5'/5' 5'5/ 5'/5' 5'/5' 5'/'5 3'/3'  4'/4'  4'/4'   Webslide: ER ONLY     GTB 3x10    RTB 2x10  RTB 2x10      Sidelying shoulder abduction AROM  1# 2x 10  1# 2x 10  1# 3x 10  2# 2x10 1# 2x 10  2x10   1# 2x10  1# 2x 10    Sidelying shoulder ER AROM 1# 3x 10  1# 3x 10  1# 3 x 10  1x 15 1#   1x 15 2#  2# 2x10 1# 2x 10  2x10   1# 2x10 1# 2x 10    Supine shoulder flexion AROM          1# 2x10    Wall slides flex 2x 10 5\" ecc.  2x 10 5\" ecc.  2 x10 5\"   2x 10 5\" with ecc lowering.   3x10   5\" ecc lower  5\" 2x10 ecc lowering 5\" x10 ecc lower  5\" x20 ecc lower  5\" x20 ecc lower  5\" 2x 10 ecc lower   Wall slide with TB resistance        Unable       Shoulder 3 way raises  3x 10  2x 10    To 90* 1# 1x10 ea     Full ROM  2x10   10x ea dir   10x ea dir  10x ea dir  2x 10    Shoulder flexion and abd AAROM with TB resistance GTB 2x 10 ea.   Gtb 3x 10   Review for Hep       GTB 10x ea dir  GTB 2x 10 ea.                                Ther Activity                                          Gait Training                                          Modalities              CP                                                    "

## 2025-02-24 ENCOUNTER — OFFICE VISIT (OUTPATIENT)
Dept: PHYSICAL THERAPY | Facility: CLINIC | Age: 75
End: 2025-02-24
Payer: MEDICARE

## 2025-02-24 DIAGNOSIS — Z98.890 S/P RIGHT ROTATOR CUFF REPAIR: Primary | ICD-10-CM

## 2025-02-24 PROCEDURE — 97140 MANUAL THERAPY 1/> REGIONS: CPT

## 2025-02-24 PROCEDURE — 97110 THERAPEUTIC EXERCISES: CPT

## 2025-02-24 NOTE — PROGRESS NOTES
"Daily Note     Today's date: 2025  Patient name: Rosario Morales  : 1950  MRN: [id number]  Referring provider: Meagan Shukla PA-C  Dx:   Encounter Diagnosis     ICD-10-CM    1. S/P right rotator cuff repair  Z98.890                      Subjective: Patient reports that she was able to lift her arm up to get something off the top shelf in her kitchen and to place paper towels on top of her refrigerator. She does still feel a pulling sensation into her right biceps when lifting her arm however.       Objective: See treatment diary below      Assessment: Tolerated treatment well. Patient continues to be challenged with shoulder AROM against gravity, most notably shoulder abduction secondary to muscle weakness and bicep discomfort. Does demonstrate full shoulder abduction AROM in sidelying position. Patient demonstrated fatigue post treatment, exhibited good technique with therapeutic exercises, and would benefit from continued PT      Plan: Progress note during next visit.      Precautions: S/p Right shoulder arthroscopic rotator cuff repair, subscapularis repair, open subpectoral biceps tenodesis, acromioplasty, extensive debridement performed 2024   No resisted elbow flexion or internal rotation     Access Code: [email or web address] - updated on     POC expires Unit limit Auth  expiration date PT/OT + Visit Limit?   3/27/25 BOMN N/A BOMN                 Visit/Unit Tracking  AUTH Status:  Date 1/22 1/24 1/27 1/30 2/3 2/5 2/10 2/12 2/17  2/19 2/24    N/A Used 2 3 4 5 6 7 8 9  10 11 12     Remaining                     Manuals 2/5 2/10 2/12 2/17  2/19 2/24 1/22 1/24 1/27 1/30 2/3   PROM R Shldr per protocol SC JK SC SC BE BE BE BE BE BE SC                               Neuro Re-Ed              Webslide: M/L        GTB 3\" 2x10 ea      Prone Y,T,I   R only 1# 2x 10  1# 2x 10  1# 3x 10  1# 3x 10   1# 3x10  1# 2x10 1# 2x10     Prone rows   R only  3# 2x 10  3# 3x 10  3# 3x 10  3# 3x 10   3# 3x10  3# 2x10  3# 3x10 " "                  Ther Ex              Pt education          Re-evaluation, HEP review    UBE  5'/5' 5'/5' 5'5/ 5'/5' 5'/5' 5'/5' 3'/3'  4'/4'  4'/4'   Webslide: ER ONLY     GTB 3x10  GTB 3x10  RTB 2x10  RTB 2x10      Sidelying shoulder abduction AROM  1# 2x 10  1# 2x 10  1# 3x 10  2# 2x10 2# 3x10 2x10   1# 2x10  1# 2x 10    Sidelying shoulder ER AROM 1# 3x 10  1# 3x 10  1# 3 x 10  1x 15 1#   1x 15 2#  2# 2x10 2# 3x10 2x10   1# 2x10 1# 2x 10    Supine shoulder flexion AROM          1# 2x10    Wall slides flex 2x 10 5\" ecc.  2x 10 5\" ecc.  2 x10 5\"   2x 10 5\" with ecc lowering.   3x10   5\" ecc lower  5\" 2x10 ecc lowering 5\" x10 ecc lower  5\" x20 ecc lower  5\" x20 ecc lower  5\" 2x 10 ecc lower   Wall slide with TB resistance        Unable       Shoulder 3 way raises  3x 10  2x 10    To 90* 1# 1x10 ea     Full ROM  2x10 To 90* 1# 1x10 ea     Full ROM  2x10  10x ea dir   10x ea dir  10x ea dir  2x 10    Shoulder flexion and abd AAROM with TB resistance GTB 2x 10 ea.   Gtb 3x 10   Review for Hep       GTB 10x ea dir  GTB 2x 10 ea.                                Ther Activity                                          Gait Training                                          Modalities              CP                                                      "

## 2025-02-26 ENCOUNTER — EVALUATION (OUTPATIENT)
Dept: PHYSICAL THERAPY | Facility: CLINIC | Age: 75
End: 2025-02-26
Payer: MEDICARE

## 2025-02-26 DIAGNOSIS — Z98.890 S/P RIGHT ROTATOR CUFF REPAIR: Primary | ICD-10-CM

## 2025-02-26 PROCEDURE — 97110 THERAPEUTIC EXERCISES: CPT

## 2025-02-26 NOTE — PROGRESS NOTES
PT Re-Evaluation     Today's date: 2025  Patient name: Rosario Morales  : 1950  MRN: [id number]  Referring provider: Meagan Shukla PA-C  Dx:   Encounter Diagnosis     ICD-10-CM    1. S/P right rotator cuff repair  Z98.890                      Assessment  Impairments: impaired physical strength, lacks appropriate home exercise program, pain with function and participation limitations  Symptom irritability: low    Assessment details: Rosario Morales is a 74 y.o. female presenting to physical therapy for a reevaluation s/p right shoulder arthroscopic rotator cuff repair, subscapularis repair, open subpectoral biceps tenodesis, acromioplasty, extensive debridement performed 24. Since their initial evaluation, she reports 80% improvement in her shoulder. Since her previous re-evaluation, she reports continued improvements in her ability to use her arm for light functional lifting around her home as well as decreased overall pain in her right biceps. Objectively, she demonstrates improved shoulder AROM and PROM to WFL in all directions. She does continue with decreased shoulder strength, largely limited by onset of right biceps pain and discomfort, with manual resistance testing or use of weights. The patient would benefit from additional PT services to promote improved shoulder strength and AROM to allow completion of ADLs, household chores, and recreational activities of gym classes and playing piano.   Understanding of Dx/Px/POC: good     Prognosis: good    Goals  STG to be achieved by 4 weeks  -Pt will be independent with basic HEP MET  -Pt will improve ROM by 25% per post operative protocol in order to improve functional mobility MET  -Pt will report 2/10 at worst in order to facilitate return to PLOF. NOT MET    LTG to be achieved by Discharge  -Pt will be independent with comprehensive HEP. MET  -Pt will improve MMT scores to WFL in all deficient planes in order to facilitate ease of functional  activity NOT MET  -Pt will improve AROM to WFL in all deficient planes in order to improve functional mobility. MET  -Pt will be able to participate in recreational gym classes without difficulties or restrictions due to her shoulder. NOT MET  -Pt will be able to complete all household chores without difficulties or restrictions due to her shoulder. NOT MET    Plan  Patient would benefit from: skilled physical therapy  Planned modality interventions: cryotherapy    Planned therapy interventions: flexibility, functional ROM exercises, home exercise program, joint mobilization, manual therapy, massage, neuromuscular re-education, patient/caregiver education, strengthening, stretching, therapeutic activities and therapeutic exercise    Frequency: 2x week  Duration in weeks: 8  Plan of Care beginning date: 2/26/2025  Plan of Care expiration date: 4/23/2025  Treatment plan discussed with: patient        Subjective Evaluation    History of Present Illness  Mechanism of injury: Rosario reports 80% improvement since beginning PT services. She reports that she continues to see improved overhead and abduction movements of her arm, however these do at times continue to be uncomfortable and mildly painful at times especially when trying to lift any objects of weight. She says that has begun overhead lifting of light objects such as dishes, paper towels, reaching for basket on top of her fridge. She does find herself able to reach her arm fully behind her back and behind her head at this time. She has also started to resume her usual cleaning activities of vacuuming, mopping, sweeping with use of her right arm and denies difficulties when doing this. She does still have challenges with lifting moderate to heavy objects (pots, pans) in her kitchen and completing her usual weight lifting activities in workout classes. Her next follow up with her surgeon 3/7/25.         Quality of life: good    Patient Goals  Patient goals for  therapy: decreased pain, increased motion, increased strength, return to sport/leisure activities and independence with ADLs/IADLs    Pain  Current pain ratin  At best pain ratin  At worst pain ratin  Location: Right bicep  Quality: dull ache  Relieving factors: support and rest  Aggravating factors: lifting and overhead activity  Progression: improved    Hand dominance: right    Treatments  Previous treatment: physical therapy and immobilization  Current treatment: physical therapy        Objective     Active Range of Motion   Left Shoulder   Normal active range of motion    Right Shoulder   Flexion: 154 degrees   Abduction: 155 degrees   External rotation BTH: T3   Internal rotation BTB: T6     Left Elbow   Normal active range of motion    Right Elbow   Normal active range of motion    Left Wrist   Normal active range of motion    Right Wrist   Normal active range of motion    Passive Range of Motion     Right Shoulder   Flexion: 160 degrees   Abduction: 160 degrees   External rotation 90°: 65 degrees with pain  Internal rotation 90°: 68 degrees     Strength/Myotome Testing     Left Shoulder   Normal muscle strength    Right Shoulder     Planes of Motion   Flexion: 3+ (bicep)   Abduction: 3+ (bicep pain)   External rotation at 0°: 4-   Internal rotation at 0°: 4     Left Elbow   Flexion: 4  Extension: 4+             Precautions: S/p Right shoulder arthroscopic rotator cuff repair, subscapularis repair, open subpectoral biceps tenodesis, acromioplasty, extensive debridement performed 2024   No resisted elbow flexion or internal rotation     Access Code: [email or web address] - updated on     POC expires Unit limit Auth  expiration date PT/OT + Visit Limit?   25 BOMN N/A BOMN                 Visit/Unit Tracking  AUTH Status:  Date  2/3 2/5 2/10 2/12 2/17  2/19 2/24 2/26   N/A Used 2 3 4 5 6 7 8 9  10 11 12 13    Remaining                     Manuals 2/5 2/10 2/12 2/17  2/19 2/24 2/26  "1/24 1/27 1/30 2/3   PROM R Shldr per protocol SC JK SC SC BE BE DC BE BE BE SC                               Neuro Re-Ed              Webslide: M/L        GTB 3\" 2x10 ea      Prone Y,T,I   R only 1# 2x 10  1# 2x 10  1# 3x 10  1# 3x 10   1# 3x10  1# 2x10 1# 2x10     Prone rows   R only  3# 2x 10  3# 3x 10  3# 3x 10  3# 3x 10   3# 3x10  3# 2x10  3# 3x10     Shoulder overhead press from 90/90 against wall              Wall walks TB              Wall clock taps with DB                             Ther Ex              Pt education       Re-evaluation, HEP review    Re-evaluation, HEP review    UBE  5'/5' 5'/5' 5'5/ 5'/5' 5'/5' 5'/5' 5'/5'  4'/4'  4'/4'   Webslide: ER ONLY     GTB 3x10  GTB 3x10  RTB 2x10  RTB 2x10      Sidelying shoulder abduction AROM  1# 2x 10  1# 2x 10  1# 3x 10  2# 2x10 2# 3x10    1# 2x10  1# 2x 10    Sidelying shoulder ER AROM 1# 3x 10  1# 3x 10  1# 3 x 10  1x 15 1#   1x 15 2#  2# 2x10 2# 3x10    1# 2x10 1# 2x 10    Supine shoulder flexion AROM          1# 2x10    Wall slides flex 2x 10 5\" ecc.  2x 10 5\" ecc.  2 x10 5\"   2x 10 5\" with ecc lowering.   3x10   5\" ecc lower   5\" x10 ecc lower  5\" x20 ecc lower  5\" x20 ecc lower  5\" 2x 10 ecc lower   Wall slide with TB resistance        Unable       Shoulder 3 way raises  3x 10  2x 10    To 90* 1# 1x10 ea     Full ROM  2x10 To 90* 1# 1x10 ea     Full ROM  2x10  10x ea dir   10x ea dir  10x ea dir  2x 10                                Ther Activity                                          Gait Training                                          Modalities              CP                                            "

## 2025-02-28 ENCOUNTER — TELEPHONE (OUTPATIENT)
Dept: GASTROENTEROLOGY | Facility: HOSPITAL | Age: 75
End: 2025-02-28

## 2025-03-02 ENCOUNTER — TELEPHONE (OUTPATIENT)
Dept: OTHER | Facility: OTHER | Age: 75
End: 2025-03-02

## 2025-03-02 NOTE — TELEPHONE ENCOUNTER
Patient is calling to cancel procedure due to cold coming on. [facility] supervisor on call notified via secure chat.    Date/Time: 3/3/25 0337    Procedure Type: Endoscopy    Patient requesting call back to reschedule: YES [x] NO []

## 2025-03-05 ENCOUNTER — APPOINTMENT (OUTPATIENT)
Dept: PHYSICAL THERAPY | Facility: CLINIC | Age: 75
End: 2025-03-05
Payer: MEDICARE

## 2025-03-07 ENCOUNTER — OFFICE VISIT (OUTPATIENT)
Dept: OBGYN CLINIC | Facility: CLINIC | Age: 75
End: 2025-03-07
Payer: MEDICARE

## 2025-03-07 VITALS — BODY MASS INDEX: 22.66 KG/M2 | WEIGHT: 120 LBS | HEIGHT: 61 IN | RESPIRATION RATE: 18 BRPM

## 2025-03-07 DIAGNOSIS — Z98.890 S/P ARTHROSCOPY OF RIGHT SHOULDER: Primary | ICD-10-CM

## 2025-03-07 PROCEDURE — 99213 OFFICE O/P EST LOW 20 MIN: CPT | Performed by: ORTHOPAEDIC SURGERY

## 2025-03-07 NOTE — PROGRESS NOTES
Name: Rosario Morales      : 1950      MRN: [id number]  Encounter Provider: Akshat Roach DO  Encounter Date: 3/7/2025   Encounter department: [facility] [place]  :  Assessment & Plan  S/P arthroscopy of right shoulder  6.5 months  s/p Right shoulder arthroscopic rotator cuff repair, open subpectoral biceps tenodesis, extensive debridement and acromioplasty performed on 2024, doing well overall             Approximately 6.5 months  s/p Right shoulder arthroscopic rotator cuff repair, open subpectoral biceps tenodesis, extensive debridement and acromioplasty performed on 2024  Overall, the patient is doing well s/p operative management  Continue physical therapy and transition home exercises as directed  Continue activity as tolerated, no specific restrictions  Tylenol prn for symptom management  Follow up as needed    History of the Present Illness   History of Present Illness   HPI   Rosario Morales is a 74 y.o. female approximately 6.5 months  s/p Right shoulder arthroscopic rotator cuff repair, open subpectoral biceps tenodesis, extensive debridement and acromioplasty performed on 2024. She is doing well overall. Patient admits soreness in her biceps region with heavy lifting or overhead movements. She continues physical therapy and works out on her own at the [place]. Pain managed with Tylenol. Patient can no longer take ibuprofen due to upper GI bleed at the beginning of January.        Review of Systems     Review of Systems   Constitutional:  Negative for chills and fever.   HENT:  Negative for ear pain and sore throat.    Eyes:  Negative for pain and visual disturbance.   Respiratory:  Negative for cough and shortness of breath.    Cardiovascular:  Negative for chest pain and palpitations.   Gastrointestinal:  Negative for abdominal pain and vomiting.   Genitourinary:  Negative for dysuria and hematuria.   Musculoskeletal:  Negative for arthralgias and back  "pain.   Skin:  Negative for color change and rash.   Neurological:  Negative for seizures and syncope.   All other systems reviewed and are negative.      Physical Exam   Objective   Resp 18   Ht 5' 1\" (1.549 m)   Wt 54.4 kg (120 lb)   BMI 22.67 kg/m²        Right Shoulder:   Surgical incisions well healed  Active range of motion   170 degrees forward flexion  160 degrees abduction  70 degrees external rotation   Lower thoracic internal rotation    There is no tenderness present over the shoulder.   Forward flexion testing 4+/5  External rotation testing 5/5  Internal rotation testing 4+/5  The patient is neurovascularly intact distally in the extremity.      Data Review     I have personally reviewed pertinent films in PACS, and my interpretation follows.    No new images today.    Social History     Tobacco Use    Smoking status: Never    Smokeless tobacco: Never   Vaping Use    Vaping status: Never Used   Substance Use Topics    Alcohol use: Yes     Alcohol/week: 7.0 standard drinks of alcohol     Types: 7 Glasses of wine per week     Comment: daily    Drug use: No           Procedures  None.    Irene Preciado   Scribe Attestation      I,:  Irene Preciado am acting as a scribe while in the presence of the attending physician.:       I,:  Akshat Roach, DO personally performed the services described in this documentation    as scribed in my presence.:             "

## 2025-03-10 ENCOUNTER — OFFICE VISIT (OUTPATIENT)
Dept: PHYSICAL THERAPY | Facility: CLINIC | Age: 75
End: 2025-03-10
Payer: MEDICARE

## 2025-03-10 DIAGNOSIS — Z98.890 S/P RIGHT ROTATOR CUFF REPAIR: Primary | ICD-10-CM

## 2025-03-10 PROCEDURE — 97110 THERAPEUTIC EXERCISES: CPT

## 2025-03-10 PROCEDURE — 97112 NEUROMUSCULAR REEDUCATION: CPT

## 2025-03-10 NOTE — PROGRESS NOTES
Daily Note     Today's date: 3/10/2025  Patient name: Rosario Morales  : 1950  MRN: [id number]  Referring provider: Meagan Shukla PA-C  Dx:   Encounter Diagnosis     ICD-10-CM    1. S/P right rotator cuff repair  Z98.890                      Subjective: Patient reports that she saw her surgeon last week and they were pleased with her shoulder progress made thus far. She was advised to continue to work with PT and independently with her HEP to improve her shoulder strength.      Objective: See treatment diary below      Assessment: Tolerated treatment well. Progressed patient with gentle overhead strengthening and stabilization exercises as outlined below. She was able to complete standing shoulder AROM against gravity through full shoulder ranges without pain today. Patient demonstrated fatigue post treatment, exhibited good technique with therapeutic exercises, and would benefit from continued PT      Plan: Continue per plan of care.      Precautions: S/p Right shoulder arthroscopic rotator cuff repair, subscapularis repair, open subpectoral biceps tenodesis, acromioplasty, extensive debridement performed 2024   No resisted elbow flexion or internal rotation     Access Code: [email or web address] - updated on     POC expires Unit limit Auth  expiration date PT/OT + Visit Limit?   25 BOMN N/A BOMN                 Visit/Unit Tracking  AUTH Status:  Date 3/10   1/30 2/3 2/5 2/10 2/12 2/17  2/19 2/24 2/26   N/A Used 14   5 6 7 8 9  10 11 12 13    Remaining                     Manuals 2/5 2/10 2/12 2/17  2/19 2/24 2/26 3/10  1/30 2/3   PROM R Shldr per protocol SC JK SC SC BE BE DC   BE SC                               Neuro Re-Ed              Webslide: M/L              Prone Y,T,I   R only 1# 2x 10  1# 2x 10  1# 3x 10  1# 3x 10   1# 3x10  1# 3x10      Prone rows   R only  3# 2x 10  3# 3x 10  3# 3x 10  3# 3x 10   3# 3x10        Shoulder overhead press from 90/90 against wall        1#   2x5       Wall walks TB  "       YTB 5 laps       Wall clock taps with DB                             Ther Ex              Pt education       Re-evaluation, HEP review    Re-evaluation, HEP review    UBE  5'/5' 5'/5' 5'5/ 5'/5' 5'/5' 5'/5' 5'/5' 5'/5'   4'/4'   Webslide: ER ONLY     GTB 3x10  GTB 3x10        Sidelying shoulder abduction AROM  1# 2x 10  1# 2x 10  1# 3x 10  2# 2x10 2# 3x10  2# 3x10  1# 2x10  1# 2x 10    Sidelying shoulder ER AROM 1# 3x 10  1# 3x 10  1# 3 x 10  1x 15 1#   1x 15 2#  2# 2x10 2# 3x10  2# 3x10  1# 2x10 1# 2x 10    Supine shoulder press        2# 3x10  1# 2x10    Wall slides flex 2x 10 5\" ecc.  2x 10 5\" ecc.  2 x10 5\"   2x 10 5\" with ecc lowering.   3x10   5\" ecc lower     5\" x20 ecc lower  5\" 2x 10 ecc lower   Wall slide with TB resistance              Shoulder 3 way raises  3x 10  2x 10    To 90* 1# 1x10 ea     Full ROM  2x10 To 90* 1# 1x10 ea     Full ROM  2x10  To 90* 1# 1x10 ea     Full ROM  2x10  10x ea dir  2x 10                                Ther Activity                                          Gait Training                                          Modalities              CP                                            "

## 2025-03-10 NOTE — TELEPHONE ENCOUNTER
Scheduled date of EGD(as of today): 3/25/25    Physician performing EGD: Dr. Holt    Location of EGD: [place]

## 2025-03-12 ENCOUNTER — OFFICE VISIT (OUTPATIENT)
Dept: PHYSICAL THERAPY | Facility: CLINIC | Age: 75
End: 2025-03-12
Payer: MEDICARE

## 2025-03-12 DIAGNOSIS — Z98.890 S/P RIGHT ROTATOR CUFF REPAIR: Primary | ICD-10-CM

## 2025-03-12 PROCEDURE — 97112 NEUROMUSCULAR REEDUCATION: CPT

## 2025-03-12 PROCEDURE — 97110 THERAPEUTIC EXERCISES: CPT

## 2025-03-12 NOTE — PROGRESS NOTES
Daily Note     Today's date: 3/12/2025  Patient name: Rosario Morales  : 1950  MRN: [id number]  Referring provider: Meagan Shukla PA-C  Dx:   Encounter Diagnosis     ICD-10-CM    1. S/P right rotator cuff repair  Z98.890                      Subjective: Patient reports that she was able to lift several plates into her kitchen cabinets with her right arm without any assistance from her left. She would like to resume her yoga class however is nervous about this because they perform planks and she isn't sure she'd be able to do this.       Objective: See treatment diary below      Assessment: Tolerated treatment well. Patient with much improved overhead shoulder strength today as she was able to complete shoulder 3 way raises with full shoulder motion and 1# weights utilized. Performed planks today on forearms to promote functional UE strength to allow full return to gym classes. She was able to complete without pain in the shoulder. Patient demonstrated fatigue post treatment, exhibited good technique with therapeutic exercises, and would benefit from continued PT      Plan: Progress treatment as tolerated.       Precautions: S/p Right shoulder arthroscopic rotator cuff repair, subscapularis repair, open subpectoral biceps tenodesis, acromioplasty, extensive debridement performed 2024   No resisted elbow flexion or internal rotation     Access Code: [email or web address] - updated on     POC expires Unit limit Auth  expiration date PT/OT + Visit Limit?   25 BOMN N/A BOMN                 Visit/Unit Tracking  AUTH Status:  Date 3/10   1/30 2/3 2/5 2/10 2/12 2/17  2/19 2/24 2/26   N/A Used 14   5 6 7 8 9  10 11 12 13    Remaining                     Manuals 2/5 2/10 2/12 2/17  2/19 2/24 2/26 3/10 3/12 1/30 2/3   PROM R Shldr per protocol SC JK SC SC BE BE DC   BE SC                               Neuro Re-Ed              Webslide: M/L              Prone Y,T,I   R only 1# 2x 10  1# 2x 10  1# 3x 10  1# 3x 10   1#  "3x10  1# 3x10 1# 3x10     Prone rows   R only  3# 2x 10  3# 3x 10  3# 3x 10  3# 3x 10   3# 3x10        Shoulder overhead press from 90/90 against wall        1#   2x5  1# 2x5      Wall walks TB        YTB 5 laps  YTB 5 laps      Wall clock taps with DB          1# 5 laps      Forearm planks         2x30\"      Ther Ex              Pt education       Re-evaluation, HEP review    Re-evaluation, HEP review    UBE  5'/5' 5'/5' 5'5/ 5'/5' 5'/5' 5'/5' 5'/5' 5'/5' 5'/5'  4'/4'   Webslide: ER ONLY     GTB 3x10  GTB 3x10        Sidelying shoulder abduction AROM  1# 2x 10  1# 2x 10  1# 3x 10  2# 2x10 2# 3x10  2# 3x10  2# 3x10 1# 2x10  1# 2x 10    Sidelying shoulder ER AROM 1# 3x 10  1# 3x 10  1# 3 x 10  1x 15 1#   1x 15 2#  2# 2x10 2# 3x10  2# 3x10 2# 3x10 1# 2x10 1# 2x 10    Supine shoulder press        2# 3x10 2# 3x10 1# 2x10    Wall slides flex 2x 10 5\" ecc.  2x 10 5\" ecc.  2 x10 5\"   2x 10 5\" with ecc lowering.   3x10   5\" ecc lower     5\" x20 ecc lower  5\" 2x 10 ecc lower   Wall slide with TB resistance              Shoulder 3 way raises  3x 10  2x 10    To 90* 1# 1x10 ea     Full ROM  2x10 To 90* 1# 1x10 ea     Full ROM  2x10  To 90* 1# 1x10 ea     Full ROM  2x10 Full ROM   1# 2x10 ea 10x ea dir  2x 10                                Ther Activity                                          Gait Training                                          Modalities              CP                                              "

## 2025-03-17 ENCOUNTER — APPOINTMENT (OUTPATIENT)
Dept: PHYSICAL THERAPY | Facility: CLINIC | Age: 75
End: 2025-03-17
Payer: MEDICARE

## 2025-03-17 DIAGNOSIS — Z98.890 S/P RIGHT ROTATOR CUFF REPAIR: Primary | ICD-10-CM

## 2025-03-17 NOTE — PROGRESS NOTES
"Daily Note     Today's date: 3/17/2025  Patient name: Rosario Morales  : 1950  MRN: [id number]  Referring provider: Meagan Shukla PA-C  Dx:   Encounter Diagnosis     ICD-10-CM    1. S/P right rotator cuff repair  Z98.890                      Subjective: ***      Objective: See treatment diary below      Assessment: Tolerated treatment {Tolerated treatment :[id number]}. Patient {assessment:}      Plan: {PLAN:[id number]}     Precautions: S/p Right shoulder arthroscopic rotator cuff repair, subscapularis repair, open subpectoral biceps tenodesis, acromioplasty, extensive debridement performed 2024   No resisted elbow flexion or internal rotation     Access Code: [email or web address] - updated on     POC expires Unit limit Auth  expiration date PT/OT + Visit Limit?   25 BOMN N/A BOMN                 Visit/Unit Tracking  AUTH Status:  Date 3/10   1/30 2/3 2/5 2/10 2/12 2/17  2/19 2/24 2/26   N/A Used 14   5 6 7 8 9  10 11 12 13    Remaining                     Manuals 2/5 2/10 2/12 2/17  2/19 2/24 2/26 3/10 3/12  2/3   PROM R Shldr per protocol SC JK SC SC BE BE DC    SC                               Neuro Re-Ed              Webslide: M/L              Prone Y,T,I   R only 1# 2x 10  1# 2x 10  1# 3x 10  1# 3x 10   1# 3x10  1# 3x10 1# 3x10     Prone rows   R only  3# 2x 10  3# 3x 10  3# 3x 10  3# 3x 10   3# 3x10        Shoulder overhead press from 90/90 against wall        1#   2x5  1# 2x5      Wall walks TB        YTB 5 laps  YTB 5 laps      Wall clock taps with DB          1# 5 laps      Forearm planks         2x30\"      Ther Ex              Pt education       Re-evaluation, HEP review        UBE  5'/5' 5'/5' 5'5/ 5'/5' 5'/5' 5'/5' 5'/5' 5'/5' 5'/5'  4'/4'   Webslide: ER ONLY     GTB 3x10  GTB 3x10        Sidelying shoulder abduction AROM  1# 2x 10  1# 2x 10  1# 3x 10  2# 2x10 2# 3x10  2# 3x10  2# 3x10  1# 2x 10    Sidelying shoulder ER AROM 1# 3x 10  1# 3x 10  1# 3 x 10  1x 15 1#   1x 15 2#  2# 2x10 " "2# 3x10  2# 3x10 2# 3x10  1# 2x 10    Supine shoulder press        2# 3x10 2# 3x10     Wall slides flex 2x 10 5\" ecc.  2x 10 5\" ecc.  2 x10 5\"   2x 10 5\" with ecc lowering.   3x10   5\" ecc lower      5\" 2x 10 ecc lower   Wall slide with TB resistance              Shoulder 3 way raises  3x 10  2x 10    To 90* 1# 1x10 ea     Full ROM  2x10 To 90* 1# 1x10 ea     Full ROM  2x10  To 90* 1# 1x10 ea     Full ROM  2x10 Full ROM   1# 2x10 ea  2x 10                                Ther Activity                                          Gait Training                                          Modalities              CP                                                "

## 2025-03-19 ENCOUNTER — EVALUATION (OUTPATIENT)
Dept: PHYSICAL THERAPY | Facility: CLINIC | Age: 75
End: 2025-03-19
Payer: MEDICARE

## 2025-03-19 DIAGNOSIS — Z98.890 S/P RIGHT ROTATOR CUFF REPAIR: Primary | ICD-10-CM

## 2025-03-19 PROCEDURE — 97110 THERAPEUTIC EXERCISES: CPT

## 2025-03-19 NOTE — PROGRESS NOTES
PT Discharge    Today's date: 3/19/2025  Patient name: Rosario Morales  : 1950  MRN: [id number]  Referring provider: Meagan Shukla PA-C  Dx:   Encounter Diagnosis     ICD-10-CM    1. S/P right rotator cuff repair  Z98.890                      Assessment    Assessment details: Rosario Morales is a 74 y.o. female presenting to physical therapy for a reevaluation s/p right shoulder arthroscopic rotator cuff repair, subscapularis repair, open subpectoral biceps tenodesis, acromioplasty, extensive debridement performed 24. Since their initial evaluation, she reports 85% improvement in her shoulder. Since her previous re-evaluation, she reports continued improvements in her ability to use her arm for household chores and activities with less limitations or disturbances in her abilities to do so secondary to biceps soreness. She has returned to her recreational workout classes and has been participating without restrictions or modifications to her exercise program, including forearm and side planks. Objectively, she demonstrates continued shoulder AROM and PROM to WNL as well as improvements in her shoulder strength. She does continue with mild shoulder weakness, most notably shoulder abduction, however does plan to continue her recreational workouts at the [facility] to further improve this. She was educated on ways to progress her current HEP as well as her gym program and demonstrated/verbalized understanding.   Understanding of Dx/Px/POC: good     Prognosis: good    Goals  STG to be achieved by 4 weeks  -Pt will be independent with basic HEP MET  -Pt will improve ROM by 25% per post operative protocol in order to improve functional mobility MET  -Pt will report 2/10 at worst in order to facilitate return to PLOF.  MET    LTG to be achieved by Discharge  -Pt will be independent with comprehensive HEP. MET  -Pt will improve MMT scores to WFL in all deficient planes in order to facilitate ease of functional activity  MET  -Pt will improve AROM to WFL in all deficient planes in order to improve functional mobility. MET  -Pt will be able to participate in recreational gym classes without difficulties or restrictions due to her shoulder.  MET  -Pt will be able to complete all household chores without difficulties or restrictions due to her shoulder. MET    Plan    Plan of Care beginning date: 3/19/2025  Plan of Care expiration date: 3/19/2025  Treatment plan discussed with: patient        Subjective Evaluation    History of Present Illness  Mechanism of injury: Rosario reports 85% improvement since beginning PT services. She reports that over the past month, she has noticed continued improvements in her abilities to lift objects into overhead kitchen cabinets and her shelving unit where she keeps her laundry detergents. She has also been able to complete her household chores of cleaning, vacuuming, mopping, and sweeping without difficulties or limitations. She has returned to her workout classes at the [facility] and has noticed her continued progressions of weights for the resistance exercises as well as completed modified planks on her elbows, which is something she has especially avoided until recently. She notes that her discomfort in her bicep region is becoming less frequent and less intense as well.     Quality of life: good    Patient Goals  Patient goals for therapy: decreased pain, increased motion, increased strength, return to sport/leisure activities and independence with ADLs/IADLs    Pain  Current pain ratin  At best pain ratin  At worst pain ratin  Location: Right bicep  Quality: dull ache  Relieving factors: support and rest  Aggravating factors: lifting  Progression: improved    Hand dominance: right    Treatments  Previous treatment: physical therapy and immobilization  Current treatment: physical therapy        Objective     Active Range of Motion   Left Shoulder   Normal active range of motion    Right  "Shoulder   Flexion: 160 degrees   Abduction: 160 degrees   External rotation BTH: T3   Internal rotation BTB: T6     Left Elbow   Normal active range of motion    Right Elbow   Normal active range of motion    Left Wrist   Normal active range of motion    Right Wrist   Normal active range of motion    Passive Range of Motion     Right Shoulder   Flexion: 160 degrees   Abduction: 160 degrees   External rotation 90°: 75 degrees   Internal rotation 90°: 72 degrees     Strength/Myotome Testing     Left Shoulder   Normal muscle strength    Right Shoulder     Planes of Motion   Flexion: 4+   Abduction: 4 (bicep pain)   External rotation at 0°: 4+   Internal rotation at 0°: 4+     Left Elbow   Flexion: 4  Extension: 4+             Precautions: S/p Right shoulder arthroscopic rotator cuff repair, subscapularis repair, open subpectoral biceps tenodesis, acromioplasty, extensive debridement performed 08/21/2024   No resisted elbow flexion or internal rotation     Access Code: [email or web address] - updated on 2/17    POC expires Unit limit Auth  expiration date PT/OT + Visit Limit?   4/23/25 BOMN N/A BOMN                 Visit/Unit Tracking  AUTH Status:  Date 3/10 3/12 3/19  2/3 2/5 2/10 2/12 2/17  2/19 2/24 2/26   N/A Used 14 15 16  6 7 8 9  10 11 12 13    Remaining                     Manuals 2/5 2/10 2/12 2/17  2/19 2/24 2/26 3/10 3/12 3/19    PROM R Shldr per protocol SC JK SC SC BE BE DC                                   Neuro Re-Ed              Webslide: M/L              Prone Y,T,I   R only 1# 2x 10  1# 2x 10  1# 3x 10  1# 3x 10   1# 3x10  1# 3x10 1# 3x10     Prone rows   R only  3# 2x 10  3# 3x 10  3# 3x 10  3# 3x 10   3# 3x10        Shoulder overhead press from 90/90 against wall        1#   2x5  1# 2x5      Wall walks TB        YTB 5 laps  YTB 5 laps      Wall clock taps with DB          1# 5 laps      Forearm planks         2x30\"      Ther Ex              Pt education       Re-evaluation, HEP review    Re-evaluation, gym " "program     UBE  5'/5' 5'/5' 5'5/ 5'/5' 5'/5' 5'/5' 5'/5' 5'/5' 5'/5' 5'/5'    Webslide: ER ONLY     GTB 3x10  GTB 3x10        Sidelying shoulder abduction AROM  1# 2x 10  1# 2x 10  1# 3x 10  2# 2x10 2# 3x10  2# 3x10  2# 3x10     Sidelying shoulder ER AROM 1# 3x 10  1# 3x 10  1# 3 x 10  1x 15 1#   1x 15 2#  2# 2x10 2# 3x10  2# 3x10 2# 3x10     Supine shoulder press        2# 3x10 2# 3x10     Wall slides flex 2x 10 5\" ecc.  2x 10 5\" ecc.  2 x10 5\"   2x 10 5\" with ecc lowering.   3x10   5\" ecc lower         Wall slide with TB resistance              Shoulder 3 way raises  3x 10  2x 10    To 90* 1# 1x10 ea     Full ROM  2x10 To 90* 1# 1x10 ea     Full ROM  2x10  To 90* 1# 1x10 ea     Full ROM  2x10 Full ROM   1# 2x10 ea                                 Ther Activity                                          Gait Training                                          Modalities              CP                                              "

## 2025-03-25 ENCOUNTER — HOSPITAL ENCOUNTER (OUTPATIENT)
Dept: GASTROENTEROLOGY | Facility: HOSPITAL | Age: 75
Setting detail: OUTPATIENT SURGERY
Discharge: HOME | End: 2025-03-25
Attending: INTERNAL MEDICINE
Payer: MEDICARE

## 2025-03-25 ENCOUNTER — ANESTHESIA (OUTPATIENT)
Dept: GASTROENTEROLOGY | Facility: HOSPITAL | Age: 75
End: 2025-03-25
Payer: MEDICARE

## 2025-03-25 ENCOUNTER — ANESTHESIA EVENT (OUTPATIENT)
Dept: GASTROENTEROLOGY | Facility: HOSPITAL | Age: 75
End: 2025-03-25
Payer: MEDICARE

## 2025-03-25 VITALS
HEIGHT: 61 IN | SYSTOLIC BLOOD PRESSURE: 121 MMHG | HEART RATE: 58 BPM | RESPIRATION RATE: 16 BRPM | TEMPERATURE: 97.8 F | WEIGHT: 119.49 LBS | BODY MASS INDEX: 22.56 KG/M2 | DIASTOLIC BLOOD PRESSURE: 70 MMHG | OXYGEN SATURATION: 99 %

## 2025-03-25 DIAGNOSIS — K26.9 DUODENAL ULCER: ICD-10-CM

## 2025-03-25 PROCEDURE — 88305 TISSUE EXAM BY PATHOLOGIST: CPT | Performed by: PATHOLOGY

## 2025-03-25 RX ORDER — SODIUM CHLORIDE, SODIUM LACTATE, POTASSIUM CHLORIDE, CALCIUM CHLORIDE 600; 310; 30; 20 MG/100ML; MG/100ML; MG/100ML; MG/100ML
50 INJECTION, SOLUTION INTRAVENOUS CONTINUOUS
Status: DISCONTINUED | OUTPATIENT
Start: 2025-03-25 | End: 2025-03-29 | Stop reason: HOSPADM

## 2025-03-25 RX ORDER — LIDOCAINE HYDROCHLORIDE 20 MG/ML
INJECTION, SOLUTION EPIDURAL; INFILTRATION; INTRACAUDAL; PERINEURAL AS NEEDED
Status: DISCONTINUED | OUTPATIENT
Start: 2025-03-25 | End: 2025-03-25

## 2025-03-25 RX ORDER — PROPOFOL 10 MG/ML
INJECTION, EMULSION INTRAVENOUS AS NEEDED
Status: DISCONTINUED | OUTPATIENT
Start: 2025-03-25 | End: 2025-03-25

## 2025-03-25 RX ADMIN — PROPOFOL 80 MG: 10 INJECTION, EMULSION INTRAVENOUS at 10:19

## 2025-03-25 RX ADMIN — SODIUM CHLORIDE, SODIUM LACTATE, POTASSIUM CHLORIDE, AND CALCIUM CHLORIDE: .6; .31; .03; .02 INJECTION, SOLUTION INTRAVENOUS at 08:56

## 2025-03-25 RX ADMIN — PROPOFOL 30 MG: 10 INJECTION, EMULSION INTRAVENOUS at 10:23

## 2025-03-25 RX ADMIN — LIDOCAINE HYDROCHLORIDE 100 MG: 20 INJECTION, SOLUTION EPIDURAL; INFILTRATION; INTRACAUDAL; PERINEURAL at 10:19

## 2025-03-25 RX ADMIN — PROPOFOL 30 MG: 10 INJECTION, EMULSION INTRAVENOUS at 10:20

## 2025-03-25 NOTE — ANESTHESIA POSTPROCEDURE EVALUATION
Post-Op Assessment Note    CV Status:  Stable    Pain management: adequate       Mental Status:  Awake and sleepy   Hydration Status:  Euvolemic   PONV Controlled:  Controlled   Airway Patency:  Patent     Post Op Vitals Reviewed: Yes    No anethesia notable event occurred.    Staff: CRNA           Last Filed PACU Vitals:  Vitals Value Taken Time   Temp 97.8    Pulse 67    /58    Resp 15    SpO2 93

## 2025-03-25 NOTE — ANESTHESIA PREPROCEDURE EVALUATION
Procedure:  EGD    Relevant Problems   CARDIO   (+) Thrombophlebitis arm      GI/HEPATIC   (+) Acute upper GI bleeding   (+) Duodenal ulcer      HEMATOLOGY   (+) Acute blood loss anemia   (+) Anemia      MUSCULOSKELETAL   (+) Other idiopathic scoliosis, thoracolumbar region      NEURO/PSYCH   (+) Numbness and tingling      Obstetrics/Gynecology   (+) Asymptomatic postmenopausal status      FEN/Gastrointestinal   (+) Esophageal ring        Physical Exam    Airway    Mallampati score: II  TM Distance: >3 FB  Neck ROM: full     Dental       Cardiovascular      Pulmonary      Other Findings  post-pubertal.      Anesthesia Plan  ASA Score- 2     Anesthesia Type- IV sedation with anesthesia with ASA Monitors.         Additional Monitors:     Airway Plan:            Plan Factors-Exercise tolerance (METS): >4 METS.    Chart reviewed.   Existing labs reviewed. Patient summary reviewed.                  Induction- intravenous.    Postoperative Plan-         Informed Consent- Anesthetic plan and risks discussed with patient.  I personally reviewed this patient with the CRNA. Discussed and agreed on the Anesthesia Plan with the CRNA..      NPO Status:  No vitals data found for the desired time range.

## 2025-03-25 NOTE — H&P
History and Physical - [facility]  Rosario Morales 74 y.o. female MRN: [id number]      HPI: Rosario Morales is a 74 y.o. year old female who presents for ulcer surveillance      REVIEW OF SYSTEMS: Per the HPI, and otherwise unremarkable.    Historical Information   Past Medical History:   Diagnosis Date    Acute bronchitis 03/30/2015    Acute upper respiratory infection 04/16/2014    Allergic 1971    cat allergy    Allergic blepharitis 06/18/2015    Benign neoplasm of skin     Memory loss April 2021    Plantar fasciitis 06/18/2015    Scoliosis Sept. 2020    Thumb pain 06/18/2015    Urinary tract infection January 2021     Past Surgical History:   Procedure Laterality Date    BREAST EXCISIONAL BIOPSY Left     1992    BREAST SURGERY  1992    COLONOSCOPY      EGD      2021    [place] SURGICAL ARTHROSCOPY SHOULDER W/ROTATOR CUFF RPR Right 08/21/2024    Procedure: REPAIR ROTATOR CUFF ARTHROSCOPIC- Right shoulder arthroscopic rotator cuff repair, open subpectoral biceps tenodesis, extensive debridement and acromioplasty;  Surgeon: Akshat Roach DO;  Location: [facility] OR;  Service: Orthopedics    SHOULDER SURGERY Right 08/21/2024    TUBAL LIGATION      LAST ASSESSED: [id number]     Social History   Social History     Substance and Sexual Activity   Alcohol Use Yes    Alcohol/week: 7.0 standard drinks of alcohol    Types: 7 Glasses of wine per week    Comment: daily     Social History     Substance and Sexual Activity   Drug Use No     Social History     Tobacco Use   Smoking Status Never   Smokeless Tobacco Never     Family History   Problem Relation Age of Onset    Migraines Mother     Osteoporosis Mother     Scoliosis Mother     Dementia Mother     Arthritis Mother     Throat cancer Father     Hearing loss Father     Depression Daughter         WITH ANXIETY     Migraines Daughter     Rheum arthritis Daughter     Urinary tract infection Daughter     No Known Problems Daughter     Depression Son         WITH  "ANXIETY     Breast cancer Neg Hx     Colon cancer Neg Hx     Uterine cancer Neg Hx     Cervical cancer Neg Hx     Ovarian cancer Neg Hx        Meds/Allergies     Not in a hospital admission.    Allergies   Allergen Reactions    Other Other (See Comments) and Sneezing     CATS-itchy eyes, heaviness in chest       Objective     Blood pressure 126/71, pulse 61, temperature 98.5 °F (36.9 °C), temperature source Temporal, resp. rate 18, height 5' 1\" (1.549 m), weight 54.2 kg (119 lb 7.8 oz), SpO2 98%, not currently breastfeeding.      PHYSICAL EXAM    Gen: NAD  CV: RRR  CHEST: Clear  ABD: soft, NT/ND  EXT: no edema      ASSESSMENT/PLAN:  This is a 74 y.o. year old female here for EGD, and she is stable and optimized for her procedure.          "

## 2025-03-31 PROCEDURE — 88305 TISSUE EXAM BY PATHOLOGIST: CPT | Performed by: PATHOLOGY

## 2025-04-01 ENCOUNTER — RESULTS FOLLOW-UP (OUTPATIENT)
Dept: GASTROENTEROLOGY | Facility: CLINIC | Age: 75
End: 2025-04-01

## 2025-04-16 ENCOUNTER — TELEPHONE (OUTPATIENT)
Age: 75
End: 2025-04-16

## 2025-04-16 DIAGNOSIS — Z12.31 ENCOUNTER FOR SCREENING MAMMOGRAM FOR BREAST CANCER: Primary | ICD-10-CM

## 2025-06-11 ENCOUNTER — HOSPITAL ENCOUNTER (OUTPATIENT)
Dept: MAMMOGRAPHY | Facility: CLINIC | Age: 75
Discharge: HOME | End: 2025-06-11
Payer: MEDICARE

## 2025-06-11 DIAGNOSIS — Z12.31 ENCOUNTER FOR SCREENING MAMMOGRAM FOR BREAST CANCER: ICD-10-CM

## 2025-06-11 PROCEDURE — 77067 SCR MAMMO BI INCL CAD: CPT

## 2025-06-11 PROCEDURE — 77063 BREAST TOMOSYNTHESIS BI: CPT

## 2025-06-12 ENCOUNTER — OFFICE VISIT (OUTPATIENT)
Dept: URGENT CARE | Facility: CLINIC | Age: 75
End: 2025-06-12
Payer: MEDICARE

## 2025-06-12 VITALS
RESPIRATION RATE: 18 BRPM | TEMPERATURE: 97.2 F | DIASTOLIC BLOOD PRESSURE: 59 MMHG | OXYGEN SATURATION: 96 % | SYSTOLIC BLOOD PRESSURE: 109 MMHG | HEART RATE: 81 BPM

## 2025-06-12 DIAGNOSIS — R23.4 SCAB: Primary | ICD-10-CM

## 2025-06-12 PROCEDURE — G0463 HOSPITAL OUTPT CLINIC VISIT: HCPCS | Performed by: PHYSICIAN ASSISTANT

## 2025-06-12 PROCEDURE — 99212 OFFICE O/P EST SF 10 MIN: CPT | Performed by: PHYSICIAN ASSISTANT

## 2025-06-12 NOTE — PROGRESS NOTES
[facility]        NAME: Rosario Morales is a 74 y.o. female  : 1950    MRN: [id number]  DATE: 2025  TIME: 12:45 PM    Assessment and Plan   Scab [R23.4]  1. Scab              Patient Instructions       Follow up with PCP in 3-5 days.  Proceed to  ER if symptoms worsen.    If tests have been performed at [facility], our office will contact you with results if changes need to be made to the care plan discussed with you at the visit. [facility]     Chief Complaint     Chief Complaint   Patient presents with    possible tick bite     Patient noticed red pimple looking or scab to left side of abdomen yesterday. Has concerns for a tick bite.          History of Present Illness       Patient is a 73 yo female who presents for a lesion on her L abdomen, which she fears may be a left over tick part or a scab. No bleeding or drainage from the lesion. No fevers. No ticks removed.        Review of Systems   Review of Systems   Constitutional: Negative.    HENT: Negative.     Respiratory: Negative.     Cardiovascular: Negative.    Skin:         + small scab on L abdomen.         Current Medications     Current Medications[1]    Current Allergies     Allergies as of 2025 - Reviewed 2025   Allergen Reaction Noted    Other Other (See Comments) and Sneezing 2021            The following portions of the patient's history were reviewed and updated as appropriate: allergies, current medications, past family history, past medical history, past social history, past surgical history and problem list.     Past Medical History[2]    Past Surgical History[3]    Family History[4]      Medications have been verified.        Objective   /59   Pulse 81   Temp (!) 97.2 °F (36.2 °C)   Resp 18   SpO2 96%        Physical Exam     Physical Exam                   [1]   Current Outpatient Medications:     Ascorbic Acid (vitamin C) 1000 MG tablet, Take 1,000  mg by mouth in the morning., Disp: , Rfl:     Calcium Carb-Cholecalciferol (Calcium 500 + D) 500-5 MG-MCG TABS, Take 2 tablets by mouth in the morning, Disp: , Rfl:     cyanocobalamin (VITAMIN B-12) 100 mcg tablet, Take by mouth in the morning., Disp: , Rfl:     cycloSPORINE (RESTASIS) 0.05 % ophthalmic emulsion, Administer 1 drop to both eyes in the morning and 1 drop in the evening., Disp: , Rfl:     ferrous gluconate (FERGON) 324 mg tablet, Take 324 mg by mouth daily with breakfast, Disp: , Rfl:   [2]   Past Medical History:  Diagnosis Date    Acute bronchitis 03/30/2015    Acute upper respiratory infection 04/16/2014    Allergic 1971    cat allergy    Allergic blepharitis 06/18/2015    Benign neoplasm of skin     Memory loss April 2021    Plantar fasciitis 06/18/2015    Scoliosis Sept. 2020    Thumb pain 06/18/2015    Urinary tract infection January 2021   [3]   Past Surgical History:  Procedure Laterality Date    BREAST EXCISIONAL BIOPSY Left     1992    BREAST SURGERY  1992    COLONOSCOPY      EGD      2021    [place] SURGICAL ARTHROSCOPY SHOULDER W/ROTATOR CUFF RPR Right 08/21/2024    Procedure: REPAIR ROTATOR CUFF ARTHROSCOPIC- Right shoulder arthroscopic rotator cuff repair, open subpectoral biceps tenodesis, extensive debridement and acromioplasty;  Surgeon: Akshat Roach DO;  Location: [facility];  Service: Orthopedics    SHOULDER SURGERY Right 08/21/2024    TUBAL LIGATION      LAST ASSESSED: [id number]   [4]   Family History  Problem Relation Name Age of Onset    Migraines Mother Nanda     Osteoporosis Mother Nanda     Scoliosis Mother Nanda     Dementia Mother Nanda     Arthritis Mother Nanda     Throat cancer Father Claude     Hearing loss Father Claude     Depression Daughter kiana         WITH ANXIETY     Migraines Daughter kiana     Rheum arthritis Daughter kiana     Urinary tract infection Daughter kiana     No Known Problems Daughter tylor     Depression Son [place]         WITH ANXIETY      Breast cancer Neg Hx      Colon cancer Neg Hx      Uterine cancer Neg Hx      Cervical cancer Neg Hx      Ovarian cancer Neg Hx

## 2025-06-20 ENCOUNTER — OFFICE VISIT (OUTPATIENT)
Age: 75
End: 2025-06-20
Payer: MEDICARE

## 2025-06-20 ENCOUNTER — APPOINTMENT (OUTPATIENT)
Dept: LAB | Facility: CLINIC | Age: 75
End: 2025-06-20

## 2025-06-20 VITALS
RESPIRATION RATE: 16 BRPM | OXYGEN SATURATION: 98 % | HEIGHT: 61 IN | HEART RATE: 70 BPM | DIASTOLIC BLOOD PRESSURE: 68 MMHG | SYSTOLIC BLOOD PRESSURE: 104 MMHG | BODY MASS INDEX: 23.03 KG/M2 | WEIGHT: 122 LBS

## 2025-06-20 DIAGNOSIS — R19.7 DIARRHEA, UNSPECIFIED TYPE: Primary | ICD-10-CM

## 2025-06-20 PROCEDURE — G2211 COMPLEX E/M VISIT ADD ON: HCPCS

## 2025-06-20 PROCEDURE — 99213 OFFICE O/P EST LOW 20 MIN: CPT

## 2025-06-20 NOTE — PROGRESS NOTES
Name: Rosario Morales      : 1950      MRN: [id number]  Encounter Provider: Meagan Mason PA-C  Encounter Date: 2025   Encounter department: [facility] INTERNAL MEDICINE [facility] [address]  :  Assessment & Plan  Diarrhea, unspecified type  Recommended obtaining stool studies to rule out bacterial cause.  Colonoscopy completed last year was unremarkable.  May be related to underlying IBS as she does admit to an increase in stress.  Will follow-up with lab results.  If negative, will plan to start daily fiber and probiotic.  Refrain from antidiarrheal agents at this time.  If symptoms persist despite fiber and probiotic, can place referral to GI.  Orders:    Giardia antigen; Future    Clostridioides difficile toxin by PCR with EIA; Future    Ova and parasite examination; Future    Stool culture; Future    Calprotectin,Fecal; Future    iFOBT (FIT); Future          Depression Screening and Follow-up Plan: Patient was screened for depression during today's encounter. They screened negative with a PHQ-2 score of 0.        History of Present Illness   Patient is a 74-year-old female that presents today for diarrhea over the past 4 weeks. It occurs first thing in the morning, sometimes it's water-like, soft, urgency, some abd cramping. It occurs twice in the morning. The rest of the day she is fine, but if she goes again it's normal. She eats a high fiber diet.  She denies any fevers, chills, night sweats, unintentional weight loss, nausea, vomiting, dysuria, hematuria, history of abdominal surgeries, hematochezia, melena.  She does have a family history of ulcerative colitis.  She denies any changes in appetite.  She is eating all of her normal meals.  She denies any recent travel.  She denies any sick contacts.    Diarrhea   Pertinent negatives include no abdominal pain, chills, fever or vomiting.     Review of Systems   Constitutional:  Negative for appetite change, chills, diaphoresis, fever and  "unexpected weight change.   Gastrointestinal:  Positive for diarrhea. Negative for abdominal distention, abdominal pain, anal bleeding, blood in stool, constipation, nausea, rectal pain and vomiting.   Genitourinary:  Negative for difficulty urinating, dysuria and hematuria.       Objective   /68 (BP Location: Left arm, Patient Position: Sitting, Cuff Size: Standard)   Pulse 70   Resp 16   Ht 5' 1\" (1.549 m)   Wt 55.3 kg (122 lb)   SpO2 98%   BMI 23.05 kg/m²      Physical Exam  Vitals and nursing note reviewed.   Constitutional:       General: She is awake. She is not in acute distress.     Appearance: Normal appearance. She is well-developed, well-groomed and normal weight.   HENT:      Head: Normocephalic and atraumatic.      Right Ear: Hearing and external ear normal.      Left Ear: Hearing and external ear normal.      Nose: Nose normal.      Mouth/Throat:      Lips: Pink.      Mouth: Mucous membranes are moist.     Eyes:      General: Lids are normal. Vision grossly intact. Gaze aligned appropriately.      Conjunctiva/sclera: Conjunctivae normal.     Neck:      Vascular: No carotid bruit.      Trachea: Trachea and phonation normal.     Cardiovascular:      Rate and Rhythm: Normal rate and regular rhythm.      Heart sounds: Normal heart sounds, S1 normal and S2 normal. No murmur heard.     No friction rub. No gallop.   Pulmonary:      Effort: Pulmonary effort is normal. No respiratory distress.      Breath sounds: Normal breath sounds and air entry. No decreased breath sounds, wheezing, rhonchi or rales.   Abdominal:      General: Abdomen is flat. Bowel sounds are normal.      Palpations: Abdomen is soft.      Tenderness: There is no abdominal tenderness.     Musculoskeletal:         General: No swelling.      Cervical back: Neck supple.      Right lower leg: No edema.      Left lower leg: No edema.     Skin:     General: Skin is warm.      Capillary Refill: Capillary refill takes less than 2 " seconds.     Neurological:      Mental Status: She is alert.     Psychiatric:         Attention and Perception: Attention and perception normal.         Mood and Affect: Mood and affect normal.         Speech: Speech normal.         Behavior: Behavior normal. Behavior is cooperative.         Thought Content: Thought content normal.         Cognition and Memory: Cognition and memory normal.         Judgment: Judgment normal.

## 2025-06-27 ENCOUNTER — APPOINTMENT (OUTPATIENT)
Dept: LAB | Facility: CLINIC | Age: 75
End: 2025-06-27
Payer: MEDICARE

## 2025-06-27 DIAGNOSIS — R19.7 DIARRHEA, UNSPECIFIED TYPE: ICD-10-CM

## 2025-06-27 LAB — HEMOCCULT STL QL IA: NEGATIVE

## 2025-06-27 PROCEDURE — 83993 ASSAY FOR CALPROTECTIN FECAL: CPT

## 2025-06-27 PROCEDURE — 87506 IADNA-DNA/RNA PROBE TQ 6-11: CPT

## 2025-06-27 PROCEDURE — 87209 SMEAR COMPLEX STAIN: CPT

## 2025-06-27 PROCEDURE — G0328 FECAL BLOOD SCRN IMMUNOASSAY: HCPCS

## 2025-06-27 PROCEDURE — 87177 OVA AND PARASITES SMEARS: CPT

## 2025-06-28 LAB
C COLI+JEJUNI TUF STL QL NAA+PROBE: NEGATIVE
C DIFF TOX GENS STL QL NAA+PROBE: NEGATIVE
EC STX1+STX2 GENES STL QL NAA+PROBE: NEGATIVE
SALMONELLA SP SPAO STL QL NAA+PROBE: NEGATIVE
SHIGELLA SP+EIEC IPAH STL QL NAA+PROBE: NEGATIVE

## 2025-06-29 LAB — G LAMBLIA AG STL QL IA: NEGATIVE

## 2025-06-30 ENCOUNTER — RESULTS FOLLOW-UP (OUTPATIENT)
Age: 75
End: 2025-06-30

## 2025-06-30 LAB — CALPROTECTIN STL-MCNC: 6.55 ÂΜG/G

## 2025-06-30 NOTE — TELEPHONE ENCOUNTER
----- Message from Meagan Mason PA-C sent at 6/30/2025  7:23 AM EDT -----  So far stool studies were negative  ----- Message -----  From: Lab, Background User  Sent: 6/27/2025   6:13 PM EDT  To: Meagan Mason PA-C

## 2025-07-17 ENCOUNTER — APPOINTMENT (OUTPATIENT)
Dept: LAB | Facility: CLINIC | Age: 75
End: 2025-07-17
Payer: MEDICARE

## 2025-07-17 DIAGNOSIS — Z00.00 WELL ADULT EXAM: ICD-10-CM

## 2025-07-17 DIAGNOSIS — M85.80 OSTEOPENIA, UNSPECIFIED LOCATION: ICD-10-CM

## 2025-07-17 DIAGNOSIS — M75.101 TEAR OF RIGHT SUPRASPINATUS TENDON: ICD-10-CM

## 2025-07-17 DIAGNOSIS — S46.001A INJURY OF RIGHT ROTATOR CUFF, INITIAL ENCOUNTER: ICD-10-CM

## 2025-07-17 DIAGNOSIS — E55.9 VITAMIN D DEFICIENCY: ICD-10-CM

## 2025-07-17 DIAGNOSIS — E78.5 DYSLIPIDEMIA: ICD-10-CM

## 2025-07-17 DIAGNOSIS — K26.9 DUODENAL ULCER: ICD-10-CM

## 2025-07-17 DIAGNOSIS — D62 ACUTE BLOOD LOSS ANEMIA: ICD-10-CM

## 2025-07-17 LAB
25(OH)D3 SERPL-MCNC: 45 NG/ML (ref 30–100)
ALBUMIN SERPL BCG-MCNC: 3.9 G/DL (ref 3.5–5)
ALP SERPL-CCNC: 67 U/L (ref 34–104)
ALT SERPL W P-5'-P-CCNC: 14 U/L (ref 7–52)
ANION GAP SERPL CALCULATED.3IONS-SCNC: 8 MMOL/L (ref 4–13)
AST SERPL W P-5'-P-CCNC: 27 U/L (ref 13–39)
BASOPHILS # BLD AUTO: 0.09 THOUSANDS/ÂΜL (ref 0–0.1)
BASOPHILS NFR BLD AUTO: 2 % (ref 0–1)
BILIRUB DIRECT SERPL-MCNC: 0.12 MG/DL (ref 0–0.2)
BILIRUB SERPL-MCNC: 0.55 MG/DL (ref 0.2–1)
BUN SERPL-MCNC: 16 MG/DL (ref 5–25)
CALCIUM SERPL-MCNC: 9.1 MG/DL (ref 8.4–10.2)
CHLORIDE SERPL-SCNC: 105 MMOL/L (ref 96–108)
CHOLEST SERPL-MCNC: 200 MG/DL (ref ?–200)
CO2 SERPL-SCNC: 24 MMOL/L (ref 21–32)
CREAT SERPL-MCNC: 0.9 MG/DL (ref 0.6–1.3)
EOSINOPHIL # BLD AUTO: 0.29 THOUSAND/ÂΜL (ref 0–0.61)
EOSINOPHIL NFR BLD AUTO: 6 % (ref 0–6)
ERYTHROCYTE [DISTWIDTH] IN BLOOD BY AUTOMATED COUNT: 13.1 % (ref 11.6–15.1)
EST. AVERAGE GLUCOSE BLD GHB EST-MCNC: 108 MG/DL
FERRITIN SERPL-MCNC: 82 NG/ML (ref 30–307)
GFR SERPL CREATININE-BSD FRML MDRD: 63 ML/MIN/1.73SQ M
GLUCOSE P FAST SERPL-MCNC: 90 MG/DL (ref 65–99)
HBA1C MFR BLD: 5.4 %
HCT VFR BLD AUTO: 40.9 % (ref 34.8–46.1)
HDLC SERPL-MCNC: 78 MG/DL
HGB BLD-MCNC: 13.3 G/DL (ref 11.5–15.4)
IMM GRANULOCYTES # BLD AUTO: 0.01 THOUSAND/UL (ref 0–0.2)
IMM GRANULOCYTES NFR BLD AUTO: 0 % (ref 0–2)
IRON SATN MFR SERPL: 15 % (ref 15–50)
IRON SERPL-MCNC: 53 UG/DL (ref 50–212)
LDLC SERPL CALC-MCNC: 104 MG/DL (ref 0–100)
LYMPHOCYTES # BLD AUTO: 1.8 THOUSANDS/ÂΜL (ref 0.6–4.47)
LYMPHOCYTES NFR BLD AUTO: 36 % (ref 14–44)
MCH RBC QN AUTO: 30.9 PG (ref 26.8–34.3)
MCHC RBC AUTO-ENTMCNC: 32.5 G/DL (ref 31.4–37.4)
MCV RBC AUTO: 95 FL (ref 82–98)
MONOCYTES # BLD AUTO: 0.51 THOUSAND/ÂΜL (ref 0.17–1.22)
MONOCYTES NFR BLD AUTO: 10 % (ref 4–12)
NEUTROPHILS # BLD AUTO: 2.35 THOUSANDS/ÂΜL (ref 1.85–7.62)
NEUTS SEG NFR BLD AUTO: 46 % (ref 43–75)
NONHDLC SERPL-MCNC: 122 MG/DL
NRBC BLD AUTO-RTO: 0 /100 WBCS
PLATELET # BLD AUTO: 261 THOUSANDS/UL (ref 149–390)
PMV BLD AUTO: 10.2 FL (ref 8.9–12.7)
POTASSIUM SERPL-SCNC: 4.2 MMOL/L (ref 3.5–5.3)
PROT SERPL-MCNC: 7 G/DL (ref 6.4–8.4)
RBC # BLD AUTO: 4.3 MILLION/UL (ref 3.81–5.12)
SODIUM SERPL-SCNC: 137 MMOL/L (ref 135–147)
TIBC SERPL-MCNC: 359.8 UG/DL (ref 250–450)
TRANSFERRIN SERPL-MCNC: 257 MG/DL (ref 203–362)
TRIGL SERPL-MCNC: 92 MG/DL (ref ?–150)
TSH SERPL DL<=0.05 MIU/L-ACNC: 1.93 UIU/ML (ref 0.45–4.5)
UIBC SERPL-MCNC: 307 UG/DL (ref 155–355)
WBC # BLD AUTO: 5.05 THOUSAND/UL (ref 4.31–10.16)

## 2025-07-17 PROCEDURE — 80076 HEPATIC FUNCTION PANEL: CPT

## 2025-07-17 PROCEDURE — 82728 ASSAY OF FERRITIN: CPT

## 2025-07-17 PROCEDURE — 83036 HEMOGLOBIN GLYCOSYLATED A1C: CPT

## 2025-07-17 PROCEDURE — 36415 COLL VENOUS BLD VENIPUNCTURE: CPT

## 2025-07-17 PROCEDURE — 82306 VITAMIN D 25 HYDROXY: CPT

## 2025-07-17 PROCEDURE — 80061 LIPID PANEL: CPT

## 2025-07-17 PROCEDURE — 84443 ASSAY THYROID STIM HORMONE: CPT

## 2025-07-17 PROCEDURE — 83540 ASSAY OF IRON: CPT

## 2025-07-17 PROCEDURE — 85025 COMPLETE CBC W/AUTO DIFF WBC: CPT

## 2025-07-17 PROCEDURE — 83550 IRON BINDING TEST: CPT

## 2025-07-17 PROCEDURE — 80048 BASIC METABOLIC PNL TOTAL CA: CPT

## 2025-07-20 ENCOUNTER — RA CDI HCC (OUTPATIENT)
Dept: OTHER | Facility: HOSPITAL | Age: 75
End: 2025-07-20

## 2025-07-28 ENCOUNTER — OFFICE VISIT (OUTPATIENT)
Age: 75
End: 2025-07-28
Payer: MEDICARE

## 2025-07-28 VITALS
HEART RATE: 76 BPM | DIASTOLIC BLOOD PRESSURE: 74 MMHG | HEIGHT: 61 IN | OXYGEN SATURATION: 97 % | BODY MASS INDEX: 23.03 KG/M2 | WEIGHT: 122 LBS | SYSTOLIC BLOOD PRESSURE: 108 MMHG

## 2025-07-28 DIAGNOSIS — E78.5 DYSLIPIDEMIA: ICD-10-CM

## 2025-07-28 DIAGNOSIS — K22.2 ESOPHAGEAL RING: ICD-10-CM

## 2025-07-28 DIAGNOSIS — M81.8 OTHER OSTEOPOROSIS WITHOUT CURRENT PATHOLOGICAL FRACTURE: Primary | ICD-10-CM

## 2025-07-28 DIAGNOSIS — D50.0 IRON DEFICIENCY ANEMIA DUE TO CHRONIC BLOOD LOSS: ICD-10-CM

## 2025-07-28 PROBLEM — D62 ACUTE BLOOD LOSS ANEMIA: Status: RESOLVED | Noted: 2025-01-06 | Resolved: 2025-07-28

## 2025-07-28 PROBLEM — K92.2 ACUTE UPPER GI BLEEDING: Status: RESOLVED | Noted: 2021-11-29 | Resolved: 2025-07-28

## 2025-07-28 PROBLEM — M25.511 RIGHT SHOULDER PAIN, UNSPECIFIED CHRONICITY: Status: RESOLVED | Noted: 2024-08-13 | Resolved: 2025-07-28

## 2025-07-28 PROCEDURE — 99214 OFFICE O/P EST MOD 30 MIN: CPT | Performed by: INTERNAL MEDICINE

## 2025-07-28 PROCEDURE — G2211 COMPLEX E/M VISIT ADD ON: HCPCS | Performed by: INTERNAL MEDICINE

## 2025-07-28 PROCEDURE — G0439 PPPS, SUBSEQ VISIT: HCPCS | Performed by: INTERNAL MEDICINE

## 2025-07-30 ENCOUNTER — TELEPHONE (OUTPATIENT)
Dept: GASTROENTEROLOGY | Facility: CLINIC | Age: 75
End: 2025-07-30

## 2025-08-07 ENCOUNTER — HOSPITAL ENCOUNTER (OUTPATIENT)
Dept: ULTRASOUND IMAGING | Facility: CLINIC | Age: 75
End: 2025-08-07
Attending: INTERNAL MEDICINE
Payer: MEDICARE

## 2025-08-07 ENCOUNTER — HOSPITAL ENCOUNTER (OUTPATIENT)
Dept: MAMMOGRAPHY | Facility: CLINIC | Age: 75
End: 2025-08-07
Attending: INTERNAL MEDICINE
Payer: MEDICARE

## 2025-08-14 ENCOUNTER — ANESTHESIA EVENT (OUTPATIENT)
Dept: GASTROENTEROLOGY | Facility: HOSPITAL | Age: 75
End: 2025-08-14
Payer: MEDICARE

## 2025-08-14 ENCOUNTER — HOSPITAL ENCOUNTER (OUTPATIENT)
Dept: GASTROENTEROLOGY | Facility: HOSPITAL | Age: 75
Setting detail: OUTPATIENT SURGERY
End: 2025-08-14
Attending: INTERNAL MEDICINE
Payer: MEDICARE

## 2025-08-14 ENCOUNTER — ANESTHESIA (OUTPATIENT)
Dept: GASTROENTEROLOGY | Facility: HOSPITAL | Age: 75
End: 2025-08-14
Payer: MEDICARE

## (undated) DEVICE — SCD SEQUENTIAL COMPRESSION COMFORT SLEEVE MEDIUM KNEE LENGTH: Brand: KENDALL SCD

## (undated) DEVICE — ABDOMINAL PAD: Brand: DERMACEA

## (undated) DEVICE — GLOVE INDICATOR PI UNDERGLOVE SZ 7 BLUE

## (undated) DEVICE — 4-PORT MANIFOLD: Brand: NEPTUNE 2

## (undated) DEVICE — INTENDED FOR TISSUE SEPARATION, AND OTHER PROCEDURES THAT REQUIRE A SHARP SURGICAL BLADE TO PUNCTURE OR CUT.: Brand: BARD-PARKER ® CARBON RIB-BACK BLADES

## (undated) DEVICE — SHOULDER SUSPENSION KIT 6 PER BOX

## (undated) DEVICE — 3M™ STERI-STRIP™ REINFORCED ADHESIVE SKIN CLOSURES, R1547, 1/2 IN X 4 IN (12 MM X 100 MM), 6 STRIPS/ENVELOPE: Brand: 3M™ STERI-STRIP™

## (undated) DEVICE — CANNULA ARTHRO LOPRFL 5MM X 7CM

## (undated) DEVICE — TUBING ARTHROSCOPIC WAVE  MAIN PUMP

## (undated) DEVICE — U-DRAPE: Brand: CONVERTORS

## (undated) DEVICE — SUT PDS II 0 CT-1 27 IN Z340H

## (undated) DEVICE — PROBE ABLATION  APOLLO RF 90 DEG MULTI PORT

## (undated) DEVICE — FIBERTAK ROTATOR CUFF DISPOSABLES KIT

## (undated) DEVICE — 3M™ MICROFOAM™ SURGICAL TAPE 4 ROLLS/CARTON 6 CARTONS/CASE 1528-3: Brand: 3M™ MICROFOAM™

## (undated) DEVICE — CHLORAPREP HI-LITE 26ML ORANGE

## (undated) DEVICE — BURR  OVAL 4MM 13CM 8 FLUTE COOLCUT

## (undated) DEVICE — BLADE SHAVER DISSECTOR 4MM 13CM COOLCUT

## (undated) DEVICE — MEDI-VAC TUBING CONNECTOR 6-IN-1 STRAIGHT: Brand: CARDINAL HEALTH

## (undated) DEVICE — CANNULA 7 X70MM THRD SEAL SIDE PORT

## (undated) DEVICE — BETHLEHEM UNIVERSAL  ARTHRO PK: Brand: CARDINAL HEALTH

## (undated) DEVICE — TUBING SUCTION 5MM X 12 FT

## (undated) DEVICE — GAUZE SPONGES,16 PLY: Brand: CURITY

## (undated) DEVICE — GLOVE SRG BIOGEL ECLIPSE 7

## (undated) DEVICE — OCCLUSIVE GAUZE STRIP,3% BISMUTH TRIBROMOPHENATE IN PETROLATUM BLEND: Brand: XEROFORM

## (undated) DEVICE — DRAPE SHEET THREE QUARTER

## (undated) DEVICE — SUT ETHILON 3-0 PS-1 18 IN 1663H

## (undated) DEVICE — GLOVE INDICATOR PI UNDERGLOVE SZ 7.5 BLUE